# Patient Record
Sex: FEMALE | Race: WHITE | NOT HISPANIC OR LATINO | Employment: FULL TIME | URBAN - METROPOLITAN AREA
[De-identification: names, ages, dates, MRNs, and addresses within clinical notes are randomized per-mention and may not be internally consistent; named-entity substitution may affect disease eponyms.]

---

## 2017-11-11 ENCOUNTER — HOSPITAL ENCOUNTER (INPATIENT)
Facility: MEDICAL CENTER | Age: 47
LOS: 4 days | DRG: 392 | End: 2017-11-15
Attending: EMERGENCY MEDICINE | Admitting: INTERNAL MEDICINE
Payer: COMMERCIAL

## 2017-11-11 ENCOUNTER — RESOLUTE PROFESSIONAL BILLING HOSPITAL PROF FEE (OUTPATIENT)
Dept: HOSPITALIST | Facility: MEDICAL CENTER | Age: 47
End: 2017-11-11
Payer: COMMERCIAL

## 2017-11-11 PROBLEM — E86.0 DEHYDRATION: Status: ACTIVE | Noted: 2017-11-11

## 2017-11-11 PROBLEM — G89.29 CHRONIC PAIN: Status: ACTIVE | Noted: 2017-11-11

## 2017-11-11 PROBLEM — A08.4 VIRAL GASTROENTERITIS: Status: ACTIVE | Noted: 2017-11-11

## 2017-11-11 PROBLEM — E83.42 HYPOMAGNESEMIA: Status: ACTIVE | Noted: 2017-11-11

## 2017-11-11 PROBLEM — E87.6 HYPOKALEMIA: Status: ACTIVE | Noted: 2017-11-11

## 2017-11-11 PROBLEM — Z72.0 TOBACCO ABUSE: Status: ACTIVE | Noted: 2017-11-11

## 2017-11-11 LAB
ALBUMIN SERPL BCP-MCNC: 4.6 G/DL (ref 3.2–4.9)
ALBUMIN/GLOB SERPL: 1 G/DL
ALP SERPL-CCNC: 99 U/L (ref 30–99)
ALT SERPL-CCNC: 20 U/L (ref 2–50)
ANION GAP SERPL CALC-SCNC: 9 MMOL/L (ref 0–11.9)
AST SERPL-CCNC: 26 U/L (ref 12–45)
BASOPHILS # BLD AUTO: 0.5 % (ref 0–1.8)
BASOPHILS # BLD: 0.03 K/UL (ref 0–0.12)
BILIRUB SERPL-MCNC: 0.9 MG/DL (ref 0.1–1.5)
BUN SERPL-MCNC: 21 MG/DL (ref 8–22)
CALCIUM SERPL-MCNC: 9.4 MG/DL (ref 8.4–10.2)
CHLORIDE SERPL-SCNC: 105 MMOL/L (ref 96–112)
CO2 SERPL-SCNC: 17 MMOL/L (ref 20–33)
CREAT SERPL-MCNC: 1.29 MG/DL (ref 0.5–1.4)
EOSINOPHIL # BLD AUTO: 0.15 K/UL (ref 0–0.51)
EOSINOPHIL NFR BLD: 2.3 % (ref 0–6.9)
ERYTHROCYTE [DISTWIDTH] IN BLOOD BY AUTOMATED COUNT: 45 FL (ref 35.9–50)
GFR SERPL CREATININE-BSD FRML MDRD: 44 ML/MIN/1.73 M 2
GLOBULIN SER CALC-MCNC: 4.8 G/DL (ref 1.9–3.5)
GLUCOSE SERPL-MCNC: 121 MG/DL (ref 65–99)
HCT VFR BLD AUTO: 50.9 % (ref 37–47)
HGB BLD-MCNC: 17.8 G/DL (ref 12–16)
IMM GRANULOCYTES # BLD AUTO: 0.01 K/UL (ref 0–0.11)
IMM GRANULOCYTES NFR BLD AUTO: 0.2 % (ref 0–0.9)
LIPASE SERPL-CCNC: 22 U/L (ref 7–58)
LYMPHOCYTES # BLD AUTO: 1.44 K/UL (ref 1–4.8)
LYMPHOCYTES NFR BLD: 22.1 % (ref 22–41)
MAGNESIUM SERPL-MCNC: 1.5 MG/DL (ref 1.5–2.5)
MCH RBC QN AUTO: 32 PG (ref 27–33)
MCHC RBC AUTO-ENTMCNC: 35 G/DL (ref 33.6–35)
MCV RBC AUTO: 91.5 FL (ref 81.4–97.8)
MONOCYTES # BLD AUTO: 0.59 K/UL (ref 0–0.85)
MONOCYTES NFR BLD AUTO: 9 % (ref 0–13.4)
NEUTROPHILS # BLD AUTO: 4.3 K/UL (ref 2–7.15)
NEUTROPHILS NFR BLD: 65.9 % (ref 44–72)
NRBC # BLD AUTO: 0 K/UL
NRBC BLD AUTO-RTO: 0 /100 WBC
PLATELET # BLD AUTO: 125 K/UL (ref 164–446)
PMV BLD AUTO: 10.1 FL (ref 9–12.9)
POTASSIUM SERPL-SCNC: 2.4 MMOL/L (ref 3.6–5.5)
PROT SERPL-MCNC: 9.4 G/DL (ref 6–8.2)
RBC # BLD AUTO: 5.56 M/UL (ref 4.2–5.4)
SODIUM SERPL-SCNC: 131 MMOL/L (ref 135–145)
WBC # BLD AUTO: 6.5 K/UL (ref 4.8–10.8)

## 2017-11-11 PROCEDURE — 80053 COMPREHEN METABOLIC PANEL: CPT

## 2017-11-11 PROCEDURE — A9270 NON-COVERED ITEM OR SERVICE: HCPCS

## 2017-11-11 PROCEDURE — 700105 HCHG RX REV CODE 258: Performed by: INTERNAL MEDICINE

## 2017-11-11 PROCEDURE — 99285 EMERGENCY DEPT VISIT HI MDM: CPT

## 2017-11-11 PROCEDURE — 99223 1ST HOSP IP/OBS HIGH 75: CPT | Mod: 25 | Performed by: INTERNAL MEDICINE

## 2017-11-11 PROCEDURE — 770020 HCHG ROOM/CARE - TELE (206)

## 2017-11-11 PROCEDURE — 700102 HCHG RX REV CODE 250 W/ 637 OVERRIDE(OP)

## 2017-11-11 PROCEDURE — 700111 HCHG RX REV CODE 636 W/ 250 OVERRIDE (IP): Performed by: EMERGENCY MEDICINE

## 2017-11-11 PROCEDURE — 83690 ASSAY OF LIPASE: CPT

## 2017-11-11 PROCEDURE — A9270 NON-COVERED ITEM OR SERVICE: HCPCS | Performed by: INTERNAL MEDICINE

## 2017-11-11 PROCEDURE — 99407 BEHAV CHNG SMOKING > 10 MIN: CPT | Performed by: INTERNAL MEDICINE

## 2017-11-11 PROCEDURE — 700111 HCHG RX REV CODE 636 W/ 250 OVERRIDE (IP): Performed by: INTERNAL MEDICINE

## 2017-11-11 PROCEDURE — 36415 COLL VENOUS BLD VENIPUNCTURE: CPT

## 2017-11-11 PROCEDURE — 96375 TX/PRO/DX INJ NEW DRUG ADDON: CPT

## 2017-11-11 PROCEDURE — 96361 HYDRATE IV INFUSION ADD-ON: CPT

## 2017-11-11 PROCEDURE — 85025 COMPLETE CBC W/AUTO DIFF WBC: CPT

## 2017-11-11 PROCEDURE — 93005 ELECTROCARDIOGRAM TRACING: CPT | Performed by: EMERGENCY MEDICINE

## 2017-11-11 PROCEDURE — 96365 THER/PROPH/DIAG IV INF INIT: CPT

## 2017-11-11 PROCEDURE — 700105 HCHG RX REV CODE 258: Performed by: EMERGENCY MEDICINE

## 2017-11-11 PROCEDURE — 83735 ASSAY OF MAGNESIUM: CPT

## 2017-11-11 PROCEDURE — 700102 HCHG RX REV CODE 250 W/ 637 OVERRIDE(OP): Performed by: INTERNAL MEDICINE

## 2017-11-11 RX ORDER — ACETAMINOPHEN 325 MG/1
650 TABLET ORAL EVERY 6 HOURS PRN
Status: DISCONTINUED | OUTPATIENT
Start: 2017-11-11 | End: 2017-11-15 | Stop reason: HOSPADM

## 2017-11-11 RX ORDER — CYCLOBENZAPRINE HCL 10 MG
10 TABLET ORAL
COMMUNITY
End: 2020-09-01

## 2017-11-11 RX ORDER — ALBUTEROL SULFATE 90 UG/1
2 AEROSOL, METERED RESPIRATORY (INHALATION) EVERY 6 HOURS PRN
Status: DISCONTINUED | OUTPATIENT
Start: 2017-11-11 | End: 2017-11-15 | Stop reason: HOSPADM

## 2017-11-11 RX ORDER — MAGNESIUM SULFATE HEPTAHYDRATE 40 MG/ML
2 INJECTION, SOLUTION INTRAVENOUS ONCE
Status: COMPLETED | OUTPATIENT
Start: 2017-11-11 | End: 2017-11-11

## 2017-11-11 RX ORDER — CYCLOBENZAPRINE HCL 10 MG
10 TABLET ORAL
Status: DISCONTINUED | OUTPATIENT
Start: 2017-11-11 | End: 2017-11-12

## 2017-11-11 RX ORDER — SODIUM CHLORIDE 9 MG/ML
INJECTION, SOLUTION INTRAVENOUS CONTINUOUS
Status: DISCONTINUED | OUTPATIENT
Start: 2017-11-11 | End: 2017-11-12

## 2017-11-11 RX ORDER — PROMETHAZINE HYDROCHLORIDE 25 MG/1
12.5-25 TABLET ORAL EVERY 4 HOURS PRN
Status: DISCONTINUED | OUTPATIENT
Start: 2017-11-11 | End: 2017-11-15 | Stop reason: HOSPADM

## 2017-11-11 RX ORDER — POTASSIUM CHLORIDE 7.45 MG/ML
10 INJECTION INTRAVENOUS ONCE
Status: COMPLETED | OUTPATIENT
Start: 2017-11-11 | End: 2017-11-11

## 2017-11-11 RX ORDER — PROMETHAZINE HYDROCHLORIDE 25 MG/1
12.5-25 SUPPOSITORY RECTAL EVERY 4 HOURS PRN
Status: DISCONTINUED | OUTPATIENT
Start: 2017-11-11 | End: 2017-11-15 | Stop reason: HOSPADM

## 2017-11-11 RX ORDER — FAMOTIDINE 40 MG/1
40 TABLET, FILM COATED ORAL
COMMUNITY
End: 2020-09-01

## 2017-11-11 RX ORDER — OMEPRAZOLE 20 MG/1
40 CAPSULE, DELAYED RELEASE ORAL DAILY
Status: DISCONTINUED | OUTPATIENT
Start: 2017-11-11 | End: 2017-11-12

## 2017-11-11 RX ORDER — OMEPRAZOLE 40 MG/1
40 CAPSULE, DELAYED RELEASE ORAL EVERY MORNING
COMMUNITY
End: 2020-09-01

## 2017-11-11 RX ORDER — DIPHENHYDRAMINE HCL 25 MG
50 TABLET ORAL 2 TIMES DAILY PRN
COMMUNITY
End: 2022-09-23

## 2017-11-11 RX ORDER — NICOTINE 21 MG/24HR
14 PATCH, TRANSDERMAL 24 HOURS TRANSDERMAL
Status: DISCONTINUED | OUTPATIENT
Start: 2017-11-11 | End: 2017-11-15 | Stop reason: HOSPADM

## 2017-11-11 RX ORDER — ONDANSETRON 4 MG/1
4 TABLET, ORALLY DISINTEGRATING ORAL EVERY 8 HOURS PRN
COMMUNITY
End: 2020-09-01

## 2017-11-11 RX ORDER — FENTANYL 25 UG/1
1 PATCH TRANSDERMAL
COMMUNITY
End: 2020-09-01

## 2017-11-11 RX ORDER — SODIUM CHLORIDE 9 MG/ML
1000 INJECTION, SOLUTION INTRAVENOUS ONCE
Status: COMPLETED | OUTPATIENT
Start: 2017-11-11 | End: 2017-11-11

## 2017-11-11 RX ORDER — ONDANSETRON 2 MG/ML
4 INJECTION INTRAMUSCULAR; INTRAVENOUS ONCE
Status: COMPLETED | OUTPATIENT
Start: 2017-11-11 | End: 2017-11-11

## 2017-11-11 RX ORDER — FENTANYL 25 UG/1
1 PATCH TRANSDERMAL
Status: DISCONTINUED | OUTPATIENT
Start: 2017-11-11 | End: 2017-11-15 | Stop reason: HOSPADM

## 2017-11-11 RX ORDER — POTASSIUM CHLORIDE 20 MEQ/1
TABLET, EXTENDED RELEASE ORAL
Status: COMPLETED
Start: 2017-11-11 | End: 2017-11-11

## 2017-11-11 RX ORDER — TOPIRAMATE 25 MG/1
50 TABLET ORAL 2 TIMES DAILY
Status: DISCONTINUED | OUTPATIENT
Start: 2017-11-11 | End: 2017-11-15 | Stop reason: HOSPADM

## 2017-11-11 RX ORDER — TOPIRAMATE 50 MG/1
50 TABLET, FILM COATED ORAL 2 TIMES DAILY
COMMUNITY
End: 2020-09-01

## 2017-11-11 RX ORDER — POTASSIUM CHLORIDE 7.45 MG/ML
10 INJECTION INTRAVENOUS
Status: COMPLETED | OUTPATIENT
Start: 2017-11-11 | End: 2017-11-11

## 2017-11-11 RX ORDER — ONDANSETRON 2 MG/ML
4 INJECTION INTRAMUSCULAR; INTRAVENOUS EVERY 4 HOURS PRN
Status: DISCONTINUED | OUTPATIENT
Start: 2017-11-11 | End: 2017-11-15 | Stop reason: HOSPADM

## 2017-11-11 RX ORDER — FAMOTIDINE 20 MG/1
40 TABLET, FILM COATED ORAL
Status: DISCONTINUED | OUTPATIENT
Start: 2017-11-11 | End: 2017-11-15 | Stop reason: HOSPADM

## 2017-11-11 RX ORDER — ONDANSETRON 4 MG/1
4 TABLET, ORALLY DISINTEGRATING ORAL EVERY 4 HOURS PRN
Status: DISCONTINUED | OUTPATIENT
Start: 2017-11-11 | End: 2017-11-15 | Stop reason: HOSPADM

## 2017-11-11 RX ORDER — ALBUTEROL SULFATE 90 UG/1
2 AEROSOL, METERED RESPIRATORY (INHALATION) EVERY 6 HOURS PRN
COMMUNITY
End: 2020-09-01

## 2017-11-11 RX ORDER — POTASSIUM CHLORIDE 20 MEQ/1
40 TABLET, EXTENDED RELEASE ORAL ONCE
Status: COMPLETED | OUTPATIENT
Start: 2017-11-11 | End: 2017-11-11

## 2017-11-11 RX ORDER — OXYCODONE HYDROCHLORIDE 10 MG/1
10 TABLET ORAL EVERY 6 HOURS PRN
Status: DISCONTINUED | OUTPATIENT
Start: 2017-11-11 | End: 2017-11-15 | Stop reason: HOSPADM

## 2017-11-11 RX ORDER — OXYCODONE HYDROCHLORIDE 10 MG/1
10 TABLET ORAL EVERY 6 HOURS PRN
COMMUNITY
End: 2020-09-01

## 2017-11-11 RX ADMIN — POTASSIUM CHLORIDE 40 MEQ: 1500 TABLET, EXTENDED RELEASE ORAL at 15:27

## 2017-11-11 RX ADMIN — OMEPRAZOLE 40 MG: 20 CAPSULE, DELAYED RELEASE ORAL at 17:11

## 2017-11-11 RX ADMIN — FAMOTIDINE 40 MG: 20 TABLET ORAL at 20:09

## 2017-11-11 RX ADMIN — POTASSIUM CHLORIDE 40 MEQ: 20 TABLET, EXTENDED RELEASE ORAL at 15:27

## 2017-11-11 RX ADMIN — POTASSIUM CHLORIDE 10 MEQ: 10 INJECTION, SOLUTION INTRAVENOUS at 15:50

## 2017-11-11 RX ADMIN — OXYCODONE HYDROCHLORIDE 10 MG: 10 TABLET ORAL at 17:11

## 2017-11-11 RX ADMIN — NICOTINE 14 MG: 14 PATCH, EXTENDED RELEASE TRANSDERMAL at 15:49

## 2017-11-11 RX ADMIN — SODIUM CHLORIDE 1000 ML: 900 INJECTION, SOLUTION INTRAVENOUS at 12:30

## 2017-11-11 RX ADMIN — SODIUM CHLORIDE: 9 INJECTION, SOLUTION INTRAVENOUS at 15:49

## 2017-11-11 RX ADMIN — MAGNESIUM SULFATE IN WATER 2 G: 40 INJECTION, SOLUTION INTRAVENOUS at 19:44

## 2017-11-11 RX ADMIN — FENTANYL 1 PATCH: 25 PATCH TRANSDERMAL at 17:11

## 2017-11-11 RX ADMIN — POTASSIUM CHLORIDE 10 MEQ: 10 INJECTION, SOLUTION INTRAVENOUS at 13:28

## 2017-11-11 RX ADMIN — POTASSIUM CHLORIDE 10 MEQ: 10 INJECTION, SOLUTION INTRAVENOUS at 19:44

## 2017-11-11 RX ADMIN — ONDANSETRON 4 MG: 2 INJECTION INTRAMUSCULAR; INTRAVENOUS at 12:30

## 2017-11-11 RX ADMIN — POTASSIUM CHLORIDE 10 MEQ: 10 INJECTION, SOLUTION INTRAVENOUS at 17:00

## 2017-11-11 RX ADMIN — TOPIRAMATE 50 MG: 25 TABLET, FILM COATED ORAL at 17:10

## 2017-11-11 RX ADMIN — CYCLOBENZAPRINE HYDROCHLORIDE 10 MG: 10 TABLET, FILM COATED ORAL at 20:09

## 2017-11-11 ASSESSMENT — PATIENT HEALTH QUESTIONNAIRE - PHQ9
1. LITTLE INTEREST OR PLEASURE IN DOING THINGS: NOT AT ALL
SUM OF ALL RESPONSES TO PHQ9 QUESTIONS 1 AND 2: 0
2. FEELING DOWN, DEPRESSED, IRRITABLE, OR HOPELESS: NOT AT ALL
SUM OF ALL RESPONSES TO PHQ QUESTIONS 1-9: 0

## 2017-11-11 ASSESSMENT — ENCOUNTER SYMPTOMS
CHILLS: 1
HEADACHES: 0
SORE THROAT: 0
COUGH: 0
ABDOMINAL PAIN: 1
DIARRHEA: 1
SHORTNESS OF BREATH: 0
NAUSEA: 1
VOMITING: 1
FEVER: 0
FLANK PAIN: 0

## 2017-11-11 ASSESSMENT — LIFESTYLE VARIABLES
ALCOHOL_USE: NO
EVER_SMOKED: YES
EVER_SMOKED: YES

## 2017-11-11 ASSESSMENT — PAIN SCALES - GENERAL
PAINLEVEL_OUTOF10: 0
PAINLEVEL_OUTOF10: 8
PAINLEVEL_OUTOF10: 6
PAINLEVEL_OUTOF10: 0

## 2017-11-11 NOTE — H&P
Hospital Medicine History and Physical    Date of Service  11/11/2017    Chief Complaint  Chief Complaint   Patient presents with   • Nausea/Vomiting/Diarrhea       History of Presenting Illness  47 y.o. femaleWith a past medical history of lupus, fibromyalgia who presented 11/11/2017 with nausea, vomiting and diarrhea for the past 4 days. Patient reports profuse watery diarrhea with no blood in it. She reports she has diarrhea 1-2 times every hour for the past 3 days. She also reports nausea and vomiting has been unable to keep much food or fluids down. She notes some generalized abdominal cramping. She also reports generalized weakness, malaise and fatigue. She denies any fever, chest pain, shortness of breath, cough or headache. In the ER she was tachycardic on admission and found to have a sodium of 131, potassium of 2.4 and mag of 1.5. She is a BUN of 21 and creatinine 1.29 with a estimated GFR of 44. She'll be admitted to the observation unit with IV replacement of her electrolytes and IV fluid hydration.      Primary Care Physician  Cipriano Gomez M.D.    Consultants  None    Code Status  Full Code    Review of Systems  Review of Systems   Constitutional: Positive for chills and malaise/fatigue. Negative for fever.   HENT: Negative for sore throat.    Respiratory: Negative for cough and shortness of breath.    Cardiovascular: Negative for chest pain.   Gastrointestinal: Positive for abdominal pain, diarrhea, nausea and vomiting.   Genitourinary: Negative for dysuria, flank pain and hematuria.   Neurological: Negative for headaches.   All other systems reviewed and are negative.       Past Medical History  Past Medical History:   Diagnosis Date   • Heart burn    • Indigestion    • Liver disease     Auto immune hepatitis   • Lupus    • Other specified disorder of intestines    • Other specified symptom associated with female genital organs     tubiligation       Surgical History  Past Surgical History:    Procedure Laterality Date   • HYSTEROSCOPY WITH VIDEO OPERATIVE  2013    Performed by Robin Frederick M.D. at SURGERY [facility]   • LAPAROSCOPY  2012    Performed by Hayes Lugo M.D. at SURGERY [facility]   • GASTROSCOPY  2012    Performed by Hayes Lugo M.D. at SURGERY [facility]   • BOWEL RESECTION LAPAROSCOPIC  2012    Performed by HAYES LUGO at SURGERY [facility]   • LYSIS ADHESIONS GENERAL  2012    Performed by HAYES LUGO at SURGERY [facility]   • OTHER ABDOMINAL SURGERY      Kristi En Y gastric bypass   • OTHER ORTHOPEDIC SURGERY      Left ankle surgery   • OTHER ORTHOPEDIC SURGERY      Left knee surgery   • GYN SURGERY      tubal ligation   • GYN SURGERY         • PB REMOVE TONSILS/ADENOIDS,<11 Y/O     • PRIMARY C SECTION             Medications  No current facility-administered medications on file prior to encounter.      No current outpatient prescriptions on file prior to encounter.       Family History  History reviewed. No pertinent family history.     Social History  Social History   Substance Use Topics   • Smoking status: Current Every Day Smoker     Packs/day: 0.50     Years: 30.00     Types: Cigarettes   • Smokeless tobacco: Never Used   • Alcohol use No       Allergies  Allergies   Allergen Reactions   • Asa [Aspirin] Unspecified     GI upset, bleeding.   • Nsaids Nausea     GI upset, bleeding.   • Tape Unspecified     Blisters all over, Paper tape is ok        Physical Exam  Laboratory   Hemodynamics  Temp (24hrs), Av.6 °C (97.8 °F), Min:36.6 °C (97.8 °F), Max:36.6 °C (97.8 °F)   Temperature: 36.6 °C (97.8 °F)  Pulse  Av.5  Min: 79  Max: 118    Blood Pressure: 128/93, NIBP: (!) 96/65      Respiratory      Respiration: 20, Pulse Oximetry: 98 %             Physical Exam   Constitutional: She is oriented to person, place, and time. She appears well-developed and well-nourished. No distress.    HENT:   Head: Normocephalic and atraumatic.   Oral mucous members are dry   Eyes: Conjunctivae are normal. Pupils are equal, round, and reactive to light.   Neck: Neck supple.   Cardiovascular: Regular rhythm and normal heart sounds.    Tachycardic   Pulmonary/Chest: Effort normal and breath sounds normal. No respiratory distress. She has no wheezes. She has no rales.   Abdominal: Soft. Bowel sounds are normal. She exhibits no distension. There is no tenderness. There is no rebound.   Musculoskeletal: Normal range of motion. She exhibits no edema or tenderness.   Neurological: She is alert and oriented to person, place, and time. No cranial nerve deficit. Coordination normal.   Strength and sensation intact bilaterally   Skin: Skin is dry.   Psychiatric: She has a normal mood and affect. Her behavior is normal.   Nursing note and vitals reviewed.      Recent Labs      11/11/17   1216   WBC  6.5   RBC  5.56*   HEMOGLOBIN  17.8*   HEMATOCRIT  50.9*   MCV  91.5   MCH  32.0   MCHC  35.0   RDW  45.0   PLATELETCT  125*   MPV  10.1     Recent Labs      11/11/17   1216   SODIUM  131*   POTASSIUM  2.4*   CHLORIDE  105   CO2  17*   GLUCOSE  121*   BUN  21   CREATININE  1.29   CALCIUM  9.4     Recent Labs      11/11/17   1216   ALTSGPT  20   ASTSGOT  26   ALKPHOSPHAT  99   TBILIRUBIN  0.9   LIPASE  22   GLUCOSE  121*                 Lab Results   Component Value Date    TROPONINI <0.01 07/01/2015     Urinalysis:    Lab Results  Component Value Date/Time   SPECGRAVITY 1.014 08/03/2016 0520   GLUCOSEUR Negative 08/03/2016 0520   KETONES Negative 08/03/2016 0520   NITRITE Negative 08/03/2016 0520   WBCURINE 20-50 (A) 08/03/2016 0520   RBCURINE 2-5 (A) 08/03/2016 0520   BACTERIA Few (A) 08/03/2016 0520   EPITHELCELL Few 08/03/2016 0520        Imaging  No orders to display        Assessment/Plan     I anticipate this patient is appropriate for observation status at this time.    Hypokalemia- (present on admission)   Assessment &  Plan    Profound hypokalemia with K of 2.4  Will replace with 4 K riders Q hour and 40 meq of po Kdur once  Replace magnesium  Follow BMP  Telemetry monitoring          Chronic pain   Assessment & Plan    stable  Continue home regimen of fentanyl and oxycodone        Tobacco abuse- (present on admission)   Assessment & Plan    Tobacco cessation education provided for more than 10 minutes, discussed options of nicotine patch, medical treatment with wellbutrin and chantix. Discussed the benefits of quitting smoking. Nicotine replacement protocol will be provided to the patient.          Viral gastroenteritis- (present on admission)   Assessment & Plan    Continue supportive care and IVF hydration  Zofran for N/V  Tylenol for fever or pain        Hypomagnesemia- (present on admission)   Assessment & Plan    Borderline low at 1.5  Replace with MgSO4 2 g IV once        Dehydration- (present on admission)   Assessment & Plan    IV fluid hydration with NS  Follow BMP  Zofran for N/V            VTE prophylaxis: SCD.

## 2017-11-11 NOTE — ED NOTES
Iv placed , labs drawn and taken to lab. Pt medicated as directed by SHEELA oneal, poc update given to pt. Further orders and dispo pending at this time. No further questions from pt at this time

## 2017-11-11 NOTE — ED NOTES
Assumed patient care. Pt assesement done.  Plan of care reviewed with patient. K-rider infusing with NS dilution. Monitor- SR. Pt resting comfortably. Waiting for admitting orders.

## 2017-11-11 NOTE — ED PROVIDER NOTES
ED Provider Note    CHIEF COMPLAINT  Chief Complaint   Patient presents with   • Nausea/Vomiting/Diarrhea       HPI  Elvia Roberto is a 47 y.o. female who presents for evaluation of nausea vomiting and nonbloody diarrhea. Patient reports symptoms for 3-4 days. She denies any antibiotic use medications foreign travel or drinking untreated water. She has no extensive abdominal surgical history including cholecystectomy bowel resection gastric bypass with Dr. Regalado. She specifically denies blood in her stool or vomit. Pain is crampy, diffuse nonradiating nonlocalizing. No flank pain or hematuria. She reports feeling unable to keep medicines or liquids down    REVIEW OF SYSTEMS  See HPI for further details. No high fevers chills night as well as weight loss numbness tingling or weakness All other systems are negative.     PAST MEDICAL HISTORY  Past Medical History:   Diagnosis Date   • Heart burn    • Indigestion    • Liver disease     Auto immune hepatitis   • Lupus    • Other specified disorder of intestines    • Other specified symptom associated with female genital organs     tubiligation       FAMILY HISTORY  No history of bleeding disorder    SOCIAL HISTORY  Social History     Social History   • Marital status: Single     Spouse name: N/A   • Number of children: N/A   • Years of education: N/A     Social History Main Topics   • Smoking status: Current Every Day Smoker     Packs/day: 0.50     Years: 30.00     Types: Cigarettes   • Smokeless tobacco: Never Used   • Alcohol use No   • Drug use: No   • Sexual activity: Not on file     Other Topics Concern   • Not on file     Social History Narrative   • No narrative on file   + tobacco  No ETOH    SURGICAL HISTORY  Past Surgical History:   Procedure Laterality Date   • HYSTEROSCOPY WITH VIDEO OPERATIVE  8/20/2013    Performed by Robin Frederick M.D. at SURGERY [facility]   • LAPAROSCOPY  12/16/2012    Performed by Hayes Regalado M.D. at SURGERY [facility]  ORS   • GASTROSCOPY  2012    Performed by Hayes Lugo M.D. at SURGERY [facility] ORS   • BOWEL RESECTION LAPAROSCOPIC  2012    Performed by HAYES LUGO at SURGERY [facility] ORS   • LYSIS ADHESIONS GENERAL  2012    Performed by HAYES LUGO at SURGERY [facility] ORS   • OTHER ABDOMINAL SURGERY      Kristi En Y gastric bypass   • OTHER ORTHOPEDIC SURGERY      Left ankle surgery   • OTHER ORTHOPEDIC SURGERY      Left knee surgery   • GYN SURGERY      tubal ligation   • GYN SURGERY         • PB REMOVE TONSILS/ADENOIDS,<13 Y/O     • PRIMARY C SECTION             CURRENT MEDICATIONS    Current Facility-Administered Medications:   •  NS infusion 1,000 mL, 1,000 mL, Intravenous, Once, Yusef Herrera M.D.  •  ondansetron (ZOFRAN) syringe/vial injection 4 mg, 4 mg, Intravenous, Once, Yusef Herrera M.D.    Current Outpatient Prescriptions:   •  cyclobenzaprine (FLEXERIL) 10 MG Tab, Take 10 mg by mouth 2 Times a Day. Indications: Muscle Spasm, Disp: , Rfl:   •  fentanyl (DURAGESIC) 25 MCG/HR PATCH 72 HR, Apply 1 Patch to skin as directed every 72 hours., Disp: , Rfl:   •  diphenhydrAMINE (BENADRYL) 25 MG Tab, Take 50 mg by mouth every 6 hours as needed for Itching. Indications: Hayfever, Disp: , Rfl:   •  B Complex Vitamins (B COMPLEX PO), Take 1 Tab by mouth every day., Disp: , Rfl:   •  therapeutic multivitamin-minerals (THERAGRAN-M) Tab, Take 1 Tab by mouth every day., Disp: , Rfl:   •  oxyCODONE CR (OXYCONTIN) 10 MG Tablet Extended Release 12 hour Abuse-Deterrent, Take 10 mg by mouth every 6 hours. Indications: Chronic Pain, Disp: , Rfl:   •  omeprazole (PRILOSEC) 40 MG delayed-release capsule, Take 40 mg by mouth every day., Disp: , Rfl:   •  famotidine (PEPCID) 40 MG Tab, Take 40 mg by mouth every bedtime., Disp: , Rfl:   •  ondansetron (ZOFRAN ODT) 4 MG TBDP, Take 4 mg by mouth every 8 hours as needed for Nausea/Vomiting., Disp: , Rfl:   •  vitamin B comp+C  "(ALLBEE WITH C) TABS, Take 1 Tab by mouth every day., Disp: , Rfl:   •  hydroxychloroquine (PLAQUENIL) 200 MG TABS, Take 400 mg by mouth every day., Disp: , Rfl:   •  calcium carbonate (OS-ZACK 500) 1250 MG TABS, Take 1 Tab by mouth every day., Disp: , Rfl:       ALLERGIES  Allergies   Allergen Reactions   • Asa [Aspirin] Unspecified     GI upset, bleeding.   • Nsaids Nausea     GI upset, bleeding.   • Tape Unspecified     Blisters all over, Paper tape is ok       PHYSICAL EXAM  VITAL SIGNS: /93   Pulse (!) 118   Temp 36.6 °C (97.8 °F)   Resp 20   Ht 1.575 m (5' 2\") Comment: Stated  Wt 64 kg (141 lb 1.5 oz)   LMP 05/04/2013   SpO2 94%   BMI 25.81 kg/m²       Constitutional: Well developed, Well nourished, No acute distress, Non-toxic appearance.   HENT: Normocephalic, Atraumatic, Bilateral external ears normal,Dry mucous membranes, No oral exudates, Nose normal.   Eyes: PERRLA, EOMI, Conjunctiva normal, No discharge.   Neck: Normal range of motion, No tenderness, Supple, No stridor.   Cardiovascular: Tachycardic, Normal rhythm, No murmurs, No rubs, No gallops.   Thorax & Lungs: Normal breath sounds, No respiratory distress, No wheezing, No chest tenderness.   Abdomen: Bowel sounds normal, Soft, No tenderness, No masses, No pulsatile masses.   Skin: Warm, Dry, No erythema, No rash.   Extremities: Intact distal pulses, No edema, No tenderness, No cyanosis, No clubbing.   Musculoskeletal: Good range of motion in all major joints. No tenderness to palpation or major deformities noted.   Neurologic: Alert & oriented x 3, Normal motor function, Normal sensory function, No focal deficits noted.   Psychiatric: Affect normal, Judgment normal, Mood normal.       RADIOLOGY/PROCEDURES  ***    COURSE & MEDICAL DECISION MAKING  Pertinent Labs & Imaging studies reviewed. (See chart for details)  ***    FINAL IMPRESSION  1. ***  2.   3.         Electronically signed by: Yusef Herrera, 11/11/2017 12:23 PM    "

## 2017-11-11 NOTE — ED PROVIDER NOTES
ED Provider Note    CHIEF COMPLAINT  Chief Complaint   Patient presents with   • Nausea/Vomiting/Diarrhea       HPI  Elvia Roberto is a 47 y.o. female who presents for evaluation of nausea vomiting and nonbloody diarrhea. The patient reports symptoms for 3-4 days. She denies any antibiotic use, foreign travel, drinking untreated water. She has an extensive abdominal surgical history including cholecystectomy, bowel resection and gastric bypass with Dr. Regalado. She has been unable to keep anything down. Pain is described as crampy diffuse nonradiating nonlocalizing no flank pain or hematuria. She has not been able to keep any of her liquids down    REVIEW OF SYSTEMS  See HPI for further details. No high fevers chills night sweats as weight loss numbness tingling or weakness All other systems are negative.     PAST MEDICAL HISTORY  Past Medical History:   Diagnosis Date   • Heart burn    • Indigestion    • Liver disease     Auto immune hepatitis   • Lupus    • Other specified disorder of intestines    • Other specified symptom associated with female genital organs     tubiligation     Family history  No history of bleeding disorder    SOCIAL HISTORY  Social History     Social History   • Marital status: Single     Spouse name: N/A   • Number of children: N/A   • Years of education: N/A     Social History Main Topics   • Smoking status: Current Every Day Smoker     Packs/day: 0.50     Years: 30.00     Types: Cigarettes   • Smokeless tobacco: Never Used   • Alcohol use No   • Drug use: No   • Sexual activity: Not on file     Other Topics Concern   • Not on file     Social History Narrative   • No narrative on file   No IV drugs    SURGICAL HISTORY  Past Surgical History:   Procedure Laterality Date   • HYSTEROSCOPY WITH VIDEO OPERATIVE  8/20/2013    Performed by Robin Frederick M.D. at SURGERY [facility] ORS   • LAPAROSCOPY  12/16/2012    Performed by Hayes Regalado M.D. at SURGERY [facility] ORS   • GASTROSCOPY   2012    Performed by Hayes Lugo M.D. at SURGERY [facility] ORS   • BOWEL RESECTION LAPAROSCOPIC  2012    Performed by HAYES LUGO at SURGERY [facility] ORS   • LYSIS ADHESIONS GENERAL  2012    Performed by HAYES LUGO at SURGERY [facility] ORS   • OTHER ABDOMINAL SURGERY      Kristi En Y gastric bypass   • OTHER ORTHOPEDIC SURGERY      Left ankle surgery   • OTHER ORTHOPEDIC SURGERY      Left knee surgery   • GYN SURGERY      tubal ligation   • GYN SURGERY         • PB REMOVE TONSILS/ADENOIDS,<13 Y/O     • PRIMARY C SECTION             CURRENT MEDICATIONS    Current Facility-Administered Medications:   •  potassium chloride in water (KCL) ivpb 10 mEq, 10 mEq, Intravenous, Once, Yusef Herrera M.D., Last Rate: 100 mL/hr at 17 1328, 10 mEq at 17 1328    Current Outpatient Prescriptions:   •  cyclobenzaprine (FLEXERIL) 10 MG Tab, Take 10 mg by mouth 2 Times a Day. Indications: Muscle Spasm, Disp: , Rfl:   •  fentanyl (DURAGESIC) 25 MCG/HR PATCH 72 HR, Apply 1 Patch to skin as directed every 72 hours., Disp: , Rfl:   •  diphenhydrAMINE (BENADRYL) 25 MG Tab, Take 50 mg by mouth every 6 hours as needed for Itching. Indications: Hayfever, Disp: , Rfl:   •  B Complex Vitamins (B COMPLEX PO), Take 1 Tab by mouth every day., Disp: , Rfl:   •  therapeutic multivitamin-minerals (THERAGRAN-M) Tab, Take 1 Tab by mouth every day., Disp: , Rfl:   •  oxyCODONE CR (OXYCONTIN) 10 MG Tablet Extended Release 12 hour Abuse-Deterrent, Take 10 mg by mouth every 6 hours. Indications: Chronic Pain, Disp: , Rfl:   •  omeprazole (PRILOSEC) 40 MG delayed-release capsule, Take 40 mg by mouth every day., Disp: , Rfl:   •  famotidine (PEPCID) 40 MG Tab, Take 40 mg by mouth every bedtime., Disp: , Rfl:   •  ondansetron (ZOFRAN ODT) 4 MG TBDP, Take 4 mg by mouth every 8 hours as needed for Nausea/Vomiting., Disp: , Rfl:   •  vitamin B comp+C (ALLBEE WITH C) TABS, Take 1 Tab by  "mouth every day., Disp: , Rfl:   •  hydroxychloroquine (PLAQUENIL) 200 MG TABS, Take 400 mg by mouth every day., Disp: , Rfl:   •  calcium carbonate (OS-ZACK 500) 1250 MG TABS, Take 1 Tab by mouth every day., Disp: , Rfl:       ALLERGIES  Allergies   Allergen Reactions   • Asa [Aspirin] Unspecified     GI upset, bleeding.   • Nsaids Nausea     GI upset, bleeding.   • Tape Unspecified     Blisters all over, Paper tape is ok       PHYSICAL EXAM  VITAL SIGNS: /93   Pulse (!) 118   Temp 36.6 °C (97.8 °F)   Resp 20   Ht 1.575 m (5' 2\") Comment: Stated  Wt 64 kg (141 lb 1.5 oz)   LMP 05/04/2013   SpO2 94%   BMI 25.81 kg/m²       Constitutional: Well developed, Well nourished, No acute distress, Non-toxic appearance.   HENT: Normocephalic, Atraumatic, Bilateral external ears normal, dry mucous membranes, No oral exudates, Nose normal.   Eyes: PERRLA, EOMI, Conjunctiva normal, No discharge.   Neck: Normal range of motion, No tenderness, Supple, No stridor.   Lymphatic: No lymphadenopathy noted.   Cardiovascular: Tachycardic, Normal rhythm, No murmurs, No rubs, No gallops.   Thorax & Lungs: Normal breath sounds, No respiratory distress, No wheezing, No chest tenderness.   Abdomen: Bowel sounds normal, Soft, No tenderness, No masses, No pulsatile masses.   Skin: Warm, Dry, No erythema, No rash.   Back: No tenderness, No CVA tenderness.   Extremities: Intact distal pulses, No edema, No tenderness, No cyanosis, No clubbing.   Musculoskeletal: Good range of motion in all major joints. No tenderness to palpation or major deformities noted.   Neurologic: Alert & oriented x 3, Normal motor function, Normal sensory function, No focal deficits noted.   Psychiatric: Anxious     Results for orders placed or performed during the hospital encounter of 11/11/17   CBC WITH DIFFERENTIAL   Result Value Ref Range    WBC 6.5 4.8 - 10.8 K/uL    RBC 5.56 (H) 4.20 - 5.40 M/uL    Hemoglobin 17.8 (H) 12.0 - 16.0 g/dL    Hematocrit 50.9 " (H) 37.0 - 47.0 %    MCV 91.5 81.4 - 97.8 fL    MCH 32.0 27.0 - 33.0 pg    MCHC 35.0 33.6 - 35.0 g/dL    RDW 45.0 35.9 - 50.0 fL    Platelet Count 125 (L) 164 - 446 K/uL    MPV 10.1 9.0 - 12.9 fL    Neutrophils-Polys 65.90 44.00 - 72.00 %    Lymphocytes 22.10 22.00 - 41.00 %    Monocytes 9.00 0.00 - 13.40 %    Eosinophils 2.30 0.00 - 6.90 %    Basophils 0.50 0.00 - 1.80 %    Immature Granulocytes 0.20 0.00 - 0.90 %    Nucleated RBC 0.00 /100 WBC    Neutrophils (Absolute) 4.30 2.00 - 7.15 K/uL    Lymphs (Absolute) 1.44 1.00 - 4.80 K/uL    Monos (Absolute) 0.59 0.00 - 0.85 K/uL    Eos (Absolute) 0.15 0.00 - 0.51 K/uL    Baso (Absolute) 0.03 0.00 - 0.12 K/uL    Immature Granulocytes (abs) 0.01 0.00 - 0.11 K/uL    NRBC (Absolute) 0.00 K/uL   COMP METABOLIC PANEL   Result Value Ref Range    Sodium 131 (L) 135 - 145 mmol/L    Potassium 2.4 (LL) 3.6 - 5.5 mmol/L    Chloride 105 96 - 112 mmol/L    Co2 17 (L) 20 - 33 mmol/L    Anion Gap 9.0 0.0 - 11.9    Glucose 121 (H) 65 - 99 mg/dL    Bun 21 8 - 22 mg/dL    Creatinine 1.29 0.50 - 1.40 mg/dL    Calcium 9.4 8.4 - 10.2 mg/dL    AST(SGOT) 26 12 - 45 U/L    ALT(SGPT) 20 2 - 50 U/L    Alkaline Phosphatase 99 30 - 99 U/L    Total Bilirubin 0.9 0.1 - 1.5 mg/dL    Albumin 4.6 3.2 - 4.9 g/dL    Total Protein 9.4 (H) 6.0 - 8.2 g/dL    Globulin 4.8 (H) 1.9 - 3.5 g/dL    A-G Ratio 1.0 g/dL   LIPASE   Result Value Ref Range    Lipase 22 7 - 58 U/L   ESTIMATED GFR   Result Value Ref Range    GFR If  53 (A) >60 mL/min/1.73 m 2    GFR If Non  44 (A) >60 mL/min/1.73 m 2   MAGNESIUM   Result Value Ref Range    Magnesium 1.5 1.5 - 2.5 mg/dL        COURSE & MEDICAL DECISION MAKING  Pertinent Labs & Imaging studies reviewed. (See chart for details)  An IV was established. The patient was given IV fluids and antiemetics. Here she did not have any evidence of peritonitis I did not feel that CT scanning is indicated. She does have profound hypokalemia with a  potassium of 2.4. Magnesium is on the low end at 1.5. Patient was tachycardic and dehydrated but no evidence of acute renal insufficiency or failure although her BUN and creatinine are elevated from baseline. She was given IV potassium and will be admitted to the hospitalist service for electrolyte repletion and IV fluids. I suspect she primarily has viral gastroenteritis    FINAL IMPRESSION  1. Nausea vomiting and diarrhea  2. Dehydration  3. Hypokalemia    Admission      Electronically signed by: Yusef Herrera, 11/11/2017 1:29 PM

## 2017-11-11 NOTE — ASSESSMENT & PLAN NOTE
Tobacco cessation education provided for more than 10 minutes, discussed options of nicotine patch, medical treatment with wellbutrin and chantix. Discussed the benefits of quitting smoking. Nicotine replacement protocol will be provided to the patient.

## 2017-11-11 NOTE — ED NOTES
Med rec complete per patient and Trly Uniq sadia  Allergies reviewed  No ORAL antibiotics in last 30 days

## 2017-11-11 NOTE — ED NOTES
tech from Lab called with critical result of K 2.4 at 1251. Critical lab result read back to tech.   Dr Giron.  notified of critical lab result at 1251.  Critical lab result read back by Dr. giron.

## 2017-11-12 LAB
ANION GAP SERPL CALC-SCNC: 7 MMOL/L (ref 0–11.9)
BASOPHILS # BLD AUTO: 0.5 % (ref 0–1.8)
BASOPHILS # BLD: 0.03 K/UL (ref 0–0.12)
BUN SERPL-MCNC: 13 MG/DL (ref 8–22)
C DIFF DNA SPEC QL NAA+PROBE: NEGATIVE
C DIFF TOX GENS STL QL NAA+PROBE: NEGATIVE
CALCIUM SERPL-MCNC: 8.4 MG/DL (ref 8.4–10.2)
CHLORIDE SERPL-SCNC: 115 MMOL/L (ref 96–112)
CO2 SERPL-SCNC: 14 MMOL/L (ref 20–33)
CREAT SERPL-MCNC: 0.95 MG/DL (ref 0.5–1.4)
EOSINOPHIL # BLD AUTO: 0.19 K/UL (ref 0–0.51)
EOSINOPHIL NFR BLD: 3.3 % (ref 0–6.9)
ERYTHROCYTE [DISTWIDTH] IN BLOOD BY AUTOMATED COUNT: 45.2 FL (ref 35.9–50)
GFR SERPL CREATININE-BSD FRML MDRD: >60 ML/MIN/1.73 M 2
GLUCOSE SERPL-MCNC: 102 MG/DL (ref 65–99)
HCT VFR BLD AUTO: 45.8 % (ref 37–47)
HGB BLD-MCNC: 15.4 G/DL (ref 12–16)
IMM GRANULOCYTES # BLD AUTO: 0.01 K/UL (ref 0–0.11)
IMM GRANULOCYTES NFR BLD AUTO: 0.2 % (ref 0–0.9)
LYMPHOCYTES # BLD AUTO: 1.39 K/UL (ref 1–4.8)
LYMPHOCYTES NFR BLD: 23.9 % (ref 22–41)
MAGNESIUM SERPL-MCNC: 1.9 MG/DL (ref 1.5–2.5)
MCH RBC QN AUTO: 31.2 PG (ref 27–33)
MCHC RBC AUTO-ENTMCNC: 33.6 G/DL (ref 33.6–35)
MCV RBC AUTO: 92.9 FL (ref 81.4–97.8)
MONOCYTES # BLD AUTO: 0.6 K/UL (ref 0–0.85)
MONOCYTES NFR BLD AUTO: 10.3 % (ref 0–13.4)
NEUTROPHILS # BLD AUTO: 3.59 K/UL (ref 2–7.15)
NEUTROPHILS NFR BLD: 61.8 % (ref 44–72)
NRBC # BLD AUTO: 0 K/UL
NRBC BLD AUTO-RTO: 0 /100 WBC
PLATELET # BLD AUTO: 90 K/UL (ref 164–446)
PMV BLD AUTO: 10.6 FL (ref 9–12.9)
POTASSIUM SERPL-SCNC: 2.4 MMOL/L (ref 3.6–5.5)
POTASSIUM SERPL-SCNC: 2.6 MMOL/L (ref 3.6–5.5)
RBC # BLD AUTO: 4.93 M/UL (ref 4.2–5.4)
SODIUM SERPL-SCNC: 136 MMOL/L (ref 135–145)
TSH SERPL DL<=0.005 MIU/L-ACNC: 0.61 UIU/ML (ref 0.35–5.5)
WBC # BLD AUTO: 5.8 K/UL (ref 4.8–10.8)

## 2017-11-12 PROCEDURE — 700101 HCHG RX REV CODE 250: Performed by: HOSPITALIST

## 2017-11-12 PROCEDURE — 700105 HCHG RX REV CODE 258: Performed by: INTERNAL MEDICINE

## 2017-11-12 PROCEDURE — 700111 HCHG RX REV CODE 636 W/ 250 OVERRIDE (IP): Performed by: INTERNAL MEDICINE

## 2017-11-12 PROCEDURE — 700102 HCHG RX REV CODE 250 W/ 637 OVERRIDE(OP)

## 2017-11-12 PROCEDURE — 700102 HCHG RX REV CODE 250 W/ 637 OVERRIDE(OP): Performed by: INTERNAL MEDICINE

## 2017-11-12 PROCEDURE — A9270 NON-COVERED ITEM OR SERVICE: HCPCS | Performed by: FAMILY MEDICINE

## 2017-11-12 PROCEDURE — 770020 HCHG ROOM/CARE - TELE (206)

## 2017-11-12 PROCEDURE — A9270 NON-COVERED ITEM OR SERVICE: HCPCS | Performed by: HOSPITALIST

## 2017-11-12 PROCEDURE — 83735 ASSAY OF MAGNESIUM: CPT

## 2017-11-12 PROCEDURE — 700111 HCHG RX REV CODE 636 W/ 250 OVERRIDE (IP): Performed by: FAMILY MEDICINE

## 2017-11-12 PROCEDURE — 700102 HCHG RX REV CODE 250 W/ 637 OVERRIDE(OP): Performed by: FAMILY MEDICINE

## 2017-11-12 PROCEDURE — A9270 NON-COVERED ITEM OR SERVICE: HCPCS

## 2017-11-12 PROCEDURE — 85025 COMPLETE CBC W/AUTO DIFF WBC: CPT

## 2017-11-12 PROCEDURE — 80048 BASIC METABOLIC PNL TOTAL CA: CPT

## 2017-11-12 PROCEDURE — A9270 NON-COVERED ITEM OR SERVICE: HCPCS | Performed by: INTERNAL MEDICINE

## 2017-11-12 PROCEDURE — 84443 ASSAY THYROID STIM HORMONE: CPT

## 2017-11-12 PROCEDURE — 700111 HCHG RX REV CODE 636 W/ 250 OVERRIDE (IP): Performed by: HOSPITALIST

## 2017-11-12 PROCEDURE — 99232 SBSQ HOSP IP/OBS MODERATE 35: CPT | Performed by: HOSPITALIST

## 2017-11-12 PROCEDURE — 700102 HCHG RX REV CODE 250 W/ 637 OVERRIDE(OP): Performed by: HOSPITALIST

## 2017-11-12 PROCEDURE — 84132 ASSAY OF SERUM POTASSIUM: CPT

## 2017-11-12 PROCEDURE — 87493 C DIFF AMPLIFIED PROBE: CPT

## 2017-11-12 RX ORDER — SODIUM CHLORIDE AND POTASSIUM CHLORIDE 150; 900 MG/100ML; MG/100ML
INJECTION, SOLUTION INTRAVENOUS CONTINUOUS
Status: DISCONTINUED | OUTPATIENT
Start: 2017-11-12 | End: 2017-11-15 | Stop reason: HOSPADM

## 2017-11-12 RX ORDER — POTASSIUM CHLORIDE 7.45 MG/ML
10 INJECTION INTRAVENOUS
Status: COMPLETED | OUTPATIENT
Start: 2017-11-12 | End: 2017-11-12

## 2017-11-12 RX ORDER — POTASSIUM CHLORIDE 20 MEQ/1
40 TABLET, EXTENDED RELEASE ORAL ONCE
Status: COMPLETED | OUTPATIENT
Start: 2017-11-12 | End: 2017-11-12

## 2017-11-12 RX ORDER — TOPIRAMATE 25 MG/1
TABLET ORAL
Status: COMPLETED
Start: 2017-11-12 | End: 2017-11-12

## 2017-11-12 RX ADMIN — OXYCODONE HYDROCHLORIDE 10 MG: 10 TABLET ORAL at 04:24

## 2017-11-12 RX ADMIN — POTASSIUM CHLORIDE 40 MEQ: 1500 TABLET, EXTENDED RELEASE ORAL at 05:56

## 2017-11-12 RX ADMIN — TOPIRAMATE 50 MG: 25 TABLET, FILM COATED ORAL at 20:20

## 2017-11-12 RX ADMIN — TOPIRAMATE 50 MG: 25 TABLET, FILM COATED ORAL at 04:24

## 2017-11-12 RX ADMIN — POTASSIUM CHLORIDE 10 MEQ: 10 INJECTION, SOLUTION INTRAVENOUS at 05:55

## 2017-11-12 RX ADMIN — SODIUM CHLORIDE AND POTASSIUM CHLORIDE: 9; 1.49 INJECTION, SOLUTION INTRAVENOUS at 14:30

## 2017-11-12 RX ADMIN — ONDANSETRON 4 MG: 2 INJECTION INTRAMUSCULAR; INTRAVENOUS at 04:19

## 2017-11-12 RX ADMIN — OMEPRAZOLE 40 MG: 20 CAPSULE, DELAYED RELEASE ORAL at 08:24

## 2017-11-12 RX ADMIN — ONDANSETRON 4 MG: 2 INJECTION INTRAMUSCULAR; INTRAVENOUS at 19:20

## 2017-11-12 RX ADMIN — POTASSIUM CHLORIDE 10 MEQ: 10 INJECTION, SOLUTION INTRAVENOUS at 16:00

## 2017-11-12 RX ADMIN — LOPERAMIDE HYDROCHLORIDE 2 MG: 1 SOLUTION ORAL at 17:22

## 2017-11-12 RX ADMIN — SODIUM CHLORIDE AND POTASSIUM CHLORIDE: 9; 1.49 INJECTION, SOLUTION INTRAVENOUS at 22:04

## 2017-11-12 RX ADMIN — POTASSIUM CHLORIDE 10 MEQ: 10 INJECTION, SOLUTION INTRAVENOUS at 14:30

## 2017-11-12 RX ADMIN — FAMOTIDINE 40 MG: 20 TABLET ORAL at 20:19

## 2017-11-12 RX ADMIN — POTASSIUM CHLORIDE 10 MEQ: 10 INJECTION, SOLUTION INTRAVENOUS at 15:00

## 2017-11-12 RX ADMIN — ONDANSETRON 4 MG: 2 INJECTION INTRAMUSCULAR; INTRAVENOUS at 08:24

## 2017-11-12 RX ADMIN — SODIUM CHLORIDE: 9 INJECTION, SOLUTION INTRAVENOUS at 00:03

## 2017-11-12 RX ADMIN — SODIUM CHLORIDE: 9 INJECTION, SOLUTION INTRAVENOUS at 06:01

## 2017-11-12 RX ADMIN — POTASSIUM CHLORIDE 10 MEQ: 10 INJECTION, SOLUTION INTRAVENOUS at 07:00

## 2017-11-12 RX ADMIN — NICOTINE 14 MG: 14 PATCH, EXTENDED RELEASE TRANSDERMAL at 05:55

## 2017-11-12 RX ADMIN — OXYCODONE HYDROCHLORIDE 10 MG: 10 TABLET ORAL at 15:23

## 2017-11-12 ASSESSMENT — ENCOUNTER SYMPTOMS
STRIDOR: 0
SPEECH CHANGE: 0
FEVER: 0
ABDOMINAL PAIN: 0
NERVOUS/ANXIOUS: 0
BLOOD IN STOOL: 0
EYE DISCHARGE: 0
HEARTBURN: 0
HEADACHES: 0
VOMITING: 0
COUGH: 0
NAUSEA: 0
DIARRHEA: 1
PALPITATIONS: 0
DIZZINESS: 0
SENSORY CHANGE: 0
CHILLS: 0
BACK PAIN: 0
DEPRESSION: 0
SHORTNESS OF BREATH: 0

## 2017-11-12 ASSESSMENT — PATIENT HEALTH QUESTIONNAIRE - PHQ9
SUM OF ALL RESPONSES TO PHQ9 QUESTIONS 1 AND 2: 0
1. LITTLE INTEREST OR PLEASURE IN DOING THINGS: NOT AT ALL
2. FEELING DOWN, DEPRESSED, IRRITABLE, OR HOPELESS: NOT AT ALL
SUM OF ALL RESPONSES TO PHQ QUESTIONS 1-9: 0

## 2017-11-12 ASSESSMENT — PAIN SCALES - GENERAL
PAINLEVEL_OUTOF10: 0
PAINLEVEL_OUTOF10: 7
PAINLEVEL_OUTOF10: 4
PAINLEVEL_OUTOF10: 7
PAINLEVEL_OUTOF10: 0

## 2017-11-12 NOTE — CARE PLAN
Problem: Safety  Goal: Will remain free from injury  Outcome: PROGRESSING AS EXPECTED  Self, up ad brooke with steady gait. CLIP. Pt calls for assist appropriately. Hourly rounding. Personal belongings within reach. Non-skid socks. Bed locked & in low position.          Problem: Knowledge Deficit  Goal: Knowledge of disease process/condition, treatment plan, diagnostic tests, and medications will improve  Outcome: PROGRESSING AS EXPECTED  POC discussed w/ pt. Understands disease process and treatment plan. Educated on new meds & procedures/tests. Repleting Mg and K+, will recheck labs in AM. Questions answered.

## 2017-11-12 NOTE — CARE PLAN
Problem: Safety  Goal: Will remain free from injury  Outcome: PROGRESSING AS EXPECTED  Pt calls for assistance. Up self. CLIP, belongings in reach. Pt verbalizes understanding of safety.    Problem: Bowel/Gastric:  Goal: Normal bowel function is maintained or improved  Outcome: PROGRESSING SLOWER THAN EXPECTED  Pt having diarrhea frequently. IVF infusing and electrolytes replaced. Pt medicated per MAR for nausea/vomiting. Stool sample sent to lab. Pt verbalizes understanding of GI POC.

## 2017-11-12 NOTE — PROGRESS NOTES
"1858 -- Assumed care of patient. Bedside report from ANAT Miller. POC discussed w/ pt. Lines patent. CLIP. Fall precautions in place.    2011 -- AOx4, up ad [place]. Afebrile. C/o mild 6/10 low back pain, no intervention needed at this time per pt. Assessment per doc flowsheet. PIV intact & patent. IVF, K-rider, and IV Mg infusing. Due NOC meds given (see MAR). States understanding of POC. No additional concerns at this time. CLIP. Personal belongings within reach. Non-skid socks. Bed locked & in low position.     2318 -- No status change. Pt resting comfortably. No signs of distress. Will monitor.    0201 -- No changes. Monitoring.    0432 -- Medicated for nausea and back pain (see MAR). C/o she's been up to bathroom multiple times and that \"if this continues I don't think I should go home.\" Per pt, she still having loose BMs.    0546 -- Chelsea from Lab called with critical result of potassium of 2.4 at 0509. Critical lab result read back to Chelsea. Dr. York notified of critical lab result at 0525.  Critical lab result read back by Dr. York. Telephone orders received for IV KCL 10mEq x2 doses and PO Kdur 40 mEq x1. RBV. Also notified MD of pt's diarrhea, order to collect stool sample to r/o Cdiff.    0602 -- Due AM meds given (see MAR). 1of2 K-rider infusing. Stool sample collected and sent to lab.    0701 -- Bedside report given to ANAT Ojeda. POC discussed w/ pt. Lines patent. Fall precautions in place.       Tele strip at 1917 shows sinus rhythm w/ HR of 82.     Measurements: 0.16 / 0.08 / 0.38    Tele Shift Summary:    Rhythm : SR  Rate : 70-89    Ectopy : Per CCT [place], pt had rare PVCs.     Telemetry monitoring strips placed in pt chart.      "

## 2017-11-12 NOTE — PROGRESS NOTES
Assessment completed. Due meds given. 2nd K rider infusing. Pt states no pain at this time. Some nausea, medicated per MAR. 2 loose/watery BM's this AM since 0700 per pt. Pt upself to bathroom, steady gait, no difficulties. Pt sitting up in bed, eating breakfast. No additional needs, will monitor.

## 2017-11-12 NOTE — PROGRESS NOTES
Transfer from [facility], report from [place].  TOF, Isolation for R/O Cdiff, 0/10 c/o pain.  Call light within reach and hourly rounding maintained.

## 2017-11-12 NOTE — DISCHARGE PLANNING
Care Transition Team Assessment    SW intern met with patient at bedside. Patient plans to discharge home with spouse when medically able. Patient given AD packet per their request. Patient has no questions or concerns at this time.     Information Source  Orientation : Oriented x 4  Information Given By: Patient  Informant's Name: Elvia   Who is responsible for making decisions for patient? : Patient    Readmission Evaluation  Is this a readmission?: No    Elopement Risk  Legal Hold: No  Ambulatory or Self Mobile in Wheelchair: Yes  Disoriented: No  Psychiatric Symptoms: None  History of Wandering: No  Elopement this Admit: No  Vocalizing Wanting to Leave: No  Displays Behaviors, Body Language Wanting to Leave: No-Not at Risk for Elopement  Elopement Risk: Not at Risk for Elopement    Interdisciplinary Discharge Planning  Does Admitting Nurse Feel This Could be a Complex Discharge?: No  Primary Care Physician: Dr. Gomez  Lives with - Patient's Self Care Capacity: Spouse  Patient or legal guardian wants to designate a caregiver (see row info): No  Support Systems: Family Member(s)  Housing / Facility: [address]  Do You Take your Prescribed Medications Regularly: Yes  Able to Return to Previous ADL's: Yes  Mobility Issues: No  Prior Services: None  Patient Expects to be Discharged to:: home  Assistance Needed: No  Durable Medical Equipment: Not Applicable    Discharge Preparedness  What is your plan after discharge?: Home with help  What are your discharge supports?: Spouse  Prior Functional Level: Independent with Activities of Daily Living  Difficulity with ADLs: None  Difficulity with IADLs: None    Functional Assesment  Prior Functional Level: Independent with Activities of Daily Living    Finances  Financial Barriers to Discharge: No  Prescription Coverage: Yes    Vision / Hearing Impairment  Vision Impairment : No  Hearing Impairment : No         Advance Directive  Advance Directive?: None  Advance Directive  offered?: AD Booklet given    Domestic Abuse  Have you ever been the victim of abuse or violence?: No  Physical Abuse or Sexual Abuse: No  Verbal Abuse or Emotional Abuse: No  Possible Abuse Reported to:: Not Applicable    Psychological Assessment  History of Substance Abuse: None  History of Psychiatric Problems: No  Non-compliant with Treatment: No  Newly Diagnosed Illness: No    Discharge Risks or Barriers  Discharge risks or barriers?: No    Anticipated Discharge Information  Anticipated discharge disposition: Home  Discharge Address: [address] [place] [address] [address]  Discharge Contact Phone Number: [phone]    This note has been reviewed by Bettye JARAMILLO

## 2017-11-12 NOTE — PROGRESS NOTES
Bedside report received from Charla COPPOLA. Assumed care. POC discussed. Pt resting comfortably in bed. Safety precautions in place.

## 2017-11-13 PROBLEM — A08.4 VIRAL GASTROENTERITIS: Status: RESOLVED | Noted: 2017-11-11 | Resolved: 2017-11-13

## 2017-11-13 PROBLEM — R19.7 DIARRHEA: Status: ACTIVE | Noted: 2017-11-13

## 2017-11-13 LAB
ANION GAP SERPL CALC-SCNC: 5 MMOL/L (ref 0–11.9)
BUN SERPL-MCNC: 9 MG/DL (ref 8–22)
CALCIUM SERPL-MCNC: 8.1 MG/DL (ref 8.4–10.2)
CHLORIDE SERPL-SCNC: 118 MMOL/L (ref 96–112)
CO2 SERPL-SCNC: 14 MMOL/L (ref 20–33)
CREAT SERPL-MCNC: 0.68 MG/DL (ref 0.5–1.4)
GFR SERPL CREATININE-BSD FRML MDRD: >60 ML/MIN/1.73 M 2
GLUCOSE SERPL-MCNC: 109 MG/DL (ref 65–99)
MAGNESIUM SERPL-MCNC: 1.5 MG/DL (ref 1.5–2.5)
POTASSIUM SERPL-SCNC: 2.8 MMOL/L (ref 3.6–5.5)
SODIUM SERPL-SCNC: 137 MMOL/L (ref 135–145)

## 2017-11-13 PROCEDURE — A9270 NON-COVERED ITEM OR SERVICE: HCPCS | Performed by: FAMILY MEDICINE

## 2017-11-13 PROCEDURE — A9270 NON-COVERED ITEM OR SERVICE: HCPCS | Performed by: INTERNAL MEDICINE

## 2017-11-13 PROCEDURE — A9270 NON-COVERED ITEM OR SERVICE: HCPCS | Performed by: HOSPITALIST

## 2017-11-13 PROCEDURE — 700102 HCHG RX REV CODE 250 W/ 637 OVERRIDE(OP): Performed by: FAMILY MEDICINE

## 2017-11-13 PROCEDURE — 3E0234Z INTRODUCTION OF SERUM, TOXOID AND VACCINE INTO MUSCLE, PERCUTANEOUS APPROACH: ICD-10-PCS | Performed by: INTERNAL MEDICINE

## 2017-11-13 PROCEDURE — 90471 IMMUNIZATION ADMIN: CPT

## 2017-11-13 PROCEDURE — 700102 HCHG RX REV CODE 250 W/ 637 OVERRIDE(OP): Performed by: INTERNAL MEDICINE

## 2017-11-13 PROCEDURE — 83735 ASSAY OF MAGNESIUM: CPT

## 2017-11-13 PROCEDURE — 80048 BASIC METABOLIC PNL TOTAL CA: CPT

## 2017-11-13 PROCEDURE — 90686 IIV4 VACC NO PRSV 0.5 ML IM: CPT | Performed by: INTERNAL MEDICINE

## 2017-11-13 PROCEDURE — 700102 HCHG RX REV CODE 250 W/ 637 OVERRIDE(OP): Performed by: HOSPITALIST

## 2017-11-13 PROCEDURE — 99232 SBSQ HOSP IP/OBS MODERATE 35: CPT | Performed by: HOSPITALIST

## 2017-11-13 PROCEDURE — 700111 HCHG RX REV CODE 636 W/ 250 OVERRIDE (IP): Performed by: HOSPITALIST

## 2017-11-13 PROCEDURE — 770020 HCHG ROOM/CARE - TELE (206)

## 2017-11-13 PROCEDURE — 700101 HCHG RX REV CODE 250: Performed by: HOSPITALIST

## 2017-11-13 PROCEDURE — 700111 HCHG RX REV CODE 636 W/ 250 OVERRIDE (IP): Performed by: INTERNAL MEDICINE

## 2017-11-13 RX ORDER — POTASSIUM CHLORIDE 7.45 MG/ML
10 INJECTION INTRAVENOUS
Status: COMPLETED | OUTPATIENT
Start: 2017-11-13 | End: 2017-11-13

## 2017-11-13 RX ORDER — POTASSIUM CHLORIDE 20 MEQ/1
40 TABLET, EXTENDED RELEASE ORAL ONCE
Status: COMPLETED | OUTPATIENT
Start: 2017-11-13 | End: 2017-11-13

## 2017-11-13 RX ORDER — MAGNESIUM SULFATE HEPTAHYDRATE 40 MG/ML
4 INJECTION, SOLUTION INTRAVENOUS ONCE
Status: COMPLETED | OUTPATIENT
Start: 2017-11-13 | End: 2017-11-13

## 2017-11-13 RX ADMIN — OXYCODONE HYDROCHLORIDE 10 MG: 10 TABLET ORAL at 06:13

## 2017-11-13 RX ADMIN — MAGNESIUM SULFATE IN WATER 4 G: 40 INJECTION, SOLUTION INTRAVENOUS at 18:33

## 2017-11-13 RX ADMIN — POTASSIUM CHLORIDE 10 MEQ: 10 INJECTION, SOLUTION INTRAVENOUS at 13:28

## 2017-11-13 RX ADMIN — INFLUENZA A VIRUS A/MICHIGAN/45/2015 X-275 (H1N1) ANTIGEN (FORMALDEHYDE INACTIVATED), INFLUENZA A VIRUS A/HONG KONG/4801/2014 X-263B (H3N2) ANTIGEN (FORMALDEHYDE INACTIVATED), INFLUENZA B VIRUS B/PHUKET/3073/2013 ANTIGEN (FORMALDEHYDE INACTIVATED), AND INFLUENZA B VIRUS B/BRISBANE/60/2008 ANTIGEN (FORMALDEHYDE INACTIVATED) 0.5 ML: 15; 15; 15; 15 INJECTION, SUSPENSION INTRAMUSCULAR at 06:07

## 2017-11-13 RX ADMIN — OXYCODONE HYDROCHLORIDE 10 MG: 10 TABLET ORAL at 13:28

## 2017-11-13 RX ADMIN — POTASSIUM CHLORIDE 10 MEQ: 10 INJECTION, SOLUTION INTRAVENOUS at 11:47

## 2017-11-13 RX ADMIN — POTASSIUM CHLORIDE 10 MEQ: 10 INJECTION, SOLUTION INTRAVENOUS at 08:17

## 2017-11-13 RX ADMIN — OXYCODONE HYDROCHLORIDE 10 MG: 10 TABLET ORAL at 20:12

## 2017-11-13 RX ADMIN — POTASSIUM CHLORIDE 10 MEQ: 10 INJECTION, SOLUTION INTRAVENOUS at 09:56

## 2017-11-13 RX ADMIN — POTASSIUM CHLORIDE 40 MEQ: 1500 TABLET, EXTENDED RELEASE ORAL at 06:58

## 2017-11-13 RX ADMIN — LOPERAMIDE HYDROCHLORIDE 2 MG: 1 SOLUTION ORAL at 19:13

## 2017-11-13 RX ADMIN — LOPERAMIDE HYDROCHLORIDE 2 MG: 1 SOLUTION ORAL at 06:13

## 2017-11-13 RX ADMIN — TOPIRAMATE 50 MG: 25 TABLET, FILM COATED ORAL at 18:32

## 2017-11-13 RX ADMIN — TOPIRAMATE 50 MG: 25 TABLET, FILM COATED ORAL at 06:05

## 2017-11-13 RX ADMIN — LOPERAMIDE HYDROCHLORIDE 2 MG: 1 SOLUTION ORAL at 12:33

## 2017-11-13 RX ADMIN — SODIUM CHLORIDE AND POTASSIUM CHLORIDE: 9; 1.49 INJECTION, SOLUTION INTRAVENOUS at 16:54

## 2017-11-13 RX ADMIN — NICOTINE 14 MG: 14 PATCH, EXTENDED RELEASE TRANSDERMAL at 06:06

## 2017-11-13 RX ADMIN — FAMOTIDINE 40 MG: 20 TABLET ORAL at 20:12

## 2017-11-13 RX ADMIN — ONDANSETRON 4 MG: 2 INJECTION INTRAMUSCULAR; INTRAVENOUS at 08:17

## 2017-11-13 RX ADMIN — SODIUM CHLORIDE AND POTASSIUM CHLORIDE: 9; 1.49 INJECTION, SOLUTION INTRAVENOUS at 06:10

## 2017-11-13 ASSESSMENT — PAIN SCALES - GENERAL
PAINLEVEL_OUTOF10: 6
PAINLEVEL_OUTOF10: 0
PAINLEVEL_OUTOF10: 5

## 2017-11-13 ASSESSMENT — ENCOUNTER SYMPTOMS
HEADACHES: 0
BLOOD IN STOOL: 0
NAUSEA: 0
EYE DISCHARGE: 0
SHORTNESS OF BREATH: 0
DIARRHEA: 1
CHILLS: 0
PALPITATIONS: 0
NERVOUS/ANXIOUS: 0
BACK PAIN: 0
DEPRESSION: 0
SPEECH CHANGE: 0
ABDOMINAL PAIN: 0
VOMITING: 0
FEVER: 0
DIZZINESS: 0

## 2017-11-13 NOTE — PROGRESS NOTES
[facility] Hospitalist Progress Note    Date of Service: 2017    Chief Complaint  47 y.o. female admitted 2017 with severe hypokalemia due to diarrhea for past 5 days prior to admit.    Interval Problem Update  Still with frequent diarrhea.  Will recheck Cdiff.  Imodium prn.  Diet discussed with patient. Replacing electrolytes.  No palpitations, muscle cramping nor vomiting.      Consultants/Specialty  none    Disposition  home        Review of Systems   Constitutional: Negative for chills and fever.   Eyes: Negative for discharge.   Respiratory: Negative for shortness of breath.    Cardiovascular: Negative for chest pain and palpitations.   Gastrointestinal: Positive for diarrhea. Negative for abdominal pain, blood in stool, melena, nausea and vomiting.   Genitourinary: Negative for dysuria and hematuria.   Musculoskeletal: Negative for back pain and joint pain.   Neurological: Negative for dizziness, speech change and headaches.   Psychiatric/Behavioral: Negative for depression. The patient is not nervous/anxious.       Physical Exam  Laboratory/Imaging   Hemodynamics  Temp (24hrs), Av.8 °C (98.2 °F), Min:36.3 °C (97.4 °F), Max:37 °C (98.6 °F)   Temperature: 36.9 °C (98.4 °F)  Pulse  Av.1  Min: 70  Max: 118    Blood Pressure: 110/69      Respiratory      Respiration: 18, Pulse Oximetry: 98 %     Work Of Breathing / Effort: Mild  RUL Breath Sounds: Diminished, RML Breath Sounds: Diminished, RLL Breath Sounds: Diminished, ROXANNA Breath Sounds: Diminished, LLL Breath Sounds: Diminished    Fluids    Intake/Output Summary (Last 24 hours) at 17 2100  Last data filed at 17 0600   Gross per 24 hour   Intake              800 ml   Output                0 ml   Net              800 ml       Nutrition  Orders Placed This Encounter   Procedures   • DIET ORDER     Standing Status:   Standing     Number of Occurrences:   1     Order Specific Question:   Diet:     Answer:   Diabetic [3]     Comments:    HIGH PROTEIN      Physical Exam   Constitutional: She appears well-developed and well-nourished. No distress.   HENT:   Head: Normocephalic and atraumatic.   Nose: Nose normal.   Mouth/Throat: Oropharynx is clear and moist.   Eyes: Conjunctivae and EOM are normal. Right eye exhibits no discharge. Left eye exhibits no discharge. No scleral icterus.   Neck: Normal range of motion. No tracheal deviation present.   Cardiovascular: Normal rate, regular rhythm, normal heart sounds and intact distal pulses.    No murmur heard.  Pulses:       Radial pulses are 2+ on the right side, and 2+ on the left side.   Pulmonary/Chest: Effort normal and breath sounds normal. No respiratory distress. She has no wheezes.   Abdominal: Soft. Bowel sounds are normal. She exhibits no distension. There is no tenderness.   Musculoskeletal: She exhibits no edema.   Neurological: She is alert. No cranial nerve deficit.   Skin: Skin is warm. She is not diaphoretic.   Psychiatric: She has a normal mood and affect. Her behavior is normal. Thought content normal.   Vitals reviewed.      Recent Labs      11/11/17   1216  11/12/17   0421   WBC  6.5  5.8   RBC  5.56*  4.93   HEMOGLOBIN  17.8*  15.4   HEMATOCRIT  50.9*  45.8   MCV  91.5  92.9   MCH  32.0  31.2   MCHC  35.0  33.6   RDW  45.0  45.2   PLATELETCT  125*  90*   MPV  10.1  10.6     Recent Labs      11/11/17   1216  11/12/17   0421  11/12/17   1500  11/13/17   0431   SODIUM  131*  136   --   137   POTASSIUM  2.4*  2.4*  2.6*  2.8*   CHLORIDE  105  115*   --   118*   CO2  17*  14*   --   14*   GLUCOSE  121*  102*   --   109*   BUN  21  13   --   9   CREATININE  1.29  0.95   --   0.68   CALCIUM  9.4  8.4   --   8.1*                      Assessment/Plan     Hypokalemia- (present on admission)   Assessment & Plan    Tele monitoring  repleting Mg and KCl (oral and IV)  Monitor labs  Due to diarrhea        Diarrhea   Assessment & Plan    Send for culture, wbc, o+p, [place] virus  Cdiff negative  IV  fluids        Chronic pain   Assessment & Plan    stable  Continue home regimen of fentanyl and oxycodone        Tobacco abuse- (present on admission)   Assessment & Plan    Tobacco cessation education provided for more than 10 minutes, discussed options of nicotine patch, medical treatment with wellbutrin and chantix. Discussed the benefits of quitting smoking. Nicotine replacement protocol will be provided to the patient.        Hypomagnesemia- (present on admission)   Assessment & Plan    low at 1.5  Replace with MgSO4   Am labs        Dehydration- (present on admission)   Assessment & Plan    Monitor vitals and daily labs  Control diarrhea.  IV fluids  Zofran for N/V            Reviewed items::  Medications reviewed, Labs reviewed and Radiology images reviewed  Hong catheter::  No Hong

## 2017-11-13 NOTE — PROGRESS NOTES
[facility] Hospitalist Progress Note    Date of Service: 2017    Chief Complaint  47 y.o. female admitted 2017 with severe hypokalemia due to diarrhea for past 5 days prior to admit.    Interval Problem Update  Awake and alert.  Ongoing diarrhea.  No Cdiff.  Imodium okay for symptomatic diarrhea.  Await further labs stool studies    Consultants/Specialty  none    Disposition  home        Review of Systems   Constitutional: Negative for chills and fever.   Eyes: Negative for discharge.   Respiratory: Negative for cough, shortness of breath and stridor.    Cardiovascular: Negative for chest pain, palpitations and leg swelling.   Gastrointestinal: Positive for diarrhea. Negative for abdominal pain, blood in stool, heartburn, melena, nausea and vomiting.   Genitourinary: Negative for dysuria and hematuria.   Musculoskeletal: Negative for back pain and joint pain.   Skin: Negative for rash.   Neurological: Negative for dizziness, sensory change, speech change and headaches.   Psychiatric/Behavioral: Negative for depression. The patient is not nervous/anxious.       Physical Exam  Laboratory/Imaging   Hemodynamics  Temp (24hrs), Av.8 °C (98.2 °F), Min:36.4 °C (97.6 °F), Max:36.9 °C (98.4 °F)   Temperature: 36.9 °C (98.4 °F)  Pulse  Av.4  Min: 70  Max: 118    Blood Pressure: 107/66, NIBP: 104/63      Respiratory      Respiration: 18, Pulse Oximetry: 98 %        RUL Breath Sounds: Clear, RML Breath Sounds: Clear, RLL Breath Sounds: Clear;Diminished, ROXANNA Breath Sounds: Clear, LLL Breath Sounds: Diminished;Clear    Fluids    Intake/Output Summary (Last 24 hours) at 17 2136  Last data filed at 17 1430   Gross per 24 hour   Intake             4065 ml   Output                0 ml   Net             4065 ml       Nutrition  Orders Placed This Encounter   Procedures   • DIET ORDER     Standing Status:   Standing     Number of Occurrences:   1     Order Specific Question:   Diet:     Answer:   Diabetic  [3]     Comments:   HIGH PROTEIN      Physical Exam   Constitutional: She appears well-developed and well-nourished. No distress.   HENT:   Head: Normocephalic and atraumatic.   Nose: Nose normal.   Mouth/Throat: Oropharynx is clear and moist.   Eyes: Conjunctivae and EOM are normal. Right eye exhibits no discharge. Left eye exhibits no discharge. No scleral icterus.   Neck: Normal range of motion. No tracheal deviation present.   Cardiovascular: Normal rate, regular rhythm, normal heart sounds and intact distal pulses.    No murmur heard.  Pulmonary/Chest: Effort normal and breath sounds normal. No respiratory distress. She has no wheezes.   Abdominal: Soft. Bowel sounds are normal. She exhibits no distension. There is no tenderness.   Musculoskeletal: She exhibits no edema.   Neurological: She is alert. No cranial nerve deficit.   Skin: Skin is warm. She is not diaphoretic.   Psychiatric: She has a normal mood and affect. Her behavior is normal. Thought content normal.   Vitals reviewed.      Recent Labs      11/11/17   1216  11/12/17   0421   WBC  6.5  5.8   RBC  5.56*  4.93   HEMOGLOBIN  17.8*  15.4   HEMATOCRIT  50.9*  45.8   MCV  91.5  92.9   MCH  32.0  31.2   MCHC  35.0  33.6   RDW  45.0  45.2   PLATELETCT  125*  90*   MPV  10.1  10.6     Recent Labs      11/11/17   1216  11/12/17   0421  11/12/17   1500   SODIUM  131*  136   --    POTASSIUM  2.4*  2.4*  2.6*   CHLORIDE  105  115*   --    CO2  17*  14*   --    GLUCOSE  121*  102*   --    BUN  21  13   --    CREATININE  1.29  0.95   --    CALCIUM  9.4  8.4   --                       Assessment/Plan     Hypokalemia- (present on admission)   Assessment & Plan    Tele monitoring  repleting Mg and KCl (oral and IV)  Monitor labs  Due to diarrhea          Chronic pain   Assessment & Plan    stable  Continue home regimen of fentanyl and oxycodone        Tobacco abuse- (present on admission)   Assessment & Plan    Tobacco cessation education provided for more than 10  minutes, discussed options of nicotine patch, medical treatment with wellbutrin and chantix. Discussed the benefits of quitting smoking. Nicotine replacement protocol will be provided to the patient.        Viral gastroenteritis- (present on admission)   Assessment & Plan    Continue supportive care and IVF hydration  Zofran for N/V  Tylenol for fever or pain        Hypomagnesemia- (present on admission)   Assessment & Plan    low at 1.5  Replace with MgSO4   Am labs        Dehydration- (present on admission)   Assessment & Plan    Monitor vitals and daily labs  Control diarrhea.  IV fluids  Zofran for N/V            Reviewed items::  Medications reviewed, Labs reviewed and Radiology images reviewed  Hong catheter::  No Hong

## 2017-11-13 NOTE — PROGRESS NOTES
Tele strip at 0711 shows SR w/ HR of 71  Measurements: .16/.10/.36    Tele Shift Summary:  Rhythm: SR  Rate: 70's  Ectopy: Per CCT [place], pt had rare PVC's.    Telemetry monitoring strips placed in pt chart.

## 2017-11-13 NOTE — PROGRESS NOTES
2230: Pt ambulated from ICU to 309-2. Pt has no c/o at this time. All questions and concerns addressed. All immediate needs met.   0330: Pt has been sleeping with the exception of vital sign checks. No acute changes in pt status.   0600: Pt medicated per MD order. All immediate needs met.   0625: Dr York updated on pt labs. Orders received.

## 2017-11-13 NOTE — PROGRESS NOTES
0815Report received, plan of care reviewed and discussed, assessment complete, oriented to room, bed alarm on, nonskid socks applied, advised to call for assistance.   1015 Discussed plan of care, encouraged and answered all questions, will continue to monitor.  1215 Up to bathroom, all needs met at this time.  1415 Complaints of diarrhea, medications administered per MAR.  1615 Up to shower.  1815 Resting, up in bed.  Report given to next shift.  Cardiac Summary.  Sinus rhythm with occasional PVCs (0.14/0.08/0.34)

## 2017-11-13 NOTE — PROGRESS NOTES
Tele summary  Rhythm: SR  Rate: 73-84  SC: 0.14  QRS: 0.08  QT: 0.36    Ectopy: Rare PVC    Telemetry strips placed in pt chart.

## 2017-11-13 NOTE — PROGRESS NOTES
Marielos from Lab called with critical result of 2.6 K at 1600. Critical lab result read back to Saroj.   Dr. Smith notified of critical lab result at 1602.  Critical lab result read back by Dr. Smith.

## 2017-11-14 LAB
ANION GAP SERPL CALC-SCNC: 4 MMOL/L (ref 0–11.9)
BUN SERPL-MCNC: <5 MG/DL (ref 8–22)
CALCIUM SERPL-MCNC: 7.6 MG/DL (ref 8.4–10.2)
CHLORIDE SERPL-SCNC: 119 MMOL/L (ref 96–112)
CO2 SERPL-SCNC: 16 MMOL/L (ref 20–33)
CREAT SERPL-MCNC: 0.75 MG/DL (ref 0.5–1.4)
ERYTHROCYTE [DISTWIDTH] IN BLOOD BY AUTOMATED COUNT: 45.8 FL (ref 35.9–50)
G LAMBLIA+C PARVUM AG STL QL RAPID: NORMAL
GFR SERPL CREATININE-BSD FRML MDRD: >60 ML/MIN/1.73 M 2
GLUCOSE SERPL-MCNC: 84 MG/DL (ref 65–99)
HCT VFR BLD AUTO: 37.4 % (ref 37–47)
HGB BLD-MCNC: 13 G/DL (ref 12–16)
MAGNESIUM SERPL-MCNC: 2 MG/DL (ref 1.5–2.5)
MCH RBC QN AUTO: 31.8 PG (ref 27–33)
MCHC RBC AUTO-ENTMCNC: 34.8 G/DL (ref 33.6–35)
MCV RBC AUTO: 91.4 FL (ref 81.4–97.8)
PLATELET # BLD AUTO: 78 K/UL (ref 164–446)
PMV BLD AUTO: 10.3 FL (ref 9–12.9)
POTASSIUM SERPL-SCNC: 3.2 MMOL/L (ref 3.6–5.5)
RBC # BLD AUTO: 4.09 M/UL (ref 4.2–5.4)
SIGNIFICANT IND 70042: NORMAL
SITE SITE: NORMAL
SODIUM SERPL-SCNC: 139 MMOL/L (ref 135–145)
SOURCE SOURCE: NORMAL
WBC # BLD AUTO: 4.2 K/UL (ref 4.8–10.8)
WBC STL QL MICRO: ABNORMAL

## 2017-11-14 PROCEDURE — 99406 BEHAV CHNG SMOKING 3-10 MIN: CPT

## 2017-11-14 PROCEDURE — 87328 CRYPTOSPORIDIUM AG IA: CPT

## 2017-11-14 PROCEDURE — 700101 HCHG RX REV CODE 250: Performed by: FAMILY MEDICINE

## 2017-11-14 PROCEDURE — 700102 HCHG RX REV CODE 250 W/ 637 OVERRIDE(OP): Performed by: HOSPITALIST

## 2017-11-14 PROCEDURE — 85027 COMPLETE CBC AUTOMATED: CPT

## 2017-11-14 PROCEDURE — 83735 ASSAY OF MAGNESIUM: CPT

## 2017-11-14 PROCEDURE — 700102 HCHG RX REV CODE 250 W/ 637 OVERRIDE(OP): Performed by: INTERNAL MEDICINE

## 2017-11-14 PROCEDURE — 99232 SBSQ HOSP IP/OBS MODERATE 35: CPT | Performed by: FAMILY MEDICINE

## 2017-11-14 PROCEDURE — 80048 BASIC METABOLIC PNL TOTAL CA: CPT

## 2017-11-14 PROCEDURE — 770020 HCHG ROOM/CARE - TELE (206)

## 2017-11-14 PROCEDURE — A9270 NON-COVERED ITEM OR SERVICE: HCPCS | Performed by: INTERNAL MEDICINE

## 2017-11-14 PROCEDURE — 700102 HCHG RX REV CODE 250 W/ 637 OVERRIDE(OP): Performed by: FAMILY MEDICINE

## 2017-11-14 PROCEDURE — 87046 STOOL CULTR AEROBIC BACT EA: CPT

## 2017-11-14 PROCEDURE — 87186 SC STD MICRODIL/AGAR DIL: CPT

## 2017-11-14 PROCEDURE — 89055 LEUKOCYTE ASSESSMENT FECAL: CPT

## 2017-11-14 PROCEDURE — 87329 GIARDIA AG IA: CPT

## 2017-11-14 PROCEDURE — A9270 NON-COVERED ITEM OR SERVICE: HCPCS | Performed by: FAMILY MEDICINE

## 2017-11-14 PROCEDURE — 87899 AGENT NOS ASSAY W/OPTIC: CPT

## 2017-11-14 PROCEDURE — 87045 FECES CULTURE AEROBIC BACT: CPT

## 2017-11-14 PROCEDURE — A9270 NON-COVERED ITEM OR SERVICE: HCPCS | Performed by: HOSPITALIST

## 2017-11-14 PROCEDURE — 87798 DETECT AGENT NOS DNA AMP: CPT

## 2017-11-14 PROCEDURE — 700111 HCHG RX REV CODE 636 W/ 250 OVERRIDE (IP): Performed by: INTERNAL MEDICINE

## 2017-11-14 RX ORDER — POTASSIUM CHLORIDE 20 MEQ/1
40 TABLET, EXTENDED RELEASE ORAL ONCE
Status: COMPLETED | OUTPATIENT
Start: 2017-11-14 | End: 2017-11-14

## 2017-11-14 RX ORDER — OMEPRAZOLE 20 MG/1
40 CAPSULE, DELAYED RELEASE ORAL EVERY MORNING
Status: DISCONTINUED | OUTPATIENT
Start: 2017-11-14 | End: 2017-11-15 | Stop reason: HOSPADM

## 2017-11-14 RX ORDER — CHOLESTYRAMINE LIGHT 4 G/5.7G
1 POWDER, FOR SUSPENSION ORAL 2 TIMES DAILY PRN
Status: DISCONTINUED | OUTPATIENT
Start: 2017-11-14 | End: 2017-11-15 | Stop reason: HOSPADM

## 2017-11-14 RX ADMIN — TOPIRAMATE 50 MG: 25 TABLET, FILM COATED ORAL at 17:34

## 2017-11-14 RX ADMIN — ONDANSETRON 4 MG: 2 INJECTION INTRAMUSCULAR; INTRAVENOUS at 10:10

## 2017-11-14 RX ADMIN — OXYCODONE HYDROCHLORIDE 10 MG: 10 TABLET ORAL at 07:58

## 2017-11-14 RX ADMIN — SODIUM CHLORIDE AND POTASSIUM CHLORIDE: 9; 1.49 INJECTION, SOLUTION INTRAVENOUS at 21:18

## 2017-11-14 RX ADMIN — ONDANSETRON 4 MG: 2 INJECTION INTRAMUSCULAR; INTRAVENOUS at 20:16

## 2017-11-14 RX ADMIN — NICOTINE 14 MG: 14 PATCH, EXTENDED RELEASE TRANSDERMAL at 05:09

## 2017-11-14 RX ADMIN — CHOLESTYRAMINE 4 G: 4 POWDER, FOR SUSPENSION ORAL at 15:15

## 2017-11-14 RX ADMIN — FAMOTIDINE 40 MG: 20 TABLET ORAL at 20:22

## 2017-11-14 RX ADMIN — SODIUM CHLORIDE AND POTASSIUM CHLORIDE: 9; 1.49 INJECTION, SOLUTION INTRAVENOUS at 12:53

## 2017-11-14 RX ADMIN — LOPERAMIDE HYDROCHLORIDE 2 MG: 1 SOLUTION ORAL at 00:56

## 2017-11-14 RX ADMIN — OMEPRAZOLE 40 MG: 20 CAPSULE, DELAYED RELEASE ORAL at 10:53

## 2017-11-14 RX ADMIN — OXYCODONE HYDROCHLORIDE 10 MG: 10 TABLET ORAL at 15:10

## 2017-11-14 RX ADMIN — POTASSIUM CHLORIDE 40 MEQ: 1500 TABLET, EXTENDED RELEASE ORAL at 10:05

## 2017-11-14 RX ADMIN — TOPIRAMATE 50 MG: 25 TABLET, FILM COATED ORAL at 05:09

## 2017-11-14 RX ADMIN — FENTANYL 1 PATCH: 25 PATCH TRANSDERMAL at 17:33

## 2017-11-14 ASSESSMENT — PAIN SCALES - GENERAL
PAINLEVEL_OUTOF10: 7
PAINLEVEL_OUTOF10: 0
PAINLEVEL_OUTOF10: 3
PAINLEVEL_OUTOF10: 8

## 2017-11-14 ASSESSMENT — ENCOUNTER SYMPTOMS
MYALGIAS: 0
BLOOD IN STOOL: 0
COUGH: 0
TINGLING: 0
VOMITING: 0
BLURRED VISION: 0
DEPRESSION: 0
HEADACHES: 0
DOUBLE VISION: 0
HEMOPTYSIS: 0
HEARTBURN: 0
DIARRHEA: 1
NECK PAIN: 0
DIZZINESS: 0
NAUSEA: 1
FEVER: 0
CHILLS: 0
ORTHOPNEA: 0

## 2017-11-14 NOTE — PROGRESS NOTES
Assessment completed--see doc flowsheet. A&Ox4. Medicated for 8/10 back pain--see MAR. Denies abdominal tenderness upon palpation. Denies N/V. Stool sample collected. POC discussed, verbalized understanding. Call light and personal possessions within reach, bed in low position, encouraged to call for assistance.

## 2017-11-14 NOTE — PROGRESS NOTES
Report received. Care assumed. Resting in bed. Alert and oriented. Respirations even and unlabored. Medicated by day RN for loose stool. No other c/o. IV Mag infusing without issue. Call light in reach.

## 2017-11-14 NOTE — RESPIRATORY CARE
COPD EDUCATION by COPD CLINICAL EDUCATOR  11/14/2017 at 9:31 AM by Sheeba Nuno     Patient interviewed by COPD education team. Patient refused COPD program at this time and smoking cessation.

## 2017-11-14 NOTE — PROGRESS NOTES
Resting with eyes closed. Respirations even and unlabored. No c/o. Repositions self. IV infusing without issue. Call light in reach.     Tele Report:   Sinus Rhythm  HR 70-90  Rare PVC  0.14/ 0.08/ 0.34

## 2017-11-14 NOTE — PROGRESS NOTES
Resting on and off. Respirations even and unlabored. States still having loose watery stool, medicated per MAR. IV infusing without issue. Call light in reach.

## 2017-11-14 NOTE — ASSESSMENT & PLAN NOTE
Stool studies showed positive WBCs. C. diff was negative. The rest of the studies are pending. Patient is still having loose green stools. Will Follow.

## 2017-11-14 NOTE — PROGRESS NOTES
Due medications given without issue. Resting on and off. Respirations even and unlabored. States still having loose Bm. Denies pain at this time and refused pain medication. IV infusing without issue. Call light in reach.

## 2017-11-14 NOTE — PROGRESS NOTES
Due medications given without issue. Up self to bathroom to void. IV infusing without issue. C/o chronic back pain, medicated per MAR. Call light in reach.

## 2017-11-14 NOTE — CARE PLAN
Problem: Infection  Goal: Will remain free from infection  Outcome: PROGRESSING AS EXPECTED  Standard precautions in place. Monitor for s/s of infection. Stool samples collected for diagnosis confirmation. Educated and encouraged re: proper hand hygiene.    Problem: Pain Management  Goal: Pain level will decrease to patient's comfort goal  Outcome: PROGRESSING AS EXPECTED  Pt encouraged to verbalize experiencing pain. 0-10 pain scale explained, verbalized understanding. Educated and encouraged re: non-pharm methods of pain relief. Administer prn pain meds as ordered.

## 2017-11-14 NOTE — CARE PLAN
Problem: Safety  Goal: Will remain free from falls  Outcome: PROGRESSING AS EXPECTED  Oriented to room and equipment. Call light instructions given, in reach at all times. Slipper socks in place. Floor dry and uncluttered. Bed locked and in lowest position.     Problem: Knowledge Deficit  Goal: Knowledge of the prescribed therapeutic regimen will improve  Outcome: PROGRESSING AS EXPECTED  Plan of care discussed with patient. Opportunity given for questions. States understanding.     Problem: Pain Management  Goal: Pain level will decrease to patient's comfort goal  Outcome: PROGRESSING AS EXPECTED  Uses 0-10 scale appropriately. Pharmacologic and nonpharmacologic interventions in place.

## 2017-11-14 NOTE — PROGRESS NOTES
[facility]. Assumed care. Pt resting in bed. Safety precautions in place.

## 2017-11-14 NOTE — DISCHARGE PLANNING
Medical Social Work    Referral: CELIA reviewed the chart this AM.      Intervention: Per flowsheet, pt lives with spouse and expects to d.c home.  No SS or DC needs at this time.      Plan: CELIA Ashley available to assist with any d.c planning.

## 2017-11-15 ENCOUNTER — PATIENT OUTREACH (OUTPATIENT)
Dept: HEALTH INFORMATION MANAGEMENT | Facility: OTHER | Age: 47
End: 2017-11-15

## 2017-11-15 VITALS
DIASTOLIC BLOOD PRESSURE: 65 MMHG | SYSTOLIC BLOOD PRESSURE: 106 MMHG | RESPIRATION RATE: 18 BRPM | OXYGEN SATURATION: 100 % | HEART RATE: 84 BPM | WEIGHT: 148.59 LBS | TEMPERATURE: 97.2 F | HEIGHT: 62 IN | BODY MASS INDEX: 27.34 KG/M2

## 2017-11-15 PROBLEM — R59.0 RETROPERITONEAL LYMPHADENOPATHY: Chronic | Status: ACTIVE | Noted: 2017-11-15

## 2017-11-15 PROBLEM — D69.6 THROMBOCYTOPENIA (HCC): Chronic | Status: ACTIVE | Noted: 2017-11-15

## 2017-11-15 LAB
ALBUMIN SERPL BCP-MCNC: 3.1 G/DL (ref 3.2–4.9)
ALBUMIN/GLOB SERPL: 1 G/DL
ALP SERPL-CCNC: 78 U/L (ref 30–99)
ALT SERPL-CCNC: 12 U/L (ref 2–50)
ANION GAP SERPL CALC-SCNC: 5 MMOL/L (ref 0–11.9)
AST SERPL-CCNC: 18 U/L (ref 12–45)
BILIRUB SERPL-MCNC: 0.7 MG/DL (ref 0.1–1.5)
BUN SERPL-MCNC: <5 MG/DL (ref 8–22)
CALCIUM SERPL-MCNC: 8 MG/DL (ref 8.4–10.2)
CHLORIDE SERPL-SCNC: 118 MMOL/L (ref 96–112)
CO2 SERPL-SCNC: 14 MMOL/L (ref 20–33)
CREAT SERPL-MCNC: 0.68 MG/DL (ref 0.5–1.4)
E COLI SXT1+2 STL IA: NORMAL
GFR SERPL CREATININE-BSD FRML MDRD: >60 ML/MIN/1.73 M 2
GLOBULIN SER CALC-MCNC: 3.2 G/DL (ref 1.9–3.5)
GLUCOSE SERPL-MCNC: 88 MG/DL (ref 65–99)
POTASSIUM SERPL-SCNC: 3.1 MMOL/L (ref 3.6–5.5)
PROT SERPL-MCNC: 6.3 G/DL (ref 6–8.2)
SIGNIFICANT IND 70042: NORMAL
SITE SITE: NORMAL
SODIUM SERPL-SCNC: 137 MMOL/L (ref 135–145)
SOURCE SOURCE: NORMAL

## 2017-11-15 PROCEDURE — 80053 COMPREHEN METABOLIC PANEL: CPT

## 2017-11-15 PROCEDURE — A9270 NON-COVERED ITEM OR SERVICE: HCPCS | Performed by: INTERNAL MEDICINE

## 2017-11-15 PROCEDURE — 700102 HCHG RX REV CODE 250 W/ 637 OVERRIDE(OP): Performed by: INTERNAL MEDICINE

## 2017-11-15 PROCEDURE — 99239 HOSP IP/OBS DSCHRG MGMT >30: CPT | Performed by: FAMILY MEDICINE

## 2017-11-15 PROCEDURE — A9270 NON-COVERED ITEM OR SERVICE: HCPCS | Performed by: FAMILY MEDICINE

## 2017-11-15 PROCEDURE — 700101 HCHG RX REV CODE 250: Performed by: FAMILY MEDICINE

## 2017-11-15 PROCEDURE — 700102 HCHG RX REV CODE 250 W/ 637 OVERRIDE(OP): Performed by: FAMILY MEDICINE

## 2017-11-15 RX ORDER — OMEPRAZOLE 20 MG/1
20 CAPSULE, DELAYED RELEASE ORAL ONCE
Status: COMPLETED | OUTPATIENT
Start: 2017-11-15 | End: 2017-11-15

## 2017-11-15 RX ORDER — POTASSIUM CHLORIDE 20 MEQ/1
20 TABLET, EXTENDED RELEASE ORAL DAILY
Qty: 5 TAB | Refills: 0 | Status: SHIPPED | OUTPATIENT
Start: 2017-11-15 | End: 2017-11-20

## 2017-11-15 RX ORDER — CHOLESTYRAMINE LIGHT 4 G/5.7G
4 POWDER, FOR SUSPENSION ORAL 2 TIMES DAILY PRN
Qty: 20 EACH | Refills: 0 | Status: SHIPPED | OUTPATIENT
Start: 2017-11-15 | End: 2017-11-25

## 2017-11-15 RX ORDER — POTASSIUM CHLORIDE 20 MEQ/1
40 TABLET, EXTENDED RELEASE ORAL DAILY
Status: DISCONTINUED | OUTPATIENT
Start: 2017-11-15 | End: 2017-11-15 | Stop reason: HOSPADM

## 2017-11-15 RX ORDER — ONDANSETRON 4 MG/1
4 TABLET, FILM COATED ORAL EVERY 4 HOURS PRN
Qty: 20 TAB | Refills: 0 | Status: SHIPPED | OUTPATIENT
Start: 2017-11-15 | End: 2017-11-22

## 2017-11-15 RX ADMIN — OMEPRAZOLE 20 MG: 20 CAPSULE, DELAYED RELEASE ORAL at 09:02

## 2017-11-15 RX ADMIN — SODIUM CHLORIDE AND POTASSIUM CHLORIDE: 9; 1.49 INJECTION, SOLUTION INTRAVENOUS at 08:43

## 2017-11-15 RX ADMIN — CHOLESTYRAMINE 4 G: 4 POWDER, FOR SUSPENSION ORAL at 06:38

## 2017-11-15 RX ADMIN — POTASSIUM CHLORIDE 40 MEQ: 1500 TABLET, EXTENDED RELEASE ORAL at 10:00

## 2017-11-15 RX ADMIN — OXYCODONE HYDROCHLORIDE 10 MG: 10 TABLET ORAL at 06:38

## 2017-11-15 RX ADMIN — TOPIRAMATE 50 MG: 25 TABLET, FILM COATED ORAL at 05:17

## 2017-11-15 RX ADMIN — NICOTINE 14 MG: 14 PATCH, EXTENDED RELEASE TRANSDERMAL at 05:18

## 2017-11-15 ASSESSMENT — PAIN SCALES - GENERAL
PAINLEVEL_OUTOF10: 8
PAINLEVEL_OUTOF10: 4

## 2017-11-15 NOTE — CARE PLAN
Problem: Safety  Goal: Will remain free from injury  Outcome: PROGRESSING AS EXPECTED  Safety Precaution in place. Non skid socks in use. Call light within reach. Reminded patient to call for assist. Hourly rounds in effect. Verbalized understanding.    Problem: Infection  Goal: Will remain free from infection  Outcome: PROGRESSING AS EXPECTED  Implement Standard Precaution. Hand washing every encounter & before & after patient care. Verbalized understanding.    Problem: Knowledge Deficit  Goal: Knowledge of disease process/condition, treatment plan, diagnostic tests, and medications will improve  Outcome: PROGRESSING AS EXPECTED  Discussed Plan of care. Discharge instructions. Verbalized understanding.    Problem: Pain Management  Goal: Pain level will decrease to patient's comfort goal    Intervention: Follow pain managment plan developed in collaboration with patient and Interdisciplinary Team  Outcome : Progressing as expected.                    Educated on pain scale. Encouraged to verbalize pain. Will medicate as per MAR.

## 2017-11-15 NOTE — CARE PLAN
Problem: Safety  Goal: Will remain free from falls  Outcome: PROGRESSING AS EXPECTED  Pt educated on call light system, all fall precautions in place    Problem: Knowledge Deficit  Goal: Knowledge of disease process/condition, treatment plan, diagnostic tests, and medications will improve  Outcome: PROGRESSING AS EXPECTED  POC discussed at the bedside with pt, all questions answered

## 2017-11-15 NOTE — PROGRESS NOTES
@ 0030 - new tegaderm dressing placed on pt IV site, IV fluids hung, no pt needs at this time    @ 0415 - pt sleeping comfortably in bed, no pt needs, will continue to monitor

## 2017-11-15 NOTE — PROGRESS NOTES
Fentanyl patch replaced to left chest as per pt request--see MAR. Pt resting quietly in bed after transfer to [facility] for special contact isolation.

## 2017-11-15 NOTE — PROGRESS NOTES
Report given to NOC nurse, Rhett COPPOLA. Pt remains calm and cooperative. All lines remain patent. Safety precautions remain in place. POC discussed with pt and RN at the bedside.

## 2017-11-15 NOTE — PROGRESS NOTES
0926 New orders received & acknowledged from Dr Valadez ([facility] po - see MAR)    1000 Patient's sitting up in bed, Dr Valadez visited.

## 2017-11-15 NOTE — DISCHARGE INSTRUCTIONS
Discharge Instructions    Discharged to home by car with relative. Discharged via wheelchair, hospital escort: Yes.  Special equipment needed: Not Applicable    Be sure to schedule a follow-up appointment with your primary care doctor or any specialists as instructed.     Discharge Plan:   Diet Plan: Discussed (as tolerated)  Activity Level: Discussed (as tolerated)  Smoking Cessation Offered: Patient Counseled  Confirmed Follow up Appointment: Patient to Call and Schedule Appointment  Confirmed Symptoms Management: Discussed  Influenza Vaccine Indication: Indicated: 9 to 64 years of age  Influenza Vaccine Given - only chart on this line when given: Influenza Vaccine Given (See MAR)    I understand that a diet low in cholesterol, fat, and sodium is recommended for good health. Unless I have been given specific instructions below for another diet, I accept this instruction as my diet prescription.   Other diet: as tolerated    Special Instructions: Follow-up with PCP in 2-3 days to follow-up on electrolyte levels. Scheduled already set (see appointments)    · Is patient discharged on Warfarin / Coumadin?   No     · Is patient Post Blood Transfusion?  No    Depression / Suicide Risk    As you are discharged from [facility], it is important to learn how to keep safe from harming yourself.    Recognize the warning signs:  · Abrupt changes in personality, positive or negative- including increase in energy   · Giving away possessions  · Change in eating patterns- significant weight changes-  positive or negative  · Change in sleeping patterns- unable to sleep or sleeping all the time   · Unwillingness or inability to communicate  · Depression  · Unusual sadness, discouragement and loneliness  · Talk of wanting to die  · Neglect of personal appearance   · Rebelliousness- reckless behavior  · Withdrawal from people/activities they love  · Confusion- inability to concentrate     If you or a loved one  observes any of these behaviors or has concerns about self-harm, here's what you can do:  · Talk about it- your feelings and reasons for harming yourself  · Remove any means that you might use to hurt yourself (examples: pills, rope, extension cords, firearm)  · Get professional help from the community (Mental Health, Substance Abuse, psychological counseling)  · Do not be alone:Call your Safe Contact- someone whom you trust who will be there for you.  · Call your local CRISIS HOTLINE 429-4923 or 662-971-2961  · Call your local Children's Mobile Crisis Response Team [place] (238) 118-2202 or www.Point  · Call the toll free National Suicide Prevention Hotlines   · National Suicide Prevention Lifeline 097-000-ZMDI (2270)  · [employer] Line Network 800-SUICIDE (699-4648)    Hypokalemia  Hypokalemia means a low potassium level in the blood. Symptoms may include muscle weakness and cramping, fatigue, abdominal pain, vomiting, constipation, or irregularities of the heartbeat. Sometimes hypokalemia is discovered by your caregiver if you are taking certain medicines for high blood pressure or kidney disease.   Potassium is an electrolyte that helps regulate the amount of fluid in the body. It also stimulates muscle contraction and maintains a stable acid-base balance. If potassium levels go too low or too high, your health may be in danger. You are at risk for developing shock, heart, and lung problems. Hypokalemia can occur if you have excessive diarrhea, vomiting, or sweating. Potassium can be lost through your kidneys in the urine. Certain common medicines can also cause potassium loss, especially water pills (diuretics). The same is possible with cortisone medications or certain types of antibiotics. Low potassium can be dangerous if you are taking certain heart medicines. In diabetes, your potassium may fall after you take insulin, especially if your diabetes had been out of control for a while. In  rare cases, potassium may be low because you are not getting enough in your diet.   In adults, a potassium level below 3.5 mEq/L is usually considered low.  Hypokalemia can be treated with potassium supplements taken by mouth and a diet that is high in potassium. Foods with high potassium content are:  · Peas, lentils, lima beans, nuts, and dried fruit.   · Whole grain and bran cereals and breads.   · Fresh fruit and vegetables. Examples include:   · Bananas.   · Cantaloupe.   · Grapefruit.   · Oranges.   · Tomatoes.   · Honeydew melons.   · Potatoes.   · Peaches.   · Orange and tomato juices.   · Meats.   See your caregiver as instructed for a follow-up blood test to be sure your potassium is back to normal.  SEEK MEDICAL CARE IF:   · You have nausea, vomiting, constipation, or abdominal pain.   · You have palpitations or irregular heartbeats, chest pain or shortness of breath.   · You have muscle cramps or weakness or fatigue.   · You have lethargy.   SEEK IMMEDIATE MEDICAL CARE IF:   · You have paralysis.   · You have confusion or other mental status changes.   Document Released: 12/18/2006 Document Revised: 03/11/2013 Document Reviewed: 04/13/2011  [employer]® Patient Information ©2013 [employer].Viral Gastroenteritis  Viral gastroenteritis is also called stomach flu. This illness is caused by a certain type of germ (virus). It can cause sudden watery poop (diarrhea) and throwing up (vomiting). This can cause you to lose body fluids (dehydration). This illness usually lasts for 3 to 8 days. It usually goes away on its own.  HOME CARE   · Drink enough fluids to keep your pee (urine) clear or pale yellow. Drink small amounts of fluids often.  · Ask your doctor how to replace body fluid losses (rehydration).  · Avoid:  ¨ Foods high in sugar.  ¨ Alcohol.  ¨ Bubbly (carbonated) drinks.  ¨ Tobacco.  ¨ Juice.  ¨ Caffeine drinks.  ¨ Very hot or cold fluids.  ¨ Fatty, greasy foods.  ¨ Eating too much at one  time.  ¨ Dairy products until 24 to 48 hours after your watery poop stops.  · You may eat foods with active cultures (probiotics). They can be found in some yogurts and supplements.  · Wash your hands well to avoid spreading the illness.  · Only take medicines as told by your doctor. Do not give aspirin to children. Do not take medicines for watery poop (antidiarrheals).  · Ask your doctor if you should keep taking your regular medicines.  · Keep all doctor visits as told.  GET HELP RIGHT AWAY IF:   · You cannot keep fluids down.  · You do not pee at least once every 6 to 8 hours.  · You are short of breath.  · You see blood in your poop or throw up. This may look like coffee grounds.  · You have belly (abdominal) pain that gets worse or is just in one small spot (localized).  · You keep throwing up or having watery poop.  · You have a fever.  · The patient is a child younger than 3 months, and he or she has a fever.  · The patient is a child older than 3 months, and he or she has a fever and problems that do not go away.  · The patient is a child older than 3 months, and he or she has a fever and problems that suddenly get worse.  · The patient is a baby, and he or she has no tears when crying.  MAKE SURE YOU:   · Understand these instructions.  · Will watch your condition.  · Will get help right away if you are not doing well or get worse.     This information is not intended to replace advice given to you by your health care provider. Make sure you discuss any questions you have with your health care provider.     Document Released: 06/05/2009 Document Revised: 03/11/2013 Document Reviewed: 10/03/2012  [employer] Interactive Patient Education ©2016 [employer]  Cholestyramine powder for oral suspension  What is this medicine?  CHOLESTYRAMINE (koe LESS tir a meen) is used to lower cholesterol in patients who are at risk of heart disease or stroke. This medicine is only for patients whose cholesterol level is not  controlled by diet.  This medicine may be used for other purposes; ask your health care provider or pharmacist if you have questions.  COMMON BRAND NAME(S): Locholest , Locholest Light, Prevalite , Questran Light, Questran  What should I tell my health care provider before I take this medicine?  They need to know if you have any of these conditions:  -blocked bile duct  -an unusual or allergic reaction to cholestyramine, other medicines, foods, dyes, or preservatives  -pregnant or trying to get pregnant  -breast-feeding  How should I use this medicine?  Do not take this medicine in the dry form. It must be mixed with a liquid before swallowing. Follow the directions on the prescription label. Place the powder in a glass or cup. Add between 2 and 6 ounces of fluid. This can be water, milk, pulpy fruit juice, fluid soup, or other liquid. Mix well and drink all of the liquid. Take your doses at regular intervals. Do not take your medicine more often than directed.  Talk to your pediatrician regarding the use of this medicine in children. Special care may be needed.  Overdosage: If you think you have taken too much of this medicine contact a poison control center or emergency room at once.  NOTE: This medicine is only for you. Do not share this medicine with others.  What if I miss a dose?  If you miss a dose, take it as soon as you can. If it is almost time for your next dose, take only that dose. Do not take double or extra doses.  What may interact with this medicine?  -diuretics  -female hormones, like estrogens or progestins and birth control pills  -heart medicines such as digoxin or digitoxin  -penicillin G  -phenobarbital  -phenylbutazone  -phytonadione  -propranolol  -tetracycline antibiotics  -thyroid hormones  -vitamin A  -vitamin D  -vitamin E  -warfarin  Take other drugs at least 1 hour before or 4 hours after this medicine, to avoid decreasing their absorption.  This list may not describe all possible  interactions. Give your health care provider a list of all the medicines, herbs, non-prescription drugs, or dietary supplements you use. Also tell them if you smoke, drink alcohol, or use illegal drugs. Some items may interact with your medicine.  What should I watch for while using this medicine?  Visit your doctor or health care professional for regular checks on your progress. Your blood fats and other tests will be measured from time to time.  This medicine is only part of a total cholesterol-lowering program. Your health care professional or dietician can suggest a low-cholesterol and low-fat diet that will reduce your risk of getting heart and blood vessel disease. Avoid alcohol and smoking, and keep a proper exercise schedule.  To reduce the chance of getting constipated, drink plenty of water and increase the amount of fiber in your diet. Ask your doctor or health care professional for advice if you are constipated.  What side effects may I notice from receiving this medicine?  Side effects that you should report to your doctor or health care professional as soon as possible:  -allergic reactions like skin rash, itching or hives, swelling of the face, lips, or tongue  -bloody or black, tarry stools  -severe stomach pain with nausea and vomiting  -unusual bleeding  Side effects that usually do not require medical attention (report to your doctor or health care professional if they continue or are bothersome):  -constipation or diarrhea  -dizziness  -headache  -heartburn, indigestion  -nausea, vomiting  -perianal irritation  This list may not describe all possible side effects. Call your doctor for medical advice about side effects. You may report side effects to FDA at 9-736-FDA-7309.  Where should I keep my medicine?  Keep out of the reach of children.  Store at room temperature between 15 and 30 degrees C (59 and 86 degrees F). Throw away any unused medicine after the expiration date.  NOTE: This sheet is a  summary. It may not cover all possible information. If you have questions about this medicine, talk to your doctor, pharmacist, or health care provider.  © 2014, Elsevier/Gold Standard. (3/24/2009 3:33:42 PM)  Ondansetron tablets  What is this medicine?  ONDANSETRON (on DAN se yuval) is used to treat nausea and vomiting caused by chemotherapy. It is also used to prevent or treat nausea and vomiting after surgery.  This medicine may be used for other purposes; ask your health care provider or pharmacist if you have questions.  COMMON BRAND NAME(S): Zofran  What should I tell my health care provider before I take this medicine?  They need to know if you have any of these conditions:  -heart disease  -history of irregular heartbeat  -liver disease  -low levels of magnesium or potassium in the blood  -an unusual or allergic reaction to ondansetron, granisetron, other medicines, foods, dyes, or preservatives  -pregnant or trying to get pregnant  -breast-feeding  How should I use this medicine?  Take this medicine by mouth with a glass of water. Follow the directions on your prescription label. Take your doses at regular intervals. Do not take your medicine more often than directed.  Talk to your pediatrician regarding the use of this medicine in children. Special care may be needed.  Overdosage: If you think you have taken too much of this medicine contact a poison control center or emergency room at once.  NOTE: This medicine is only for you. Do not share this medicine with others.  What if I miss a dose?  If you miss a dose, take it as soon as you can. If it is almost time for your next dose, take only that dose. Do not take double or extra doses.  What may interact with this medicine?  Do not take this medicine with any of the following medications:  -apomorphine  -cisapride  -dofetilide  -dronedarone  -pimozide  -thioridazine  -ziprasidone   This medicine may also interact with the following  medications:  -carbamazepine  -phenytoin  -rifampicin  -tramadol  -other medicines that prolong the QT interval (cause an abnormal heart rhythm)  This list may not describe all possible interactions. Give your health care provider a list of all the medicines, herbs, non-prescription drugs, or dietary supplements you use. Also tell them if you smoke, drink alcohol, or use illegal drugs. Some items may interact with your medicine.  What should I watch for while using this medicine?  Check with your doctor or health care professional right away if you have any sign of an allergic reaction.  What side effects may I notice from receiving this medicine?  Side effects that you should report to your doctor or health care professional as soon as possible:  -allergic reactions like skin rash, itching or hives, swelling of the face, lips or tongue  -breathing problems  -dizziness  -fast or irregular heartbeat  -feeling faint or lightheaded, falls  -fever and chills  -swelling of the hands or feet  -tightness in the chest  Side effects that usually do not require medical attention (report to your doctor or health care professional if they continue or are bothersome):  -constipation or diarrhea  -headache  This list may not describe all possible side effects. Call your doctor for medical advice about side effects. You may report side effects to FDA at 4-420-FDA-9619.  Where should I keep my medicine?  Keep out of the reach of children.  Store between 2 and 30 degrees C (36 and 86 degrees F). Throw away any unused medicine after the expiration date.  NOTE: This sheet is a summary. It may not cover all possible information. If you have questions about this medicine, talk to your doctor, pharmacist, or health care provider.  © 2014, Elsevier/Gold Standard. (12/19/2013 4:05:54 PM)

## 2017-11-15 NOTE — PROGRESS NOTES
Report given to Eli day-shift RN. Pt POC discussed, pt resting comfortably in bed, all safety precautions in place.

## 2017-11-15 NOTE — PROGRESS NOTES
Tele Summary:    Rhythm: SR  Rate: 65-78  KY: 0.16  QRS: 0.08  QT: 0.34    Ectopy: no ectopy    Tele strip placed in pt chart.

## 2017-11-15 NOTE — PROGRESS NOTES
Tele Summary    Rhythm : Sinus Rhythm  Ectopy : rare PVC   HR : 71  CO : 0.16  QRS : 0.08  QT : 0.38

## 2017-11-15 NOTE — PROGRESS NOTES
[facility]    Date of Service: 2017    Chief Complaint  47 y.o. female admitted 2017 with severe hypokalemia due to diarrhea for past 5 days prior to admit.    Interval Problem Update  Patient was seen and examined. Feels better this morning. Was nauseous but did not vomit. Still having loose green stools. No further watery diarrhea.    Consultants/Specialty  none    Disposition  home        Review of Systems   Constitutional: Negative for chills and fever.   HENT: Negative for hearing loss.    Eyes: Negative for blurred vision and double vision.   Respiratory: Negative for cough and hemoptysis.    Cardiovascular: Negative for chest pain and orthopnea.   Gastrointestinal: Positive for diarrhea and nausea. Negative for blood in stool, heartburn, melena and vomiting.   Genitourinary: Negative for dysuria and urgency.   Musculoskeletal: Negative for myalgias and neck pain.   Skin: Negative for itching and rash.   Neurological: Negative for dizziness, tingling and headaches.   Psychiatric/Behavioral: Negative for depression and suicidal ideas.      Physical Exam  Laboratory/Imaging   Hemodynamics  Temp (24hrs), Av.9 °C (98.5 °F), Min:36.6 °C (97.9 °F), Max:37.4 °C (99.4 °F)   Temperature: 36.6 °C (97.9 °F)  Pulse  Av.1  Min: 70  Max: 118    Blood Pressure: 102/64      Respiratory      Respiration: 18, Pulse Oximetry: 98 %     Work Of Breathing / Effort: Mild  RUL Breath Sounds: Clear, RML Breath Sounds: Diminished, RLL Breath Sounds: Diminished, ROXANNA Breath Sounds: Clear, LLL Breath Sounds: Diminished    Fluids    Intake/Output Summary (Last 24 hours) at 17 1634  Last data filed at 17 1200   Gross per 24 hour   Intake             2830 ml   Output                0 ml   Net             2830 ml       Nutrition  Orders Placed This Encounter   Procedures   • DIET ORDER     Standing Status:   Standing     Number of Occurrences:   1     Order Specific Question:   Diet:      Answer:   Diabetic [3]     Comments:   HIGH PROTEIN      Physical Exam   Constitutional: She is oriented to person, place, and time. She appears well-developed and well-nourished. No distress.   HENT:   Head: Normocephalic and atraumatic.   Nose: Nose normal.   Eyes: Conjunctivae and EOM are normal. No scleral icterus.   Neck: Normal range of motion. Neck supple. No thyromegaly present.   Cardiovascular: Normal rate, regular rhythm, normal heart sounds and intact distal pulses.    Pulses:       Radial pulses are 2+ on the right side, and 2+ on the left side.   Pulmonary/Chest: Effort normal and breath sounds normal. No respiratory distress.   Abdominal: Soft. Bowel sounds are normal. She exhibits no distension. There is no tenderness.   Musculoskeletal: Normal range of motion. She exhibits no edema.   Neurological: She is alert and oriented to person, place, and time. No cranial nerve deficit.   Skin: Skin is warm. No erythema.   Psychiatric: She has a normal mood and affect. Her behavior is normal.   Vitals reviewed.      Recent Labs      11/12/17   0421 11/14/17   0518   WBC  5.8  4.2*   RBC  4.93  4.09*   HEMOGLOBIN  15.4  13.0   HEMATOCRIT  45.8  37.4   MCV  92.9  91.4   MCH  31.2  31.8   MCHC  33.6  34.8   RDW  45.2  45.8   PLATELETCT  90*  78*   MPV  10.6  10.3     Recent Labs      11/12/17   0421  11/12/17   1500  11/13/17   0431  11/14/17   0518   SODIUM  136   --   137  139   POTASSIUM  2.4*  2.6*  2.8*  3.2*   CHLORIDE  115*   --   118*  119*   CO2  14*   --   14*  16*   GLUCOSE  102*   --   109*  84   BUN  13   --   9  <5*   CREATININE  0.95   --   0.68  0.75   CALCIUM  8.4   --   8.1*  7.6*                      Assessment/Plan     Hypokalemia- (present on admission)   Assessment & Plan    Replaced and corrected        Diarrhea   Assessment & Plan    Stool studies showed positive WBCs. C. diff was negative. The rest of the studies are pending. Patient is still having loose green stools. Will Follow.         Chronic pain   Assessment & Plan    stable  On home regimen of fentanyl and oxycodone        Tobacco abuse- (present on admission)   Assessment & Plan    Tobacco cessation education provided for more than 10 minutes, discussed options of nicotine patch, medical treatment with wellbutrin and chantix. Discussed the benefits of quitting smoking. Nicotine replacement protocol will be provided to the patient.        Hypomagnesemia- (present on admission)   Assessment & Plan    Placed and corrected        Dehydration- (present on admission)   Assessment & Plan    On IV fluids            Reviewed items::  Medications reviewed, Labs reviewed and Radiology images reviewed  Hong catheter::  No Hong  DVT prophylaxis - mechanical:  SCDs

## 2017-11-15 NOTE — PROGRESS NOTES
1330 Discharge instructions given to the patient. Verbalized understanding. Patient was discharged with patient's belongings, e prescriptions (Questran, Potassium & Zofran) pauly w/c accompanied by one female CNA & spouse via Private car.

## 2017-11-15 NOTE — PROGRESS NOTES
0656 Bedside report received from NOC RN (Rhett) at the beginning of the shift.  No distress noted.    0902 Patient's sitting up in bed, having Breakfast. IVF patent & infusing. Rates back pain 4/10, tolerable per patient (medicated by NOC RN (See MAR) on Fentanyl patch. Assessment completed. No distress noted. Plan of care reviewed with the patient. Verbalized understanding.

## 2017-11-15 NOTE — DISCHARGE SUMMARY
CHIEF COMPLAINT ON ADMISSION  Chief Complaint   Patient presents with   • Nausea/Vomiting/Diarrhea       CODE STATUS  Full Code    HPI & HOSPITAL COURSE  This is a 47 y.o. female here with Intractable nausea vomiting diarrhea. She was also found to be hypokalemic. Potassium was replaced during the hospital stay but continued to be low. Sepsis workup was negative. Stool studies were negative for any infectious etiology. Patient's symptoms improved over the course of hospital stay. She was able to tolerate by mouth intake with minimal nausea. Diarrhea and watery stools decreased over the hospital stay. Patient was ambulatory. Patient to be discharged home in stable condition. Patient to take oral potassium at home and follow up with PCP as soon as possible to check her electrolyte levels.    Therefore, she is discharged in good and stable condition with close outpatient follow-up.    SPECIFIC OUTPATIENT FOLLOW-UP  PCP ASAP    DISCHARGE PROBLEM LIST  Active Problems:    Hypokalemia POA: Yes    Dehydration POA: Yes    Hypomagnesemia POA: Yes    Tobacco abuse POA: Yes    Chronic pain POA: Unknown    Diarrhea POA: Unknown    Thrombocytopenia (CMS-HCC) (Chronic) POA: Yes    Retroperitoneal lymphadenopathy (Chronic) POA: No  Resolved Problems:    Viral gastroenteritis POA: Yes      FOLLOW UP  No future appointments.  No follow-up provider specified.    MEDICATIONS ON DISCHARGE   Elvia Roberto   Home Medication Instructions TASHA:[id number]    Printed on:11/15/17 1100   Medication Information                      albuterol 108 (90 Base) MCG/ACT Aero Soln inhalation aerosol  Inhale 2 Puffs by mouth every 6 hours as needed for Shortness of Breath.             cholestyramine (QUESTRAN,PREVALITE) 4 GM Pack  Take 1 Packet by mouth 2 times a day as needed (diarrhea) for up to 10 days.             cyclobenzaprine (FLEXERIL) 10 MG Tab  Take 10 mg by mouth every bedtime.             diphenhydrAMINE (BENADRYL) 25 MG Tab  Take 50 mg  by mouth 2 Times a Day. Itching and allergies             famotidine (PEPCID) 40 MG Tab  Take 40 mg by mouth every bedtime.             fentanyl (DURAGESIC) 25 MCG/HR PATCH 72 HR  Apply 1 Patch to skin as directed every 72 hours.             omeprazole (PRILOSEC) 40 MG delayed-release capsule  Take 40 mg by mouth every morning.             ondansetron (ZOFRAN ODT) 4 MG TABLET DISPERSIBLE  Take 4 mg by mouth every 8 hours as needed for Nausea.             ondansetron (ZOFRAN) 4 MG Tab tablet  Take 1 Tab by mouth every four hours as needed for Nausea/Vomiting for up to 7 days.             oxycodone immediate release (ROXICODONE) 10 MG immediate release tablet  Take 10 mg by mouth every 6 hours as needed for Moderate Pain.             potassium chloride SA (KDUR) 20 MEQ Tab CR  Take 1 Tab by mouth every day for 5 days.             topiramate (TOPAMAX) 50 MG tablet  Take 50 mg by mouth 2 times a day.                 DIET  Orders Placed This Encounter   Procedures   • DIET ORDER     Standing Status:   Standing     Number of Occurrences:   1     Order Specific Question:   Diet:     Answer:   Diabetic [3]     Comments:   HIGH PROTEIN        ACTIVITY  As tolerated.  Weight bearing as tolerated      CONSULTATIONS  None    PROCEDURES  None    LABORATORY  Lab Results   Component Value Date/Time    SODIUM 137 11/15/2017 08:00 AM    POTASSIUM 3.1 (L) 11/15/2017 08:00 AM    CHLORIDE 118 (H) 11/15/2017 08:00 AM    CO2 14 (L) 11/15/2017 08:00 AM    GLUCOSE 88 11/15/2017 08:00 AM    BUN <5 (L) 11/15/2017 08:00 AM    CREATININE 0.68 11/15/2017 08:00 AM        Lab Results   Component Value Date/Time    WBC 4.2 (L) 11/14/2017 05:18 AM    HEMOGLOBIN 13.0 11/14/2017 05:18 AM    HEMATOCRIT 37.4 11/14/2017 05:18 AM    PLATELETCT 78 (L) 11/14/2017 05:18 AM        Total time of the discharge process exceeds 38 minutes

## 2017-11-16 LAB — NORWALK VIRUS PCR NORWK1: NORMAL

## 2017-11-17 LAB
BACTERIA STL CULT: ABNORMAL
BACTERIA STL CULT: ABNORMAL
E COLI SXT1+2 STL IA: ABNORMAL
SIGNIFICANT IND 70042: ABNORMAL
SITE SITE: ABNORMAL
SOURCE SOURCE: ABNORMAL

## 2017-11-20 ENCOUNTER — HOSPITAL ENCOUNTER (OUTPATIENT)
Dept: LAB | Facility: MEDICAL CENTER | Age: 47
End: 2017-11-20
Attending: INTERNAL MEDICINE
Payer: COMMERCIAL

## 2017-11-20 LAB
ANION GAP SERPL CALC-SCNC: 7 MMOL/L (ref 0–11.9)
BUN SERPL-MCNC: 13 MG/DL (ref 8–22)
CALCIUM SERPL-MCNC: 9.1 MG/DL (ref 8.5–10.5)
CHLORIDE SERPL-SCNC: 106 MMOL/L (ref 96–112)
CO2 SERPL-SCNC: 24 MMOL/L (ref 20–33)
CREAT SERPL-MCNC: 0.73 MG/DL (ref 0.5–1.4)
GFR SERPL CREATININE-BSD FRML MDRD: >60 ML/MIN/1.73 M 2
GLUCOSE SERPL-MCNC: 89 MG/DL (ref 65–99)
POTASSIUM SERPL-SCNC: 4.3 MMOL/L (ref 3.6–5.5)
SODIUM SERPL-SCNC: 137 MMOL/L (ref 135–145)

## 2017-11-20 PROCEDURE — 80048 BASIC METABOLIC PNL TOTAL CA: CPT

## 2017-11-20 PROCEDURE — 36415 COLL VENOUS BLD VENIPUNCTURE: CPT

## 2018-02-06 LAB — EKG IMPRESSION: NORMAL

## 2018-10-16 ENCOUNTER — OFFICE VISIT (OUTPATIENT)
Dept: NEUROLOGY | Facility: MEDICAL CENTER | Age: 48
End: 2018-10-16
Payer: COMMERCIAL

## 2018-10-16 VITALS
OXYGEN SATURATION: 98 % | HEART RATE: 83 BPM | WEIGHT: 164 LBS | BODY MASS INDEX: 30.18 KG/M2 | SYSTOLIC BLOOD PRESSURE: 108 MMHG | TEMPERATURE: 98.8 F | DIASTOLIC BLOOD PRESSURE: 66 MMHG | HEIGHT: 62 IN

## 2018-10-16 PROCEDURE — 99204 OFFICE O/P NEW MOD 45 MIN: CPT | Performed by: PHYSICIAN ASSISTANT

## 2018-10-16 RX ORDER — TOPIRAMATE 150 MG/1
1 CAPSULE, EXTENDED RELEASE ORAL DAILY
Qty: 30 EACH | Refills: 11 | Status: SHIPPED | OUTPATIENT
Start: 2018-10-16 | End: 2020-09-01

## 2018-10-16 RX ORDER — LIDOCAINE 50 MG/G
OINTMENT TOPICAL PRN
COMMUNITY
End: 2020-09-01

## 2018-10-16 RX ORDER — RIZATRIPTAN BENZOATE 10 MG/1
10 TABLET, ORALLY DISINTEGRATING ORAL
Qty: 10 TAB | Refills: 11 | Status: SHIPPED | OUTPATIENT
Start: 2018-10-16 | End: 2020-09-01

## 2018-10-16 RX ORDER — DULOXETIN HYDROCHLORIDE 60 MG/1
60 CAPSULE, DELAYED RELEASE ORAL DAILY
COMMUNITY
End: 2022-07-10

## 2018-10-16 ASSESSMENT — PATIENT HEALTH QUESTIONNAIRE - PHQ9
SUM OF ALL RESPONSES TO PHQ QUESTIONS 1-9: 5
CLINICAL INTERPRETATION OF PHQ2 SCORE: 3
5. POOR APPETITE OR OVEREATING: 0 - NOT AT ALL

## 2018-10-16 NOTE — PATIENT INSTRUCTIONS
Acute/Rescue Medications: max use 9 days monthly - use calendar to track and bring to next visit    Triptan  - recommend maxalt melt at onset of migraine - if you do not like we will try sumatriptan nasal spray  Nausea medication - zofran melt for nausea with migraine or for severe neausea  Caffeine tablets potentiall add on to the maxalt if it isn't working after an hour    Daily Preventative Treatment:  Vitamins - handout provided  Daily medicine - TRY QUDEXY instead of topamax    Refer to dentist for TMJ evaluation    Dilated eye exam by next visit

## 2018-10-16 NOTE — PROGRESS NOTES
"Subjective:      Elvia Roberto is a 48 y.o. female who presents with Establish Care (Migraines)    Chief Complaint/Reason for referral:  headaches    History of Present Illness:   Describe your headaches to me: began in 20's.  Currently pounding, stabbing, light hurts, sound not so much but is an irritant.  Normally in back and above eyes.  Lasts more than 4 hours untreated.  Lasts days some time.    One medicine he was prescribing \"midrin\" they don't make anymore so her PCP sent her here.    When did headaches start or why do you think you started having headaches?  Nothing    Have your headaches started recently or changed recently? No, but can't get abortive anymore    Do you get an aura or any symptoms that typically begin PRIOR to headache onset?  none    Are you nauseated or sick to your stomach when you have a headache?  y  Does light bother you when you have a headache?   __y_______  Does sound or noise bother you when you have a headache?   __y_______    Neurologic symptoms with headaches (weakness, numbness, vertigo, speech changes, cognitive changes)  none    Do you have any neck or jaw pain with headaches?    No neck injuries  Been told she grinds teeth    Have you identified any triggers for your headaches (dehydration, poor sleep, low blood sugar, alcohol 35% (ciaran wine) chocolate 22%, cheese 9%, citrus fruit 11%)? denies    Do you feel restless like you want to pace around with your headaches or do you feel like lying down to make your headache feel better/less severe? Lie down    Do headaches start by coughing, sneezing, bending over, Valsalva maneuver, sexual activity? none    Do headaches start shortly after you lie down to go to bed or shortly after you get up from bed in the morning? none    Have you noticed a menstrual pattern to your headaches? No menses but has period    Have you ever kept a headache diary?    How many days do you keep ANY type of headache in any given month?  3 or fewer " days______   Between 3 and 6 days______   Between 6 and 10 days_____  Between 11 and 14 days____  15 or more headache/days per month__X___ everyday  Has severe headaches __15__ days per month    Family members with headaches:  Daughter has migraines    Co--morbid conditions:    Conditions that affect diagnosis and treatment:  Depression  Yes over the past year - situational due to death.  She feels it is well controlled with the cymbalta       Anxiety     Yes - covered also by cymbalta       Sleep disorders:   Yes but due to pain from lupus       Obesity  Yes BMI 30.  Last dilated eye exam 2 years ago    History of TBI Y age 9            Pregnancy/family planning   N/a    Fibromyalgia   Yes - treated by Dr. Woodard also for the lupus    Social History:  Do you drink any caffeine? Yes__X___ No____   How many days per week?  2 or fewer days______  3 or more days__X____  typically daily one large coffee    Do you drink alcohol?  None    Do you use recreational drugs including medicinal marijuana? none    Do you smoke cigarettes? 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 n/a    Who and where do you live? Jorge lives with  son and granddaughters    What is your exercise program: walk to school pick them up.    Have you had an MRI done? never    PMH reviewed - allergy to aspirin products, no kidney stones, + lupus, no MI, no stroke, no asthma    Medications and Allergies Reviewed     What are you taking right now for your headaches/#days per month of acute medication use:     15 days per month - tylenol caffeine OTC product  Caffeine  Never tried actual migraine abortive      Prior acute treatments:  Medication/dose/timing/route/worked or side-effects?    midrin used to work but cannot get anymore    What are you taking right now - daily - to prevent headaches?/dose    cymbalta for depression  topiramate 100 mg  "daily    Any prior prophylactic treatments:  Medications/dose/frequency/duration of treatment/worked or side effects?    Never tried a beta blocker for migraine but blood pressure is too low - she will not tolerate  Antidepressant - already taking so add on of TCA or venlafaxine contraindicated  Already taking topamax at therapeutic dose > 3 months                                                                                                             HPI    ROS       Objective:     /66 (BP Location: Right arm, Patient Position: Sitting, BP Cuff Size: Small adult)   Pulse 83   Temp 37.1 °C (98.8 °F) (Temporal)   Ht 1.575 m (5' 2\")   Wt 74.4 kg (164 lb)   LMP 05/04/2013   SpO2 98%   BMI 30.00 kg/m²      Physical Exam    Well developed, well nourished - vital signs reviewed  Alert and Oriented x 3, Affect Appropriate, Fund of knowledge within normal limits, Memory intact  Cranial Nerves: PERRL, EOMI without nystagmus or opthalmoplegia,  face symmetric in strength and sensation, no facial droop, tongue midline without atrophy or fasiculations, shoulder shrug is normal bilaterally, speech clear and fluent no aphasia  Motor:  Upper and lower extremities 5/5, equal bilaterally.  No drift.  Sensation: Light touch equal and intact in all extremities, No sensory deficits  Cerebellar:  Finger to nose intact.  No dysmetria.  No tremor.  Gait: normal gait without ataxia.  Negative Romberg  CV: no peripheral edema          Assessment/Plan:     Chronic migraine/migraine without aura:    Acute/Rescue Medications: max use 9 days monthly - use calendar to track and bring to next visit    Triptan  - recommend maxalt melt at onset of migraine - if you do not like we will try sumatriptan nasal spray  Nausea medication - zofran melt for nausea with migraine or for severe neausea  Caffeine tablets potentiall add on to the maxalt if it isn't working after an hour    Daily Preventative Treatment:  Vitamins - handout " provided  Daily medicine - TRY QUDEXY instead of topamax    Refer to dentist for TMJ evaluation    Dilated eye exam by next visit    Total time with this visit:   45  Minutes face-to-face with patient. More than 50% of this visit was spent educating patient on their illness and/or coordinating care, as detailed above

## 2018-11-14 ENCOUNTER — TELEPHONE (OUTPATIENT)
Dept: NEUROLOGY | Facility: MEDICAL CENTER | Age: 48
End: 2018-11-14

## 2018-11-14 NOTE — TELEPHONE ENCOUNTER
Called pt and no answer I LVM letting her know that I received PA request for Qudexy XR and if she still has the platinum pass that we gave her during the visit. I advised pt to call me back.

## 2018-11-19 NOTE — TELEPHONE ENCOUNTER
Called pt and she answered, she let me know that she used the platinum card and was able to get the medication.

## 2018-12-11 ENCOUNTER — TELEPHONE (OUTPATIENT)
Dept: NEUROLOGY | Facility: MEDICAL CENTER | Age: 48
End: 2018-12-11

## 2018-12-11 NOTE — TELEPHONE ENCOUNTER
Called pt and she answered, I let her know that pharmacy requested PA for Qudexy pt is aware of this and is frustrated with pharmacy because she had taken the card to them twice now and still haven't figured it out. Pt said she would go in the third time and I let her know that the only thing they need to do is call access pathways and their shouldn't be an issue since she has commercial insurance. I advised pt to call me back if there are issues.

## 2019-01-10 ENCOUNTER — APPOINTMENT (OUTPATIENT)
Dept: NEUROLOGY | Facility: MEDICAL CENTER | Age: 49
End: 2019-01-10
Payer: COMMERCIAL

## 2020-03-31 ENCOUNTER — OFFICE VISIT (OUTPATIENT)
Dept: URGENT CARE | Facility: CLINIC | Age: 50
End: 2020-03-31
Payer: MEDICAID

## 2020-03-31 VITALS
HEIGHT: 62 IN | SYSTOLIC BLOOD PRESSURE: 120 MMHG | WEIGHT: 152 LBS | OXYGEN SATURATION: 97 % | DIASTOLIC BLOOD PRESSURE: 76 MMHG | HEART RATE: 70 BPM | TEMPERATURE: 98.6 F | BODY MASS INDEX: 27.97 KG/M2

## 2020-03-31 DIAGNOSIS — J02.9 VIRAL PHARYNGITIS: ICD-10-CM

## 2020-03-31 LAB
INT CON NEG: NORMAL
INT CON POS: NORMAL
S PYO AG THROAT QL: NEGATIVE

## 2020-03-31 PROCEDURE — 87880 STREP A ASSAY W/OPTIC: CPT | Performed by: FAMILY MEDICINE

## 2020-03-31 PROCEDURE — 99214 OFFICE O/P EST MOD 30 MIN: CPT | Performed by: FAMILY MEDICINE

## 2020-03-31 RX ORDER — FLUTICASONE PROPIONATE 50 MCG
1 SPRAY, SUSPENSION (ML) NASAL 2 TIMES DAILY
Qty: 1 BOTTLE | Refills: 0 | Status: SHIPPED | OUTPATIENT
Start: 2020-03-31 | End: 2020-09-01

## 2020-03-31 NOTE — LETTER
March 31, 2020         Patient: Elvia Roberto   YOB: 1970   Date of Visit: 3/31/2020           To Whom it May Concern:    Elvia Roberto was seen in my clinic on 3/31/2020. She may return to work when symptoms have resolved..    If you have any questions or concerns, please don't hesitate to call.        Sincerely,           Arash Frazier M.D.  Electronically Signed

## 2020-04-01 NOTE — PROGRESS NOTES
"Subjective:     CC:  presents with Pharyngitis            Pharyngitis   This is a new problem. The current episode started in the past 5 days. The problem has been unchanged. There has been no fever. The pain is moderate. Associated symptoms include a hoarse voice, swollen glands . Pertinent negatives include no abdominal pain, congestion, coughing, diarrhea, headaches, shortness of breath or vomiting. no exposure to strep or mono.   has tried acetaminophen for the symptoms. The treatment provided mild relief.     Social History     Tobacco Use   • Smoking status: Current Every Day Smoker     Packs/day: 0.50     Years: 30.00     Pack years: 15.00     Types: Cigarettes   • Smokeless tobacco: Never Used   Substance Use Topics   • Alcohol use: No   • Drug use: No       Past Medical History:   Diagnosis Date   • Heart burn    • Indigestion    • Liver disease     Auto immune hepatitis   • Lupus (HCC)    • Other specified disorder of intestines    • Other specified symptom associated with female genital organs     tubiligation       Review of Systems   Constitutional: Positive for malaise/fatigue. Negative for fever and weight loss.   HENT: Positive for sore throat  Respiratory: Negative for cough, sputum production and shortness of breath.    Cardiovascular: Negative for chest pain.   Gastrointestinal: Negative for nausea, vomiting, abdominal pain and diarrhea.   Genitourinary: Negative.    Neurological: Negative for dizziness and headaches.   All other systems reviewed and are negative.         Objective:   /76   Pulse 70   Temp 37 °C (98.6 °F) (Temporal)   Ht 1.575 m (5' 2\")   Wt 68.9 kg (152 lb)   SpO2 97%         Physical Exam   Constitutional:   oriented to person, place, and time.  appears well-developed and well-nourished. No distress.   HENT:   Head: Normocephalic and atraumatic.   Right Ear: External ear normal.   Left Ear: External ear normal.   Nose: Mucosal edema present. Right sinus exhibits no " maxillary sinus tenderness and no frontal sinus tenderness. Left sinus exhibits no maxillary sinus tenderness and no frontal sinus tenderness.   Mouth/Throat: no posterior oropharyngeal exudate.   There is posterior oropharyngeal erythema present. No posterior oropharyngeal edema.   + clr PND noted  Tonsils absent .  Uvula midline   Eyes: Conjunctivae and EOM are normal. Pupils are equal, round, and reactive to light. Right eye exhibits no discharge. Left eye exhibits no discharge. No scleral icterus.   Neck: Normal range of motion. Neck supple. No JVD present. No tracheal deviation present. No thyromegaly present.   Cardiovascular: Normal rate, regular rhythm, normal heart sounds and intact distal pulses.  Exam reveals no friction rub.    No murmur heard.  Pulmonary/Chest: Effort normal and breath sounds normal. No respiratory distress.   no wheezes.   no rales.    Musculoskeletal:  exhibits no edema.   Lymphadenopathy:     Positive Cervical adenopathy.   Neurological:   alert and oriented to person, place, and time.   Skin: Skin is warm and dry. No erythema.   Psychiatric:   normal mood and affect.   Nursing note and vitals reviewed.             Assessment/Plan:     1. Viral pharyngitis  Rapid strep negative       - Maalox Plus - Diphenhydramine - Lidocaine (MBX Oral Solution); Take 5 mL by mouth every 6 hours as needed.  Dispense: 150 mL; Refill: 0  - fluticasone (FLONASE) 50 MCG/ACT nasal spray; Spray 1 Spray in nose 2 times a day.  Dispense: 1 Bottle; Refill: 0      Follow up in one week if no improvement, sooner if symptoms worsen.

## 2020-09-01 ENCOUNTER — HOSPITAL ENCOUNTER (EMERGENCY)
Facility: MEDICAL CENTER | Age: 50
End: 2020-09-01
Attending: EMERGENCY MEDICINE
Payer: MEDICAID

## 2020-09-01 VITALS
WEIGHT: 153 LBS | OXYGEN SATURATION: 95 % | DIASTOLIC BLOOD PRESSURE: 75 MMHG | HEIGHT: 62 IN | BODY MASS INDEX: 28.16 KG/M2 | SYSTOLIC BLOOD PRESSURE: 124 MMHG | RESPIRATION RATE: 18 BRPM | HEART RATE: 56 BPM | TEMPERATURE: 97.9 F

## 2020-09-01 DIAGNOSIS — G43.901 MIGRAINE WITH STATUS MIGRAINOSUS, NOT INTRACTABLE, UNSPECIFIED MIGRAINE TYPE: ICD-10-CM

## 2020-09-01 PROCEDURE — 99284 EMERGENCY DEPT VISIT MOD MDM: CPT

## 2020-09-01 PROCEDURE — 96375 TX/PRO/DX INJ NEW DRUG ADDON: CPT

## 2020-09-01 PROCEDURE — 96374 THER/PROPH/DIAG INJ IV PUSH: CPT

## 2020-09-01 PROCEDURE — 700111 HCHG RX REV CODE 636 W/ 250 OVERRIDE (IP): Performed by: EMERGENCY MEDICINE

## 2020-09-01 RX ORDER — PROCHLORPERAZINE EDISYLATE 5 MG/ML
10 INJECTION INTRAMUSCULAR; INTRAVENOUS ONCE
Status: COMPLETED | OUTPATIENT
Start: 2020-09-01 | End: 2020-09-01

## 2020-09-01 RX ORDER — KETOROLAC TROMETHAMINE 30 MG/ML
30 INJECTION, SOLUTION INTRAMUSCULAR; INTRAVENOUS ONCE
Status: COMPLETED | OUTPATIENT
Start: 2020-09-01 | End: 2020-09-01

## 2020-09-01 RX ORDER — PANTOPRAZOLE SODIUM 40 MG/1
40 TABLET, DELAYED RELEASE ORAL DAILY
Status: SHIPPED | COMMUNITY
End: 2021-02-05 | Stop reason: SDUPTHER

## 2020-09-01 RX ORDER — DIPHENHYDRAMINE HYDROCHLORIDE 50 MG/ML
25 INJECTION INTRAMUSCULAR; INTRAVENOUS ONCE
Status: COMPLETED | OUTPATIENT
Start: 2020-09-01 | End: 2020-09-01

## 2020-09-01 RX ADMIN — DIPHENHYDRAMINE HYDROCHLORIDE 25 MG: 50 INJECTION INTRAMUSCULAR; INTRAVENOUS at 12:40

## 2020-09-01 RX ADMIN — PROCHLORPERAZINE EDISYLATE 10 MG: 5 INJECTION INTRAMUSCULAR; INTRAVENOUS at 12:41

## 2020-09-01 RX ADMIN — KETOROLAC TROMETHAMINE 30 MG: 30 INJECTION, SOLUTION INTRAMUSCULAR at 12:40

## 2020-09-01 NOTE — DISCHARGE INSTRUCTIONS
Follow-up with your primary doctor for recheck when possible.  Return if worse or for any concerns

## 2020-09-01 NOTE — ED PROVIDER NOTES
ED Provider Note    CHIEF COMPLAINT  Chief Complaint   Patient presents with   • Headache     Onset 1 week. [place]. Hx of migrains.       HPI  Elvia Roberto is a 50 y.o. female who presents with complaint of headache.  Onset 1 week ago.  It is throbbing with associated photophobia and nausea.  She states it is similar to previous migraine headaches although lasting longer than usual.  She states it has been over a year since she last had [facility].  Patient states she did drive herself today.  There are no acute numbness or weakness to the extremities, no neck stiffness, no fever.  She denies associated trauma.  Patient states she feels tired.  No chest pain, no cough, she denies exposure to COVID-19.    REVIEW OF SYSTEMS  Neurologic: Prolonged headache, history of similar  Eyes: Photophobia  Ear nose throat: No ear pain  Gastrointestinal: Nausea, no vomiting  Musculoskeletal: No neck pain or stiffness           PAST MEDICAL HISTORY  Past Medical History:   Diagnosis Date   • Heart burn    • Indigestion    • Liver disease     Auto immune hepatitis   • Lupus (HCC)    • Other specified disorder of intestines    • Other specified symptom associated with female genital organs     tubiligation       FAMILY HISTORY  No family history on file.    SOCIAL HISTORY  Social History     Socioeconomic History   • Marital status: Single     Spouse name: Not on file   • Number of children: Not on file   • Years of education: Not on file   • Highest education level: Not on file   Occupational History   • Not on file   Social Needs   • Financial resource strain: Not on file   • Food insecurity     Worry: Not on file     Inability: Not on file   • Transportation needs     Medical: Not on file     Non-medical: Not on file   Tobacco Use   • Smoking status: Current Every Day Smoker     Packs/day: 0.50     Years: 30.00     Pack years: 15.00     Types: Cigarettes   • Smokeless tobacco: Never Used    Substance and Sexual Activity   • Alcohol use: No   • Drug use: No   • Sexual activity: Not on file   Lifestyle   • Physical activity     Days per week: Not on file     Minutes per session: Not on file   • Stress: Not on file   Relationships   • Social connections     Talks on phone: Not on file     Gets together: Not on file     Attends [employer] service: Not on file     Active member of club or organization: Not on file     Attends meetings of clubs or organizations: Not on file     Relationship status: Not on file   • Intimate partner violence     Fear of current or ex partner: Not on file     Emotionally abused: Not on file     Physically abused: Not on file     Forced sexual activity: Not on file   Other Topics Concern   • Not on file   Social History Narrative   • Not on file       SURGICAL HISTORY  Past Surgical History:   Procedure Laterality Date   • HYSTEROSCOPY WITH VIDEO OPERATIVE  2013    Performed by Robin Frederick M.D. at SURGERY [facility]   • LAPAROSCOPY  2012    Performed by Hayes Lugo M.D. at SURGERY [facility]   • GASTROSCOPY  2012    Performed by Hayes Lugo M.D. at SURGERY [facility]   • BOWEL RESECTION LAPAROSCOPIC  2012    Performed by HAYES LUGO at SURGERY [facility]   • LYSIS ADHESIONS GENERAL  2012    Performed by HAYES LUGO at SURGERY [facility]   • OTHER ABDOMINAL SURGERY      Kristi En Y gastric bypass   • OTHER ORTHOPEDIC SURGERY      Left ankle surgery   • OTHER ORTHOPEDIC SURGERY      Left knee surgery   • GYN SURGERY      tubal ligation   • GYN SURGERY         • PB REMOVE TONSILS/ADENOIDS,<13 Y/O     • PRIMARY C SECTION             CURRENT MEDICATIONS  No current facility-administered medications on file prior to encounter.      Current Outpatient Medications on File Prior to Encounter   Medication Sig Dispense Refill   • pantoprazole (PROTONIX) 40 MG Tablet Delayed Response Take 40  "mg by mouth every bedtime.     • DULoxetine (CYMBALTA) 60 MG Cap DR Particles delayed-release capsule Take 60 mg by mouth every bedtime.     • diphenhydrAMINE (BENADRYL) 25 MG Tab Take 50 mg by mouth every bedtime.         ALLERGIES  Allergies   Allergen Reactions   • Asa [Aspirin] Unspecified     GI upset, bleeding.   • Nsaids Nausea     GI upset, bleeding.   • Tape Unspecified     Blisters all over, Paper tape is ok       PHYSICAL EXAM  VITAL SIGNS: /78   Pulse 77   Temp 36.6 °C (97.9 °F) (Temporal)   Resp 18   Ht 1.575 m (5' 2\")   Wt 69.4 kg (153 lb)   LMP 05/04/2013   SpO2 99%   BMI 27.98 kg/m²   Constitutional:  No acute distress, Non-toxic appearance.   HENT: No facial trauma, no swelling, no epistaxis  Eyes: Pupils are 3 mm bilateral, EOMI, Conjunctiva normal, No discharge.  No nystagmus  Musculoskeletal: No cervical neck tenderness, neck is supple.   Lymphatic: No lymphadenopathy.   Cardiovascular: Normal heart rate, Normal rhythm  Pulmonary: Normal breath sounds, No respiratory distress, No wheezing  Skin: Warm, Dry, No erythema, No rash.    Neurologic: Sensation and strength normal.  Speech clear.  Patient is alert and cooperative  Psychiatric: Affect normal, Mood normal.         COURSE & MEDICAL DECISION MAKING  Pertinent Labs & Imaging studies reviewed. (See chart for details)  Differential diagnosis was migraine headache, tension headache, cluster headache.  I find no evidence of stroke on exam.  There was no history of trauma and no fever.  Patient received IV Toradol, IV Benadryl, and Compazine.  She was able to get some rest, stated she was feeling improved.  Patient is discharged in improved condition.  Given the multiple episodes of previous similar headache, no extensive work-up was ordered today.  She has normal vital signs, appears well.  She is agreeable to returning if worse.    FINAL IMPRESSION     1. Migraine with status migrainosus, not intractable, unspecified migraine type   "           Electronically signed by: Teodoro Laguerre M.D., 9/1/2020 11:49 AM

## 2020-09-14 ENCOUNTER — APPOINTMENT (OUTPATIENT)
Dept: RADIOLOGY | Facility: MEDICAL CENTER | Age: 50
End: 2020-09-14
Attending: EMERGENCY MEDICINE
Payer: MEDICAID

## 2020-09-14 ENCOUNTER — HOSPITAL ENCOUNTER (EMERGENCY)
Facility: MEDICAL CENTER | Age: 50
End: 2020-09-14
Attending: EMERGENCY MEDICINE
Payer: MEDICAID

## 2020-09-14 VITALS
OXYGEN SATURATION: 98 % | SYSTOLIC BLOOD PRESSURE: 124 MMHG | BODY MASS INDEX: 28.34 KG/M2 | HEIGHT: 62 IN | WEIGHT: 154 LBS | DIASTOLIC BLOOD PRESSURE: 77 MMHG | HEART RATE: 78 BPM | TEMPERATURE: 98.5 F | RESPIRATION RATE: 16 BRPM

## 2020-09-14 DIAGNOSIS — S61.211A LACERATION OF LEFT INDEX FINGER WITHOUT FOREIGN BODY WITHOUT DAMAGE TO NAIL, INITIAL ENCOUNTER: ICD-10-CM

## 2020-09-14 PROCEDURE — 700111 HCHG RX REV CODE 636 W/ 250 OVERRIDE (IP): Performed by: EMERGENCY MEDICINE

## 2020-09-14 PROCEDURE — 700101 HCHG RX REV CODE 250: Performed by: EMERGENCY MEDICINE

## 2020-09-14 PROCEDURE — 99283 EMERGENCY DEPT VISIT LOW MDM: CPT

## 2020-09-14 PROCEDURE — 73140 X-RAY EXAM OF FINGER(S): CPT | Mod: LT

## 2020-09-14 PROCEDURE — 90715 TDAP VACCINE 7 YRS/> IM: CPT | Performed by: EMERGENCY MEDICINE

## 2020-09-14 PROCEDURE — 64450 NJX AA&/STRD OTHER PN/BRANCH: CPT

## 2020-09-14 PROCEDURE — 90471 IMMUNIZATION ADMIN: CPT

## 2020-09-14 RX ORDER — AMOXICILLIN 500 MG/1
500 CAPSULE ORAL 3 TIMES DAILY
Status: SHIPPED | COMMUNITY
Start: 2020-09-11 | End: 2021-01-21

## 2020-09-14 RX ORDER — SENNOSIDES 8.6 MG
1300 CAPSULE ORAL EVERY 8 HOURS PRN
Status: SHIPPED | COMMUNITY
End: 2021-01-30

## 2020-09-14 RX ORDER — LIDOCAINE HYDROCHLORIDE 10 MG/ML
5 INJECTION, SOLUTION INFILTRATION; PERINEURAL ONCE
Status: COMPLETED | OUTPATIENT
Start: 2020-09-14 | End: 2020-09-14

## 2020-09-14 RX ADMIN — LIDOCAINE HYDROCHLORIDE 5 ML: 10 INJECTION, SOLUTION INFILTRATION; PERINEURAL at 12:15

## 2020-09-14 RX ADMIN — CLOSTRIDIUM TETANI TOXOID ANTIGEN (FORMALDEHYDE INACTIVATED), CORYNEBACTERIUM DIPHTHERIAE TOXOID ANTIGEN (FORMALDEHYDE INACTIVATED), BORDETELLA PERTUSSIS TOXOID ANTIGEN (GLUTARALDEHYDE INACTIVATED), BORDETELLA PERTUSSIS FILAMENTOUS HEMAGGLUTININ ANTIGEN (FORMALDEHYDE INACTIVATED), BORDETELLA PERTUSSIS PERTACTIN ANTIGEN, AND BORDETELLA PERTUSSIS FIMBRIAE 2/3 ANTIGEN 0.5 ML: 5; 2; 2.5; 5; 3; 5 INJECTION, SUSPENSION INTRAMUSCULAR at 11:13

## 2020-09-14 NOTE — ED NOTES
Reviewed discharge instructions w/ pt, verbalized understanding to information provided including follow up care, return precautions and wound care, denied questions/concerns.  Pt ambulated from ED.

## 2020-09-14 NOTE — ED NOTES
Med Rec complete per Pt at bedside  Allergies Reviewed    Pt started a 10 day course of AMOXIL on 9/11

## 2020-09-14 NOTE — DISCHARGE INSTRUCTIONS
You soap and water 2-3 times a day to keep the area clean.  Otherwise keep it covered with a Band-Aid until it heals.

## 2020-09-14 NOTE — ED PROVIDER NOTES
ED Provider Note    ED Provider    Means of arrival: Private vehicle  History obtained from: Patient  History limited by: None    CHIEF COMPLAINT  Chief Complaint   Patient presents with   • Finger Injury     Left Second Digit, by screwdriver       HPI  Elvia Roberto is a 50 y.o. female who presents with complaints of laceration to left index finger.  She was at home doing a job had a flat screw bit on a electric drill that punctured the distal left index finger.  She has no numbness or tingling.  She does complain of pain.    REVIEW OF SYSTEMS  See HPI for further details.     PAST MEDICAL HISTORY   has a past medical history of Heart burn, Indigestion, Liver disease, Lupus (HCC), Other specified disorder of intestines, and Other specified symptom associated with female genital organs.    SOCIAL HISTORY  Social History     Tobacco Use   • Smoking status: Current Every Day Smoker     Packs/day: 0.50     Years: 30.00     Pack years: 15.00     Types: Cigarettes   • Smokeless tobacco: Never Used   Substance and Sexual Activity   • Alcohol use: No   • Drug use: No   • Sexual activity: Not on file       SURGICAL HISTORY   has a past surgical history that includes bowel resection laparoscopic (4/4/2012); lysis adhesions general (4/4/2012); gyn surgery (1993); gyn surgery (1993); other orthopedic surgery (2004); other orthopedic surgery (2000); other abdominal surgery (2010); laparoscopy (12/16/2012); gastroscopy (12/16/2012); primary c section; remove tonsils/adenoids,<11 y/o; and hysteroscopy with video operative (8/20/2013).    CURRENT MEDICATIONS  Home Medications     Reviewed by Maria G Navas (Pharmacy Tech) on 09/14/20 at 1109  Med List Status: Complete   Medication Last Dose Status   acetaminophen (ACETAMINOPHEN 8 HOUR) 650 MG CR tablet 9/13/2020 Active   amoxicillin (AMOXIL) 500 MG Cap 9/13/2020 Active   diphenhydrAMINE (BENADRYL) 25 MG Tab 9/13/2020 Active   DULoxetine (CYMBALTA) 60 MG Cap DR Particles  "delayed-release capsule 9/13/2020 Active   pantoprazole (PROTONIX) 40 MG Tablet Delayed Response 9/13/2020 Active                ALLERGIES  Allergies   Allergen Reactions   • Asa [Aspirin] Unspecified     GI upset, bleeding.   • Nsaids Nausea     GI upset, bleeding.   • Tape Unspecified     Blisters all over, Paper tape is ok       PHYSICAL EXAM  VITAL SIGNS: /77   Pulse 78   Temp 36.9 °C (98.5 °F) (Temporal)   Resp 16   Ht 1.575 m (5' 2\")   Wt 69.9 kg (154 lb)   LMP 05/04/2013   SpO2 98%   BMI 28.17 kg/m²   Constitutional: Alert in no apparent distress.  HENT: Normocephalic, Atraumatic, Mucous membranes are moist  Eyes:  Conjunctiva normal, non-icteric.   Heart: no peripheral cyanosis  Lungs: respirations are even and unlabored  Skin: Warm, Dry,normal color  Neurologic: Alert, Grossly non-focal.   Psychiatric: Affect normal, Judgment normal, Mood normal, Appears appropriate and not intoxicated.   Left index finger has a laceration on the radial side, subcutaneous wound edges are well approximated.  Distal neurovascular is normal.  Range of motion of the fingers normal  MEDICAL DECISION MAKING  This is a 50 y.o. female who presents with laceration of the finger.  X-rays again be done to evaluate for fracture.  Tetanus will be updated    DIAGNOSTIC STUDIES / PROCEDURES    LABS      RADIOLOGY  DX-FINGER(S) 2+ LEFT   Final Result      No radiographic evidence of acute traumatic injury      Mild second DIP osteoarthritis      X-rays were visualized by me    COURSE  Pertinent Labs & Imaging studies reviewed. (See chart for details)    12:13 PM - Patient seen and examined at bedside. Discussed plan of care,     Digital block  Left index finger  3 cc lidocaine 1% without epinephrine  Good anesthesia was obtained      Wound edges are well approximated without intervention.  This is a dirty wound from a dirty drill bit, risk of infection is high.  As the wound edges are well approximated I do not believe " sutures are necessary.  The wound was anesthetized using a digital block with good results and the wound will be cleaned and dressed.              FINAL IMPRESSION  1. Laceration of left index finger without foreign body without damage to nail, initial encounter        The note accurately reflects work and decisions made by me.  Dave Pollack D.O.  9/14/2020  12:18 PM

## 2020-09-14 NOTE — ED TRIAGE NOTES
"Chief Complaint   Patient presents with   • Finger Injury     Left Second Digit, by screwdriver     /77   Pulse 78   Temp 36.9 °C (98.5 °F) (Temporal)   Resp 16   Ht 1.575 m (5' 2\")   Wt 69.9 kg (154 lb)   LMP 05/04/2013   SpO2 98%   BMI 28.17 kg/m²     Covid Screen Negative  "

## 2020-12-23 ENCOUNTER — HOSPITAL ENCOUNTER (EMERGENCY)
Facility: MEDICAL CENTER | Age: 50
End: 2020-12-23
Attending: EMERGENCY MEDICINE
Payer: COMMERCIAL

## 2020-12-23 VITALS
BODY MASS INDEX: 27.38 KG/M2 | HEIGHT: 62 IN | TEMPERATURE: 97.7 F | HEART RATE: 70 BPM | DIASTOLIC BLOOD PRESSURE: 72 MMHG | OXYGEN SATURATION: 99 % | WEIGHT: 148.81 LBS | SYSTOLIC BLOOD PRESSURE: 110 MMHG | RESPIRATION RATE: 17 BRPM

## 2020-12-23 DIAGNOSIS — R52 BODY ACHES: ICD-10-CM

## 2020-12-23 DIAGNOSIS — M32.9 LUPUS (HCC): ICD-10-CM

## 2020-12-23 LAB
COVID ORDER STATUS COVID19: NORMAL
SARS-COV-2 RNA RESP QL NAA+PROBE: NOTDETECTED
SPECIMEN SOURCE: NORMAL

## 2020-12-23 PROCEDURE — U0003 INFECTIOUS AGENT DETECTION BY NUCLEIC ACID (DNA OR RNA); SEVERE ACUTE RESPIRATORY SYNDROME CORONAVIRUS 2 (SARS-COV-2) (CORONAVIRUS DISEASE [COVID-19]), AMPLIFIED PROBE TECHNIQUE, MAKING USE OF HIGH THROUGHPUT TECHNOLOGIES AS DESCRIBED BY CMS-2020-01-R: HCPCS

## 2020-12-23 PROCEDURE — C9803 HOPD COVID-19 SPEC COLLECT: HCPCS | Performed by: EMERGENCY MEDICINE

## 2020-12-23 PROCEDURE — 99283 EMERGENCY DEPT VISIT LOW MDM: CPT

## 2020-12-23 RX ORDER — PREDNISONE 10 MG/1
TABLET ORAL
Qty: 42 TAB | Refills: 0 | Status: SHIPPED | OUTPATIENT
Start: 2020-12-23 | End: 2021-03-30

## 2020-12-23 RX ORDER — TRAMADOL HYDROCHLORIDE 50 MG/1
50 TABLET ORAL EVERY 6 HOURS PRN
Qty: 15 TAB | Refills: 0 | Status: SHIPPED | OUTPATIENT
Start: 2020-12-23 | End: 2020-12-28

## 2020-12-23 ASSESSMENT — PAIN SCALES - WONG BAKER: WONGBAKER_NUMERICALRESPONSE: HURTS JUST A LITTLE BIT

## 2020-12-23 ASSESSMENT — LIFESTYLE VARIABLES: DO YOU DRINK ALCOHOL: NO

## 2020-12-23 NOTE — ED TRIAGE NOTES
"Chief Complaint   Patient presents with   • Generalized Body Aches     \" lupus flare\"      pt states she is \" in a lupus flare\". Not manageable with tylenol at home.   "

## 2020-12-23 NOTE — ED PROVIDER NOTES
"ED Provider Note    CHIEF COMPLAINT   Chief Complaint   Patient presents with   • Generalized Body Aches     \" lupus flare\"       HPI   Elvia Roberto is a 50 y.o. female who presents to the emergency department with a chief complaint of body aches.  The patient has a history of lupus.  She is not currently taking any lupus medications.  She comes in with diffuse body aches and joint aches.  She says that she feels like she is having a lupus flare.  She had similar symptoms multiple times in the past and this is been related to lupus.  She not had any fevers.  No COVID-19 exposures.  No vomiting.  No chest pain.  No abdominal pain.  No headaches.    REVIEW OF SYSTEMS   See HPI for further details. All other systems are negative.     PAST MEDICAL HISTORY   Past Medical History:   Diagnosis Date   • Heart burn    • Indigestion    • Liver disease     Auto immune hepatitis   • Lupus (HCC)    • Other specified disorder of intestines    • Other specified symptom associated with female genital organs     tubiligation       FAMILY HISTORY  History reviewed. No pertinent family history.    SOCIAL HISTORY  Social History     Socioeconomic History   • Marital status: Single     Spouse name: Not on file   • Number of children: Not on file   • Years of education: Not on file   • Highest education level: Not on file   Occupational History   • Not on file   Social Needs   • Financial resource strain: Not on file   • Food insecurity     Worry: Not on file     Inability: Not on file   • Transportation needs     Medical: Not on file     Non-medical: Not on file   Tobacco Use   • Smoking status: Current Every Day Smoker     Packs/day: 0.50     Years: 30.00     Pack years: 15.00     Types: Cigarettes   • Smokeless tobacco: Never Used   Substance and Sexual Activity   • Alcohol use: No   • Drug use: No   • Sexual activity: Not on file   Lifestyle   • Physical activity     Days per week: Not on file     Minutes per session: Not on " "file   • Stress: Not on file   Relationships   • Social connections     Talks on phone: Not on file     Gets together: Not on file     Attends Protestant service: Not on file     Active member of club or organization: Not on file     Attends meetings of clubs or organizations: Not on file     Relationship status: Not on file   • Intimate partner violence     Fear of current or ex partner: Not on file     Emotionally abused: Not on file     Physically abused: Not on file     Forced sexual activity: Not on file   Other Topics Concern   • Not on file   Social History Narrative   • Not on file       SURGICAL HISTORY  Past Surgical History:   Procedure Laterality Date   • HYSTEROSCOPY WITH VIDEO OPERATIVE  2013    Performed by Robin Frederick M.D. at SURGERY [facility]   • LAPAROSCOPY  2012    Performed by Hayes Lugo M.D. at SURGERY [facility] ORS   • GASTROSCOPY  2012    Performed by Hayes Lugo M.D. at SURGERY [facility] ORS   • BOWEL RESECTION LAPAROSCOPIC  2012    Performed by HAYES LUGO at SURGERY [facility] ORS   • LYSIS ADHESIONS GENERAL  2012    Performed by HAYES LUGO at SURGERY [facility] ORS   • OTHER ABDOMINAL SURGERY      Kristi En Y gastric bypass   • OTHER ORTHOPEDIC SURGERY      Left ankle surgery   • OTHER ORTHOPEDIC SURGERY      Left knee surgery   • GYN SURGERY      tubal ligation   • GYN SURGERY         • PB REMOVE TONSILS/ADENOIDS,<13 Y/O     • PRIMARY C SECTION             CURRENT MEDICATIONS   Home Medications    **Home medications have not yet been reviewed for this encounter**         ALLERGIES   Allergies   Allergen Reactions   • Asa [Aspirin] Unspecified     GI upset, bleeding.   • Nsaids Nausea     GI upset, bleeding.   • Tape Unspecified     Blisters all over, Paper tape is ok       PHYSICAL EXAM  VITAL SIGNS: /69   Pulse 75   Temp 36.5 °C (97.7 °F) (Temporal)   Resp 16   Ht 1.575 m (5' 2\")   Wt 67.5 " kg (148 lb 13 oz)   LMP 05/04/2013   SpO2 99%   BMI 27.22 kg/m²   Constitutional: Well developed, Well nourished, No acute distress, Non-toxic appearance.   Cardiovascular: Normal heart rate, Normal rhythm, No murmurs, No rubs, No gallops.   Thorax & Lungs: Normal breath sounds, No respiratory distress, No wheezing, No chest tenderness.   Abdomen: Bowel sounds normal, Soft, No tenderness, No masses, No pulsatile masses.   Skin: Warm, Dry, No erythema, xeroderma noted, excoriations over the forearms noted.  Extremities: Intact distal pulses, No cyanosis, No clubbing.   Musculoskeletal: Mild diffuse tenderness but no specific point tenderness.  No joint effusions.  No significant joint erythema.  Neurologic: Alert & oriented x 3, Normal motor function, Normal sensory function, No focal deficits noted.     RADIOLOGY/PROCEDURES      COURSE & MEDICAL DECISION MAKING  Pertinent Labs & Imaging studies reviewed. (See chart for details)    Patient presents today with body aches.  She says that she feels like she is having a lupus flare.  Previously she has been treated well with tramadol and prednisone for similar symptoms.  I do not feel that this is inappropriate.  I will give the patient a prescription for these medications.  We will also screen her for COVID-19 given the current pandemic.  Discharged home in stable condition.    I reviewed prescription monitoring program for patient's narcotic use before prescribing a scheduled drug.The patient will not drink alcohol nor drive with prescribed medications. The patient will return for new or worsening symptoms and is stable at the time of discharge.    The patient is referred to a primary physician for blood pressure management, diabetic screening, and for all other preventative health concerns.    DISPOSITION:  Patient will be discharged home in stable condition.    FOLLOW UP:  Cipriano Gomez M.D.  [address]  [facility]  [address]  [phone]    Schedule an appointment as soon as possible for a visit       [facility], Emergency Dept  [address]  [place] [id number] [phone]    If symptoms worsen      OUTPATIENT MEDICATIONS:  New Prescriptions    PREDNISONE (DELTASONE) 10 MG TAB    Day 1 take 6 tablets  Day 2 take 6 tablets  Day 3 take 5 tablets  Day 4 take 5 tablets  Day 5 take 4 tablets  Day 6 take 4 tablets  Day 7 take 3 tablets  Day 8 take 3 tablets  Day 9 take 2 tablets  Day 10 take 2 tablets  Day 11 take 1 tablet  Day 12 take 1 tablet    TRAMADOL (ULTRAM) 50 MG TAB    Take 1 Tab by mouth every 6 hours as needed (pain) for up to 5 days.         FINAL IMPRESSION  1. Body aches    2. Lupus (HCC)            Electronically signed by: Rio Deshpande M.D., 12/23/2020 8:49 AM

## 2021-01-21 ENCOUNTER — APPOINTMENT (OUTPATIENT)
Dept: RADIOLOGY | Facility: MEDICAL CENTER | Age: 51
End: 2021-01-21
Attending: EMERGENCY MEDICINE
Payer: MEDICAID

## 2021-01-21 ENCOUNTER — HOSPITAL ENCOUNTER (EMERGENCY)
Facility: MEDICAL CENTER | Age: 51
End: 2021-01-21
Attending: EMERGENCY MEDICINE
Payer: MEDICAID

## 2021-01-21 VITALS
OXYGEN SATURATION: 96 % | RESPIRATION RATE: 16 BRPM | WEIGHT: 149.69 LBS | HEART RATE: 62 BPM | DIASTOLIC BLOOD PRESSURE: 55 MMHG | TEMPERATURE: 97.9 F | SYSTOLIC BLOOD PRESSURE: 100 MMHG | BODY MASS INDEX: 27.55 KG/M2 | HEIGHT: 62 IN

## 2021-01-21 DIAGNOSIS — D61.818 PANCYTOPENIA (HCC): ICD-10-CM

## 2021-01-21 DIAGNOSIS — K75.4 AUTOIMMUNE HEPATITIS (HCC): ICD-10-CM

## 2021-01-21 LAB
ALBUMIN SERPL BCP-MCNC: 3.5 G/DL (ref 3.2–4.9)
ALBUMIN/GLOB SERPL: 0.8 G/DL
ALP SERPL-CCNC: 153 U/L (ref 30–99)
ALT SERPL-CCNC: 32 U/L (ref 2–50)
ANION GAP SERPL CALC-SCNC: 8 MMOL/L (ref 7–16)
AST SERPL-CCNC: 49 U/L (ref 12–45)
BASOPHILS # BLD AUTO: 0.9 % (ref 0–1.8)
BASOPHILS # BLD: 0.03 K/UL (ref 0–0.12)
BILIRUB SERPL-MCNC: 0.3 MG/DL (ref 0.1–1.5)
BUN SERPL-MCNC: 12 MG/DL (ref 8–22)
CALCIUM SERPL-MCNC: 8.3 MG/DL (ref 8.4–10.2)
CHLORIDE SERPL-SCNC: 107 MMOL/L (ref 96–112)
CO2 SERPL-SCNC: 23 MMOL/L (ref 20–33)
CREAT SERPL-MCNC: 0.6 MG/DL (ref 0.5–1.4)
EOSINOPHIL # BLD AUTO: 0.06 K/UL (ref 0–0.51)
EOSINOPHIL NFR BLD: 1.9 % (ref 0–6.9)
ERYTHROCYTE [DISTWIDTH] IN BLOOD BY AUTOMATED COUNT: 52.3 FL (ref 35.9–50)
GLOBULIN SER CALC-MCNC: 4.5 G/DL (ref 1.9–3.5)
GLUCOSE SERPL-MCNC: 85 MG/DL (ref 65–99)
HCT VFR BLD AUTO: 33 % (ref 37–47)
HGB BLD-MCNC: 9.9 G/DL (ref 12–16)
IMM GRANULOCYTES # BLD AUTO: 0.01 K/UL (ref 0–0.11)
IMM GRANULOCYTES NFR BLD AUTO: 0.3 % (ref 0–0.9)
INR PPP: 1.2 (ref 0.87–1.13)
LIPASE SERPL-CCNC: 41 U/L (ref 7–58)
LYMPHOCYTES # BLD AUTO: 0.87 K/UL (ref 1–4.8)
LYMPHOCYTES NFR BLD: 27.5 % (ref 22–41)
MCH RBC QN AUTO: 23.4 PG (ref 27–33)
MCHC RBC AUTO-ENTMCNC: 30 G/DL (ref 33.6–35)
MCV RBC AUTO: 78 FL (ref 81.4–97.8)
MONOCYTES # BLD AUTO: 0.33 K/UL (ref 0–0.85)
MONOCYTES NFR BLD AUTO: 10.4 % (ref 0–13.4)
NEUTROPHILS # BLD AUTO: 1.86 K/UL (ref 2–7.15)
NEUTROPHILS NFR BLD: 59 % (ref 44–72)
NRBC # BLD AUTO: 0 K/UL
NRBC BLD-RTO: 0 /100 WBC
PLATELET # BLD AUTO: 104 K/UL (ref 164–446)
PMV BLD AUTO: 10.1 FL (ref 9–12.9)
POTASSIUM SERPL-SCNC: 3.9 MMOL/L (ref 3.6–5.5)
PROT SERPL-MCNC: 8 G/DL (ref 6–8.2)
PROTHROMBIN TIME: 14.9 SEC (ref 12–14.6)
RBC # BLD AUTO: 4.23 M/UL (ref 4.2–5.4)
SODIUM SERPL-SCNC: 138 MMOL/L (ref 135–145)
WBC # BLD AUTO: 3.2 K/UL (ref 4.8–10.8)

## 2021-01-21 PROCEDURE — 83690 ASSAY OF LIPASE: CPT

## 2021-01-21 PROCEDURE — 85610 PROTHROMBIN TIME: CPT

## 2021-01-21 PROCEDURE — 99284 EMERGENCY DEPT VISIT MOD MDM: CPT

## 2021-01-21 PROCEDURE — 76705 ECHO EXAM OF ABDOMEN: CPT

## 2021-01-21 PROCEDURE — 700111 HCHG RX REV CODE 636 W/ 250 OVERRIDE (IP): Performed by: EMERGENCY MEDICINE

## 2021-01-21 PROCEDURE — 36415 COLL VENOUS BLD VENIPUNCTURE: CPT

## 2021-01-21 PROCEDURE — 96374 THER/PROPH/DIAG INJ IV PUSH: CPT

## 2021-01-21 PROCEDURE — 80053 COMPREHEN METABOLIC PANEL: CPT

## 2021-01-21 PROCEDURE — 85025 COMPLETE CBC W/AUTO DIFF WBC: CPT

## 2021-01-21 RX ADMIN — FENTANYL CITRATE 50 MCG: 50 INJECTION, SOLUTION INTRAMUSCULAR; INTRAVENOUS at 08:56

## 2021-01-21 NOTE — ED NOTES
Pharmacy Medication Reconciliation      Medication reconciliation updated and complete per pt at bedside  Allergies have been verified and updated   No oral ABX within the last 14 days  Patient home pharmacy:[facility]

## 2021-01-21 NOTE — ED TRIAGE NOTES
Pt amb to triage c/o ruq pain since this am; pt states hx autoimmune hepatits r/t hx lupus. Pt states ruq pain recurrent r/t hx lupus

## 2021-01-21 NOTE — ED NOTES
Discharge instructions provided. PIV removed.  Pt verbalized the understanding of discharge instructions to follow up with PCP, GI and to return to ER if condition worsens.  Pt ambulated out of ER without difficulty.

## 2021-01-21 NOTE — ED PROVIDER NOTES
ED Provider Note    CHIEF COMPLAINT  Chief Complaint   Patient presents with   • RUQ Pain       HPI  Elvia Roberto is a 50 y.o. female who presents to the emergency department with a history of lupus and autoimmune hepatitis.  She states she has not maintained follow-up with her gastroenterologist and currently has no one that is following her liver disease.  She last had a lupus flareup flare in the emergency department and was told to follow-up with her primary care and placed on a steroid taper.  The patient comes in stating it 4:00 this morning she started having pain in the right side that feels like her liver becoming inflamed again.  She has not noticed any vomiting she has had no jaundice she tried to go to work but it was too painful and came here for evaluation.    REVIEW OF SYSTEMS  Positive for abdominal pain, Negative for jaundice fever chills headache rash shortness of breath and all other systems are negative  PAST MEDICAL HISTORY   has a past medical history of Heart burn, Indigestion, Liver disease, Lupus (HCC), Other specified disorder of intestines, and Other specified symptom associated with female genital organs.    SOCIAL HISTORY  Social History     Tobacco Use   • Smoking status: Current Every Day Smoker     Packs/day: 0.50     Years: 30.00     Pack years: 15.00     Types: Cigarettes   • Smokeless tobacco: Never Used   Substance and Sexual Activity   • Alcohol use: No   • Drug use: No   • Sexual activity: Not on file       SURGICAL HISTORY   has a past surgical history that includes bowel resection laparoscopic (4/4/2012); lysis adhesions general (4/4/2012); gyn surgery (1993); gyn surgery (1993); other orthopedic surgery (2004); other orthopedic surgery (2000); other abdominal surgery (2010); laparoscopy (12/16/2012); gastroscopy (12/16/2012); primary c section; remove tonsils/adenoids,<11 y/o; and hysteroscopy with video operative (8/20/2013).    CURRENT MEDICATIONS  Reviewed.  See  "Encounter Summary.  Include   Home Medications    Medication Sig Taking? Last Dose Authorizing Provider   predniSONE (DELTASONE) 10 MG Tab Day 1 take 6 tablets  Day 2 take 6 tablets  Day 3 take 5 tablets  Day 4 take 5 tablets  Day 5 take 4 tablets  Day 6 take 4 tablets  Day 7 take 3 tablets  Day 8 take 3 tablets  Day 9 take 2 tablets  Day 10 take 2 tablets  Day 11 take 1 tablet  Day 12 take 1 tablet   Rio Deshpande M.D.   amoxicillin (AMOXIL) 500 MG Cap Take 500 mg by mouth 3 times a day. Pt started a 10 day course of AMOXIL on 9/11   Physician Outpatient   acetaminophen (ACETAMINOPHEN 8 HOUR) 650 MG CR tablet Take 1,300 mg by mouth every 8 hours as needed (PAIN , HEADACHE).   Physician Outpatient   pantoprazole (PROTONIX) 40 MG Tablet Delayed Response Take 40 mg by mouth every bedtime.   Physician Outpatient   DULoxetine (CYMBALTA) 60 MG Cap DR Particles delayed-release capsule Take 60 mg by mouth every bedtime.   Physician Outpatient   diphenhydrAMINE (BENADRYL) 25 MG Tab Take 50 mg by mouth every bedtime.   Physician Outpatient         ALLERGIES  Allergies   Allergen Reactions   • Asa [Aspirin] Unspecified     GI upset, bleeding.   • Nsaids Nausea     GI upset, bleeding.   • Tape Unspecified     Blisters all over, Paper tape is ok       PHYSICAL EXAM  VITAL SIGNS: /69   Pulse 71   Temp 36.6 °C (97.9 °F) (Temporal)   Resp 18   Ht 1.575 m (5' 2\")   Wt 67.9 kg (149 lb 11.1 oz)   LMP 05/04/2013   SpO2 99%   BMI 27.38 kg/m²   Constitutional: Appears comfortable, Alert in no apparent distress.  HENT: Normocephalic, Bilateral external ears normal. Nose normal.   Eyes: Pupils are equal and reactive. Conjunctiva normal, non-icteric.   Thorax & Lungs: Easy unlabored respirations  Abdomen:  No gross signs of peritonitis, no pain with movement   Skin: Visualized skin is  Dry, No erythema, No rash.,  Right upper quadrant tenderness to palpation extremities:   No edema, No asymmetry  Neurologic: Alert, " Grossly non-focal.   Psychiatric: Affect and Mood normal      COURSE & MEDICAL DECISION MAKING  Nursing notes and vital signs were reviewed. (See chart for details)    The patient presents to the Emergency Department with right upper quadrant pain, history of autoimmune hepatitis, not currently compliant with care.  In the emergency department patient will be evaluated with a CBC CMP lipase and an ultrasound of the liver.  She will be given a low-dose of fentanyl for pain relief prior to her imaging to help facilitate the ultrasound.  Clinically she does not appear to have ascites or jaundice, she is afebrile nontoxic and no signs of sepsis    Patient in the emergency department the following results:  US-RUQ   Final Result      1.  Suspicious for cirrhosis and portal hypertension      2.  Prior cholecystectomy. No biliary dilation.        Results for orders placed or performed during the hospital encounter of 01/21/21   CBC WITH DIFFERENTIAL   Result Value Ref Range    WBC 3.2 (L) 4.8 - 10.8 K/uL    RBC 4.23 4.20 - 5.40 M/uL    Hemoglobin 9.9 (L) 12.0 - 16.0 g/dL    Hematocrit 33.0 (L) 37.0 - 47.0 %    MCV 78.0 (L) 81.4 - 97.8 fL    MCH 23.4 (L) 27.0 - 33.0 pg    MCHC 30.0 (L) 33.6 - 35.0 g/dL    RDW 52.3 (H) 35.9 - 50.0 fL    Platelet Count 104 (L) 164 - 446 K/uL    MPV 10.1 9.0 - 12.9 fL    Neutrophils-Polys 59.00 44.00 - 72.00 %    Lymphocytes 27.50 22.00 - 41.00 %    Monocytes 10.40 0.00 - 13.40 %    Eosinophils 1.90 0.00 - 6.90 %    Basophils 0.90 0.00 - 1.80 %    Immature Granulocytes 0.30 0.00 - 0.90 %    Nucleated RBC 0.00 /100 WBC    Neutrophils (Absolute) 1.86 (L) 2.00 - 7.15 K/uL    Lymphs (Absolute) 0.87 (L) 1.00 - 4.80 K/uL    Monos (Absolute) 0.33 0.00 - 0.85 K/uL    Eos (Absolute) 0.06 0.00 - 0.51 K/uL    Baso (Absolute) 0.03 0.00 - 0.12 K/uL    Immature Granulocytes (abs) 0.01 0.00 - 0.11 K/uL    NRBC (Absolute) 0.00 K/uL   Comp Metabolic Panel   Result Value Ref Range    Sodium 138 135 - 145 mmol/L     Potassium 3.9 3.6 - 5.5 mmol/L    Chloride 107 96 - 112 mmol/L    Co2 23 20 - 33 mmol/L    Anion Gap 8.0 7.0 - 16.0    Glucose 85 65 - 99 mg/dL    Bun 12 8 - 22 mg/dL    Creatinine 0.60 0.50 - 1.40 mg/dL    Calcium 8.3 (L) 8.4 - 10.2 mg/dL    AST(SGOT) 49 (H) 12 - 45 U/L    ALT(SGPT) 32 2 - 50 U/L    Alkaline Phosphatase 153 (H) 30 - 99 U/L    Total Bilirubin 0.3 0.1 - 1.5 mg/dL    Albumin 3.5 3.2 - 4.9 g/dL    Total Protein 8.0 6.0 - 8.2 g/dL    Globulin 4.5 (H) 1.9 - 3.5 g/dL    A-G Ratio 0.8 g/dL   LIPASE   Result Value Ref Range    Lipase 41 7 - 58 U/L   ESTIMATED GFR   Result Value Ref Range    GFR If African American >60 >60 mL/min/1.73 m 2    GFR If Non African American >60 >60 mL/min/1.73 m 2   PT/INR   Result Value Ref Range    PT 14.9 (H) 12.0 - 14.6 sec    INR 1.20 (H) 0.87 - 1.13     t I spoke with Dr. Oro from [facility] regarding this patient's management.  He did not feel any further work-up was necessary and that she would need to be seen in their liver clinic.  He has taken her name and information we will schedule her as he needs to have her undergo fibrosis analysis as an outpatient and management of her autoimmune hepatitis.  He did feel that her LFTs were reassuring that she did not have active disease.  We discussed the findings on ultrasound of portal hypertension and possible cirrhosis that he will follow-up with.  Additionally the patient was noted to be pancytopenic.  He thought this could be due to her lupus so I have made copies of her blood work to see if the patient is having a lupus flare.  The patient will need ongoing management and I have stressed to her the importance of not failing outpatient management.  She states that she sees her regular doctor routinely.    At this time the patient has no acute florid liver failure or signs of active bleeding or other hemodynamic instability to suggest need for inpatient management.  I have set her up with gastroenterology and she  knows to follow-up with her primary care for her pancytopenia and lupus.  If there is any new or worsening symptoms she may return to the ER at any time  Discharge Medications:    FINAL IMPRESSION  1.  Right upper quadrant pain  2.  Suspected autoimmune hepatitis without evidence of an acute flare  3.  Pancytopenia             Electronically signed by: Jyoti Mckay M.D., 1/21/2021 8:46 AM

## 2021-01-23 ENCOUNTER — HOSPITAL ENCOUNTER (EMERGENCY)
Facility: MEDICAL CENTER | Age: 51
End: 2021-01-23
Attending: EMERGENCY MEDICINE
Payer: COMMERCIAL

## 2021-01-23 ENCOUNTER — APPOINTMENT (OUTPATIENT)
Dept: RADIOLOGY | Facility: MEDICAL CENTER | Age: 51
End: 2021-01-23
Attending: EMERGENCY MEDICINE
Payer: COMMERCIAL

## 2021-01-23 VITALS
HEART RATE: 60 BPM | HEIGHT: 62 IN | RESPIRATION RATE: 16 BRPM | SYSTOLIC BLOOD PRESSURE: 107 MMHG | OXYGEN SATURATION: 95 % | TEMPERATURE: 97.2 F | DIASTOLIC BLOOD PRESSURE: 60 MMHG | WEIGHT: 147.71 LBS | BODY MASS INDEX: 27.18 KG/M2

## 2021-01-23 DIAGNOSIS — R10.11 RIGHT UPPER QUADRANT ABDOMINAL PAIN: ICD-10-CM

## 2021-01-23 LAB
ALBUMIN SERPL BCP-MCNC: 3.5 G/DL (ref 3.2–4.9)
ALBUMIN/GLOB SERPL: 0.8 G/DL
ALP SERPL-CCNC: 152 U/L (ref 30–99)
ALT SERPL-CCNC: 29 U/L (ref 2–50)
ANION GAP SERPL CALC-SCNC: 8 MMOL/L (ref 7–16)
APTT PPP: 33.5 SEC (ref 24.7–36)
AST SERPL-CCNC: 47 U/L (ref 12–45)
BASOPHILS # BLD AUTO: 0.6 % (ref 0–1.8)
BASOPHILS # BLD: 0.02 K/UL (ref 0–0.12)
BILIRUB SERPL-MCNC: 0.3 MG/DL (ref 0.1–1.5)
BUN SERPL-MCNC: 11 MG/DL (ref 8–22)
CALCIUM SERPL-MCNC: 8.8 MG/DL (ref 8.4–10.2)
CHLORIDE SERPL-SCNC: 105 MMOL/L (ref 96–112)
CO2 SERPL-SCNC: 24 MMOL/L (ref 20–33)
CREAT SERPL-MCNC: 0.62 MG/DL (ref 0.5–1.4)
EOSINOPHIL # BLD AUTO: 0.09 K/UL (ref 0–0.51)
EOSINOPHIL NFR BLD: 2.8 % (ref 0–6.9)
ERYTHROCYTE [DISTWIDTH] IN BLOOD BY AUTOMATED COUNT: 50.4 FL (ref 35.9–50)
GLOBULIN SER CALC-MCNC: 4.5 G/DL (ref 1.9–3.5)
GLUCOSE SERPL-MCNC: 74 MG/DL (ref 65–99)
HCT VFR BLD AUTO: 32.4 % (ref 37–47)
HGB BLD-MCNC: 9.9 G/DL (ref 12–16)
IMM GRANULOCYTES # BLD AUTO: 0.01 K/UL (ref 0–0.11)
IMM GRANULOCYTES NFR BLD AUTO: 0.3 % (ref 0–0.9)
INR PPP: 1.19 (ref 0.87–1.13)
LIPASE SERPL-CCNC: 49 U/L (ref 7–58)
LYMPHOCYTES # BLD AUTO: 1.24 K/UL (ref 1–4.8)
LYMPHOCYTES NFR BLD: 38.8 % (ref 22–41)
MCH RBC QN AUTO: 23 PG (ref 27–33)
MCHC RBC AUTO-ENTMCNC: 30.6 G/DL (ref 33.6–35)
MCV RBC AUTO: 75.3 FL (ref 81.4–97.8)
MONOCYTES # BLD AUTO: 0.43 K/UL (ref 0–0.85)
MONOCYTES NFR BLD AUTO: 13.4 % (ref 0–13.4)
NEUTROPHILS # BLD AUTO: 1.41 K/UL (ref 2–7.15)
NEUTROPHILS NFR BLD: 44.1 % (ref 44–72)
NRBC # BLD AUTO: 0 K/UL
NRBC BLD-RTO: 0 /100 WBC
PLATELET # BLD AUTO: 123 K/UL (ref 164–446)
PMV BLD AUTO: 10.2 FL (ref 9–12.9)
POTASSIUM SERPL-SCNC: 4.4 MMOL/L (ref 3.6–5.5)
PROT SERPL-MCNC: 8 G/DL (ref 6–8.2)
PROTHROMBIN TIME: 14.8 SEC (ref 12–14.6)
RBC # BLD AUTO: 4.3 M/UL (ref 4.2–5.4)
SODIUM SERPL-SCNC: 137 MMOL/L (ref 135–145)
WBC # BLD AUTO: 3.2 K/UL (ref 4.8–10.8)

## 2021-01-23 PROCEDURE — 83690 ASSAY OF LIPASE: CPT

## 2021-01-23 PROCEDURE — 96374 THER/PROPH/DIAG INJ IV PUSH: CPT | Mod: XU

## 2021-01-23 PROCEDURE — 700105 HCHG RX REV CODE 258: Performed by: EMERGENCY MEDICINE

## 2021-01-23 PROCEDURE — 700117 HCHG RX CONTRAST REV CODE 255: Performed by: EMERGENCY MEDICINE

## 2021-01-23 PROCEDURE — 85025 COMPLETE CBC W/AUTO DIFF WBC: CPT

## 2021-01-23 PROCEDURE — 96375 TX/PRO/DX INJ NEW DRUG ADDON: CPT

## 2021-01-23 PROCEDURE — 74177 CT ABD & PELVIS W/CONTRAST: CPT

## 2021-01-23 PROCEDURE — 99285 EMERGENCY DEPT VISIT HI MDM: CPT

## 2021-01-23 PROCEDURE — 85730 THROMBOPLASTIN TIME PARTIAL: CPT

## 2021-01-23 PROCEDURE — 700111 HCHG RX REV CODE 636 W/ 250 OVERRIDE (IP): Performed by: EMERGENCY MEDICINE

## 2021-01-23 PROCEDURE — 85610 PROTHROMBIN TIME: CPT

## 2021-01-23 PROCEDURE — 80053 COMPREHEN METABOLIC PANEL: CPT

## 2021-01-23 RX ORDER — HYDROCODONE BITARTRATE AND ACETAMINOPHEN 5; 325 MG/1; MG/1
1 TABLET ORAL EVERY 4 HOURS PRN
Qty: 12 TAB | Refills: 0 | Status: SHIPPED | OUTPATIENT
Start: 2021-01-23 | End: 2021-01-26

## 2021-01-23 RX ORDER — ONDANSETRON 4 MG/1
4 TABLET, ORALLY DISINTEGRATING ORAL EVERY 8 HOURS PRN
Qty: 10 TAB | Refills: 0 | Status: SHIPPED | OUTPATIENT
Start: 2021-01-23 | End: 2021-03-30

## 2021-01-23 RX ORDER — SODIUM CHLORIDE 9 MG/ML
1000 INJECTION, SOLUTION INTRAVENOUS ONCE
Status: COMPLETED | OUTPATIENT
Start: 2021-01-23 | End: 2021-01-23

## 2021-01-23 RX ORDER — ONDANSETRON 2 MG/ML
4 INJECTION INTRAMUSCULAR; INTRAVENOUS ONCE
Status: COMPLETED | OUTPATIENT
Start: 2021-01-23 | End: 2021-01-23

## 2021-01-23 RX ORDER — HYDROMORPHONE HYDROCHLORIDE 1 MG/ML
0.5 INJECTION, SOLUTION INTRAMUSCULAR; INTRAVENOUS; SUBCUTANEOUS ONCE
Status: COMPLETED | OUTPATIENT
Start: 2021-01-23 | End: 2021-01-23

## 2021-01-23 RX ADMIN — IOHEXOL 100 ML: 350 INJECTION, SOLUTION INTRAVENOUS at 13:49

## 2021-01-23 RX ADMIN — ONDANSETRON 4 MG: 2 INJECTION INTRAMUSCULAR; INTRAVENOUS at 14:18

## 2021-01-23 RX ADMIN — SODIUM CHLORIDE 1000 ML: 9 INJECTION, SOLUTION INTRAVENOUS at 14:18

## 2021-01-23 RX ADMIN — HYDROMORPHONE HYDROCHLORIDE 0.5 MG: 1 INJECTION, SOLUTION INTRAMUSCULAR; INTRAVENOUS; SUBCUTANEOUS at 14:18

## 2021-01-23 ASSESSMENT — FIBROSIS 4 INDEX: FIB4 SCORE: 4.25

## 2021-01-23 NOTE — ED TRIAGE NOTES
"This is a 50 y.o. female who was seen in our department 2 days ago.  Her medical history history includes lupus and autoimmune hepatitis.  She has not continued her visits with her gastroenterologist and currently has no specialty that is following her liver disease.   She present today, complaining of recurrent abdominal pain.  Chief Complaint   Patient presents with   • Abdominal Pain   /75   Pulse 77   Temp 36.2 °C (97.2 °F) (Temporal)   Resp 18   Ht 1.575 m (5' 2\")   Wt 67 kg (147 lb 11.3 oz)   LMP 05/04/2013   SpO2 99%   BMI 27.02 kg/m²         "

## 2021-01-23 NOTE — ED PROVIDER NOTES
CHIEF COMPLAINT  Chief Complaint   Patient presents with   • Abdominal Pain       HPI  Elvia Roberto is a 50 y.o. female who presents for the second time in 48 hours with abdominal pain to the right upper abdomen. The patient has a history of lupus and autoimmune hepatitis. She has had pain similar to this in the past but this episode started three days ago. She had an evaluation in the ER two days ago and was found to have only mild elevation of her transaminases. Ultrasound of the right upper quadrant demonstrated cirrhosis and evidence of portal hypertension but otherwise was unremarkable. The patient is had no jaundice. She does not have an endocrinologist and is only followed by her primary doctor. She was given follow-up with Dr. Linares from [facility] but did not follow up yet.    REVIEW OF SYSTEMS  All other systems are negative.     PAST MEDICAL HISTORY  Past Medical History:   Diagnosis Date   • Heart burn    • Indigestion    • Liver disease     Auto immune hepatitis   • Lupus (HCC)    • Other specified disorder of intestines    • Other specified symptom associated with female genital organs     tubiligation       FAMILY HISTORY  History reviewed. No pertinent family history.    SOCIAL HISTORY  Social History     Socioeconomic History   • Marital status: Single     Spouse name: Not on file   • Number of children: Not on file   • Years of education: Not on file   • Highest education level: Not on file   Occupational History   • Not on file   Social Needs   • Financial resource strain: Not on file   • Food insecurity     Worry: Not on file     Inability: Not on file   • Transportation needs     Medical: Not on file     Non-medical: Not on file   Tobacco Use   • Smoking status: Current Every Day Smoker     Packs/day: 0.50     Years: 30.00     Pack years: 15.00     Types: Cigarettes   • Smokeless tobacco: Never Used   Substance and Sexual Activity   • Alcohol use: No   • Drug use: No   • Sexual activity: Not on  file   Lifestyle   • Physical activity     Days per week: Not on file     Minutes per session: Not on file   • Stress: Not on file   Relationships   • Social connections     Talks on phone: Not on file     Gets together: Not on file     Attends Worship service: Not on file     Active member of club or organization: Not on file     Attends meetings of clubs or organizations: Not on file     Relationship status: Not on file   • Intimate partner violence     Fear of current or ex partner: Not on file     Emotionally abused: Not on file     Physically abused: Not on file     Forced sexual activity: Not on file   Other Topics Concern   • Not on file   Social History Narrative   • Not on file       SURGICAL HISTORY  Past Surgical History:   Procedure Laterality Date   • HYSTEROSCOPY WITH VIDEO OPERATIVE  2013    Performed by Robin Frederick M.D. at SURGERY [facility]   • LAPAROSCOPY  2012    Performed by Hayes Lguo M.D. at SURGERY [facility]   • GASTROSCOPY  2012    Performed by Hayes Lugo M.D. at SURGERY [facility]   • BOWEL RESECTION LAPAROSCOPIC  2012    Performed by HAYES LUGO at SURGERY [facility]   • LYSIS ADHESIONS GENERAL  2012    Performed by HAYES LUGO at SURGERY [facility]   • OTHER ABDOMINAL SURGERY      Kristi En Y gastric bypass   • OTHER ORTHOPEDIC SURGERY      Left ankle surgery   • OTHER ORTHOPEDIC SURGERY      Left knee surgery   • GYN SURGERY      tubal ligation   • GYN SURGERY         • PB REMOVE TONSILS/ADENOIDS,<13 Y/O     • PRIMARY C SECTION             CURRENT MEDICATIONS  Home Medications    **Home medications have not yet been reviewed for this encounter**         ALLERGIES  Allergies   Allergen Reactions   • Asa [Aspirin] Unspecified     GI upset, bleeding, Gastric Bypass     • Nsaids Nausea     GI upset, bleeding, Gastric Bypass     • Tape Unspecified     Blisters all over, Paper tape is ok  "      PHYSICAL EXAM  VITAL SIGNS: /75   Pulse 77   Temp 36.2 °C (97.2 °F) (Temporal)   Resp 18   Ht 1.575 m (5' 2\")   Wt 67 kg (147 lb 11.3 oz)   LMP 05/04/2013   SpO2 99%   BMI 27.02 kg/m²      Constitutional: Well developed, Well nourished, No acute distress, Non-toxic appearance.   HENT: Normocephalic, Atraumatic, mucous membranes moist, no erythema, exudates, swelling, or masses, nares patent   Eyes: nonicteric  Neck: Supple, no meningismus  Lymphatic: No lymphadenopathy noted.   Cardiovascular: Regular rate and rhythm, no gallops rubs or murmurs  Lungs: Clear bilaterally   Abdomen: soft throughout, there is mild tenderness in the right upper quadrant without rebound or guarding  Skin: Warm, Dry, no rash  Back: No tenderness, No CVA tenderness.   Genitalia: Deferred  Rectal: Deferred  Extremities: No edema  Neurologic: Alert, appropriate, follows commands, moving all extremities, normal speech   Psychiatric: Affect normal    RADIOLOGY/PROCEDURES  CT-ABDOMEN-PELVIS WITH   Final Result      1.  No evidence of bowel obstruction or focal inflammatory change.      2.  Appearance of the liver consistent with hepatocellular dysfunction. There is also splenomegaly with dilatation of the portal vein, small gastroesophageal varices and recanalization of the umbilical vein consistent with portal hypertension.      3.  Prior cholecystectomy.      4.  Prior gastric bypass procedure.      5.  Surgical change involving the lumbar spine.        Results for orders placed or performed during the hospital encounter of 01/23/21   CBC WITH DIFFERENTIAL   Result Value Ref Range    WBC 3.2 (L) 4.8 - 10.8 K/uL    RBC 4.30 4.20 - 5.40 M/uL    Hemoglobin 9.9 (L) 12.0 - 16.0 g/dL    Hematocrit 32.4 (L) 37.0 - 47.0 %    MCV 75.3 (L) 81.4 - 97.8 fL    MCH 23.0 (L) 27.0 - 33.0 pg    MCHC 30.6 (L) 33.6 - 35.0 g/dL    RDW 50.4 (H) 35.9 - 50.0 fL    Platelet Count 123 (L) 164 - 446 K/uL    MPV 10.2 9.0 - 12.9 fL    " Neutrophils-Polys 44.10 44.00 - 72.00 %    Lymphocytes 38.80 22.00 - 41.00 %    Monocytes 13.40 0.00 - 13.40 %    Eosinophils 2.80 0.00 - 6.90 %    Basophils 0.60 0.00 - 1.80 %    Immature Granulocytes 0.30 0.00 - 0.90 %    Nucleated RBC 0.00 /100 WBC    Neutrophils (Absolute) 1.41 (L) 2.00 - 7.15 K/uL    Lymphs (Absolute) 1.24 1.00 - 4.80 K/uL    Monos (Absolute) 0.43 0.00 - 0.85 K/uL    Eos (Absolute) 0.09 0.00 - 0.51 K/uL    Baso (Absolute) 0.02 0.00 - 0.12 K/uL    Immature Granulocytes (abs) 0.01 0.00 - 0.11 K/uL    NRBC (Absolute) 0.00 K/uL   COMP METABOLIC PANEL   Result Value Ref Range    Sodium 137 135 - 145 mmol/L    Potassium 4.4 3.6 - 5.5 mmol/L    Chloride 105 96 - 112 mmol/L    Co2 24 20 - 33 mmol/L    Anion Gap 8.0 7.0 - 16.0    Glucose 74 65 - 99 mg/dL    Bun 11 8 - 22 mg/dL    Creatinine 0.62 0.50 - 1.40 mg/dL    Calcium 8.8 8.4 - 10.2 mg/dL    AST(SGOT) 47 (H) 12 - 45 U/L    ALT(SGPT) 29 2 - 50 U/L    Alkaline Phosphatase 152 (H) 30 - 99 U/L    Total Bilirubin 0.3 0.1 - 1.5 mg/dL    Albumin 3.5 3.2 - 4.9 g/dL    Total Protein 8.0 6.0 - 8.2 g/dL    Globulin 4.5 (H) 1.9 - 3.5 g/dL    A-G Ratio 0.8 g/dL   LIPASE   Result Value Ref Range    Lipase 49 7 - 58 U/L   PROTHROMBIN TIME (INR)   Result Value Ref Range    PT 14.8 (H) 12.0 - 14.6 sec    INR 1.19 (H) 0.87 - 1.13   APTT   Result Value Ref Range    APTT 33.5 24.7 - 36.0 sec   ESTIMATED GFR   Result Value Ref Range    GFR If African American >60 >60 mL/min/1.73 m 2    GFR If Non African American >60 >60 mL/min/1.73 m 2         COURSE & MEDICAL DECISION MAKING  Pertinent Labs & Imaging studies reviewed. (See chart for details)  this is a 50-year-old female with liver cirrhosis and a history of autoimmune hepatitis. She presents with right upper quadrant abdominal pain. She was recently here and had a workup to include ultrasound of the right upper abdomen which demonstrated evidence of cirrhosis and portal hypertension. The patient is had a prior  cholecystectomy. Follow-up CT imaging today demonstrates no evidence of other pathology to include appendicitis, diverticulitis, colitis, portal vein thrombosis. Patient has mild elevation of her transaminases. Case discussed with gastroenterology-digestive health. The patient will be referred for further management but at this time we will not initiate steroids.    FINAL IMPRESSION  1. RUQ abdominal pain  2.   3.         Electronically signed by: Arnaud George M.D., 1/23/2021 1:10 PM

## 2021-01-23 NOTE — DISCHARGE INSTRUCTIONS
Please follow up with gastroenterology. Return for Sandy concerning symptoms in the interim to include increased pain persistent vomiting or other concerns.

## 2021-01-30 ENCOUNTER — HOSPITAL ENCOUNTER (EMERGENCY)
Facility: MEDICAL CENTER | Age: 51
End: 2021-01-30
Attending: EMERGENCY MEDICINE
Payer: COMMERCIAL

## 2021-01-30 VITALS
OXYGEN SATURATION: 94 % | HEART RATE: 66 BPM | RESPIRATION RATE: 16 BRPM | HEIGHT: 62 IN | SYSTOLIC BLOOD PRESSURE: 119 MMHG | BODY MASS INDEX: 27.18 KG/M2 | TEMPERATURE: 98 F | WEIGHT: 147.71 LBS | DIASTOLIC BLOOD PRESSURE: 67 MMHG

## 2021-01-30 DIAGNOSIS — R11.2 NAUSEA AND VOMITING, INTRACTABILITY OF VOMITING NOT SPECIFIED, UNSPECIFIED VOMITING TYPE: ICD-10-CM

## 2021-01-30 DIAGNOSIS — M32.9 LUPUS (HCC): ICD-10-CM

## 2021-01-30 DIAGNOSIS — R10.13 EPIGASTRIC PAIN: ICD-10-CM

## 2021-01-30 LAB
ALBUMIN SERPL BCP-MCNC: 3.4 G/DL (ref 3.2–4.9)
ALBUMIN/GLOB SERPL: 0.8 G/DL
ALP SERPL-CCNC: 156 U/L (ref 30–99)
ALT SERPL-CCNC: 37 U/L (ref 2–50)
ANION GAP SERPL CALC-SCNC: 10 MMOL/L (ref 7–16)
APPEARANCE UR: CLEAR
AST SERPL-CCNC: 59 U/L (ref 12–45)
BASOPHILS # BLD AUTO: 0.6 % (ref 0–1.8)
BASOPHILS # BLD: 0.02 K/UL (ref 0–0.12)
BILIRUB SERPL-MCNC: 0.3 MG/DL (ref 0.1–1.5)
BILIRUB UR QL STRIP.AUTO: NEGATIVE
BUN SERPL-MCNC: 12 MG/DL (ref 8–22)
CALCIUM SERPL-MCNC: 8.7 MG/DL (ref 8.4–10.2)
CHLORIDE SERPL-SCNC: 104 MMOL/L (ref 96–112)
CO2 SERPL-SCNC: 24 MMOL/L (ref 20–33)
COLOR UR: YELLOW
COMMENT 1642: NORMAL
CREAT SERPL-MCNC: 0.72 MG/DL (ref 0.5–1.4)
EOSINOPHIL # BLD AUTO: 0.08 K/UL (ref 0–0.51)
EOSINOPHIL NFR BLD: 2.6 % (ref 0–6.9)
ERYTHROCYTE [DISTWIDTH] IN BLOOD BY AUTOMATED COUNT: 51.2 FL (ref 35.9–50)
GLOBULIN SER CALC-MCNC: 4.5 G/DL (ref 1.9–3.5)
GLUCOSE SERPL-MCNC: 89 MG/DL (ref 65–99)
GLUCOSE UR STRIP.AUTO-MCNC: NEGATIVE MG/DL
HCT VFR BLD AUTO: 33.8 % (ref 37–47)
HGB BLD-MCNC: 10 G/DL (ref 12–16)
HYPOCHROMIA BLD QL SMEAR: ABNORMAL
IMM GRANULOCYTES # BLD AUTO: 0.01 K/UL (ref 0–0.11)
IMM GRANULOCYTES NFR BLD AUTO: 0.3 % (ref 0–0.9)
INR PPP: 1.25 (ref 0.87–1.13)
KETONES UR STRIP.AUTO-MCNC: NEGATIVE MG/DL
LEUKOCYTE ESTERASE UR QL STRIP.AUTO: NEGATIVE
LG PLATELETS BLD QL SMEAR: NORMAL
LIPASE SERPL-CCNC: 35 U/L (ref 7–58)
LYMPHOCYTES # BLD AUTO: 1.13 K/UL (ref 1–4.8)
LYMPHOCYTES NFR BLD: 36.2 % (ref 22–41)
MCH RBC QN AUTO: 22.3 PG (ref 27–33)
MCHC RBC AUTO-ENTMCNC: 29.6 G/DL (ref 33.6–35)
MCV RBC AUTO: 75.4 FL (ref 81.4–97.8)
MICRO URNS: NORMAL
MONOCYTES # BLD AUTO: 0.38 K/UL (ref 0–0.85)
MONOCYTES NFR BLD AUTO: 12.2 % (ref 0–13.4)
NEUTROPHILS # BLD AUTO: 1.5 K/UL (ref 2–7.15)
NEUTROPHILS NFR BLD: 48.1 % (ref 44–72)
NITRITE UR QL STRIP.AUTO: NEGATIVE
NRBC # BLD AUTO: 0 K/UL
NRBC BLD-RTO: 0 /100 WBC
PH UR STRIP.AUTO: 5.5 [PH] (ref 5–8)
PLATELET # BLD AUTO: 101 K/UL (ref 164–446)
PLATELET BLD QL SMEAR: NORMAL
PMV BLD AUTO: 10.1 FL (ref 9–12.9)
POLYCHROMASIA BLD QL SMEAR: NORMAL
POTASSIUM SERPL-SCNC: 4.2 MMOL/L (ref 3.6–5.5)
PROT SERPL-MCNC: 7.9 G/DL (ref 6–8.2)
PROT UR QL STRIP: NEGATIVE MG/DL
PROTHROMBIN TIME: 15.4 SEC (ref 12–14.6)
RBC # BLD AUTO: 4.48 M/UL (ref 4.2–5.4)
RBC BLD AUTO: PRESENT
RBC UR QL AUTO: NEGATIVE
SODIUM SERPL-SCNC: 138 MMOL/L (ref 135–145)
SP GR UR STRIP.AUTO: 1.02
TARGETS BLD QL SMEAR: NORMAL
WBC # BLD AUTO: 3.1 K/UL (ref 4.8–10.8)

## 2021-01-30 PROCEDURE — 700111 HCHG RX REV CODE 636 W/ 250 OVERRIDE (IP): Performed by: EMERGENCY MEDICINE

## 2021-01-30 PROCEDURE — 80053 COMPREHEN METABOLIC PANEL: CPT

## 2021-01-30 PROCEDURE — 96375 TX/PRO/DX INJ NEW DRUG ADDON: CPT

## 2021-01-30 PROCEDURE — 96374 THER/PROPH/DIAG INJ IV PUSH: CPT

## 2021-01-30 PROCEDURE — 85610 PROTHROMBIN TIME: CPT

## 2021-01-30 PROCEDURE — 700105 HCHG RX REV CODE 258: Performed by: EMERGENCY MEDICINE

## 2021-01-30 PROCEDURE — 99284 EMERGENCY DEPT VISIT MOD MDM: CPT

## 2021-01-30 PROCEDURE — 81003 URINALYSIS AUTO W/O SCOPE: CPT

## 2021-01-30 PROCEDURE — 83690 ASSAY OF LIPASE: CPT

## 2021-01-30 PROCEDURE — 85025 COMPLETE CBC W/AUTO DIFF WBC: CPT

## 2021-01-30 PROCEDURE — 96376 TX/PRO/DX INJ SAME DRUG ADON: CPT

## 2021-01-30 RX ORDER — MORPHINE SULFATE 4 MG/ML
4 INJECTION, SOLUTION INTRAMUSCULAR; INTRAVENOUS ONCE
Status: COMPLETED | OUTPATIENT
Start: 2021-01-30 | End: 2021-01-30

## 2021-01-30 RX ORDER — ONDANSETRON 2 MG/ML
4 INJECTION INTRAMUSCULAR; INTRAVENOUS ONCE
Status: COMPLETED | OUTPATIENT
Start: 2021-01-30 | End: 2021-01-30

## 2021-01-30 RX ORDER — SODIUM CHLORIDE 9 MG/ML
1000 INJECTION, SOLUTION INTRAVENOUS ONCE
Status: COMPLETED | OUTPATIENT
Start: 2021-01-30 | End: 2021-01-30

## 2021-01-30 RX ORDER — ONDANSETRON 4 MG/1
4 TABLET, ORALLY DISINTEGRATING ORAL EVERY 6 HOURS PRN
Qty: 20 TAB | Refills: 0 | Status: SHIPPED | OUTPATIENT
Start: 2021-01-30 | End: 2022-07-10

## 2021-01-30 RX ORDER — OXYCODONE HYDROCHLORIDE 5 MG/1
5 TABLET ORAL EVERY 6 HOURS PRN
Qty: 12 TAB | Refills: 0 | Status: SHIPPED | OUTPATIENT
Start: 2021-01-30 | End: 2021-02-03

## 2021-01-30 RX ADMIN — ONDANSETRON 4 MG: 2 INJECTION INTRAMUSCULAR; INTRAVENOUS at 13:25

## 2021-01-30 RX ADMIN — MORPHINE SULFATE 4 MG: 4 INJECTION INTRAVENOUS at 11:08

## 2021-01-30 RX ADMIN — MORPHINE SULFATE 4 MG: 4 INJECTION INTRAVENOUS at 13:27

## 2021-01-30 RX ADMIN — ONDANSETRON 4 MG: 2 INJECTION INTRAMUSCULAR; INTRAVENOUS at 11:08

## 2021-01-30 RX ADMIN — SODIUM CHLORIDE 1000 ML: 9 INJECTION, SOLUTION INTRAVENOUS at 11:08

## 2021-01-30 ASSESSMENT — FIBROSIS 4 INDEX: FIB4 SCORE: 3.62

## 2021-01-30 NOTE — ED TRIAGE NOTES
"Chief Complaint   Patient presents with   • Vomiting     vomiting for 2 days.  The patient has a history of lupus and autoimmune hepatitis. Multiple ER visits for this symptoms.      /81   Pulse 62   Temp 36.3 °C (97.3 °F) (Temporal)   Resp 16   Ht 1.575 m (5' 2\")   Wt 67 kg (147 lb 11.3 oz)   LMP 05/04/2013   SpO2 99%   BMI 27.02 kg/m²     "

## 2021-01-30 NOTE — ED PROVIDER NOTES
ED Provider Note    ER PROVIDER NOTE        CHIEF COMPLAINT  Chief Complaint   Patient presents with   • Vomiting     vomiting for 2 days.  The patient has a history of lupus and autoimmune hepatitis. Multiple ER visits for this symptoms.        HPI  Elvia Roberto is a 50 y.o. female who presents to the emergency department complaining of nausea and vomiting.  Patient reports that over the last 2 days she has had continuous vomiting and difficulty holding anything down.  She reports no blood in her emesis.  Is not having any diarrhea or constipation.  She also reports some upper abdominal pain, she states she has had some right-sided upper abdominal pain for few weeks, has been seen few times for this in the emergency department.  Pain seems to be a little more central now both crampy and sharp and worse with vomiting.  There is no radiation and no other alleviating or aggravating factors.  She reports no chest pain or shortness of breath.  No fevers chills cough or congestion.    History of lupus and autoimmune hepatitis, has appointment scheduled with digestive health although has not seen them yet    REVIEW OF SYSTEMS  Pertinent positives include vomiting. Pertinent negatives include no fever or chills. See HPI for details. All other systems reviewed and are negative.    PAST MEDICAL HISTORY   has a past medical history of Heart burn, Indigestion, Liver disease, Lupus (HCC), Other specified disorder of intestines, and Other specified symptom associated with female genital organs.    SURGICAL HISTORY   has a past surgical history that includes bowel resection laparoscopic (4/4/2012); lysis adhesions general (4/4/2012); gyn surgery (1993); gyn surgery (1993); other orthopedic surgery (2004); other orthopedic surgery (2000); other abdominal surgery (2010); laparoscopy (12/16/2012); gastroscopy (12/16/2012); primary c section; remove tonsils/adenoids,<13 y/o; and hysteroscopy with video operative  (8/20/2013).    FAMILY HISTORY  History reviewed. No pertinent family history.    SOCIAL HISTORY  Social History     Socioeconomic History   • Marital status: Single     Spouse name: Not on file   • Number of children: Not on file   • Years of education: Not on file   • Highest education level: Not on file   Occupational History   • Not on file   Social Needs   • Financial resource strain: Not on file   • Food insecurity     Worry: Not on file     Inability: Not on file   • Transportation needs     Medical: Not on file     Non-medical: Not on file   Tobacco Use   • Smoking status: Current Every Day Smoker     Packs/day: 0.50     Years: 30.00     Pack years: 15.00     Types: Cigarettes   • Smokeless tobacco: Never Used   Substance and Sexual Activity   • Alcohol use: No   • Drug use: No   • Sexual activity: Not on file   Lifestyle   • Physical activity     Days per week: Not on file     Minutes per session: Not on file   • Stress: Not on file   Relationships   • Social connections     Talks on phone: Not on file     Gets together: Not on file     Attends Yarsani service: Not on file     Active member of club or organization: Not on file     Attends meetings of clubs or organizations: Not on file     Relationship status: Not on file   • Intimate partner violence     Fear of current or ex partner: Not on file     Emotionally abused: Not on file     Physically abused: Not on file     Forced sexual activity: Not on file   Other Topics Concern   • Not on file   Social History Narrative   • Not on file      Social History     Substance and Sexual Activity   Drug Use No       CURRENT MEDICATIONS  Home Medications     Reviewed by Maria G Starks (Pharmacy Tech) on 01/30/21 at 1128  Med List Status: Complete   Medication Last Dose Status   diphenhydrAMINE (BENADRYL) 25 MG Tab FEW DAYS AGO Active   DULoxetine (CYMBALTA) 60 MG Cap DR Particles delayed-release capsule 1/30/2021 Active   ondansetron (ZOFRAN ODT) 4 MG TABLET  "DISPERSIBLE FEW DAYS AGO Active   pantoprazole (PROTONIX) 40 MG Tablet Delayed Response 1/30/2021 Active   predniSONE (DELTASONE) 10 MG Tab NOT TAKING Active                ALLERGIES  Allergies   Allergen Reactions   • Asa [Aspirin] Unspecified     GI upset, bleeding, Gastric Bypass     • Nsaids Nausea     GI upset, bleeding, Gastric Bypass     • Tape Unspecified     Blisters all over, Paper tape is ok       PHYSICAL EXAM  VITAL SIGNS: /63   Pulse 63   Temp 36.3 °C (97.3 °F) (Temporal)   Resp 16   Ht 1.575 m (5' 2\")   Wt 67 kg (147 lb 11.3 oz)   LMP 05/04/2013   SpO2 94%   BMI 27.02 kg/m²   Pulse ox interpretation: I interpret this pulse ox as normal.    Constitutional: Alert in no apparent distress.  HENT: No signs of trauma, Bilateral external ears normal, Nose normal.   Eyes: Pupils are equal and reactive, Conjunctiva normal, Non-icteric.   Neck: Normal range of motion, No tenderness, Supple, No stridor.   Lymphatic: No lymphadenopathy noted.   Cardiovascular: Regular rate and rhythm, no murmurs.   Thorax & Lungs: Normal breath sounds, No respiratory distress, No wheezing, No chest tenderness.   Abdomen: Bowel sounds normal, Soft, mild right upper quadrant/epigastric tenderness, No masses, No pulsatile masses. No peritoneal signs.  Skin: Warm, Dry, No erythema, No rash.   Back: No bony tenderness, No CVA tenderness.   Extremities: Intact distal pulses, No edema, No tenderness, No cyanosis, Negative Ritchie's sign.  Musculoskeletal: Good range of motion in all major joints. No tenderness to palpation or major deformities noted.   Neurologic: Alert , Normal motor function, Normal sensory function, No focal deficits noted.   Psychiatric: Affect normal, Judgment normal, Mood normal.     DIAGNOSTIC STUDIES / PROCEDURES    LABS  Labs Reviewed   CBC WITH DIFFERENTIAL - Abnormal; Notable for the following components:       Result Value    WBC 3.1 (*)     Hemoglobin 10.0 (*)     Hematocrit 33.8 (*)     MCV " 75.4 (*)     MCH 22.3 (*)     MCHC 29.6 (*)     RDW 51.2 (*)     Platelet Count 101 (*)     Neutrophils (Absolute) 1.50 (*)     Hypochromia 2+ (*)     All other components within normal limits    Narrative:     Indicate which anticoagulants the patient is on:->UNKNOWN   COMP METABOLIC PANEL - Abnormal; Notable for the following components:    AST(SGOT) 59 (*)     Alkaline Phosphatase 156 (*)     Globulin 4.5 (*)     All other components within normal limits    Narrative:     Indicate which anticoagulants the patient is on:->UNKNOWN   PROTHROMBIN TIME - Abnormal; Notable for the following components:    PT 15.4 (*)     INR 1.25 (*)     All other components within normal limits    Narrative:     Indicate which anticoagulants the patient is on:->UNKNOWN   LIPASE    Narrative:     Indicate which anticoagulants the patient is on:->UNKNOWN   URINALYSIS   ESTIMATED GFR    Narrative:     Indicate which anticoagulants the patient is on:->UNKNOWN   PLATELET ESTIMATE    Narrative:     Indicate which anticoagulants the patient is on:->UNKNOWN   MORPHOLOGY    Narrative:     Indicate which anticoagulants the patient is on:->UNKNOWN   DIFFERENTIAL COMMENT    Narrative:     Indicate which anticoagulants the patient is on:->UNKNOWN       All labs reviewed by me.    RADIOLOGY  No orders to display     The radiologist's interpretation of all radiological studies have been reviewed and images independently viewed by me.    COURSE & MEDICAL DECISION MAKING  Nursing notes, VS, PMSFHx reviewed in chart.    10:42 AM Patient seen and examined at bedside. Patient will be treated with morphine, Zofran, IV fluid. Ordered for CBC, CMP, lipase, INR, UA to evaluate her symptoms.     In review of patient records she was seen here on January 23 had CT scan that showed hepatocellular dysfunction and portal hypertension, the 21st was seen as well ultrasound with portal hypertension and cirrhosis    Patient is reevaluated, her pain is improved, still  some nausea, will order additional medications, oral challenge    2:46 PM    Patient is tolerated oral challenge well    This patient was cared for during the COVID-19 pandemic.  History and physical exam may be limited/truncated by the inherent challenges of PPE and the need to decrease staff exposure to novel coronavirus.  Some aspects of disease management may be different to protect staff and help slow the spread of disease. I verified that, if possible, the patient was wearing a mask and I was wearing appropriate PPE every time I encountered the patient.     Current [facility] COVID19 protocol followed      HYDRATION: Based on the patient's presentation of Acute Vomiting the patient was given IV fluids. IV Hydration was used because oral hydration was not as rapid as required. Upon recheck following hydration, the patient was Improved.    Decision Making:  This is a 50 y.o. female present with abdominal pain nausea and vomiting.  Here the patient's symptoms have been controlled and she is tolerating orals well the time of discharge with appropriate vital signs.  She is given a prescription for Zofran, oxycodone, and will follow up through her primary care, she additionally has follow-up scheduled with .  She does have a slight pancytopenia that is stable from her baseline.  No significant metabolic or electrolyte derangement.  Does not appear to have any decompensated liver function with her labs today.  She has had recent imaging including CT and ultrasound without acute pathology I do not see an indication for repeat imaging today.  I have discussed strict return precautions with the patient which he understands well    I reviewed prescription monitoring program for patient's narcotic use before prescribing a scheduled drug.The patient will not drink alcohol nor drive with prescribed medications. The patient will return for new or worsening symptoms and is stable at the time of discharge.    The  patient is referred to a primary physician for blood pressure management, diabetic screening, and for all other preventative health concerns.    In prescribing controlled substances to this patient, I certify that I have obtained and reviewed the medical history of Elvia Roberto. I have also made a good aranza effort to obtain applicable records from other providers who have treated the patient and records did not demonstrate any increased risk of substance abuse that would prevent me from prescribing controlled substances.     I have conducted a physical exam and documented it. I have reviewed Ms. Roberto’s prescription history as maintained by the [place] Prescription Monitoring Program.     I have assessed the patient’s risk for abuse, dependency, and addiction using the validated Opioid Risk Tool available at https://www.mdcalc.com/mdfugp-clwq-lctu-ort-narcotic-abuse.     Given the above, I believe the benefits of controlled substance therapy outweigh the risks. The reasons for prescribing controlled substances include non-narcotic, oral analgesic alternatives have been inadequate for pain control. Accordingly, I have discussed the risk and benefits, treatment plan, and alternative therapies with the patient.         DISPOSITION:  Patient will be discharged home in stable condition.    FOLLOW UP:  Cipriano Gomez M.D.  [address]  [facility] [address]  [phone]    In 1 week      [facility]  [address] [place] [id number] [phone]          OUTPATIENT MEDICATIONS:  New Prescriptions    ONDANSETRON (ZOFRAN ODT) 4 MG TABLET DISPERSIBLE    Take 1 Tab by mouth every 6 hours as needed for Nausea.    OXYCODONE IMMEDIATE-RELEASE (ROXICODONE) 5 MG TAB    Take 1 Tab by mouth every 6 hours as needed for Severe Pain for up to 4 days.         FINAL IMPRESSION  1. Nausea and vomiting, intractability of vomiting not specified, unspecified vomiting type    2. Epigastric  pain    3. Lupus (HCC)          The note accurately reflects work and decisions made by me.  Pérez Garg M.D.  1/30/2021  2:49 PM

## 2021-01-30 NOTE — Clinical Note
Elvia Pardeep was seen and treated in our emergency department on 1/30/2021.  She may return to work on 02/04/2021.       If you have any questions or concerns, please don't hesitate to call.      Pérez Garg M.D.

## 2021-01-30 NOTE — ED NOTES
Discharge instructions given and discussed. RX for oxycodone given and pt educated. Pt educated to come back to ER for new or worsening symptoms and follow up with PCP  And GI as instructed. Pt verbalized understanding. VSS. Pt  Discharged in stable condition.

## 2021-02-04 ENCOUNTER — HOSPITAL ENCOUNTER (EMERGENCY)
Facility: MEDICAL CENTER | Age: 51
End: 2021-02-05
Attending: EMERGENCY MEDICINE | Admitting: EMERGENCY MEDICINE
Payer: COMMERCIAL

## 2021-02-04 DIAGNOSIS — R10.11 RUQ ABDOMINAL PAIN: ICD-10-CM

## 2021-02-04 DIAGNOSIS — R11.2 NON-INTRACTABLE VOMITING WITH NAUSEA, UNSPECIFIED VOMITING TYPE: ICD-10-CM

## 2021-02-04 DIAGNOSIS — R10.13 EPIGASTRIC PAIN: ICD-10-CM

## 2021-02-04 LAB
ALBUMIN SERPL BCP-MCNC: 3.6 G/DL (ref 3.2–4.9)
ALBUMIN/GLOB SERPL: 0.7 G/DL
ALP SERPL-CCNC: 208 U/L (ref 30–99)
ALT SERPL-CCNC: 54 U/L (ref 2–50)
AMPHET UR QL SCN: NEGATIVE
ANION GAP SERPL CALC-SCNC: 10 MMOL/L (ref 7–16)
APPEARANCE UR: CLEAR
AST SERPL-CCNC: 82 U/L (ref 12–45)
BARBITURATES UR QL SCN: NEGATIVE
BASOPHILS # BLD AUTO: 1 % (ref 0–1.8)
BASOPHILS # BLD: 0.04 K/UL (ref 0–0.12)
BENZODIAZ UR QL SCN: NEGATIVE
BILIRUB SERPL-MCNC: 0.3 MG/DL (ref 0.1–1.5)
BILIRUB UR QL STRIP.AUTO: NEGATIVE
BUN SERPL-MCNC: 13 MG/DL (ref 8–22)
BZE UR QL SCN: NEGATIVE
CALCIUM SERPL-MCNC: 8.5 MG/DL (ref 8.4–10.2)
CANNABINOIDS UR QL SCN: NEGATIVE
CHLORIDE SERPL-SCNC: 104 MMOL/L (ref 96–112)
CO2 SERPL-SCNC: 24 MMOL/L (ref 20–33)
COLOR UR: YELLOW
CREAT SERPL-MCNC: 0.75 MG/DL (ref 0.5–1.4)
EOSINOPHIL # BLD AUTO: 0.1 K/UL (ref 0–0.51)
EOSINOPHIL NFR BLD: 2.4 % (ref 0–6.9)
ERYTHROCYTE [DISTWIDTH] IN BLOOD BY AUTOMATED COUNT: 51.8 FL (ref 35.9–50)
ETHANOL BLD-MCNC: <10.1 MG/DL (ref 0–10)
GLOBULIN SER CALC-MCNC: 5 G/DL (ref 1.9–3.5)
GLUCOSE SERPL-MCNC: 76 MG/DL (ref 65–99)
GLUCOSE UR STRIP.AUTO-MCNC: NEGATIVE MG/DL
HCT VFR BLD AUTO: 35 % (ref 37–47)
HGB BLD-MCNC: 10.5 G/DL (ref 12–16)
IMM GRANULOCYTES # BLD AUTO: 0.01 K/UL (ref 0–0.11)
IMM GRANULOCYTES NFR BLD AUTO: 0.2 % (ref 0–0.9)
KETONES UR STRIP.AUTO-MCNC: NEGATIVE MG/DL
LEUKOCYTE ESTERASE UR QL STRIP.AUTO: NEGATIVE
LIPASE SERPL-CCNC: 43 U/L (ref 7–58)
LYMPHOCYTES # BLD AUTO: 1.51 K/UL (ref 1–4.8)
LYMPHOCYTES NFR BLD: 36.3 % (ref 22–41)
MCH RBC QN AUTO: 22.9 PG (ref 27–33)
MCHC RBC AUTO-ENTMCNC: 30 G/DL (ref 33.6–35)
MCV RBC AUTO: 76.3 FL (ref 81.4–97.8)
METHADONE UR QL SCN: NEGATIVE
MICRO URNS: NORMAL
MONOCYTES # BLD AUTO: 0.53 K/UL (ref 0–0.85)
MONOCYTES NFR BLD AUTO: 12.7 % (ref 0–13.4)
NEUTROPHILS # BLD AUTO: 1.97 K/UL (ref 2–7.15)
NEUTROPHILS NFR BLD: 47.4 % (ref 44–72)
NITRITE UR QL STRIP.AUTO: NEGATIVE
NRBC # BLD AUTO: 0 K/UL
NRBC BLD-RTO: 0 /100 WBC
OPIATES UR QL SCN: NEGATIVE
OXYCODONE UR QL SCN: POSITIVE
PCP UR QL SCN: NEGATIVE
PH UR STRIP.AUTO: 6 [PH] (ref 5–8)
PLATELET # BLD AUTO: 126 K/UL (ref 164–446)
PMV BLD AUTO: 10.3 FL (ref 9–12.9)
POTASSIUM SERPL-SCNC: 3.4 MMOL/L (ref 3.6–5.5)
PROPOXYPH UR QL SCN: NEGATIVE
PROT SERPL-MCNC: 8.6 G/DL (ref 6–8.2)
PROT UR QL STRIP: NEGATIVE MG/DL
RBC # BLD AUTO: 4.59 M/UL (ref 4.2–5.4)
RBC UR QL AUTO: NEGATIVE
SODIUM SERPL-SCNC: 138 MMOL/L (ref 135–145)
SP GR UR STRIP.AUTO: 1.02
WBC # BLD AUTO: 4.2 K/UL (ref 4.8–10.8)

## 2021-02-04 PROCEDURE — 700102 HCHG RX REV CODE 250 W/ 637 OVERRIDE(OP): Performed by: EMERGENCY MEDICINE

## 2021-02-04 PROCEDURE — 82077 ASSAY SPEC XCP UR&BREATH IA: CPT

## 2021-02-04 PROCEDURE — 80053 COMPREHEN METABOLIC PANEL: CPT

## 2021-02-04 PROCEDURE — 94760 N-INVAS EAR/PLS OXIMETRY 1: CPT

## 2021-02-04 PROCEDURE — 700111 HCHG RX REV CODE 636 W/ 250 OVERRIDE (IP): Performed by: EMERGENCY MEDICINE

## 2021-02-04 PROCEDURE — A9270 NON-COVERED ITEM OR SERVICE: HCPCS | Performed by: EMERGENCY MEDICINE

## 2021-02-04 PROCEDURE — 99285 EMERGENCY DEPT VISIT HI MDM: CPT

## 2021-02-04 PROCEDURE — 80307 DRUG TEST PRSMV CHEM ANLYZR: CPT

## 2021-02-04 PROCEDURE — 83690 ASSAY OF LIPASE: CPT

## 2021-02-04 PROCEDURE — 96375 TX/PRO/DX INJ NEW DRUG ADDON: CPT

## 2021-02-04 PROCEDURE — 85025 COMPLETE CBC W/AUTO DIFF WBC: CPT

## 2021-02-04 PROCEDURE — 81003 URINALYSIS AUTO W/O SCOPE: CPT | Mod: XU

## 2021-02-04 PROCEDURE — 700105 HCHG RX REV CODE 258: Performed by: EMERGENCY MEDICINE

## 2021-02-04 PROCEDURE — 96374 THER/PROPH/DIAG INJ IV PUSH: CPT

## 2021-02-04 RX ORDER — METOCLOPRAMIDE HYDROCHLORIDE 5 MG/ML
10 INJECTION INTRAMUSCULAR; INTRAVENOUS ONCE
Status: COMPLETED | OUTPATIENT
Start: 2021-02-04 | End: 2021-02-04

## 2021-02-04 RX ORDER — SODIUM CHLORIDE 9 MG/ML
1000 INJECTION, SOLUTION INTRAVENOUS ONCE
Status: COMPLETED | OUTPATIENT
Start: 2021-02-04 | End: 2021-02-05

## 2021-02-04 RX ORDER — MORPHINE SULFATE 4 MG/ML
4 INJECTION, SOLUTION INTRAMUSCULAR; INTRAVENOUS ONCE
Status: COMPLETED | OUTPATIENT
Start: 2021-02-04 | End: 2021-02-04

## 2021-02-04 RX ORDER — ONDANSETRON 2 MG/ML
4 INJECTION INTRAMUSCULAR; INTRAVENOUS ONCE
Status: COMPLETED | OUTPATIENT
Start: 2021-02-04 | End: 2021-02-04

## 2021-02-04 RX ADMIN — ONDANSETRON 4 MG: 2 INJECTION INTRAMUSCULAR; INTRAVENOUS at 21:35

## 2021-02-04 RX ADMIN — METOCLOPRAMIDE 10 MG: 5 INJECTION, SOLUTION INTRAMUSCULAR; INTRAVENOUS at 21:35

## 2021-02-04 RX ADMIN — LIDOCAINE HYDROCHLORIDE 30 ML: 20 SOLUTION OROPHARYNGEAL at 21:34

## 2021-02-04 RX ADMIN — MORPHINE SULFATE 4 MG: 4 INJECTION INTRAVENOUS at 21:34

## 2021-02-04 RX ADMIN — SODIUM CHLORIDE 1000 ML: 9 INJECTION, SOLUTION INTRAVENOUS at 23:01

## 2021-02-04 ASSESSMENT — FIBROSIS 4 INDEX: FIB4 SCORE: 4.9

## 2021-02-05 VITALS
DIASTOLIC BLOOD PRESSURE: 61 MMHG | SYSTOLIC BLOOD PRESSURE: 101 MMHG | OXYGEN SATURATION: 95 % | HEIGHT: 62 IN | WEIGHT: 145.28 LBS | HEART RATE: 64 BPM | TEMPERATURE: 98.4 F | RESPIRATION RATE: 16 BRPM | BODY MASS INDEX: 26.74 KG/M2

## 2021-02-05 RX ORDER — PANTOPRAZOLE SODIUM 40 MG/1
40 TABLET, DELAYED RELEASE ORAL DAILY
Qty: 30 TAB | Refills: 0 | Status: SHIPPED | OUTPATIENT
Start: 2021-02-05 | End: 2022-07-10

## 2021-02-05 RX ORDER — ONDANSETRON 4 MG/1
4 TABLET, ORALLY DISINTEGRATING ORAL EVERY 8 HOURS PRN
Qty: 10 TAB | Refills: 0 | Status: SHIPPED | OUTPATIENT
Start: 2021-02-05 | End: 2021-03-30

## 2021-02-05 RX ORDER — SUCRALFATE ORAL 1 G/10ML
1 SUSPENSION ORAL 4 TIMES DAILY
Qty: 414 ML | Refills: 0 | Status: SHIPPED | OUTPATIENT
Start: 2021-02-05 | End: 2021-03-30

## 2021-02-05 RX ORDER — METOCLOPRAMIDE 10 MG/1
10 TABLET ORAL 4 TIMES DAILY
Qty: 40 TAB | Refills: 0 | Status: SHIPPED | OUTPATIENT
Start: 2021-02-05 | End: 2021-02-15

## 2021-02-05 NOTE — DISCHARGE INSTRUCTIONS
Return the emergency department if you have increasing pain, fever, vomiting blood, blood in stool or jaundice.  You need to follow-up with a GI doctor.

## 2021-02-05 NOTE — ED PROVIDER NOTES
CHIEF COMPLAINT  Chief Complaint   Patient presents with   • N/V   • Abdominal Pain     right/ epigastric area        HPI  Elvia Roberto is a 50 y.o. female who presents epigastric and right upper quadrant abdominal pain, nausea, vomiting.  Patient states is been going on for over 2 weeks now with persistent vomiting several times a day.  She denies any blood in her vomit.  She denies fever, chills, cough,, change in sense of smell, change in sense of taste, known COVID-19 exposure.  She is try to get an appointment with GI but states that they cannot see her for at least 2 months.  She states that the pain in the epigastric region plus with vomiting is unbearable therefore she came to the emergency department.    REVIEW OF SYSTEMS  Pertinent positives include nausea, vomiting, epigastric pain, right upper quadrant pain. Pertinent negatives include as above. All other systems negative.    PAST MEDICAL HISTORY   has a past medical history of Heart burn, Indigestion, Liver disease, Lupus (HCC), Other specified disorder of intestines, and Other specified symptom associated with female genital organs.    SOCIAL HISTORY  Social History     Tobacco Use   • Smoking status: Current Every Day Smoker     Packs/day: 0.50     Years: 30.00     Pack years: 15.00     Types: Cigarettes   • Smokeless tobacco: Never Used   Substance and Sexual Activity   • Alcohol use: No   • Drug use: No   • Sexual activity: Not on file       SURGICAL HISTORY   has a past surgical history that includes bowel resection laparoscopic (4/4/2012); lysis adhesions general (4/4/2012); gyn surgery (1993); gyn surgery (1993); other orthopedic surgery (2004); other orthopedic surgery (2000); other abdominal surgery (2010); laparoscopy (12/16/2012); gastroscopy (12/16/2012); primary c section; remove tonsils/adenoids,<11 y/o; and hysteroscopy with video operative (8/20/2013).    CURRENT MEDICATIONS  Home Medications    **Home medications have not yet been  "reviewed for this encounter**         ALLERGIES  Allergies   Allergen Reactions   • Asa [Aspirin] Unspecified     GI upset, bleeding, Gastric Bypass     • Nsaids Nausea     GI upset, bleeding, Gastric Bypass     • Tape Unspecified     Blisters all over, Paper tape is ok       PHYSICAL EXAM  VITAL SIGNS: /61   Pulse 64   Temp 36.9 °C (98.4 °F) (Temporal)   Resp 16   Ht 1.575 m (5' 2\")   Wt 65.9 kg (145 lb 4.5 oz)   LMP 05/04/2013   SpO2 95%   BMI 26.57 kg/m²   Constitutional: Well developed, Well nourished, moderate distress.   HENT: Normocephalic, Atraumatic, mask in place.   Eyes: Conjunctiva normal, No discharge.  No scleral icterus  Cardiovascular: Normal heart rate, Normal rhythm, No murmurs, equal pulses.   Pulmonary: Normal breath sounds, No respiratory distress, No wheezing, No rales, No rhonchi.  Abdomen:Soft, tenderness greatest in the epigastric and right upper quadrant, negative Andrews sign no masses, no rebound, no guarding.   Back: No CVA tenderness.   Musculoskeletal: No major deformities noted, No tenderness.   Skin: Warm, Dry, No erythema, No rash.   Neurologic: Alert & oriented x 3, Normal motor function,  No focal deficits noted.   Psychiatric: Affect normal, Judgment normal, Mood normal.         RADIOLOGY/PROCEDURES  No orders to display       Laboratory tests  Results for orders placed or performed during the hospital encounter of 02/04/21   CBC WITH DIFFERENTIAL   Result Value Ref Range    WBC 4.2 (L) 4.8 - 10.8 K/uL    RBC 4.59 4.20 - 5.40 M/uL    Hemoglobin 10.5 (L) 12.0 - 16.0 g/dL    Hematocrit 35.0 (L) 37.0 - 47.0 %    MCV 76.3 (L) 81.4 - 97.8 fL    MCH 22.9 (L) 27.0 - 33.0 pg    MCHC 30.0 (L) 33.6 - 35.0 g/dL    RDW 51.8 (H) 35.9 - 50.0 fL    Platelet Count 126 (L) 164 - 446 K/uL    MPV 10.3 9.0 - 12.9 fL    Neutrophils-Polys 47.40 44.00 - 72.00 %    Lymphocytes 36.30 22.00 - 41.00 %    Monocytes 12.70 0.00 - 13.40 %    Eosinophils 2.40 0.00 - 6.90 %    Basophils 1.00 0.00 - " 1.80 %    Immature Granulocytes 0.20 0.00 - 0.90 %    Nucleated RBC 0.00 /100 WBC    Neutrophils (Absolute) 1.97 (L) 2.00 - 7.15 K/uL    Lymphs (Absolute) 1.51 1.00 - 4.80 K/uL    Monos (Absolute) 0.53 0.00 - 0.85 K/uL    Eos (Absolute) 0.10 0.00 - 0.51 K/uL    Baso (Absolute) 0.04 0.00 - 0.12 K/uL    Immature Granulocytes (abs) 0.01 0.00 - 0.11 K/uL    NRBC (Absolute) 0.00 K/uL   COMP METABOLIC PANEL   Result Value Ref Range    Sodium 138 135 - 145 mmol/L    Potassium 3.4 (L) 3.6 - 5.5 mmol/L    Chloride 104 96 - 112 mmol/L    Co2 24 20 - 33 mmol/L    Anion Gap 10.0 7.0 - 16.0    Glucose 76 65 - 99 mg/dL    Bun 13 8 - 22 mg/dL    Creatinine 0.75 0.50 - 1.40 mg/dL    Calcium 8.5 8.4 - 10.2 mg/dL    AST(SGOT) 82 (H) 12 - 45 U/L    ALT(SGPT) 54 (H) 2 - 50 U/L    Alkaline Phosphatase 208 (H) 30 - 99 U/L    Total Bilirubin 0.3 0.1 - 1.5 mg/dL    Albumin 3.6 3.2 - 4.9 g/dL    Total Protein 8.6 (H) 6.0 - 8.2 g/dL    Globulin 5.0 (H) 1.9 - 3.5 g/dL    A-G Ratio 0.7 g/dL   LIPASE   Result Value Ref Range    Lipase 43 7 - 58 U/L   URINALYSIS    Specimen: Urine   Result Value Ref Range    Color Yellow     Character Clear     Specific Gravity 1.025 <1.035    Ph 6.0 5.0 - 8.0    Glucose Negative Negative mg/dL    Ketones Negative Negative mg/dL    Protein Negative Negative mg/dL    Bilirubin Negative Negative    Nitrite Negative Negative    Leukocyte Esterase Negative Negative    Occult Blood Negative Negative    Micro Urine Req see below    ESTIMATED GFR   Result Value Ref Range    GFR If African American >60 >60 mL/min/1.73 m 2    GFR If Non African American >60 >60 mL/min/1.73 m 2   URINE DRUG SCREEN   Result Value Ref Range    Amphetamines Urine Negative Negative    Barbiturates Negative Negative    Benzodiazepines Negative Negative    Cocaine Metabolite Negative Negative    Methadone Negative Negative    Opiates Negative Negative    Oxycodone Positive (A) Negative    Phencyclidine -Pcp Negative Negative    Propoxyphene  Negative Negative    Cannabinoid Metab Negative Negative   ETHYL ALCOHOL (BLOOD)   Result Value Ref Range    Diagnostic Alcohol <10.1 0.0 - 10.0 mg/dL         Medications given in the ER  Medications   morphine (pf) 4 mg/mL injection 4 mg (4 mg Intravenous Given 2/4/21 2134)   ondansetron (ZOFRAN) syringe/vial injection 4 mg (4 mg Intravenous Given 2/4/21 2135)   metoclopramide (REGLAN) injection 10 mg (10 mg Intravenous Given 2/4/21 2135)   hyoscyamine-maalox plus-lidocaine viscous (GI COCKTAIL) oral susp 30 mL (30 mL Oral Given 2/4/21 2134)   NS infusion 1,000 mL (0 mL Intravenous Stopped 2/5/21 0007)       COURSE & MEDICAL DECISION MAKING  Pertinent Labs & Imaging studies reviewed. (See chart for details)  [place] includes gastritis, ulcer, choledocholithiasis, dehydration, electrolyte abnormality, COVID-19  HYDRATION: Based on the patient's presentation of Acute Vomiting the patient was given IV fluids. IV Hydration was used because oral hydration was not as rapid as required. Upon recheck following hydration, the patient was Patient felt better after IV fluids..    I verified that the patient was wearing a mask and I was wearing appropriate PPE every time I entered the room. The patient's mask was on the patient at all times during my encounter except for a brief view of the oropharynx.    At this point time patient's pain is improved with GI cocktail as well as antinausea medication and morphine.  Discussed case with Dr. [facility].  He recommended an alcohol and urine drug screen be obtained.  He feels my services are negative    Medical decision making: Patient presents with epigastric/right upper quadrant abdominal pain at this point time I think abdominal pain may be secondary to gastritis or possible ulcer.  Patient is started on Carafate to add to her antacid medication.  As well as prescription for Zofran and Reglan.  We will add Carafate to the patient's GI regimen exam concerned about  gastritis or gastric ulcer causing the pain.  Patient's electrolytes are otherwise unremarkable I do not think she needs any electrolyte replacement.    I reviewed prescription monitoring program for patient's narcotic use before prescribing a scheduled drug.The patient will not drink alcohol nor drive with prescribed medications. The patient will return for new or worsening symptoms and is stable at the time of discharge.    The patient is referred to a primary physician for blood pressure management, diabetic screening, and for all other preventative health concerns.        DISPOSITION:  Patient will be discharged home in stable condition.    FOLLOW UP:  Cipriano Gomez M.D.  [address]  [address]  [facility] [address]  [phone]    Schedule an appointment as soon as possible for a visit in 2 days      Talha Funez M.D.  [address]  [address]  [facility] [address]  [phone]    Schedule an appointment as soon as possible for a visit         OUTPATIENT MEDICATIONS:  Discharge Medication List as of 2/5/2021 12:08 AM      START taking these medications    Details   sucralfate (CARAFATE) 1 GM/10ML Suspension Take 10 mL by mouth 4 times a day., Disp-414 mL, R-0, Normal      !! ondansetron (ZOFRAN ODT) 4 MG TABLET DISPERSIBLE Take 1 Tab by mouth every 8 hours as needed., Disp-10 Tab, R-0, Print Rx Paper      metoclopramide (REGLAN) 10 MG Tab Take 1 Tab by mouth 4 times a day for 10 days., Disp-40 Tab, R-0, Normal       !! - Potential duplicate medications found. Please discuss with provider.          FINAL IMPRESSION  1. Epigastric pain    2. RUQ abdominal pain    3. Non-intractable vomiting with nausea, unspecified vomiting type               Electronically signed by: aDmien Crain M.D., 2/5/2021 1:54 AM

## 2021-02-05 NOTE — ED TRIAGE NOTES
"Chief Complaint   Patient presents with   • N/V   • Abdominal Pain     right/ epigastric area      /80   Pulse 89   Temp 35.9 °C (96.7 °F) (Temporal)   Resp 16   Ht 1.575 m (5' 2\")   Wt 65.9 kg (145 lb 4.5 oz)   LMP 05/04/2013   SpO2 98%   BMI 26.57 kg/m²     "

## 2021-02-14 ENCOUNTER — APPOINTMENT (OUTPATIENT)
Dept: RADIOLOGY | Facility: MEDICAL CENTER | Age: 51
End: 2021-02-14
Attending: EMERGENCY MEDICINE
Payer: MEDICAID

## 2021-02-14 ENCOUNTER — HOSPITAL ENCOUNTER (EMERGENCY)
Facility: MEDICAL CENTER | Age: 51
End: 2021-02-14
Attending: EMERGENCY MEDICINE
Payer: MEDICAID

## 2021-02-14 VITALS
HEIGHT: 62 IN | BODY MASS INDEX: 27.18 KG/M2 | TEMPERATURE: 98.7 F | SYSTOLIC BLOOD PRESSURE: 100 MMHG | OXYGEN SATURATION: 96 % | DIASTOLIC BLOOD PRESSURE: 64 MMHG | HEART RATE: 81 BPM | WEIGHT: 147.71 LBS | RESPIRATION RATE: 17 BRPM

## 2021-02-14 DIAGNOSIS — R11.0 NAUSEA: ICD-10-CM

## 2021-02-14 DIAGNOSIS — M79.10 MYALGIA: ICD-10-CM

## 2021-02-14 LAB
ALBUMIN SERPL BCP-MCNC: 3.7 G/DL (ref 3.2–4.9)
ALBUMIN/GLOB SERPL: 0.7 G/DL
ALP SERPL-CCNC: 226 U/L (ref 30–99)
ALT SERPL-CCNC: 53 U/L (ref 2–50)
AMMONIA PLAS-SCNC: <10 UMOL/L (ref 11–45)
ANION GAP SERPL CALC-SCNC: 9 MMOL/L (ref 7–16)
APPEARANCE UR: CLEAR
APTT PPP: 33.9 SEC (ref 24.7–36)
AST SERPL-CCNC: 82 U/L (ref 12–45)
BASOPHILS # BLD AUTO: 0.9 % (ref 0–1.8)
BASOPHILS # BLD: 0.03 K/UL (ref 0–0.12)
BILIRUB SERPL-MCNC: 0.4 MG/DL (ref 0.1–1.5)
BILIRUB UR QL STRIP.AUTO: NEGATIVE
BUN SERPL-MCNC: 13 MG/DL (ref 8–22)
CALCIUM SERPL-MCNC: 8.6 MG/DL (ref 8.4–10.2)
CHLORIDE SERPL-SCNC: 102 MMOL/L (ref 96–112)
CO2 SERPL-SCNC: 25 MMOL/L (ref 20–33)
COLOR UR: YELLOW
CREAT SERPL-MCNC: 0.65 MG/DL (ref 0.5–1.4)
CRP SERPL HS-MCNC: 0.1 MG/DL (ref 0–0.75)
EKG IMPRESSION: NORMAL
EOSINOPHIL # BLD AUTO: 0.08 K/UL (ref 0–0.51)
EOSINOPHIL NFR BLD: 2.4 % (ref 0–6.9)
ERYTHROCYTE [DISTWIDTH] IN BLOOD BY AUTOMATED COUNT: 51.8 FL (ref 35.9–50)
ERYTHROCYTE [SEDIMENTATION RATE] IN BLOOD BY WESTERGREN METHOD: 39 MM/HOUR (ref 0–30)
GLOBULIN SER CALC-MCNC: 5 G/DL (ref 1.9–3.5)
GLUCOSE SERPL-MCNC: 94 MG/DL (ref 65–99)
GLUCOSE UR STRIP.AUTO-MCNC: NEGATIVE MG/DL
HCT VFR BLD AUTO: 34.5 % (ref 37–47)
HGB BLD-MCNC: 10.4 G/DL (ref 12–16)
IMM GRANULOCYTES # BLD AUTO: 0.01 K/UL (ref 0–0.11)
IMM GRANULOCYTES NFR BLD AUTO: 0.3 % (ref 0–0.9)
INR PPP: 1.2 (ref 0.87–1.13)
KETONES UR STRIP.AUTO-MCNC: NEGATIVE MG/DL
LACTATE BLD-SCNC: 1 MMOL/L (ref 0.5–2)
LEUKOCYTE ESTERASE UR QL STRIP.AUTO: NEGATIVE
LIPASE SERPL-CCNC: 48 U/L (ref 7–58)
LYMPHOCYTES # BLD AUTO: 1.13 K/UL (ref 1–4.8)
LYMPHOCYTES NFR BLD: 33.6 % (ref 22–41)
MCH RBC QN AUTO: 22.4 PG (ref 27–33)
MCHC RBC AUTO-ENTMCNC: 30.1 G/DL (ref 33.6–35)
MCV RBC AUTO: 74.4 FL (ref 81.4–97.8)
MICRO URNS: NORMAL
MONOCYTES # BLD AUTO: 0.32 K/UL (ref 0–0.85)
MONOCYTES NFR BLD AUTO: 9.5 % (ref 0–13.4)
NEUTROPHILS # BLD AUTO: 1.79 K/UL (ref 2–7.15)
NEUTROPHILS NFR BLD: 53.3 % (ref 44–72)
NITRITE UR QL STRIP.AUTO: NEGATIVE
NRBC # BLD AUTO: 0 K/UL
NRBC BLD-RTO: 0 /100 WBC
PH UR STRIP.AUTO: 6 [PH] (ref 5–8)
PLATELET # BLD AUTO: 110 K/UL (ref 164–446)
PMV BLD AUTO: 9.7 FL (ref 9–12.9)
POTASSIUM SERPL-SCNC: 3.9 MMOL/L (ref 3.6–5.5)
PROT SERPL-MCNC: 8.7 G/DL (ref 6–8.2)
PROT UR QL STRIP: NEGATIVE MG/DL
PROTHROMBIN TIME: 14.9 SEC (ref 12–14.6)
RBC # BLD AUTO: 4.64 M/UL (ref 4.2–5.4)
RBC UR QL AUTO: NEGATIVE
SODIUM SERPL-SCNC: 136 MMOL/L (ref 135–145)
SP GR UR STRIP.AUTO: 1.02
TROPONIN T SERPL-MCNC: <6 NG/L (ref 6–19)
WBC # BLD AUTO: 3.4 K/UL (ref 4.8–10.8)

## 2021-02-14 PROCEDURE — 99284 EMERGENCY DEPT VISIT MOD MDM: CPT

## 2021-02-14 PROCEDURE — 96374 THER/PROPH/DIAG INJ IV PUSH: CPT

## 2021-02-14 PROCEDURE — 700111 HCHG RX REV CODE 636 W/ 250 OVERRIDE (IP): Performed by: EMERGENCY MEDICINE

## 2021-02-14 PROCEDURE — 81003 URINALYSIS AUTO W/O SCOPE: CPT

## 2021-02-14 PROCEDURE — 82140 ASSAY OF AMMONIA: CPT

## 2021-02-14 PROCEDURE — 83605 ASSAY OF LACTIC ACID: CPT

## 2021-02-14 PROCEDURE — 700105 HCHG RX REV CODE 258: Performed by: EMERGENCY MEDICINE

## 2021-02-14 PROCEDURE — 80053 COMPREHEN METABOLIC PANEL: CPT

## 2021-02-14 PROCEDURE — 85730 THROMBOPLASTIN TIME PARTIAL: CPT

## 2021-02-14 PROCEDURE — 93005 ELECTROCARDIOGRAM TRACING: CPT | Performed by: EMERGENCY MEDICINE

## 2021-02-14 PROCEDURE — 85610 PROTHROMBIN TIME: CPT

## 2021-02-14 PROCEDURE — 87040 BLOOD CULTURE FOR BACTERIA: CPT

## 2021-02-14 PROCEDURE — 36415 COLL VENOUS BLD VENIPUNCTURE: CPT

## 2021-02-14 PROCEDURE — 96375 TX/PRO/DX INJ NEW DRUG ADDON: CPT

## 2021-02-14 PROCEDURE — 84484 ASSAY OF TROPONIN QUANT: CPT

## 2021-02-14 PROCEDURE — 85025 COMPLETE CBC W/AUTO DIFF WBC: CPT

## 2021-02-14 PROCEDURE — 74022 RADEX COMPL AQT ABD SERIES: CPT

## 2021-02-14 PROCEDURE — 86140 C-REACTIVE PROTEIN: CPT

## 2021-02-14 PROCEDURE — 85652 RBC SED RATE AUTOMATED: CPT

## 2021-02-14 PROCEDURE — 83690 ASSAY OF LIPASE: CPT

## 2021-02-14 RX ORDER — ONDANSETRON 2 MG/ML
4 INJECTION INTRAMUSCULAR; INTRAVENOUS ONCE
Status: COMPLETED | OUTPATIENT
Start: 2021-02-14 | End: 2021-02-14

## 2021-02-14 RX ORDER — PREDNISONE 20 MG/1
40 TABLET ORAL DAILY
Qty: 6 TABLET | Refills: 0 | Status: SHIPPED | OUTPATIENT
Start: 2021-02-15 | End: 2021-02-18

## 2021-02-14 RX ORDER — SODIUM CHLORIDE 9 MG/ML
1000 INJECTION, SOLUTION INTRAVENOUS ONCE
Status: COMPLETED | OUTPATIENT
Start: 2021-02-14 | End: 2021-02-14

## 2021-02-14 RX ORDER — ONDANSETRON 4 MG/1
4 TABLET, ORALLY DISINTEGRATING ORAL EVERY 8 HOURS PRN
Qty: 6 TABLET | Refills: 0 | Status: SHIPPED | OUTPATIENT
Start: 2021-02-14 | End: 2021-02-16

## 2021-02-14 RX ADMIN — SODIUM CHLORIDE 1000 ML: 9 INJECTION, SOLUTION INTRAVENOUS at 17:11

## 2021-02-14 RX ADMIN — ONDANSETRON 4 MG: 2 INJECTION INTRAMUSCULAR; INTRAVENOUS at 17:11

## 2021-02-14 RX ADMIN — FAMOTIDINE 20 MG: 10 INJECTION INTRAVENOUS at 17:11

## 2021-02-14 RX ADMIN — PREDNISONE 60 MG: 10 TABLET ORAL at 18:02

## 2021-02-14 ASSESSMENT — FIBROSIS 4 INDEX: FIB4 SCORE: 4.52

## 2021-02-14 NOTE — ED TRIAGE NOTES
"Presents complaining of episodic N/V with generalized body aches recurring for \"a month or so.\"  Chief Complaint   Patient presents with   • N/V   • Body Aches   • Jaundice       /76   Pulse 66   Temp 37.2 °C (99 °F) (Temporal)   Resp 18   Ht 1.575 m (5' 2\")   Wt 67 kg (147 lb 11.3 oz)   LMP 05/04/2013   SpO2 99%   BMI 27.02 kg/m²      "

## 2021-02-14 NOTE — ED PROVIDER NOTES
"ED Provider Note  CHIEF COMPLAINT  Chief Complaint   Patient presents with   • N/V   • Body Aches   • Jaundice       HPI  Elvia Roberto is a 50 y.o. female who presents to the ER complaining of diffuse body pain, nausea, dry heaves, and upper abdominal pain which is been going on for the last week or so.  She says she is having a \"flareup\" of her lupus.  She is currently not on any medications for her lupus.  She said that since having all of her teeth removed in August, \"her body is falling apart.\"  She has had similar total body pain/diffuse body pain like this in the past.  She said her last episode was about a month ago.  She said that prednisone helped her quite a bit for her diffuse body pain.  She also has history of cirrhosis.  She has been seen here in emergency room and several times for the same symptoms and has been referred to gastroenterology.  She says because of her insurance they cannot see her for 2 months.  No diarrhea.  No urinary symptoms.  No chest pain, cough, or shortness of breath.  Patient reports that the total body pain and upper abdominal discomfort with associated nausea and dry heaves that she feels today is the same that she is felt in the past.  She has been taking her omeprazole and her fluoxetine as prescribed.  Otherwise she is on no other medications.  No fevers or chills.  She denies ever having drinking alcohol.  She says that her cirrhosis is secondary to autoimmune hepatitis.    REVIEW OF SYSTEMS  See HPI for further details.  Positive for diffuse body pain/total body pain, upper abdominal pain, nausea, dry heaves.  Negative for fever, chills, melena, diarrhea, chest pain, cough, shortness of breath.  All other systems are negative.    PAST MEDICAL HISTORY  Past Medical History:   Diagnosis Date   • Heart burn    • Indigestion    • Liver disease     Auto immune hepatitis   • Lupus (HCC)    • Other specified disorder of intestines    • Other specified symptom associated " with female genital organs     tubiligation       FAMILY HISTORY  History reviewed. No pertinent family history.    SOCIAL HISTORY  Social History     Socioeconomic History   • Marital status: Single     Spouse name: Not on file   • Number of children: Not on file   • Years of education: Not on file   • Highest education level: Not on file   Occupational History   • Not on file   Tobacco Use   • Smoking status: Current Every Day Smoker     Packs/day: 0.50     Years: 30.00     Pack years: 15.00     Types: Cigarettes   • Smokeless tobacco: Never Used   Substance and Sexual Activity   • Alcohol use: No   • Drug use: No   • Sexual activity: Not on file   Other Topics Concern   • Not on file   Social History Narrative   • Not on file     Social Determinants of Health     Financial Resource Strain:    • Difficulty of Paying Living Expenses:    Food Insecurity:    • Worried About Running Out of Food in the Last Year:    • Ran Out of Food in the Last Year:    Transportation Needs:    • Lack of Transportation (Medical):    • Lack of Transportation (Non-Medical):    Physical Activity:    • Days of Exercise per Week:    • Minutes of Exercise per Session:    Stress:    • Feeling of Stress :    Social Connections:    • Frequency of Communication with Friends and Family:    • Frequency of Social Gatherings with Friends and Family:    • Attends Yarsanism Services:    • Active Member of Clubs or Organizations:    • Attends Club or Organization Meetings:    • Marital Status:    Intimate Partner Violence:    • Fear of Current or Ex-Partner:    • Emotionally Abused:    • Physically Abused:    • Sexually Abused:        SURGICAL HISTORY  Past Surgical History:   Procedure Laterality Date   • HYSTEROSCOPY WITH VIDEO OPERATIVE  8/20/2013    Performed by Robin Frederick M.D. at SURGERY [facility]   • LAPAROSCOPY  12/16/2012    Performed by Hayes Regalado M.D. at SURGERY [facility] ORS   • GASTROSCOPY  12/16/2012    Performed by Hayes  "IAN Lugo M.D. at SURGERY [facility] ORS   • BOWEL RESECTION LAPAROSCOPIC  2012    Performed by TOYIN LUGO at SURGERY [facility] ORS   • LYSIS ADHESIONS GENERAL  2012    Performed by TOYIN LUGO at SURGERY [facility] ORS   • OTHER ABDOMINAL SURGERY      Kristi En Y gastric bypass   • OTHER ORTHOPEDIC SURGERY      Left ankle surgery   • OTHER ORTHOPEDIC SURGERY      Left knee surgery   • GYN SURGERY      tubal ligation   • GYN SURGERY         • PB REMOVE TONSILS/ADENOIDS,<10 Y/O     • PRIMARY C SECTION             CURRENT MEDICATIONS  Home Medications    **Home medications have not yet been reviewed for this encounter**         ALLERGIES  Allergies   Allergen Reactions   • Asa [Aspirin] Unspecified     GI upset, bleeding, Gastric Bypass     • Nsaids Nausea     GI upset, bleeding, Gastric Bypass     • Tape Unspecified     Blisters all over, Paper tape is ok       PHYSICAL EXAM  VITAL SIGNS: /76   Pulse 66   Temp 37.2 °C (99 °F) (Temporal)   Resp 18   Ht 1.575 m (5' 2\")   Wt 67 kg (147 lb 11.3 oz)   LMP 2013   SpO2 99%   BMI 27.02 kg/m²      Constitutional: Well developed, well nourished; No acute distress; Non-toxic appearance.   HENT: Normocephalic, atraumatic; Bilateral external ears normal; oropharyngeal examination deferred due to COVID-19 outbreak and lack of oral pharyngeal complaints.  Eyes: PERRL, EOMI, Conjunctiva normal. No discharge.   Neck:  Supple, nontender midline; No stridor; No nuchal rigidity.   Lymphatic: No cervical lymphadenopathy noted.   Cardiovascular: Regular rate and rhythm without murmurs, rubs, or gallop.   Thorax & Lungs: No respiratory distress, breath sounds clear to auscultation bilaterally without wheezing, rales or rhonchi. Nontender chest wall. No crepitus or subcutaneous air  Abdomen: Soft, mild inconsistent tenderness in the right upper quadrant., bowel sounds normal. No obvious masses; No pulsatile masses; no " rebound, guarding, or peritoneal signs.   Skin: Good color; warm and dry without rash or petechia.  Back: Nontender, No CVA tenderness.    Extremities: Distal radial, dorsalis pedis, posterior tibial pulses are equal bilaterally; No edema; Nontender calves or saphenous, No cyanosis, No clubbing.   Musculoskeletal: Good range of motion in all major joints. No tenderness to palpation or major deformities noted.   Neurologic: Alert & oriented x 4, clear speech    EKG  Please see my EKG interpretation below    RADIOLOGY/PROCEDURES  DX-ABDOMEN COMPLETE WITH AP OR PA CXR   Final Result         1. Moderate amount of stool throughout the colon.          COURSE & MEDICAL DECISION MAKING  Pertinent Labs & Imaging studies reviewed. (See chart for details)  Results for orders placed or performed during the hospital encounter of 02/14/21   CBC WITH DIFFERENTIAL   Result Value Ref Range    WBC 3.4 (L) 4.8 - 10.8 K/uL    RBC 4.64 4.20 - 5.40 M/uL    Hemoglobin 10.4 (L) 12.0 - 16.0 g/dL    Hematocrit 34.5 (L) 37.0 - 47.0 %    MCV 74.4 (L) 81.4 - 97.8 fL    MCH 22.4 (L) 27.0 - 33.0 pg    MCHC 30.1 (L) 33.6 - 35.0 g/dL    RDW 51.8 (H) 35.9 - 50.0 fL    Platelet Count 110 (L) 164 - 446 K/uL    MPV 9.7 9.0 - 12.9 fL    Neutrophils-Polys 53.30 44.00 - 72.00 %    Lymphocytes 33.60 22.00 - 41.00 %    Monocytes 9.50 0.00 - 13.40 %    Eosinophils 2.40 0.00 - 6.90 %    Basophils 0.90 0.00 - 1.80 %    Immature Granulocytes 0.30 0.00 - 0.90 %    Nucleated RBC 0.00 /100 WBC    Neutrophils (Absolute) 1.79 (L) 2.00 - 7.15 K/uL    Lymphs (Absolute) 1.13 1.00 - 4.80 K/uL    Monos (Absolute) 0.32 0.00 - 0.85 K/uL    Eos (Absolute) 0.08 0.00 - 0.51 K/uL    Baso (Absolute) 0.03 0.00 - 0.12 K/uL    Immature Granulocytes (abs) 0.01 0.00 - 0.11 K/uL    NRBC (Absolute) 0.00 K/uL   COMP METABOLIC PANEL   Result Value Ref Range    Sodium 136 135 - 145 mmol/L    Potassium 3.9 3.6 - 5.5 mmol/L    Chloride 102 96 - 112 mmol/L    Co2 25 20 - 33 mmol/L    Anion  Gap 9.0 7.0 - 16.0    Glucose 94 65 - 99 mg/dL    Bun 13 8 - 22 mg/dL    Creatinine 0.65 0.50 - 1.40 mg/dL    Calcium 8.6 8.4 - 10.2 mg/dL    AST(SGOT) 82 (H) 12 - 45 U/L    ALT(SGPT) 53 (H) 2 - 50 U/L    Alkaline Phosphatase 226 (H) 30 - 99 U/L    Total Bilirubin 0.4 0.1 - 1.5 mg/dL    Albumin 3.7 3.2 - 4.9 g/dL    Total Protein 8.7 (H) 6.0 - 8.2 g/dL    Globulin 5.0 (H) 1.9 - 3.5 g/dL    A-G Ratio 0.7 g/dL   LIPASE   Result Value Ref Range    Lipase 48 7 - 58 U/L   URINALYSIS (UA)    Specimen: Blood   Result Value Ref Range    Color Yellow     Character Clear     Specific Gravity 1.020 <1.035    Ph 6.0 5.0 - 8.0    Glucose Negative Negative mg/dL    Ketones Negative Negative mg/dL    Protein Negative Negative mg/dL    Bilirubin Negative Negative    Nitrite Negative Negative    Leukocyte Esterase Negative Negative    Occult Blood Negative Negative    Micro Urine Req see below    AMMONIA   Result Value Ref Range    Ammonia <10 (L) 11 - 45 umol/L   PROTHROMBIN TIME (INR)   Result Value Ref Range    PT 14.9 (H) 12.0 - 14.6 sec    INR 1.20 (H) 0.87 - 1.13   APTT   Result Value Ref Range    APTT 33.9 24.7 - 36.0 sec   CRP QUANTITIVE (NON-CARDIAC)   Result Value Ref Range    Stat C-Reactive Protein 0.10 0.00 - 0.75 mg/dL   Sed Rate   Result Value Ref Range    Sed Rate Westergren 39 (H) 0 - 30 mm/hour   LACTIC ACID   Result Value Ref Range    Lactic Acid 1.0 0.5 - 2.0 mmol/L   ESTIMATED GFR   Result Value Ref Range    GFR If African American >60 >60 mL/min/1.73 m 2    GFR If Non African American >60 >60 mL/min/1.73 m 2   TROPONIN   Result Value Ref Range    Troponin T <6 6 - 19 ng/L   EKG (NOW)   Result Value Ref Range    Report       [facility]    Test Date:  2021  Pt Name:    JA CAMPOS                Department: E[facility]  MRN:        [id number]                      Room:       [facility]  Gender:     Female                       Technician: PERLA  :        [id number]         "           Requested By:BELEN MORA  Order #:    [id number]                    Reading MD: Belen Mora    Measurements  Intervals                                Axis  Rate:       61                           P:          -16  AL:         140                          QRS:        29  QRSD:       87                           T:          28  QT:         395  QTc:        398    Interpretive Statements  Sinus rhythm rate of 61  Normal axis  Normal intervals  No ST elevation or depression  Compared to ECG 11/11/2017 13:01:05  No significant changes  Electronically Signed On 2- 19:42:18 PST by Belen Mora         Patient presents to the ER complaining of diffuse body pain, right upper quadrant discomfort, nausea and dry heaves.  The patient says that the diffuse body pain is something she is experienced before.  It occurs when she is having a \"lupus flare.\"  She also has autoimmune hepatitis and has right upper quadrant abdominal discomfort.  She was told she needed to follow-up with gastroenterology, but they \"went to her appointment for couple months because she has Medicaid.\"  Patient has had nausea and dry heaves.  She says that she is had several of these episodes of total body pain with worsening right upper quadrant pain, nausea and dry heaves since August of last year when she got all of her teeth removed.  She says that since getting all of her teeth pulled, her body just has not been the same.  She is not being treated for her lupus.  She is waiting to see GI for her autoimmune hepatitis.  She has been in the ER here 6 times since Christmas of 2020 (in the last 2 months) for exactly the same symptoms.  Patient has a slight elevation in her LFTs.  Bilirubin is normal.  She is not jaundiced.  She has some mild tenderness in the right upper quadrant.  She had a right upper quadrant ultrasound and a CT scan of her abdomen done within the last 3 weeks.  Right upper quadrant ultrasound revealed cirrhosis and portal " hypertension.  Her gallbladder is already been removed.  No biliary dilatation.  CT scan of the abdomen was done which was negative other than liver appearing consistent with hepatocellular dysfunction as well as portal hypertension.  Patient has not had fevers or chills.  She was tender in the right upper quadrant in the area of the liver.  The pain she is been experiencing today is the same pain she is had on and off for many months.  Is the same pain for which she is supposed to go see gastroenterology.  At this time there is no evidence of ascites.  No evidence of decompensated liver disease today.  The patient is well-appearing.  She has been comfortably resting on the gurney the entire stay.  With respect to her total body pain, she cannot take NSAIDs or Tylenol.  She says prednisone seems to help her total body discomfort which she attributes to her lupus flare.  Her CRP is negative.  Her sed rate is minimally elevated.  Her white count is normal.  Lactic acid level is normal.  She is not septic or toxic.  I will send her home with some prednisone as it seemed to help her total body pain in the past.  Is possible she may be having some sort of lupus flare.  I told her I would not give her any more narcotic pain medicine as [facility] revealed that she has received multiple narcotic prescriptions from multiple providers over the last year.  I told her its important that she speak with her primary care physician about pain management.  If she needs some pain medicine she should be getting it from 1 physician and not from multiple ER physicians.  At this time I do not think the patient needs any additional imaging studies.  X-ray revealed some constipation but otherwise no obstruction or perforation.  Since the pain is the same pain she has had on and off for months and is the same pain she has been here in the ER with 6 times in the last 2 months alone, I do not think that repeating a CT scan and ultrasounds  going to be helpful, especially since all of her labs are stable and unchanged from previous.  At this time patient will be discharged home.  Of strongly encouraged [facility] to [facility] to see if they might be able to expedite her appointment.  She has been referred to Dr. Childress in the past.  Patient has been given strict return precautions and discharge instructions and understands treatment plan and follow-up.      I verified that the patient was wearing a mask and I was wearing appropriate PPE every time I entered the room. The patient's mask was on the patient at all times during my encounter     FINAL IMPRESSION   1. Nausea     2. Myalgia     .     This dictation has been created using voice recognition software. The accuracy of the dictation is limited by the abilities of the software. I expect there may be some errors of grammar and possibly content. I made every attempt to manually correct the errors within my dictation. However, errors related to voice recognition software may still exist and should be interpreted within the appropriate context.    Electronically signed by: Crystal Mora M.D., 2/14/2021 3:52 PM

## 2021-02-15 NOTE — ED NOTES
Patient given discharge instructions and printed prescriptions. Patient has no further questions at this time and encouraged to follow up with PCP in 2 days.

## 2021-02-15 NOTE — DISCHARGE INSTRUCTIONS
Follow-up with your primary care physician on Tuesday.  Please discuss a pain management regimen with your primary care physician.    Also follow-up with gastroenterology and rheumatology soon as possible.  Your primary care physician should be able to refer you to rheumatology and/or start a rheumatologic work-up that might guide future medication and treatment management.    Return to the ER for any worsening abdominal pain, worsening total body pain, changing abdominal pain, changing total body pain, fevers over 100.4, shaking chills, shortness of breath, worsening nausea/vomiting, diarrhea, blood in stool, black tarry stool, rash, joint pain or swelling, or for any concerns.

## 2021-02-15 NOTE — ED NOTES
ERP at bedside. Pt agrees with plan of care discussed by ERP. AIDET acknowledged with patient. Mary in low position. Pt is in a miguel bed.  Will continue to monitor.

## 2021-02-19 LAB
BACTERIA BLD CULT: NORMAL
BACTERIA BLD CULT: NORMAL
SIGNIFICANT IND 70042: NORMAL
SIGNIFICANT IND 70042: NORMAL
SITE SITE: NORMAL
SITE SITE: NORMAL
SOURCE SOURCE: NORMAL
SOURCE SOURCE: NORMAL

## 2021-03-16 ENCOUNTER — APPOINTMENT (OUTPATIENT)
Dept: RADIOLOGY | Facility: IMAGING CENTER | Age: 51
End: 2021-03-16
Attending: FAMILY MEDICINE
Payer: COMMERCIAL

## 2021-03-16 ENCOUNTER — OFFICE VISIT (OUTPATIENT)
Dept: URGENT CARE | Facility: CLINIC | Age: 51
End: 2021-03-16
Payer: COMMERCIAL

## 2021-03-16 VITALS
RESPIRATION RATE: 16 BRPM | TEMPERATURE: 98.6 F | HEART RATE: 80 BPM | BODY MASS INDEX: 27.05 KG/M2 | HEIGHT: 62 IN | SYSTOLIC BLOOD PRESSURE: 100 MMHG | WEIGHT: 147 LBS | DIASTOLIC BLOOD PRESSURE: 60 MMHG | OXYGEN SATURATION: 98 %

## 2021-03-16 DIAGNOSIS — S70.02XA CONTUSION OF LEFT HIP, INITIAL ENCOUNTER: ICD-10-CM

## 2021-03-16 DIAGNOSIS — S16.1XXA STRAIN OF NECK MUSCLE, INITIAL ENCOUNTER: ICD-10-CM

## 2021-03-16 DIAGNOSIS — S30.0XXA LUMBAR CONTUSION, INITIAL ENCOUNTER: ICD-10-CM

## 2021-03-16 LAB — GLUCOSE BLD-MCNC: 96 MG/DL (ref 70–100)

## 2021-03-16 PROCEDURE — 82962 GLUCOSE BLOOD TEST: CPT | Performed by: FAMILY MEDICINE

## 2021-03-16 PROCEDURE — 73501 X-RAY EXAM HIP UNI 1 VIEW: CPT | Mod: TC,LT | Performed by: FAMILY MEDICINE

## 2021-03-16 PROCEDURE — 72100 X-RAY EXAM L-S SPINE 2/3 VWS: CPT | Mod: TC | Performed by: FAMILY MEDICINE

## 2021-03-16 PROCEDURE — 72040 X-RAY EXAM NECK SPINE 2-3 VW: CPT | Mod: TC | Performed by: FAMILY MEDICINE

## 2021-03-16 PROCEDURE — 99215 OFFICE O/P EST HI 40 MIN: CPT | Performed by: FAMILY MEDICINE

## 2021-03-16 RX ORDER — CYCLOBENZAPRINE HCL 5 MG
5 TABLET ORAL 3 TIMES DAILY PRN
Qty: 20 TABLET | Refills: 0 | Status: SHIPPED | OUTPATIENT
Start: 2021-03-16 | End: 2021-03-30

## 2021-03-16 ASSESSMENT — FIBROSIS 4 INDEX: FIB4 SCORE: 5.22

## 2021-03-16 NOTE — LETTER
March 16, 2021         Patient: Elvia Roberto   YOB: 1970   Date of Visit: 3/16/2021           To Whom it May Concern:    Elvia Roberto was seen in my clinic on 3/16/2021. She may return to work on 3/20.    If you have any questions or concerns, please don't hesitate to call.        Sincerely,           Arash Frazier M.D.  Electronically Signed

## 2021-03-16 NOTE — PROGRESS NOTES
Subjective:      Chief Complaint   Patient presents with   • Dizziness     Walked to living room, became dizzy and fell.  Her neck and all the way to buttocks aches, headache             Fall  The accident occurred 3   hours ago.  States that she got up quickly from seated position an became dizzy and fell backward.  She did not hit her head or have LOC.   The fall occurred while walking. Pt fell from a height of 3 to 5 ft. Pt landed on hard floor, onto left buttock/left hip area.    There was no blood loss.  Now c/o dull soreness over neck, left lumbar and left hip area, worse with wtbearing.        Pertinent negatives include no fever, hematuria, loss of consciousness, tingling or visual change. Pt has tried nothing for the symptoms.         Social History     Tobacco Use   • Smoking status: Current Every Day Smoker     Packs/day: 0.50     Years: 30.00     Pack years: 15.00     Types: Cigarettes   • Smokeless tobacco: Never Used   Substance Use Topics   • Alcohol use: No   • Drug use: No         Past Medical History:   Diagnosis Date   • Heart burn    • Indigestion    • Liver disease     Auto immune hepatitis   • Lupus (HCC)    • Other specified disorder of intestines    • Other specified symptom associated with female genital organs     tubiligation         Current Outpatient Medications on File Prior to Visit   Medication Sig Dispense Refill   • pantoprazole (PROTONIX) 40 MG Tablet Delayed Response Take 1 Tab by mouth every day. 30 Tab 0   • ondansetron (ZOFRAN ODT) 4 MG TABLET DISPERSIBLE Take 1 Tab by mouth every 6 hours as needed for Nausea. 20 Tab 0   • DULoxetine (CYMBALTA) 60 MG Cap DR Particles delayed-release capsule Take 60 mg by mouth every day.     • diphenhydrAMINE (BENADRYL) 25 MG Tab Take 50 mg by mouth 2 times a day as needed.     • sucralfate (CARAFATE) 1 GM/10ML Suspension Take 10 mL by mouth 4 times a day. (Patient not taking: Reported on 3/16/2021) 414 mL 0   • ondansetron (ZOFRAN ODT) 4 MG  "TABLET DISPERSIBLE Take 1 Tab by mouth every 8 hours as needed. (Patient not taking: Reported on 3/16/2021) 10 Tab 0   • ondansetron (ZOFRAN ODT) 4 MG TABLET DISPERSIBLE Take 1 Tab by mouth every 8 hours as needed for Nausea. (Patient not taking: Reported on 3/16/2021) 10 Tab 0   • predniSONE (DELTASONE) 10 MG Tab Day 1 take 6 tablets  Day 2 take 6 tablets  Day 3 take 5 tablets  Day 4 take 5 tablets  Day 5 take 4 tablets  Day 6 take 4 tablets  Day 7 take 3 tablets  Day 8 take 3 tablets  Day 9 take 2 tablets  Day 10 take 2 tablets  Day 11 take 1 tablet  Day 12 take 1 tablet (Patient not taking: Reported on 1/21/2021) 42 Tab 0     No current facility-administered medications on file prior to visit.              Review of Systems   Constitutional: Negative for fever.   Genitourinary: Negative for hematuria.   Neurological: Negative for tingling and loss of consciousness.          Objective:     /60 (BP Location: Left arm, Patient Position: Sitting, BP Cuff Size: Adult)   Pulse 80   Temp 37 °C (98.6 °F) (Temporal)   Resp 16   Ht 1.575 m (5' 2\")   Wt 66.7 kg (147 lb)   SpO2 98%     Physical Exam   Constitutional: She is oriented to person, place, and time. Pt appears well-developed and well-nourished. No distress.   HENT:   Head: Normocephalic and atraumatic.   Eyes: Conjunctivae are normal.   Cardiovascular: Normal rate, regular rhythm and normal heart sounds.    Pulmonary/Chest: Effort normal and breath sounds normal. No respiratory distress. Pt has no wheezes.   Musculoskeletal:   Cervical spine:   Full AROM.   There is TTP over base of c-spine with some bilat muscular spasm noted.   Lumbar spine:  Previous surgical scar noted.    + bilat TTP.     Left hip:   Full AROM.   + TTP over greater trochanter.   Neurological: pt is alert and oriented to person, place, and time. No cranial nerve deficit.   Skin: Skin is warm. Pt is not diaphoretic. No erythema.   Nursing note and vitals reviewed.         POCT " glucose - 96  Reading Physician Reading Date Result Priority   Mendoza Slater M.D.  [phone] 3/16/2021 [facility]      Narrative & Impression        3/16/2021 1:05 PM     HISTORY/REASON FOR EXAM:  Pelvic/Hip Pain Following Trauma.        TECHNIQUE/EXAM DESCRIPTION AND NUMBER OF VIEWS:  3 views of the LEFT hip.     COMPARISON: 1/21/2016     FINDINGS:  Femoral heads are seated within the acetabula. There is mild bilateral joint space narrowing. There is acetabular spurring, right greater than left. There is subchondral cystic change on the right. There is no diastases of the symphysis pubis. SI joints   are symmetric. There are postsurgical changes in the lower lumbar spine. There is a moderate amount of colonic stool. There are surgical sutures in the left lower quadrant.     IMPRESSION:     No fracture or dislocation is seen.     Degenerative changes, right greater than left.     Moderate amount of colonic stool.           Details    Reading Physician Reading Date Result Priority   Mendoza Slater M.D.  [phone] 3/16/2021 Urgent Care      Narrative & Impression        3/16/2021 1:05 PM     HISTORY/REASON FOR EXAM:  Pain Following Trauma        TECHNIQUE/ EXAM DESCRIPTION AND NUMBER OF VIEWS:  3 views of the lumbar spine.     COMPARISON: None.     FINDINGS:  Posterior fusion hardware is seen at L5/S1. No hardware fracture is identified. No compression fracture is seen. There is intervertebral disc space narrowing and spurring at T11/T12. There are degenerative changes of the facet joints. There is a moderate   amount of colonic stool.     Surgical sutures in the left abdomen are noted. SI joints are symmetric.     IMPRESSION:     Postsurgical changes spanning L5/S1.     Facet arthropathy.     Degenerative changes in the visualized lower thoracic spine.     Moderate amount of colonic stool     Details    Reading Physician Reading Date Result Priority   Angy Cortez M.D.  [phone] 3/16/2021  Urgent [facility]      Narrative & Impression        3/16/2021 1:05 PM     HISTORY/REASON FOR EXAM:  Neck pain after fall earlier today.        TECHNIQUE/EXAM DESCRIPTION AND NUMBER OF VIEWS:  Cervical spine series, 3 views.     COMPARISON:  None.        FINDINGS:  The alignment of the cervical spine is within normal limits.     The vertebral body heights are maintained.     The intervertebral disk spaces are maintained.     The C1-2 level is normal. Lateral masses are symmetric.     Visualized lung apices are clear. There is no focal prevertebral soft tissue swelling.     IMPRESSION:     1.  Unremarkable cervical spine series.          Assessment/Plan:         1. Lumbar contusion, initial encounter  X-rays were personally reviewed by myself.   There is no fracture or sx of hardware failure,  just arthritic changes as noted above.      - diclofenac sodium 1 % Gel; Apply 4 g topically every 8 hours as needed.  Dispense: 100 g; Refill: 0  - cyclobenzaprine (FLEXERIL) 5 mg tablet; Take 1 tablet by mouth 3 times a day as needed.  Dispense: 20 tablet; Refill: 0    2. Strain of neck muscle, initial encounter  X-rays were personally reviewed by myself.   There is no fracture, just arthritic changes as noted above.      - diclofenac sodium 1 % Gel; Apply 4 g topically every 8 hours as needed.  Dispense: 100 g; Refill: 0  - cyclobenzaprine (FLEXERIL) 5 mg tablet; Take 1 tablet by mouth 3 times a day as needed.  Dispense: 20 tablet; Refill: 0      3. Contusion of left hip, initial encounter  X-rays were personally reviewed by myself.   There is no fracture, just arthritic changes as noted above.        - diclofenac sodium 1 % Gel; Apply 4 g topically every 8 hours as needed.  Dispense: 100 g; Refill: 0           A total of 40 minutes were spent with the patient during this encounter taking history, physical, placing orders, chart review and imaging interpretation.

## 2021-03-30 ENCOUNTER — OFFICE VISIT (OUTPATIENT)
Dept: URGENT CARE | Facility: CLINIC | Age: 51
End: 2021-03-30
Payer: COMMERCIAL

## 2021-03-30 VITALS
HEIGHT: 62 IN | HEART RATE: 74 BPM | TEMPERATURE: 99.3 F | RESPIRATION RATE: 14 BRPM | SYSTOLIC BLOOD PRESSURE: 110 MMHG | WEIGHT: 159.4 LBS | BODY MASS INDEX: 29.33 KG/M2 | OXYGEN SATURATION: 98 % | DIASTOLIC BLOOD PRESSURE: 80 MMHG

## 2021-03-30 DIAGNOSIS — H92.02 LEFT EAR PAIN: ICD-10-CM

## 2021-03-30 DIAGNOSIS — J01.00 ACUTE NON-RECURRENT MAXILLARY SINUSITIS: ICD-10-CM

## 2021-03-30 PROCEDURE — 99214 OFFICE O/P EST MOD 30 MIN: CPT | Performed by: NURSE PRACTITIONER

## 2021-03-30 RX ORDER — URSODIOL 300 MG/1
300 CAPSULE ORAL 3 TIMES DAILY
COMMUNITY
End: 2022-07-10

## 2021-03-30 RX ORDER — AMOXICILLIN AND CLAVULANATE POTASSIUM 875; 125 MG/1; MG/1
1 TABLET, FILM COATED ORAL 2 TIMES DAILY
Qty: 14 TABLET | Refills: 0 | Status: SHIPPED | OUTPATIENT
Start: 2021-03-30 | End: 2021-04-06

## 2021-03-30 ASSESSMENT — ENCOUNTER SYMPTOMS
SORE THROAT: 1
EYE PAIN: 0
DIAPHORESIS: 0
SINUS PAIN: 1
NECK PAIN: 0
NAUSEA: 0
SINUS PRESSURE: 1
SHORTNESS OF BREATH: 0
HOARSE VOICE: 0
FEVER: 0
DIZZINESS: 0
HEADACHES: 1
CHILLS: 0
MYALGIAS: 0
VOMITING: 0
COUGH: 0

## 2021-03-30 ASSESSMENT — FIBROSIS 4 INDEX: FIB4 SCORE: 5.22

## 2021-03-30 NOTE — LETTER
March 30, 2021         Patient: Elvia Roberto   YOB: 1970   Date of Visit: 3/30/2021           To Whom it May Concern:    Elvia Roberto was seen in my clinic on 3/30/2021. She may return to work on 4/1/2021.    If you have any questions or concerns, please don't hesitate to call.        Sincerely,           SHADIA Butler.  Electronically Signed

## 2021-03-31 NOTE — PROGRESS NOTES
Subjective:   Elvia Roberto is a 50 y.o. female who presents for Recurrent Sinusitis (sinus infection x10 days. )      Sinus Problem  This is a new problem. Episode onset: 10 days. The problem has been gradually worsening since onset. There has been no fever. Her pain is at a severity of 8/10. The pain is moderate. Associated symptoms include congestion, ear pain, headaches, sinus pressure and a sore throat. Pertinent negatives include no chills, coughing, diaphoresis, hoarse voice, neck pain, shortness of breath or sneezing. Past treatments include acetaminophen and saline sprays. The treatment provided no relief.       Review of Systems   Constitutional: Negative for chills, diaphoresis and fever.   HENT: Positive for congestion, ear pain, sinus pressure, sinus pain and sore throat. Negative for hoarse voice and sneezing.    Eyes: Negative for pain.   Respiratory: Negative for cough and shortness of breath.    Cardiovascular: Negative for chest pain.   Gastrointestinal: Negative for nausea and vomiting.   Genitourinary: Negative for hematuria.   Musculoskeletal: Negative for myalgias and neck pain.   Skin: Negative for rash.   Neurological: Positive for headaches. Negative for dizziness.       Medications:    • amoxicillin-clavulanate Tabs  • diphenhydrAMINE Tabs  • DULoxetine Cpep  • ondansetron Tbdp  • pantoprazole Tbec  • URSODIOL PO    Allergies: Asa [aspirin], Nsaids, and Tape    Problem List: Elvia Roberto has Abdominal pain, other specified site; Epigastric abdominal pain; Dehydration; Hypokalemia; Hypomagnesemia; Tobacco abuse; Chronic pain; Diarrhea; Thrombocytopenia (HCC); Retroperitoneal lymphadenopathy; and Chronic migraine on their problem list.    Surgical History:  Past Surgical History:   Procedure Laterality Date   • HYSTEROSCOPY WITH VIDEO OPERATIVE  8/20/2013    Performed by Robin Frederick M.D. at [facility]   • LAPAROSCOPY  12/16/2012    Performed by Hayes Regalado M.D. at  "[facility]   • GASTROSCOPY  2012    Performed by Hayes Lugo M.D. at SUR[facility]ERY [facility]   • BOWEL RESECTION LAPAROSCOPIC  2012    Performed by HAYES LUGO at SURGERY [facility]   • LYSIS ADHESIONS GENERAL  2012    Performed by HAYES LUGO at SURGERY [facility]   • OTHER ABDOMINAL SURGERY      Kristi En Y gastric bypass   • OTHER ORTHOPEDIC SURGERY      Left ankle surgery   • OTHER ORTHOPEDIC SURGERY      Left knee surgery   • GYN SURGERY      tubal ligation   • GYN SURGERY         • PB REMOVE TONSILS/ADENOIDS,<11 Y/O     • PRIMARY C SECTION             Past Social Hx: Elvia Roberto  reports that she has been smoking cigarettes. She has a 15.00 pack-year smoking history. She has never used smokeless tobacco. She reports that she does not drink alcohol and does not use drugs.     Past Family Hx:  Elvia Roberto family history is not on file.     Problem list, medications, and allergies reviewed by myself today in Epic.     Objective:     /80 (BP Location: Left arm, Patient Position: Sitting, BP Cuff Size: Adult)   Pulse 74   Temp 37.4 °C (99.3 °F) (Temporal)   Resp 14   Ht 1.575 m (5' 2\")   Wt 72.3 kg (159 lb 6.4 oz)   LMP 2013   SpO2 98%   BMI 29.15 kg/m²     Physical Exam  Vitals and nursing note reviewed.   Constitutional:       General: She is not in acute distress.     Appearance: She is well-developed.   HENT:      Head: Normocephalic and atraumatic.      Right Ear: External ear normal.      Left Ear: External ear normal.      Nose:      Right Turbinates: Swollen.      Left Turbinates: Swollen. Not pale.      Right Sinus: Maxillary sinus tenderness present.      Left Sinus: Maxillary sinus tenderness present.      Mouth/Throat:      Mouth: Mucous membranes are moist.      Pharynx: Oropharynx is clear.   Eyes:      Conjunctiva/sclera: Conjunctivae normal.   Cardiovascular:      Rate and Rhythm: Normal " rate.   Pulmonary:      Effort: Pulmonary effort is normal. No respiratory distress.      Breath sounds: Normal breath sounds.   Abdominal:      General: There is no distension.   Musculoskeletal:         General: Normal range of motion.   Skin:     General: Skin is warm and dry.   Neurological:      General: No focal deficit present.      Mental Status: She is alert and oriented to person, place, and time. Mental status is at baseline.      Gait: Gait (gait at baseline) normal.   Psychiatric:         Judgment: Judgment normal.         Assessment/Plan:     Diagnosis and associated orders:     1. Acute non-recurrent maxillary sinusitis  amoxicillin-clavulanate (AUGMENTIN) 875-125 MG Tab   2. Left ear pain          Comments/MDM:     • Patient is a 50-year-old female present with the stated above, patient having ongoing symptoms x10 days unrelieved with over-the-counter medication, patient is tender on exam, no facial swelling noted, patient without fever she will be started on Augmentin twice daily x7 days.  Encouraged to continue with oral decongestant, saline rinses, Tylenol as needed for pain.  Patient having close contact anybody positive COVID-19.  Declined Covid testing.  • Differential diagnosis, natural history, supportive care, and indications for immediate follow-up discussed.              Please note that this dictation was created using voice recognition software. I have made a reasonable attempt to correct obvious errors, but I expect that there are errors of grammar and possibly content that I did not discover before finalizing the note.    This note was electronically signed by Toney LEE.

## 2021-04-15 ENCOUNTER — APPOINTMENT (OUTPATIENT)
Dept: RADIOLOGY | Facility: MEDICAL CENTER | Age: 51
End: 2021-04-15
Attending: EMERGENCY MEDICINE
Payer: COMMERCIAL

## 2021-04-15 ENCOUNTER — HOSPITAL ENCOUNTER (OUTPATIENT)
Facility: MEDICAL CENTER | Age: 51
End: 2021-04-16
Attending: EMERGENCY MEDICINE | Admitting: INTERNAL MEDICINE
Payer: COMMERCIAL

## 2021-04-15 DIAGNOSIS — R55 SYNCOPE, UNSPECIFIED SYNCOPE TYPE: ICD-10-CM

## 2021-04-15 PROBLEM — F32.A DEPRESSION: Status: ACTIVE | Noted: 2021-04-15

## 2021-04-15 PROBLEM — D61.818 PANCYTOPENIA (HCC): Status: ACTIVE | Noted: 2021-04-15

## 2021-04-15 PROBLEM — M32.9 LUPUS (SYSTEMIC LUPUS ERYTHEMATOSUS) (HCC): Status: ACTIVE | Noted: 2021-04-15

## 2021-04-15 PROBLEM — K74.69 OTHER CIRRHOSIS OF LIVER (HCC): Status: ACTIVE | Noted: 2021-04-15

## 2021-04-15 LAB
ALBUMIN SERPL BCP-MCNC: 3.4 G/DL (ref 3.2–4.9)
ALBUMIN/GLOB SERPL: 0.8 G/DL
ALP SERPL-CCNC: 121 U/L (ref 30–99)
ALT SERPL-CCNC: 19 U/L (ref 2–50)
ANION GAP SERPL CALC-SCNC: 4 MMOL/L (ref 7–16)
ANISOCYTOSIS BLD QL SMEAR: ABNORMAL
APPEARANCE UR: CLEAR
AST SERPL-CCNC: 36 U/L (ref 12–45)
BASOPHILS # BLD AUTO: 0.8 % (ref 0–1.8)
BASOPHILS # BLD: 0.02 K/UL (ref 0–0.12)
BILIRUB SERPL-MCNC: 0.2 MG/DL (ref 0.1–1.5)
BILIRUB UR QL STRIP.AUTO: NEGATIVE
BUN SERPL-MCNC: 9 MG/DL (ref 8–22)
CALCIUM SERPL-MCNC: 8.4 MG/DL (ref 8.5–10.5)
CHLORIDE SERPL-SCNC: 107 MMOL/L (ref 96–112)
CO2 SERPL-SCNC: 25 MMOL/L (ref 20–33)
COLOR UR: YELLOW
COMMENT 1642: NORMAL
CREAT SERPL-MCNC: 0.63 MG/DL (ref 0.5–1.4)
EKG IMPRESSION: NORMAL
EKG IMPRESSION: NORMAL
EOSINOPHIL # BLD AUTO: 0.09 K/UL (ref 0–0.51)
EOSINOPHIL NFR BLD: 3.4 % (ref 0–6.9)
ERYTHROCYTE [DISTWIDTH] IN BLOOD BY AUTOMATED COUNT: 52.6 FL (ref 35.9–50)
GLOBULIN SER CALC-MCNC: 4.2 G/DL (ref 1.9–3.5)
GLUCOSE SERPL-MCNC: 94 MG/DL (ref 65–99)
GLUCOSE UR STRIP.AUTO-MCNC: NEGATIVE MG/DL
HCT VFR BLD AUTO: 31.7 % (ref 37–47)
HGB BLD-MCNC: 9.4 G/DL (ref 12–16)
HYPOCHROMIA BLD QL SMEAR: ABNORMAL
IMM GRANULOCYTES # BLD AUTO: 0.01 K/UL (ref 0–0.11)
IMM GRANULOCYTES NFR BLD AUTO: 0.4 % (ref 0–0.9)
KETONES UR STRIP.AUTO-MCNC: NEGATIVE MG/DL
LEUKOCYTE ESTERASE UR QL STRIP.AUTO: NEGATIVE
LYMPHOCYTES # BLD AUTO: 0.97 K/UL (ref 1–4.8)
LYMPHOCYTES NFR BLD: 36.5 % (ref 22–41)
MCH RBC QN AUTO: 21.9 PG (ref 27–33)
MCHC RBC AUTO-ENTMCNC: 29.7 G/DL (ref 33.6–35)
MCV RBC AUTO: 73.7 FL (ref 81.4–97.8)
MICRO URNS: NORMAL
MICROCYTES BLD QL SMEAR: ABNORMAL
MONOCYTES # BLD AUTO: 0.4 K/UL (ref 0–0.85)
MONOCYTES NFR BLD AUTO: 15 % (ref 0–13.4)
MORPHOLOGY BLD-IMP: NORMAL
NEUTROPHILS # BLD AUTO: 1.17 K/UL (ref 2–7.15)
NEUTROPHILS NFR BLD: 43.9 % (ref 44–72)
NITRITE UR QL STRIP.AUTO: NEGATIVE
NRBC # BLD AUTO: 0 K/UL
NRBC BLD-RTO: 0 /100 WBC
PH UR STRIP.AUTO: 7 [PH] (ref 5–8)
PLATELET # BLD AUTO: 86 K/UL (ref 164–446)
PLATELET BLD QL SMEAR: NORMAL
PMV BLD AUTO: 9.9 FL (ref 9–12.9)
POIKILOCYTOSIS BLD QL SMEAR: NORMAL
POTASSIUM SERPL-SCNC: 4 MMOL/L (ref 3.6–5.5)
PROT SERPL-MCNC: 7.6 G/DL (ref 6–8.2)
PROT UR QL STRIP: NEGATIVE MG/DL
RBC # BLD AUTO: 4.3 M/UL (ref 4.2–5.4)
RBC BLD AUTO: PRESENT
RBC UR QL AUTO: NEGATIVE
SARS-COV-2 RNA RESP QL NAA+PROBE: NOTDETECTED
SODIUM SERPL-SCNC: 136 MMOL/L (ref 135–145)
SP GR UR STRIP.AUTO: 1.01
SPECIMEN SOURCE: NORMAL
TARGETS BLD QL SMEAR: NORMAL
TROPONIN T SERPL-MCNC: <6 NG/L (ref 6–19)
UROBILINOGEN UR STRIP.AUTO-MCNC: 0.2 MG/DL
WBC # BLD AUTO: 2.7 K/UL (ref 4.8–10.8)

## 2021-04-15 PROCEDURE — 700105 HCHG RX REV CODE 258: Performed by: INTERNAL MEDICINE

## 2021-04-15 PROCEDURE — 99219 PR INITIAL OBSERVATION CARE,LEVL II: CPT | Performed by: INTERNAL MEDICINE

## 2021-04-15 PROCEDURE — 85025 COMPLETE CBC W/AUTO DIFF WBC: CPT

## 2021-04-15 PROCEDURE — G0378 HOSPITAL OBSERVATION PER HR: HCPCS

## 2021-04-15 PROCEDURE — 71045 X-RAY EXAM CHEST 1 VIEW: CPT

## 2021-04-15 PROCEDURE — 70450 CT HEAD/BRAIN W/O DYE: CPT

## 2021-04-15 PROCEDURE — 81003 URINALYSIS AUTO W/O SCOPE: CPT

## 2021-04-15 PROCEDURE — U0005 INFEC AGEN DETEC AMPLI PROBE: HCPCS

## 2021-04-15 PROCEDURE — A9270 NON-COVERED ITEM OR SERVICE: HCPCS | Performed by: INTERNAL MEDICINE

## 2021-04-15 PROCEDURE — U0003 INFECTIOUS AGENT DETECTION BY NUCLEIC ACID (DNA OR RNA); SEVERE ACUTE RESPIRATORY SYNDROME CORONAVIRUS 2 (SARS-COV-2) (CORONAVIRUS DISEASE [COVID-19]), AMPLIFIED PROBE TECHNIQUE, MAKING USE OF HIGH THROUGHPUT TECHNOLOGIES AS DESCRIBED BY CMS-2020-01-R: HCPCS

## 2021-04-15 PROCEDURE — 93005 ELECTROCARDIOGRAM TRACING: CPT | Performed by: EMERGENCY MEDICINE

## 2021-04-15 PROCEDURE — 84484 ASSAY OF TROPONIN QUANT: CPT

## 2021-04-15 PROCEDURE — 93005 ELECTROCARDIOGRAM TRACING: CPT

## 2021-04-15 PROCEDURE — 700102 HCHG RX REV CODE 250 W/ 637 OVERRIDE(OP): Performed by: INTERNAL MEDICINE

## 2021-04-15 PROCEDURE — 36415 COLL VENOUS BLD VENIPUNCTURE: CPT

## 2021-04-15 PROCEDURE — 99285 EMERGENCY DEPT VISIT HI MDM: CPT

## 2021-04-15 PROCEDURE — 80053 COMPREHEN METABOLIC PANEL: CPT

## 2021-04-15 RX ORDER — URSODIOL 300 MG/1
300 CAPSULE ORAL 3 TIMES DAILY
Status: DISCONTINUED | OUTPATIENT
Start: 2021-04-15 | End: 2021-04-16 | Stop reason: HOSPADM

## 2021-04-15 RX ORDER — BISACODYL 10 MG
10 SUPPOSITORY, RECTAL RECTAL
Status: DISCONTINUED | OUTPATIENT
Start: 2021-04-15 | End: 2021-04-16 | Stop reason: HOSPADM

## 2021-04-15 RX ORDER — DULOXETIN HYDROCHLORIDE 60 MG/1
60 CAPSULE, DELAYED RELEASE ORAL EVERY MORNING
Status: DISCONTINUED | OUTPATIENT
Start: 2021-04-16 | End: 2021-04-16 | Stop reason: HOSPADM

## 2021-04-15 RX ORDER — ACETAMINOPHEN 325 MG/1
650 TABLET ORAL EVERY 6 HOURS PRN
Status: DISCONTINUED | OUTPATIENT
Start: 2021-04-15 | End: 2021-04-16 | Stop reason: HOSPADM

## 2021-04-15 RX ORDER — PROMETHAZINE HYDROCHLORIDE 25 MG/1
12.5-25 TABLET ORAL EVERY 4 HOURS PRN
Status: DISCONTINUED | OUTPATIENT
Start: 2021-04-15 | End: 2021-04-16 | Stop reason: HOSPADM

## 2021-04-15 RX ORDER — DIPHENHYDRAMINE HCL 25 MG
50 TABLET ORAL EVERY 8 HOURS PRN
Status: DISCONTINUED | OUTPATIENT
Start: 2021-04-15 | End: 2021-04-16 | Stop reason: HOSPADM

## 2021-04-15 RX ORDER — AMOXICILLIN AND CLAVULANATE POTASSIUM 875; 125 MG/1; MG/1
1 TABLET, FILM COATED ORAL 2 TIMES DAILY
COMMUNITY
End: 2021-04-15

## 2021-04-15 RX ORDER — PROMETHAZINE HYDROCHLORIDE 25 MG/1
12.5-25 SUPPOSITORY RECTAL EVERY 4 HOURS PRN
Status: DISCONTINUED | OUTPATIENT
Start: 2021-04-15 | End: 2021-04-16 | Stop reason: HOSPADM

## 2021-04-15 RX ORDER — AMOXICILLIN 250 MG
2 CAPSULE ORAL 2 TIMES DAILY PRN
Status: DISCONTINUED | OUTPATIENT
Start: 2021-04-15 | End: 2021-04-16 | Stop reason: HOSPADM

## 2021-04-15 RX ORDER — ACETAMINOPHEN 500 MG
1000 TABLET ORAL 2 TIMES DAILY PRN
COMMUNITY
End: 2022-09-23

## 2021-04-15 RX ORDER — NICOTINE 21 MG/24HR
14 PATCH, TRANSDERMAL 24 HOURS TRANSDERMAL
Status: DISCONTINUED | OUTPATIENT
Start: 2021-04-15 | End: 2021-04-16 | Stop reason: HOSPADM

## 2021-04-15 RX ORDER — OMEPRAZOLE 20 MG/1
20 CAPSULE, DELAYED RELEASE ORAL DAILY
Status: DISCONTINUED | OUTPATIENT
Start: 2021-04-16 | End: 2021-04-16 | Stop reason: HOSPADM

## 2021-04-15 RX ORDER — ONDANSETRON 2 MG/ML
4 INJECTION INTRAMUSCULAR; INTRAVENOUS EVERY 4 HOURS PRN
Status: DISCONTINUED | OUTPATIENT
Start: 2021-04-15 | End: 2021-04-16 | Stop reason: HOSPADM

## 2021-04-15 RX ORDER — OXYCODONE HYDROCHLORIDE 5 MG/1
5 TABLET ORAL EVERY 4 HOURS PRN
Status: DISCONTINUED | OUTPATIENT
Start: 2021-04-15 | End: 2021-04-16 | Stop reason: HOSPADM

## 2021-04-15 RX ORDER — POLYETHYLENE GLYCOL 3350 17 G/17G
1 POWDER, FOR SOLUTION ORAL
Status: DISCONTINUED | OUTPATIENT
Start: 2021-04-15 | End: 2021-04-16 | Stop reason: HOSPADM

## 2021-04-15 RX ORDER — OXYCODONE HYDROCHLORIDE 10 MG/1
10 TABLET ORAL EVERY 4 HOURS PRN
Status: DISCONTINUED | OUTPATIENT
Start: 2021-04-15 | End: 2021-04-16 | Stop reason: HOSPADM

## 2021-04-15 RX ORDER — ONDANSETRON 4 MG/1
4 TABLET, ORALLY DISINTEGRATING ORAL EVERY 4 HOURS PRN
Status: DISCONTINUED | OUTPATIENT
Start: 2021-04-15 | End: 2021-04-16 | Stop reason: HOSPADM

## 2021-04-15 RX ORDER — METOCLOPRAMIDE 10 MG/1
10 TABLET ORAL EVERY MORNING
COMMUNITY
End: 2021-11-15 | Stop reason: SDUPTHER

## 2021-04-15 RX ORDER — PROCHLORPERAZINE EDISYLATE 5 MG/ML
5-10 INJECTION INTRAMUSCULAR; INTRAVENOUS EVERY 4 HOURS PRN
Status: DISCONTINUED | OUTPATIENT
Start: 2021-04-15 | End: 2021-04-16 | Stop reason: HOSPADM

## 2021-04-15 RX ORDER — SODIUM CHLORIDE 9 MG/ML
INJECTION, SOLUTION INTRAVENOUS CONTINUOUS
Status: ACTIVE | OUTPATIENT
Start: 2021-04-15 | End: 2021-04-16

## 2021-04-15 RX ADMIN — NICOTINE 14 MG: 14 PATCH TRANSDERMAL at 13:55

## 2021-04-15 RX ADMIN — URSODIOL 300 MG: 300 CAPSULE ORAL at 17:17

## 2021-04-15 RX ADMIN — OXYCODONE HYDROCHLORIDE 5 MG: 5 TABLET ORAL at 16:16

## 2021-04-15 RX ADMIN — URSODIOL 300 MG: 300 CAPSULE ORAL at 13:55

## 2021-04-15 RX ADMIN — SODIUM CHLORIDE: 9 INJECTION, SOLUTION INTRAVENOUS at 13:56

## 2021-04-15 RX ADMIN — ACETAMINOPHEN 650 MG: 325 TABLET, FILM COATED ORAL at 14:24

## 2021-04-15 ASSESSMENT — COGNITIVE AND FUNCTIONAL STATUS - GENERAL
SUGGESTED CMS G CODE MODIFIER MOBILITY: CH
MOBILITY SCORE: 24
DAILY ACTIVITIY SCORE: 24
MOBILITY SCORE: 24
SUGGESTED CMS G CODE MODIFIER MOBILITY: CH
SUGGESTED CMS G CODE MODIFIER DAILY ACTIVITY: CH

## 2021-04-15 ASSESSMENT — ENCOUNTER SYMPTOMS
PALPITATIONS: 0
CHILLS: 0
DIARRHEA: 0
CONSTIPATION: 0
ABDOMINAL PAIN: 0
MYALGIAS: 0
VOMITING: 0
HEADACHES: 1
DIZZINESS: 1
BLURRED VISION: 0
SPEECH CHANGE: 0
DEPRESSION: 0
SHORTNESS OF BREATH: 0
WEAKNESS: 0
NAUSEA: 0
FEVER: 0

## 2021-04-15 ASSESSMENT — FIBROSIS 4 INDEX
FIB4 SCORE: 4.9
FIB4 SCORE: 5.22

## 2021-04-15 ASSESSMENT — PATIENT HEALTH QUESTIONNAIRE - PHQ9
5. POOR APPETITE OR OVEREATING: NOT AT ALL
SUM OF ALL RESPONSES TO PHQ9 QUESTIONS 1 AND 2: 2
SUM OF ALL RESPONSES TO PHQ QUESTIONS 1-9: 3
8. MOVING OR SPEAKING SO SLOWLY THAT OTHER PEOPLE COULD HAVE NOTICED. OR THE OPPOSITE, BEING SO FIGETY OR RESTLESS THAT YOU HAVE BEEN MOVING AROUND A LOT MORE THAN USUAL: NOT AT ALL
7. TROUBLE CONCENTRATING ON THINGS, SUCH AS READING THE NEWSPAPER OR WATCHING TELEVISION: NOT AT ALL
1. LITTLE INTEREST OR PLEASURE IN DOING THINGS: SEVERAL DAYS
3. TROUBLE FALLING OR STAYING ASLEEP OR SLEEPING TOO MUCH: NOT AT ALL
9. THOUGHTS THAT YOU WOULD BE BETTER OFF DEAD, OR OF HURTING YOURSELF: NOT AT ALL
2. FEELING DOWN, DEPRESSED, IRRITABLE, OR HOPELESS: SEVERAL DAYS
4. FEELING TIRED OR HAVING LITTLE ENERGY: SEVERAL DAYS
6. FEELING BAD ABOUT YOURSELF - OR THAT YOU ARE A FAILURE OR HAVE LET YOURSELF OR YOUR FAMILY DOWN: NOT AL ALL

## 2021-04-15 ASSESSMENT — LIFESTYLE VARIABLES
ALCOHOL_USE: NO
HOW MANY TIMES IN THE PAST YEAR HAVE YOU HAD 5 OR MORE DRINKS IN A DAY: 0
DOES PATIENT WANT TO STOP DRINKING: NO
HAVE PEOPLE ANNOYED YOU BY CRITICIZING YOUR DRINKING: NO
AVERAGE NUMBER OF DAYS PER WEEK YOU HAVE A DRINK CONTAINING ALCOHOL: 0
TOTAL SCORE: 0
TOTAL SCORE: 0
CONSUMPTION TOTAL: NEGATIVE
ON A TYPICAL DAY WHEN YOU DRINK ALCOHOL HOW MANY DRINKS DO YOU HAVE: 0
EVER HAD A DRINK FIRST THING IN THE MORNING TO STEADY YOUR NERVES TO GET RID OF A HANGOVER: NO
EVER FELT BAD OR GUILTY ABOUT YOUR DRINKING: NO
HAVE YOU EVER FELT YOU SHOULD CUT DOWN ON YOUR DRINKING: NO
TOTAL SCORE: 0

## 2021-04-15 ASSESSMENT — PAIN DESCRIPTION - PAIN TYPE
TYPE: ACUTE PAIN

## 2021-04-15 NOTE — ASSESSMENT & PLAN NOTE
Smokes 5-10 cigs per day, started as a teenager  Cessation counseling performed  Nicotine replacement ordered

## 2021-04-15 NOTE — ASSESSMENT & PLAN NOTE
Unclear etiology, possibly orthostatic versus vasovagal  Telemetry monitoring  Echocardiogram pending  Orthostatic vital signs pending  IV fluids  CT head negative

## 2021-04-15 NOTE — ED NOTES
Pt up to restroom @ this time. Steady and strong gait noted. Pt denies c/o dizziness or feeling faint @ this time. Will cont to monitor pt

## 2021-04-15 NOTE — H&P
"Hospital Medicine History & Physical Note    Date of Service  4/15/2021    Primary Care Physician  Cipriano Gomez M.D.    Consultants  N/A    Code Status  Full Code    Chief Complaint  Chief Complaint   Patient presents with   • Syncope     Pt stated she \"got dizzy and woke up on the floor\". +LOC, +head trauma, denies blood thinners. EKG completed in triage        History of Presenting Illness  50 y.o. female who presented 4/15/2021 with syncopal episode.  Patient reports this morning she woke up feeling normal however after she had gotten up and walked across the floor she states she felt lightheaded and passed out.  Patient denied any chest pain, shortness of breath, nausea or diaphoresis before or after passing out.  She reports this is never happened to her before.  Patient did report hitting her head with headache afterwards, denied incontinence or tongue biting or postictal state.  Patient denies any recent illnesses, fevers or chills.  Of note patient reports that she has had liver cirrhosis with her lupus and has previously been told she is anemic.  She had a colonoscopy done last week where 3 polyps were removed, no signs of bleeding.    In the ER CT head showed no acute findings.  Laboratory work-up significant for WBC 2.7, hemoglobin 9.4, platelets 86, troponin negative, and UA negative.    Review of Systems  Review of Systems   Constitutional: Negative for chills, fever and malaise/fatigue.   HENT: Negative for congestion.    Eyes: Negative for blurred vision.   Respiratory: Negative for shortness of breath.    Cardiovascular: Negative for chest pain and palpitations.   Gastrointestinal: Negative for abdominal pain, constipation, diarrhea, nausea and vomiting.   Genitourinary: Negative for dysuria.   Musculoskeletal: Negative for myalgias.   Skin: Negative for rash.   Neurological: Positive for dizziness and headaches. Negative for speech change and weakness.   Psychiatric/Behavioral: Negative for " depression.   All other systems reviewed and are negative.      Past Medical History   has a past medical history of Heart burn, Indigestion, Liver disease, Lupus (HCC), Other specified disorder of intestines, and Other specified symptom associated with female genital organs.    Surgical History   has a past surgical history that includes bowel resection laparoscopic (4/4/2012); lysis adhesions general (4/4/2012); gyn surgery (1993); gyn surgery (1993); other orthopedic surgery (2004); other orthopedic surgery (2000); other abdominal surgery (2010); laparoscopy (12/16/2012); gastroscopy (12/16/2012); primary c section; pr remove tonsils/adenoids,<13 y/o; and hysteroscopy with video operative (8/20/2013).     Family History  Aunt- lupus. Denies any significant disease in mother or father     Social History   reports that she has been smoking cigarettes. She has a 15.00 pack-year smoking history. She has never used smokeless tobacco. She reports that she does not drink alcohol and does not use drugs.    Allergies  Allergies   Allergen Reactions   • Asa [Aspirin] Unspecified     GI upset, bleeding, Gastric Bypass     • Nsaids Nausea     GI upset, bleeding, Gastric Bypass     • Tape Unspecified     Blisters all over, Paper tape is ok       Medications  Prior to Admission Medications   Prescriptions Last Dose Informant Patient Reported? Taking?   DULoxetine (CYMBALTA) 60 MG Cap DR Particles delayed-release capsule  Patient Yes No   Sig: Take 60 mg by mouth every day.   URSODIOL PO   Yes No   Sig: Take  by mouth 3 times a day.   diphenhydrAMINE (BENADRYL) 25 MG Tab  Patient Yes No   Sig: Take 50 mg by mouth 2 times a day as needed.   ondansetron (ZOFRAN ODT) 4 MG TABLET DISPERSIBLE   No No   Sig: Take 1 Tab by mouth every 6 hours as needed for Nausea.   pantoprazole (PROTONIX) 40 MG Tablet Delayed Response   No No   Sig: Take 1 Tab by mouth every day.      Facility-Administered Medications: None       Physical Exam  Temp:   [36.3 °C (97.4 °F)-36.5 °C (97.7 °F)] 36.5 °C (97.7 °F)  Pulse:  [61-98] 64  Resp:  [17-22] 20  BP: (109-122)/(58-79) 116/58  SpO2:  [94 %-98 %] 95 %    Physical Exam  Vitals and nursing note reviewed.   Constitutional:       General: She is not in acute distress.  HENT:      Head: Normocephalic and atraumatic.   Eyes:      Extraocular Movements: Extraocular movements intact.      Conjunctiva/sclera: Conjunctivae normal.   Cardiovascular:      Rate and Rhythm: Normal rate and regular rhythm.      Pulses: Normal pulses.      Heart sounds: Normal heart sounds.   Pulmonary:      Effort: Pulmonary effort is normal. No respiratory distress.      Breath sounds: Normal breath sounds.   Abdominal:      General: Abdomen is flat. There is no distension.      Palpations: Abdomen is soft.      Tenderness: There is no abdominal tenderness.   Musculoskeletal:         General: Normal range of motion.      Cervical back: Normal range of motion and neck supple.      Right lower leg: No edema.      Left lower leg: No edema.   Skin:     General: Skin is warm and dry.   Neurological:      General: No focal deficit present.      Mental Status: She is alert and oriented to person, place, and time.      Cranial Nerves: No cranial nerve deficit.      Sensory: No sensory deficit.      Motor: No weakness.   Psychiatric:         Mood and Affect: Mood normal.         Behavior: Behavior normal.         Laboratory:  Recent Labs     04/15/21  0732   WBC 2.7*   RBC 4.30   HEMOGLOBIN 9.4*   HEMATOCRIT 31.7*   MCV 73.7*   MCH 21.9*   MCHC 29.7*   RDW 52.6*   PLATELETCT 86*   MPV 9.9     Recent Labs     04/15/21  0732   SODIUM 136   POTASSIUM 4.0   CHLORIDE 107   CO2 25   GLUCOSE 94   BUN 9   CREATININE 0.63   CALCIUM 8.4*     Recent Labs     04/15/21  0732   ALTSGPT 19   ASTSGOT 36   ALKPHOSPHAT 121*   TBILIRUBIN 0.2   GLUCOSE 94         No results for input(s): NTPROBNP in the last 72 hours.      Recent Labs     04/15/21  0732   TROPONINT <6        Imaging:  CT-HEAD W/O   Final Result      No evidence of acute intracranial process.      DX-CHEST-PORTABLE (1 VIEW)   Final Result         1.  No acute cardiopulmonary disease.      EC-ECHOCARDIOGRAM COMPLETE W/ CONT    (Results Pending)         Assessment/Plan:  I anticipate this patient is appropriate for observation status at this time.    Syncope- (present on admission)  Assessment & Plan  Unclear etiology, possibly orthostatic versus vasovagal  Telemetry monitoring  Echocardiogram pending  Orthostatic vital signs pending  IV fluids  CT head negative    Pancytopenia (HCC)- (present on admission)  Assessment & Plan  Possibly 2/2 lupus/cirrhosis  Recent colonoscopy done by GI, 3 polyps removed  Continue to monitor CBC  Transfuse hemoglobin < 7 or platelets < 10    Other cirrhosis of liver (HCC)- (present on admission)  Assessment & Plan  Patient reports Hx of   Continue home ursodiol     Depression  Assessment & Plan  Continue Cymbalta    Lupus (systemic lupus erythematosus) (HCC)- (present on admission)  Assessment & Plan  Hx of   Patient reports liver (cirrhosis) and joint involvement  Takes Tylenol as needed pain    Tobacco abuse- (present on admission)  Assessment & Plan  Smokes 5-10 cigs per day, started as a teenager  Cessation counseling performed  Nicotine replacement ordered    DVT prophylaxis: SCDs

## 2021-04-15 NOTE — ED NOTES
Pt up to restroom @ this time. Gait steady and strong. Now c/o dizziness or feeling faint @ this time. Pt put back on monitor. Will cont to monitor pt

## 2021-04-15 NOTE — ASSESSMENT & PLAN NOTE
Continue Cymbalta   Increase fluid intake  Warm salt water gargles as needed for sore throat  You may alternate Tylenol and Motrin as needed for sore throat/fever  You are contagious until on the antibiotic x 24 hours  Get a new toothbrush and begin using in 24 hours  If symptoms do not start to improve in next 2-3 days, follow up with PCP  Keep appointment with PCP on 11/30/18      Pharyngitis  Pharyngitis is redness, pain, and swelling (inflammation) of the throat (pharynx). It is a very common cause of sore throat. Pharyngitis can be caused by a bacteria, but it is usually caused by a virus. Most cases of pharyngitis get better on their own without treatment.  What are the causes?  This condition may be caused by:  · Infection by viruses (viral). Viral pharyngitis spreads from person to person (is contagious) through coughing, sneezing, and sharing of personal items or utensils such as cups, forks, spoons, and toothbrushes.  · Infection by bacteria (bacterial). Bacterial pharyngitis may be spread by touching the nose or face after coming in contact with the bacteria, or through more intimate contact, such as kissing.  · Allergies. Allergies can cause buildup of mucus in the throat (post-nasal drip), leading to inflammation and irritation. Allergies can also cause blocked nasal passages, forcing breathing through the mouth, which dries and irritates the throat.    What increases the risk?  You are more likely to develop this condition if:  · You are 5-24 years old.  · You are exposed to crowded environments such as , school, or dormitory living.  · You live in a cold climate.  · You have a weakened disease-fighting (immune) system.    What are the signs or symptoms?  Symptoms of this condition vary by the cause (viral, bacterial, or allergies) and can include:  · Sore throat.  · Fatigue.  · Low-grade fever.  · Headache.  · Joint pain and muscle aches.  · Skin rashes.  · Swollen glands in the throat (lymph  nodes).  · Plaque-like film on the throat or tonsils. This is often a symptom of bacterial pharyngitis.  · Vomiting.  · Stuffy nose (nasal congestion).  · Cough.  · Red, itchy eyes (conjunctivitis).  · Loss of appetite.    How is this diagnosed?  This condition is often diagnosed based on your medical history and a physical exam. Your health care provider will ask you questions about your illness and your symptoms. A swab of your throat may be done to check for bacteria (rapid strep test). Other lab tests may also be done, depending on the suspected cause, but these are rare.  How is this treated?  This condition usually gets better in 3-4 days without medicine. Bacterial pharyngitis may be treated with antibiotic medicines.  Follow these instructions at home:  · Take over-the-counter and prescription medicines only as told by your health care provider.  ? If you were prescribed an antibiotic medicine, take it as told by your health care provider. Do not stop taking the antibiotic even if you start to feel better.  ? Do not give children aspirin because of the association with Reye syndrome.  · Drink enough water and fluids to keep your urine clear or pale yellow.  · Get a lot of rest.  · Gargle with a salt-water mixture 3-4 times a day or as needed. To make a salt-water mixture, completely dissolve ½-1 tsp of salt in 1 cup of warm water.  · If your health care provider approves, you may use throat lozenges or sprays to soothe your throat.  Contact a health care provider if:  · You have large, tender lumps in your neck.  · You have a rash.  · You cough up green, yellow-brown, or bloody spit.  Get help right away if:  · Your neck becomes stiff.  · You drool or are unable to swallow liquids.  · You cannot drink or take medicines without vomiting.  · You have severe pain that does not go away, even after you take medicine.  · You have trouble breathing, and it is not caused by a stuffy nose.  · You have new pain and  swelling in your joints such as the knees, ankles, wrists, or elbows.  Summary  · Pharyngitis is redness, pain, and swelling (inflammation) of the throat (pharynx).  · While pharyngitis can be caused by a bacteria, the most common causes are viral.  · Most cases of pharyngitis get better on their own without treatment.  · Bacterial pharyngitis is treated with antibiotic medicines.  This information is not intended to replace advice given to you by your health care provider. Make sure you discuss any questions you have with your health care provider.  Document Released: 12/18/2006 Document Revised: 01/23/2018 Document Reviewed: 01/23/2018  [employer] Interactive Patient Education © 2018 [employer]

## 2021-04-15 NOTE — ED NOTES
Med rec completed per Pt at bedside and Pt's pharmacy [facility] ([phone]) to verify strengths of some of Pt's medications as Pt was unsure.  Allergies reviewed with Pt.  Pt was on a 7 day course of Augmentin 875-125 mg 1 tablet twice per day dispensed 3/30/2021 which Pt reports that she FINISHED.       Medication Sig   • metoclopramide (REGLAN) 10 MG Tab Take 10 mg by mouth every morning. Indications: Nausea and Vomiting   • acetaminophen (TYLENOL) 500 MG Tab Take 1,000 mg by mouth 1 time a day as needed (Pain, Headache). 2 tablets = 1,000 mg.   • [DISCONTINUED] amoxicillin-clavulanate (AUGMENTIN) 875-125 MG Tab Take 1 tablet by mouth 2 times a day. 7 day course dispensed 3/30/2021. FINISHED.   • ursodiol (ACTIGALL) 300 MG Cap Take 300 mg by mouth 3 times a day.   • pantoprazole (PROTONIX) 40 MG Tablet Delayed Response Take 1 Tab by mouth every day. (Patient taking differently: Take 40 mg by mouth every morning.)   • ondansetron (ZOFRAN ODT) 4 MG TABLET DISPERSIBLE Take 1 Tab by mouth every 6 hours as needed for Nausea.   • DULoxetine (CYMBALTA) 60 MG Cap DR Particles delayed-release capsule Take 60 mg by mouth every morning.   • diphenhydrAMINE (BENADRYL) 25 MG Tab Take 50 mg by mouth 2 times a day as needed (Allergies). 2 tablets = 50 mg.

## 2021-04-15 NOTE — ED PROVIDER NOTES
"ED Provider Note    CHIEF COMPLAINT  Chief Complaint   Patient presents with   • Syncope     Pt stated she \"got dizzy and woke up on the floor\". +LOC, +head trauma, denies blood thinners. EKG completed in triage        HPI  Elvia Roberto is a 50 y.o. female who presents to the emergency department after passing out.  Patient states she woke up feeling usual state of health.  Had been up for a few minutes walking around suddenly felt lightheaded things closed in on her and she woke up on the floor.  No incontinence or tongue biting.  She did hit her head and have a headache after the event but no headache prior.  Headache is generalized.  Nonradiating constant.  No neck pain.  No focal weakness, numbness, or acute neurologic symptoms although reports she always feels weak from her lupus.  She did not have any chest pain, shortness of breath, palpitations nor does she have that now.  No abdominal pain, nausea vomiting or diarrhea.  No black or bloody stool.  No fevers or recent illness.  Denies dysuria hematuria frequency.    REVIEW OF SYSTEMS  As per HPI, otherwise a 10 point review of systems is negative    PAST MEDICAL HISTORY  Past Medical History:   Diagnosis Date   • Heart burn    • Indigestion    • Liver disease     Auto immune hepatitis   • Lupus (HCC)    • Other specified disorder of intestines    • Other specified symptom associated with female genital organs     tubiligation       SOCIAL HISTORY  Social History     Tobacco Use   • Smoking status: Current Every Day Smoker     Packs/day: 0.50     Years: 30.00     Pack years: 15.00     Types: Cigarettes   • Smokeless tobacco: Never Used   Substance Use Topics   • Alcohol use: No   • Drug use: No       SURGICAL HISTORY  Past Surgical History:   Procedure Laterality Date   • HYSTEROSCOPY WITH VIDEO OPERATIVE  8/20/2013    Performed by Robin Frederick M.D. at SURGERY [facility]   • LAPAROSCOPY  12/16/2012    Performed by Hayes Regalado M.D. at SURGERY " "[facility]   • GASTROSCOPY  2012    Performed by Hayes Lugo M.D. at SURGERY [facility]   • BOWEL RESECTION LAPAROSCOPIC  2012    Performed by HAYES LUGO at SURGERY [facility]   • LYSIS ADHESIONS GENERAL  2012    Performed by HAYES LUGO at SURGERY [facility]   • OTHER ABDOMINAL SURGERY      Kristi En Y gastric bypass   • OTHER ORTHOPEDIC SURGERY      Left ankle surgery   • OTHER ORTHOPEDIC SURGERY      Left knee surgery   • GYN SURGERY      tubal ligation   • GYN SURGERY         • PB REMOVE TONSILS/ADENOIDS,<13 Y/O     • PRIMARY C SECTION             CURRENT MEDICATIONS  Home Medications    **Home medications have not yet been reviewed for this encounter**         ALLERGIES  Allergies   Allergen Reactions   • Asa [Aspirin] Unspecified     GI upset, bleeding, Gastric Bypass     • Nsaids Nausea     GI upset, bleeding, Gastric Bypass     • Tape Unspecified     Blisters all over, Paper tape is ok       PHYSICAL EXAM  VITAL SIGNS: /79   Pulse 98   Temp 36.3 °C (97.4 °F) (Temporal)   Resp 17   Ht 1.575 m (5' 2\")   Wt 71.1 kg (156 lb 12 oz)   LMP 2013   SpO2 98%   BMI 28.67 kg/m²    Constitutional: Awake and alert  HENT: Normal inspection  Eyes: Normal inspection  Neck: Grossly normal range of motion.  Cardiovascular: Normal heart rate, Normal rhythm.  Symmetric peripheral pulses.   Thorax & Lungs: No respiratory distress, No wheezing, No rales, No rhonchi, No chest tenderness.   Abdomen: Bowel sounds normal, soft, non-distended, nontender, no mass  Skin: No obvious rash.  Back: No tenderness, No CVA tenderness.   Extremities: No clubbing, cyanosis, edema, no Homans or cords.  Neurologic: Grossly normal   Psychiatric: Normal for situation    RADIOLOGY/PROCEDURES  CT-HEAD W/O   Final Result      No evidence of acute intracranial process.      DX-CHEST-PORTABLE (1 VIEW)   Final Result         1.  No acute cardiopulmonary " disease.           Imaging is interpreted by radiologist    Labs:  Results for orders placed or performed during the hospital encounter of 04/15/21   CBC with Differential   Result Value Ref Range    WBC 2.7 (L) 4.8 - 10.8 K/uL    RBC 4.30 4.20 - 5.40 M/uL    Hemoglobin 9.4 (L) 12.0 - 16.0 g/dL    Hematocrit 31.7 (L) 37.0 - 47.0 %    MCV 73.7 (L) 81.4 - 97.8 fL    MCH 21.9 (L) 27.0 - 33.0 pg    MCHC 29.7 (L) 33.6 - 35.0 g/dL    RDW 52.6 (H) 35.9 - 50.0 fL    Platelet Count 86 (L) 164 - 446 K/uL    MPV 9.9 9.0 - 12.9 fL    Neutrophils-Polys 43.90 (L) 44.00 - 72.00 %    Lymphocytes 36.50 22.00 - 41.00 %    Monocytes 15.00 (H) 0.00 - 13.40 %    Eosinophils 3.40 0.00 - 6.90 %    Basophils 0.80 0.00 - 1.80 %    Immature Granulocytes 0.40 0.00 - 0.90 %    Nucleated RBC 0.00 /100 WBC    Neutrophils (Absolute) 1.17 (L) 2.00 - 7.15 K/uL    Lymphs (Absolute) 0.97 (L) 1.00 - 4.80 K/uL    Monos (Absolute) 0.40 0.00 - 0.85 K/uL    Eos (Absolute) 0.09 0.00 - 0.51 K/uL    Baso (Absolute) 0.02 0.00 - 0.12 K/uL    Immature Granulocytes (abs) 0.01 0.00 - 0.11 K/uL    NRBC (Absolute) 0.00 K/uL    Hypochromia 1+     Anisocytosis 1+     Microcytosis 1+    Complete Metabolic Panel (CMP)   Result Value Ref Range    Sodium 136 135 - 145 mmol/L    Potassium 4.0 3.6 - 5.5 mmol/L    Chloride 107 96 - 112 mmol/L    Co2 25 20 - 33 mmol/L    Anion Gap 4.0 (L) 7.0 - 16.0    Glucose 94 65 - 99 mg/dL    Bun 9 8 - 22 mg/dL    Creatinine 0.63 0.50 - 1.40 mg/dL    Calcium 8.4 (L) 8.5 - 10.5 mg/dL    AST(SGOT) 36 12 - 45 U/L    ALT(SGPT) 19 2 - 50 U/L    Alkaline Phosphatase 121 (H) 30 - 99 U/L    Total Bilirubin 0.2 0.1 - 1.5 mg/dL    Albumin 3.4 3.2 - 4.9 g/dL    Total Protein 7.6 6.0 - 8.2 g/dL    Globulin 4.2 (H) 1.9 - 3.5 g/dL    A-G Ratio 0.8 g/dL   Troponin   Result Value Ref Range    Troponin T <6 6 - 19 ng/L   URINALYSIS CULTURE, IF INDICATED    Specimen: Urine   Result Value Ref Range    Color Yellow     Character Clear     Specific Gravity  1.010 <1.035    Ph 7.0 5.0 - 8.0    Glucose Negative Negative mg/dL    Ketones Negative Negative mg/dL    Protein Negative Negative mg/dL    Bilirubin Negative Negative    Urobilinogen, Urine 0.2 Negative    Nitrite Negative Negative    Leukocyte Esterase Negative Negative    Occult Blood Negative Negative    Micro Urine Req see below    ESTIMATED GFR   Result Value Ref Range    GFR If African American >60 >60 mL/min/1.73 m 2    GFR If Non African American >60 >60 mL/min/1.73 m 2   PERIPHERAL SMEAR REVIEW   Result Value Ref Range    Peripheral Smear Review see below    PLATELET ESTIMATE   Result Value Ref Range    Plt Estimation Decreased    MORPHOLOGY   Result Value Ref Range    RBC Morphology Present     Poikilocytosis 1+     Target Cells 1+    DIFFERENTIAL COMMENT   Result Value Ref Range    Comments-Diff see below    EKG   Result Value Ref Range    Report       [facility]    Test Date:  2021-04-15  Pt Name:    JA CAMPOS                Department: ER  MRN:        [id number]                      Room:  Gender:     Female                       Technician: [id number]  :        [id number]                   Requested By:ER TRIAGE PROTOCOL  Order #:    [id number]                    Reading MD: FAVIO CORTES MD    Measurements  Intervals                                Axis  Rate:       69                           P:          18  ME:         163                          QRS:        35  QRSD:       81                           T:          30  QT:         372  QTc:        398    Interpretive Statements  Sinus rhythm  Compared to ECG 2021 19:13:19  ST (T wave) deviation no longer present  Electronically Signed On 4- 7:33:21 PDT by FAVIO CORTES MD           COURSE & MEDICAL DECISION MAKING  Patient presents with syncope.  Sudden unexplained.  No preceding symptoms.  EKG without preexcitation.  Work-up undertaken.    Patient has pancytopenia.  Previous history of  leukopenia and anemia.  Platelets are lower than previous.  Other data unremarkable.    Patient will be admitted for telemetry echocardiogram further exploration in the pancytopenia.  Consulted hospitalist for admission.    FINAL IMPRESSION  1.  Syncope  2.  Pancytopenia      This dictation was created using voice recognition software. The accuracy of the dictation is limited to the abilities of the software.  The nursing notes were reviewed and certain aspects of this information were incorporated into this note.      Electronically signed by: Nelson Goldman M.D., 4/15/2021 7:38 AM

## 2021-04-15 NOTE — CARE PLAN
Problem: Communication  Goal: The ability to communicate needs accurately and effectively will improve  Outcome: PROGRESSING AS EXPECTED     Problem: Safety  Goal: Will remain free from falls  Outcome: PROGRESSING AS EXPECTED  Intervention: Implement fall precautions  Flowsheets (Taken 4/15/2021 1417)  Environmental Precautions:   Treaded Slipper Socks on Patient   Personal Belongings, Wastebasket, Call Bell etc. in Easy Reach   Transferred to Stronger Side   Report Given to Other Health Care Providers Regarding Fall Risk   Bed in Low Position   Communication Sign for Patients & Families   Mobility Assessed & Appropriate Sign Placed     Problem: Pain Management  Goal: Pain level will decrease to patient's comfort goal  Outcome: PROGRESSING AS EXPECTED

## 2021-04-15 NOTE — ASSESSMENT & PLAN NOTE
Possibly 2/2 lupus/cirrhosis  Recent colonoscopy done by GI, 3 polyps removed  Continue to monitor CBC  Transfuse hemoglobin < 7 or platelets < 10

## 2021-04-15 NOTE — ED TRIAGE NOTES
"Chief Complaint   Patient presents with   • Syncope     Pt stated she \"got dizzy and woke up on the floor\". +LOC, +head trauma, denies blood thinners. EKG completed in triage        Pt amb to triage with steady gait for above complaint.   Pt is alert and oriented, speaking in full sentences, follows commands and responds appropriately to questions. Resp are even and unlabored. No obvious acute distress.    Pt placed in lobby. Pt educated on triage process. Pt encouraged to alert staff for any changes.      Vitals:    04/15/21 0651   BP: 122/79   Pulse: 98   Resp: 17   Temp: 36.3 °C (97.4 °F)   SpO2: 98%       "

## 2021-04-16 ENCOUNTER — APPOINTMENT (OUTPATIENT)
Dept: CARDIOLOGY | Facility: MEDICAL CENTER | Age: 51
End: 2021-04-16
Attending: INTERNAL MEDICINE
Payer: COMMERCIAL

## 2021-04-16 VITALS
DIASTOLIC BLOOD PRESSURE: 74 MMHG | OXYGEN SATURATION: 97 % | HEIGHT: 62 IN | SYSTOLIC BLOOD PRESSURE: 104 MMHG | RESPIRATION RATE: 16 BRPM | BODY MASS INDEX: 28.76 KG/M2 | HEART RATE: 69 BPM | TEMPERATURE: 99.3 F | WEIGHT: 156.31 LBS

## 2021-04-16 PROBLEM — R55 SYNCOPE: Status: RESOLVED | Noted: 2021-04-15 | Resolved: 2021-04-16

## 2021-04-16 LAB
ANION GAP SERPL CALC-SCNC: 5 MMOL/L (ref 7–16)
BUN SERPL-MCNC: 12 MG/DL (ref 8–22)
CALCIUM SERPL-MCNC: 8.3 MG/DL (ref 8.5–10.5)
CHLORIDE SERPL-SCNC: 108 MMOL/L (ref 96–112)
CO2 SERPL-SCNC: 24 MMOL/L (ref 20–33)
CREAT SERPL-MCNC: 0.63 MG/DL (ref 0.5–1.4)
ERYTHROCYTE [DISTWIDTH] IN BLOOD BY AUTOMATED COUNT: 51.7 FL (ref 35.9–50)
FERRITIN SERPL-MCNC: 7.6 NG/ML (ref 10–291)
GLUCOSE SERPL-MCNC: 84 MG/DL (ref 65–99)
HCT VFR BLD AUTO: 29 % (ref 37–47)
HGB BLD-MCNC: 8.6 G/DL (ref 12–16)
IRON SATN MFR SERPL: 7 % (ref 15–55)
IRON SERPL-MCNC: 30 UG/DL (ref 40–170)
LV EJECT FRACT  99904: 65
LV EJECT FRACT MOD 2C 99903: 61.32
LV EJECT FRACT MOD 4C 99902: 70.62
LV EJECT FRACT MOD BP 99901: 64.93
MCH RBC QN AUTO: 21.6 PG (ref 27–33)
MCHC RBC AUTO-ENTMCNC: 29.7 G/DL (ref 33.6–35)
MCV RBC AUTO: 72.9 FL (ref 81.4–97.8)
PLATELET # BLD AUTO: 87 K/UL (ref 164–446)
PMV BLD AUTO: 10.1 FL (ref 9–12.9)
POTASSIUM SERPL-SCNC: 4.2 MMOL/L (ref 3.6–5.5)
RBC # BLD AUTO: 3.98 M/UL (ref 4.2–5.4)
SODIUM SERPL-SCNC: 137 MMOL/L (ref 135–145)
TIBC SERPL-MCNC: 412 UG/DL (ref 250–450)
TSH SERPL DL<=0.005 MIU/L-ACNC: 0.69 UIU/ML (ref 0.38–5.33)
UIBC SERPL-MCNC: 382 UG/DL (ref 110–370)
WBC # BLD AUTO: 3.3 K/UL (ref 4.8–10.8)

## 2021-04-16 PROCEDURE — 84443 ASSAY THYROID STIM HORMONE: CPT

## 2021-04-16 PROCEDURE — 83540 ASSAY OF IRON: CPT

## 2021-04-16 PROCEDURE — G0378 HOSPITAL OBSERVATION PER HR: HCPCS

## 2021-04-16 PROCEDURE — A9270 NON-COVERED ITEM OR SERVICE: HCPCS | Performed by: INTERNAL MEDICINE

## 2021-04-16 PROCEDURE — 85027 COMPLETE CBC AUTOMATED: CPT

## 2021-04-16 PROCEDURE — 700105 HCHG RX REV CODE 258: Performed by: INTERNAL MEDICINE

## 2021-04-16 PROCEDURE — 93306 TTE W/DOPPLER COMPLETE: CPT

## 2021-04-16 PROCEDURE — 36415 COLL VENOUS BLD VENIPUNCTURE: CPT

## 2021-04-16 PROCEDURE — 700102 HCHG RX REV CODE 250 W/ 637 OVERRIDE(OP): Performed by: INTERNAL MEDICINE

## 2021-04-16 PROCEDURE — 80048 BASIC METABOLIC PNL TOTAL CA: CPT

## 2021-04-16 PROCEDURE — 82728 ASSAY OF FERRITIN: CPT

## 2021-04-16 PROCEDURE — 83550 IRON BINDING TEST: CPT

## 2021-04-16 PROCEDURE — 93306 TTE W/DOPPLER COMPLETE: CPT | Mod: 26 | Performed by: INTERNAL MEDICINE

## 2021-04-16 RX ORDER — FERROUS SULFATE 325(65) MG
325 TABLET ORAL DAILY
Qty: 90 TABLET | Refills: 1 | Status: SHIPPED | OUTPATIENT
Start: 2021-04-16 | End: 2022-07-10

## 2021-04-16 RX ADMIN — SODIUM CHLORIDE: 9 INJECTION, SOLUTION INTRAVENOUS at 08:22

## 2021-04-16 RX ADMIN — NICOTINE 14 MG: 14 PATCH TRANSDERMAL at 05:43

## 2021-04-16 RX ADMIN — OMEPRAZOLE 20 MG: 20 CAPSULE, DELAYED RELEASE ORAL at 05:41

## 2021-04-16 RX ADMIN — URSODIOL 300 MG: 300 CAPSULE ORAL at 12:04

## 2021-04-16 RX ADMIN — DULOXETINE HYDROCHLORIDE 60 MG: 60 CAPSULE, DELAYED RELEASE ORAL at 05:41

## 2021-04-16 RX ADMIN — URSODIOL 300 MG: 300 CAPSULE ORAL at 05:41

## 2021-04-16 NOTE — NON-PROVIDER
[facility] FAMILY MEDICINE PROGRESS NOTE     Attending: Dr. Albina Torres MD  Senior Resident: Dr. Sarah Hargrove MD(PGY-2)    PATIENT: Elvia Robetro; [id number]; 1970    Subjective: Elvia Roberto is a 50 y.o. M w/ PMH of cirrhosis and anemia secondary to SLE presenting to the ED this morning after a syncopal episode. She states that she woke up feeling fine but began to feel lightheaded as she started to walk and woke up on the floor. She is unsure if she hit her head but reports headache. She denies any prodromal symptoms or post-ictal state and state she has never passed out before. She underwent colonoscopy 3 weeks ago and had 3 polyps removed but shows no sign of bleeding.    Pt is comfortable and denies any current pain. Discussed plan to discharge later today if echo results and labs come back normal and she is agreeable to this plan.    OBJECTIVE:  Temp:  [36.5 °C (97.7 °F)-37.3 °C (99.1 °F)] 37.3 °C (99.1 °F)  Pulse:  [57-71] 68  Resp:  [16-20] 16  BP: (100-127)/(58-83) 118/72  SpO2:  [95 %-98 %] 98 %  No intake or output data in the 24 hours ending 04/16/21 1049    PE:  Physical Exam  Constitutional:       Appearance: Normal appearance. She is not ill-appearing.   HENT:      Head: Normocephalic and atraumatic.      Nose: Nose normal.      Mouth/Throat:      Mouth: Mucous membranes are moist.   Cardiovascular:      Rate and Rhythm: Normal rate and regular rhythm.      Pulses: Normal pulses.      Heart sounds: Normal heart sounds.   Pulmonary:      Effort: Pulmonary effort is normal.      Breath sounds: Normal breath sounds.   Abdominal:      General: Bowel sounds are normal.      Palpations: Abdomen is soft.   Skin:     General: Skin is warm and dry.   Neurological:      General: No focal deficit present.      Mental Status: She is alert and oriented to person, place, and time.   Psychiatric:         Mood and Affect: Mood normal.         Behavior: Behavior normal.       LABS:  Recent Labs     04/15/21  0732  04/16/21  0140   WBC 2.7* 3.3*   RBC 4.30 3.98*   HEMOGLOBIN 9.4* 8.6*   HEMATOCRIT 31.7* 29.0*   MCV 73.7* 72.9*   MCH 21.9* 21.6*   RDW 52.6* 51.7*   PLATELETCT 86* 87*   MPV 9.9 10.1   NEUTSPOLYS 43.90*  --    LYMPHOCYTES 36.50  --    MONOCYTES 15.00*  --    EOSINOPHILS 3.40  --    BASOPHILS 0.80  --    RBCMORPHOLO Present  --      Recent Labs     04/15/21  0732 04/16/21  0140   SODIUM 136 137   POTASSIUM 4.0 4.2   CHLORIDE 107 108   CO2 25 24   BUN 9 12   CREATININE 0.63 0.63   CALCIUM 8.4* 8.3*   ALBUMIN 3.4  --      Estimated GFR/CRCL = Estimated Creatinine Clearance: 97.4 mL/min (by C-G formula based on SCr of 0.63 mg/dL).  Recent Labs     04/15/21  0732 04/16/21  0140   GLUCOSE 94 84     Recent Labs     04/15/21  0732   ASTSGOT 36   ALTSGPT 19   TBILIRUBIN 0.2   ALKPHOSPHAT 121*   GLOBULIN 4.2*             No results for input(s): INR, APTT, FIBRINOGEN in the last 72 hours.    Invalid input(s): DIMER    MICROBIOLOGY:  No results found for: BLOODCULTU, BLDCULT, BCHOLD     IMAGING:   CT-HEAD W/O   Final Result      No evidence of acute intracranial process.      DX-CHEST-PORTABLE (1 VIEW)   Final Result         1.  No acute cardiopulmonary disease.      EC-ECHOCARDIOGRAM COMPLETE W/ CONT    (Results Pending)       MEDS:  Current Facility-Administered Medications   Medication Last Admin   • senna-docusate (PERICOLACE or SENOKOT S) 8.6-50 MG per tablet 2 tablet      And   • polyethylene glycol/lytes (MIRALAX) PACKET 1 Packet      And   • magnesium hydroxide (MILK OF MAGNESIA) suspension 30 mL      And   • bisacodyl (DULCOLAX) suppository 10 mg     • NS infusion New Bag at 04/16/21 0822   • acetaminophen (Tylenol) tablet 650 mg 650 mg at 04/15/21 1424   • ondansetron (ZOFRAN) syringe/vial injection 4 mg     • ondansetron (ZOFRAN ODT) dispertab 4 mg     • promethazine (PHENERGAN) tablet 12.5-25 mg     • promethazine (PHENERGAN) suppository 12.5-25 mg     • prochlorperazine (COMPAZINE) injection 5-10 mg     •  DULoxetine (CYMBALTA) capsule 60 mg 60 mg at 04/16/21 0541   • omeprazole (PRILOSEC) capsule 20 mg 20 mg at 04/16/21 0541   • ursodiol (ACTIGALL) capsule 300 mg 300 mg at 04/16/21 0541   • diphenhydrAMINE (BENADRYL) tablet/capsule 50 mg     • nicotine (NICODERM) 14 MG/24HR 14 mg 14 mg at 04/16/21 0543    And   • nicotine polacrilex (NICORETTE) 2 MG piece 2 mg     • oxyCODONE immediate-release (ROXICODONE) tablet 5 mg 5 mg at 04/15/21 1616    Or   • oxyCODONE immediate release (ROXICODONE) tablet 10 mg         PROBLEM LIST:  No problems updated.    ASSESSMENT/PLAN: Elvia Roberto is a 50 y.o. M w/ PMH of cirrhosis and anemia secondary to SLE presenting to the ED this morning after a syncopal episode.    # Syncope  Pt's sx likely secondary to severe anemia. She is in sinus rhythm w/ normal electrolyte levels, negative troponin, and normal EKG and head CT.  - Telemetry  - TSH wnl  - Pending echocardiogram results  - IV fluids    # Pancytopenia  Likely secondary to SLE. Pt underwent colonoscopy w/ polyp removal 3 weeks ago but has no signs of active bleeding which may have contributed to her anemia.  - F/u w/ rheumatology outpatient  - Transfuse if Hgb <7 or platelets <10    # Cirrhosis  Secondary to SLE  - F/u w/ GI outpatient  - Continue home ursodiol    # SLE  Pt has hx of methotrexate tx but is not currently taking anything for her lupus and has not seen a physician in town for this recently.  - F/u w/ rheumatology outpatient    #Diet / Fluids  - Regular diet    #Bowel Regimen  - Senna/docusate, polyethylene glycol, milk of magnesia, bisacodyl PRN     #DVT Prophylaxis  - N/A    #Disposition  Discharge

## 2021-04-16 NOTE — DISCHARGE INSTRUCTIONS
Discharge Instructions    Discharged to home by car with friend. Discharged via walking, hospital escort: Yes.  Special equipment needed: Not Applicable    Be sure to schedule a follow-up appointment with your primary care doctor or any specialists as instructed.     Discharge Plan:   Diet Plan: Discussed  Activity Level: Discussed  Confirmed Follow up Appointment: Patient to Call and Schedule Appointment  Confirmed Symptoms Management: Discussed  Medication Reconciliation Updated: Yes    I understand that a diet low in cholesterol, fat, and sodium is recommended for good health. Unless I have been given specific instructions below for another diet, I accept this instruction as my diet prescription.       Special Instructions: None    · Is patient discharged on Warfarin / Coumadin?   No     Depression / Suicide Risk    As you are discharged from this [facility] facility, it is important to learn how to keep safe from harming yourself.    Recognize the warning signs:  · Abrupt changes in personality, positive or negative- including increase in energy   · Giving away possessions  · Change in eating patterns- significant weight changes-  positive or negative  · Change in sleeping patterns- unable to sleep or sleeping all the time   · Unwillingness or inability to communicate  · Depression  · Unusual sadness, discouragement and loneliness  · Talk of wanting to die  · Neglect of personal appearance   · Rebelliousness- reckless behavior  · Withdrawal from people/activities they love  · Confusion- inability to concentrate     If you or a loved one observes any of these behaviors or has concerns about self-harm, here's what you can do:  · Talk about it- your feelings and reasons for harming yourself  · Remove any means that you might use to hurt yourself (examples: pills, rope, extension cords, firearm)  · Get professional help from the community (Mental Health, Substance Abuse, psychological counseling)  · Do not be  alone:Call your Safe Contact- someone whom you trust who will be there for you.  · Call your local CRISIS HOTLINE 266-7510 or 214-777-7867  · Call your local Children's Mobile Crisis Response Team [place] (725) 593-2083 or www.[employer]  · Call the toll free National Suicide Prevention Hotlines   · National Suicide Prevention Lifeline 699-086-ODYJ (1421)  · National Calm Line Network 800-SUICIDE (083-0867)

## 2021-04-16 NOTE — PROGRESS NOTES
Pt being discharged home. IV removed. Telemetry monitor discontinued. Pt belongings gathered. Changed into own clothing. Discharge instructions reviewed with patient. Signed copy kept with chart. Medications, prescriptions, follow up appointments reviewed. Patient verbalizes understanding. All questions answered. Patient walked to hospital entrance with staff, being picked up by  and transported home.

## 2021-04-16 NOTE — PROGRESS NOTES
2 RN Skin Check    2 RN skin check complete.   Skin intact and unremarkable at this time.  Devices in place: none  Skin assessed under devices: no.  Wound consult placed No.  The following interventions in place Pillows.

## 2021-04-16 NOTE — PROGRESS NOTES
Bedside report received from day shift RN. Communication board updated, call light within reach. POC discussed with pt; all questions answered at this time.

## 2021-04-16 NOTE — CARE PLAN
Problem: Safety  Goal: Will remain free from injury  Outcome: PROGRESSING AS EXPECTED     Problem: Pain Management  Goal: Pain level will decrease to patient's comfort goal  Outcome: PROGRESSING AS EXPECTED     Problem: Communication  Goal: The ability to communicate needs accurately and effectively will improve  Outcome: MET

## 2021-04-16 NOTE — PROGRESS NOTES
Assumed care of patient at bedside report from NOC RN. Updated on POC. Patient currently A & O x 4; up independently; without complaints of acute pain. Patient has no complaints this morning. Questions regarding plan of care answered. Call light within reach. Whiteboard updated. Fall precautions in place. Bed locked and in lowest position. All questions answered. No other needs indicated at this time.

## 2021-04-16 NOTE — DISCHARGE SUMMARY
[facility] MEDICINE ADULT DISCHARGE SUMMARY   PATIENT: Elvia Roberto; [id number]; 1970    Attending Physician: Albina Torres MD  Senior Resident: Sarah Hargrove MD  Reent Physician: Jose L Langston MD    Admit Date:  4/15/2021       Discharge Date:   4/16/2021    Primary Diagnosis:   Syncope- (present on admission)  Assessment & Plan  Unclear etiology, possibly orthostatic versus vasovagal and may be affected by her significant anemia  Telemetry monitoring has shown no significant ectopy  CT was head negative    Secondary Diagnoses:                Pancytopenia (HCC)- (present on admission)  Assessment & Plan  Possibly 2/2 lupus/cirrhosis and suppression of marrow by related autoimmune processes  Recent colonoscopy done by GI, 3 polyps removed - seems unlikely to have been complicated by ongoing bleeding. FOBT negative.  Transfusion was not indicated because Hgb remained >8, but iron supplement will be started due to low ferritin of 7  Establishing ongoing relationship with hematology and following up with appointments is very important-reiterated importance of consistent followup     Other cirrhosis of liver (HCC)- (present on admission)  Assessment & Plan  Patient reports Hx of chronic liver failure  Continue home ursodiol      Depression  Assessment & Plan  Continue Cymbalta, no significant change from baseline, no SI/HI     Lupus (systemic lupus erythematosus) (HCC)- (present on admission)  Assessment & Plan  Hx of longstanding Lupus  Patient reports liver (cirrhosis) and joint involvement related to it, there has been no previous report of pancytopenia  Takes Tylenol as needed pain  Re-establish relationship if possible with her previous oncologist Dr. Gong, or colleague, and follow up regularly     Tobacco abuse- (present on admission)  Assessment & Plan  Smokes 5-10 cigs per day, started as a teenager  Cessation counseling reiterated    HPI:     50 y.o. female with history of Lupus and Liver disease  admitted for syncope and found at admission to have pancytopenia.     Hospital Summary:       Observed overnight with telemetry showing sinus rhythms with occasional PVC's.      Consultants:      None    Procedures:        None    Imaging/ Testing:      IMAGING: My preliminary echocardiogram viewing during the procedure suggests no severe valvular dysfunction and a normal looking LV ejection fraction.  EC-ECHOCARDIOGRAM COMPLETE W/O CONT   Final Result - pending      CT-HEAD W/O   Final Result      No evidence of acute intracranial process.      DX-CHEST-PORTABLE (1 VIEW)   Final Result         1.  No acute cardiopulmonary disease.        LABS:  Recent Labs     04/15/21  0732 04/16/21  0140   WBC 2.7* 3.3*   RBC 4.30 3.98*   HEMOGLOBIN 9.4* 8.6*   HEMATOCRIT 31.7* 29.0*   MCV 73.7* 72.9*   MCH 21.9* 21.6*   RDW 52.6* 51.7*   PLATELETCT 86* 87*   MPV 9.9 10.1   NEUTSPOLYS 43.90*  --    LYMPHOCYTES 36.50  --    MONOCYTES 15.00*  --    EOSINOPHILS 3.40  --    BASOPHILS 0.80  --    RBCMORPHOLO Present  --      Recent Labs     04/15/21  0732 04/16/21  0140   SODIUM 136 137   POTASSIUM 4.0 4.2   CHLORIDE 107 108   CO2 25 24   BUN 9 12   CREATININE 0.63 0.63   CALCIUM 8.4* 8.3*   ALBUMIN 3.4  --      Estimated GFR/CRCL = Estimated Creatinine Clearance: 97.4 mL/min (by C-G formula based on SCr of 0.63 mg/dL).  Recent Labs     04/15/21  0732 04/16/21  0140   GLUCOSE 94 84     Recent Labs     04/15/21  0732   ASTSGOT 36   ALTSGPT 19   TBILIRUBIN 0.2   ALKPHOSPHAT 121*   GLOBULIN 4.2*       OBJECTIVE:  Temp:  [36.9 °C (98.4 °F)-37.4 °C (99.3 °F)] 37.4 °C (99.3 °F)  Pulse:  [57-71] 69  Resp:  [16] 16  BP: (100-127)/(64-83) 104/74  SpO2:  [95 %-98 %] 97 %  No intake or output data in the 24 hours ending 04/16/21 0754    PE:  Gen: Persistent global weakness/fatigue but cheerful/ineractive   HEENT: NCAT, EOMI, PERRL.  MMM without lesions  Pulm: CTABL, no respiratory distress   Cardio: NS1S2 NMRG   Abdom: NTND BS+, no to minimal  ascites or liver tenderness  MSK: Normal motion x 4 extremities, mild global weakness  Ext: Tr-1+ pedal/ankle edema, 2+ pulses   Skin: No lesions or rashes visible  Neuro: A&O x 4/4, nonfocal exam    Discharge Medications:         Elvia Roberto   Home Medication Instructions TASHA:[id number]    Printed on:04/16/21 0405   Medication Information      acetaminophen (TYLENOL) 500 MG Tab  Take 1,000 mg by mouth 1 time a day as needed (Pain, Headache). 2 tablets = 1,000 mg.     diphenhydrAMINE (BENADRYL) 25 MG Tab  Take 50 mg by mouth 2 times a day as needed (Allergies). 2 tablets = 50 mg.     DULoxetine (CYMBALTA) 60 MG Cap DR Particles delayed-release capsule  Take 60 mg by mouth every morning.     ferrous sulfate 325 (65 Fe) MG tablet  Take 1 tablet by mouth every day.     metoclopramide (REGLAN) 10 MG Tab  Take 10 mg by mouth every morning. Indications: Nausea and Vomiting     ondansetron (ZOFRAN ODT) 4 MG TABLET DISPERSIBLE  Take 1 Tab by mouth every 6 hours as needed for Nausea.     pantoprazole (PROTONIX) 40 MG Tablet Delayed Response  Take 1 Tab by mouth every day.     ursodiol (ACTIGALL) 300 MG Cap  Take 300 mg by mouth 3 times a day.         Disposition:   Home    Diet:   As tolerated    Activity:   Regular as tolerated    Instructions:      It is critical to follow up with hematology and rheumatology specialists because the ongoing autoimmune issues in liver, blood cells and joints is complex and needs ongoing care.  The patient was instructed to return to the ER in the event of worsening symptoms. I have counseled the patient on the importance of compliance and the patient has agreed to proceed with all medical recommendations and follow up plan indicated above.   The patient understands that all medications come with benefits and risks. Risks may include permanent injury or death and these risks can be minimized with close reassessment and monitoring.        Please CC: Cipriano Gomez M.D.    Pending Studies:         Echocardiogram final results       Jose L Langston MD  [facility]   [phone]

## 2021-04-27 ASSESSMENT — ENCOUNTER SYMPTOMS
COUGH: 0
PHOTOPHOBIA: 0
DIZZINESS: 0
DOUBLE VISION: 0
CONSTIPATION: 0
WEIGHT LOSS: 0
NERVOUS/ANXIOUS: 0
FEVER: 0
BRUISES/BLEEDS EASILY: 0
VOMITING: 0
WHEEZING: 0
DIAPHORESIS: 0
TINGLING: 0
BLURRED VISION: 0
DEPRESSION: 0
DIARRHEA: 0
HEADACHES: 0
INSOMNIA: 0
SENSORY CHANGE: 0
SPUTUM PRODUCTION: 0
CHILLS: 0
SHORTNESS OF BREATH: 0
MYALGIAS: 0
NAUSEA: 0

## 2021-04-27 NOTE — PROGRESS NOTES
Consult:  Hematology/Oncology      Referring Physician: Same as PCP  Primary Care:  Cipriano Gomez M.D.    Diagnosis: Pancytopenia    Chief Complaint: Recent syncope    History of Presenting Illness:  Elvia Roberto is a 50 y.o. female with past medical history significant for autoimmune hepatitis and lupus.  Several CBCs are available for review in the last 2months demonstrating a white count between 2.7 and 4.2 and hemoglobin between 8.6 and 10.5 and MCV below 80 and a platelet count between 86,000 126,000.  Two value of ferritin are available 5 years ago and 11 days ago which are respectively 4.1 and 7.6.  A basic metabolic panel obtained 11 days ago showed calcium of 8.3 and a creatinine 0.6  A C-reactive protein obtained 2 months ago was 0.1  And a CT scan of abdomen and pelvis performed on January 23, 2021 indicated that the appearance of the liver is consistent with hepatocellular dysfunction and there is presence of splenomegaly redilation of the portal vein and small gastroesophageal varices with recanalization of the umbilical vein consistent with portal hypertension.  The patient appears to be status post gastric bypass  Recent evaluation by GI and according to the patient revealed no esophageal varices  Interval History:  Patient is here for consultation.    Past Medical History:   Diagnosis Date   • Heart burn    • Indigestion    • Liver disease     Auto immune hepatitis   • Lupus (HCC)    • Other specified disorder of intestines    • Other specified symptom associated with female genital organs     tubiligation       Past Surgical History:   Procedure Laterality Date   • HYSTEROSCOPY WITH VIDEO OPERATIVE  8/20/2013    Performed by Robin Frederick M.D. at SURGERY [facility] ORS   • LAPAROSCOPY  12/16/2012    Performed by Hayes Regalado M.D. at SURGERY [facility]   • GASTROSCOPY  12/16/2012    Performed by Hayes Regalado M.D. at SURGERY [facility]   • BOWEL RESECTION LAPAROSCOPIC  4/4/2012     Performed by TOYIN LUGO at SURGERY [facility]   • LYSIS ADHESIONS GENERAL  2012    Performed by TOYIN LUGO at SURGERY [facility]   • OTHER ABDOMINAL SURGERY      Kristi En Y gastric bypass   • OTHER ORTHOPEDIC SURGERY      Left ankle surgery   • OTHER ORTHOPEDIC SURGERY      Left knee surgery   • GYN SURGERY      tubal ligation   • GYN SURGERY         • PB REMOVE TONSILS/ADENOIDS,<11 Y/O     • PRIMARY C SECTION             Social History     Socioeconomic History   • Marital status: Single     Spouse name: Not on file   • Number of children: Not on file   • Years of education: Not on file   • Highest education level: Not on file   Occupational History   • Not on file   Tobacco Use   • Smoking status: Current Every Day Smoker     Packs/day: 0.50     Years: 30.00     Pack years: 15.00     Types: Cigarettes   • Smokeless tobacco: Never Used   Substance and Sexual Activity   • Alcohol use: No   • Drug use: No   • Sexual activity: Not on file   Other Topics Concern   • Not on file   Social History Narrative   • Not on file     Social Determinants of Health     Financial Resource Strain:    • Difficulty of Paying Living Expenses:    Food Insecurity:    • Worried About Running Out of Food in the Last Year:    • Ran Out of Food in the Last Year:    Transportation Needs:    • Lack of Transportation (Medical):    • Lack of Transportation (Non-Medical):    Physical Activity:    • Days of Exercise per Week:    • Minutes of Exercise per Session:    Stress:    • Feeling of Stress :    Social Connections:    • Frequency of Communication with Friends and Family:    • Frequency of Social Gatherings with Friends and Family:    • Attends Synagogue Services:    • Active Member of Clubs or Organizations:    • Attends Club or Organization Meetings:    • Marital Status:    Intimate Partner Violence:    • Fear of Current or Ex-Partner:    • Emotionally Abused:    • Physically Abused:     • Sexually Abused:        No family history on file.    OB History   No obstetric history on file.       Allergies as of 04/28/2021 - Reviewed 04/28/2021   Allergen Reaction Noted   • Asa [aspirin] Unspecified 07/13/2012   • Nsaids Nausea 04/04/2012   • Tape Unspecified 12/14/2012         Current Outpatient Medications:   •  ferrous sulfate 325 (65 Fe) MG tablet, Take 1 tablet by mouth every day., Disp: 90 tablet, Rfl: 1  •  metoclopramide (REGLAN) 10 MG Tab, Take 10 mg by mouth every morning. Indications: Nausea and Vomiting, Disp: , Rfl:   •  acetaminophen (TYLENOL) 500 MG Tab, Take 1,000 mg by mouth 1 time a day as needed (Pain, Headache). 2 tablets = 1,000 mg., Disp: , Rfl:   •  ursodiol (ACTIGALL) 300 MG Cap, Take 300 mg by mouth 3 times a day., Disp: , Rfl:   •  pantoprazole (PROTONIX) 40 MG Tablet Delayed Response, Take 1 Tab by mouth every day. (Patient taking differently: Take 40 mg by mouth every morning.), Disp: 30 Tab, Rfl: 0  •  ondansetron (ZOFRAN ODT) 4 MG TABLET DISPERSIBLE, Take 1 Tab by mouth every 6 hours as needed for Nausea., Disp: 20 Tab, Rfl: 0  •  DULoxetine (CYMBALTA) 60 MG Cap DR Particles delayed-release capsule, Take 60 mg by mouth every morning., Disp: , Rfl:   •  diphenhydrAMINE (BENADRYL) 25 MG Tab, Take 50 mg by mouth 2 times a day as needed (Allergies). 2 tablets = 50 mg., Disp: , Rfl:     Review of Systems:  Review of Systems   Constitutional: Negative for chills, diaphoresis, fever, malaise/fatigue and weight loss.   HENT: Negative for nosebleeds and tinnitus.    Eyes: Negative for blurred vision, double vision and photophobia.   Respiratory: Negative for cough, sputum production, shortness of breath and wheezing.    Cardiovascular: Negative for chest pain and leg swelling.        Syncope   Gastrointestinal: Negative for constipation, diarrhea, nausea and vomiting.   Genitourinary: Negative for dysuria.   Musculoskeletal: Negative for joint pain and myalgias.   Skin: Negative for  "rash.   Neurological: Negative for dizziness, tingling, sensory change and headaches.   Endo/Heme/Allergies: Does not bruise/bleed easily.   Psychiatric/Behavioral: Negative for depression. The patient is not nervous/anxious and does not have insomnia.           Physical Exam:  Vitals:    04/28/21 1512   BP: 110/72   BP Location: Left arm   Patient Position: Sitting   BP Cuff Size: Adult   Pulse: 87   Resp: 16   Temp: 37.7 °C (99.9 °F)   TempSrc: Temporal   SpO2: 98%   Weight: 73.3 kg (161 lb 9.6 oz)   Height: 1.605 m (5' 3.19\")       Physical Exam  Vitals reviewed.   Constitutional:       General: She is not in acute distress.  HENT:      Head: Normocephalic.      Mouth/Throat:      Mouth: Mucous membranes are moist.   Eyes:      Extraocular Movements: Extraocular movements intact.      Pupils: Pupils are equal, round, and reactive to light.   Cardiovascular:      Rate and Rhythm: Normal rate and regular rhythm.      Pulses: Normal pulses.      Heart sounds: Normal heart sounds.   Pulmonary:      Effort: Pulmonary effort is normal.      Breath sounds: Normal breath sounds.   Abdominal:      General: Bowel sounds are normal. There is distension.      Palpations: Abdomen is soft.      Tenderness: There is no abdominal tenderness.   Musculoskeletal:         General: Normal range of motion.      Cervical back: Normal range of motion and neck supple.   Lymphadenopathy:      Head:      Right side of head: No submental, submandibular, tonsillar, preauricular, posterior auricular or occipital adenopathy.      Left side of head: No submental, submandibular, tonsillar, preauricular, posterior auricular or occipital adenopathy.      Cervical: No cervical adenopathy.      Right cervical: No superficial, deep or posterior cervical adenopathy.     Left cervical: No superficial, deep or posterior cervical adenopathy.      Upper Body:      Right upper body: No supraclavicular, axillary, pectoral or epitrochlear adenopathy.      " Left upper body: No supraclavicular, axillary, pectoral or epitrochlear adenopathy.      Lower Body: No right inguinal adenopathy. No left inguinal adenopathy.   Skin:     General: Skin is warm and dry.      Coloration: Skin is pale.   Neurological:      General: No focal deficit present.      Mental Status: She is alert and oriented to person, place, and time.   Psychiatric:         Mood and Affect: Mood normal.          Labs:  No visits with results within 1 Day(s) from this visit.   Latest known visit with results is:   Admission on 04/15/2021, Discharged on 2021   Component Date Value Ref Range Status   • Report 04/15/2021    Final                    Value:[facility]    Test Date:  2021-04-15  Pt Name:    JA CAMPOS                Department: ER  MRN:        [id number]                      Room:  Gender:     Female                       Technician: [id number]  :        1970                   Requested By:ER TRIAGE PROTOCOL  Order #:    [id number]                    Reading MD: FAVIO CORTES MD    Measurements  Intervals                                Axis  Rate:       69                           P:          18  IA:         163                          QRS:        35  QRSD:       81                           T:          30  QT:         372  QTc:        398    Interpretive Statements  Sinus rhythm  Compared to ECG 2021 19:13:19  ST (T wave) deviation no longer present  Electronically Signed On 4- 7:33:21 PDT by FAVIO CORTES MD     • WBC 04/15/2021 2.7* 4.8 - 10.8 K/uL Final   • RBC 04/15/2021 4.30  4.20 - 5.40 M/uL Final   • Hemoglobin 04/15/2021 9.4* 12.0 - 16.0 g/dL Final   • Hematocrit 04/15/2021 31.7* 37.0 - 47.0 % Final   • MCV 04/15/2021 73.7* 81.4 - 97.8 fL Final   • MCH 04/15/2021 21.9* 27.0 - 33.0 pg Final   • MCHC 04/15/2021 29.7* 33.6 - 35.0 g/dL Final   • RDW 04/15/2021 52.6* 35.9 - 50.0 fL Final   • Platelet Count 04/15/2021 86* 164 -  446 K/uL Final   • MPV 04/15/2021 9.9  9.0 - 12.9 fL Final   • Neutrophils-Polys 04/15/2021 43.90* 44.00 - 72.00 % Final   • Lymphocytes 04/15/2021 36.50  22.00 - 41.00 % Final   • Monocytes 04/15/2021 15.00* 0.00 - 13.40 % Final   • Eosinophils 04/15/2021 3.40  0.00 - 6.90 % Final   • Basophils 04/15/2021 0.80  0.00 - 1.80 % Final   • Immature Granulocytes 04/15/2021 0.40  0.00 - 0.90 % Final   • Nucleated RBC 04/15/2021 0.00  /100 WBC Final   • Neutrophils (Absolute) 04/15/2021 1.17* 2.00 - 7.15 K/uL Final    Includes immature neutrophils, if present.   • Lymphs (Absolute) 04/15/2021 0.97* 1.00 - 4.80 K/uL Final   • Monos (Absolute) 04/15/2021 0.40  0.00 - 0.85 K/uL Final   • Eos (Absolute) 04/15/2021 0.09  0.00 - 0.51 K/uL Final   • Baso (Absolute) 04/15/2021 0.02  0.00 - 0.12 K/uL Final   • Immature Granulocytes (abs) 04/15/2021 0.01  0.00 - 0.11 K/uL Final   • NRBC (Absolute) 04/15/2021 0.00  K/uL Final   • Hypochromia 04/15/2021 1+   Final   • Anisocytosis 04/15/2021 1+   Final   • Microcytosis 04/15/2021 1+   Final   • Sodium 04/15/2021 136  135 - 145 mmol/L Final   • Potassium 04/15/2021 4.0  3.6 - 5.5 mmol/L Final   • Chloride 04/15/2021 107  96 - 112 mmol/L Final   • Co2 04/15/2021 25  20 - 33 mmol/L Final   • Anion Gap 04/15/2021 4.0* 7.0 - 16.0 Final   • Glucose 04/15/2021 94  65 - 99 mg/dL Final   • Bun 04/15/2021 9  8 - 22 mg/dL Final   • Creatinine 04/15/2021 0.63  0.50 - 1.40 mg/dL Final   • Calcium 04/15/2021 8.4* 8.5 - 10.5 mg/dL Final   • AST(SGOT) 04/15/2021 36  12 - 45 U/L Final   • ALT(SGPT) 04/15/2021 19  2 - 50 U/L Final   • Alkaline Phosphatase 04/15/2021 121* 30 - 99 U/L Final   • Total Bilirubin 04/15/2021 0.2  0.1 - 1.5 mg/dL Final   • Albumin 04/15/2021 3.4  3.2 - 4.9 g/dL Final   • Total Protein 04/15/2021 7.6  6.0 - 8.2 g/dL Final   • Globulin 04/15/2021 4.2* 1.9 - 3.5 g/dL Final   • A-G Ratio 04/15/2021 0.8  g/dL Final   • Troponin T 04/15/2021 <6  6 - 19 ng/L Final    Comment: As of  2019 at 0900, the Troponin I test has been replaced with the  Ultra High Sensitivity (Gen 5) Troponin T test.  Please note new analyte,  methodology, and reference range.  The Ultra High Sensitivity Troponin T test has a reference range for  positive troponins that follows the recommendation of the American College  of Cardiology (ACC) committee in conjunction with the 99th percentile  reference population. Normal range: 6-19 ng/L     • Report 04/15/2021    Final                    Value:[facility]    Test Date:  2021-04-15  Pt Name:    JA CAMPOS                Department: ER  MRN:        [id number]                      Room:       [facility]  Gender:     Female                       Technician: YESSI  :        1970                   Requested By:ER TRIAGE PROTOCOL  Order #:    [id number]                    Reading MD: Paco Cole MD    Measurements  Intervals                                Axis  Rate:       59                           P:          14  MO:         148                          QRS:        15  QRSD:       80                           T:          12  QT:         408  QTc:        405    Interpretive Statements  SINUS BRADYCARDIA  Compared to ECG 04/15/2021 06:54:46  Sinus rhythm no longer present  Electronically Signed On 4- 18:40:28 PDT by Paco Cole MD     • Color 04/15/2021 Yellow   Final   • Character 04/15/2021 Clear   Final   • Specific Gravity 04/15/2021 1.010  <1.035 Final   • Ph 04/15/2021 7.0  5.0 - 8.0 Final   • Glucose 04/15/2021 Negative  Negative mg/dL Final   • Ketones 04/15/2021 Negative  Negative mg/dL Final   • Protein 04/15/2021 Negative  Negative mg/dL Final   • Bilirubin 04/15/2021 Negative  Negative Final   • Urobilinogen, Urine 04/15/2021 0.2  Negative Final   • Nitrite 04/15/2021 Negative  Negative Final   • Leukocyte Esterase 04/15/2021 Negative  Negative Final   • Occult Blood 04/15/2021 Negative  Negative Final   • Micro Urine Req 04/15/2021  "see below   Final    Comment: Microscopic examination not performed when specimen is clear  and chemically negative for protein, blood, leukocyte esterase  and nitrite.     • GFR If  04/15/2021 >60  >60 mL/min/1.73 m 2 Final   • GFR If Non  04/15/2021 >60  >60 mL/min/1.73 m 2 Final   • Peripheral Smear Review 04/15/2021 see below   Final    Comment: Due to instrument suspect flags, further review of peripheral smear is  indicated on this patient sample. This review may or may not result in  abnormal findings.     • Plt Estimation 04/15/2021 Decreased   Final   • RBC Morphology 04/15/2021 Present   Final   • Poikilocytosis 04/15/2021 1+   Final   • Target Cells 04/15/2021 1+   Final   • Comments-Diff 04/15/2021 see below   Final    Results have been verified by peripheral blood smear review.   • SARS-CoV-2 Source 04/15/2021 NP Swab   Final   • SARS-CoV-2 by PCR 04/15/2021 NotDetected   Final    Comment: PATIENTS: Important information regarding your results and instructions can  be found at [email or web address]   \"After your  Covid-19 Test\"  [facility] providers: PLEASE REFER TO DE-ESCALATION AND RETESTING PROTOCOL  on [email or web address]  **The [facility] COVID-19 SARS-CoV-2 RT-PCR test has been made available for  use under the Emergency Use Authorization (EUA) only.     • Sodium 04/16/2021 137  135 - 145 mmol/L Final   • Potassium 04/16/2021 4.2  3.6 - 5.5 mmol/L Final   • Chloride 04/16/2021 108  96 - 112 mmol/L Final   • Co2 04/16/2021 24  20 - 33 mmol/L Final   • Glucose 04/16/2021 84  65 - 99 mg/dL Final   • Bun 04/16/2021 12  8 - 22 mg/dL Final   • Creatinine 04/16/2021 0.63  0.50 - 1.40 mg/dL Final   • Calcium 04/16/2021 8.3* 8.5 - 10.5 mg/dL Final   • Anion Gap 04/16/2021 5.0* 7.0 - 16.0 Final   • WBC 04/16/2021 3.3* 4.8 - 10.8 K/uL Final   • RBC 04/16/2021 3.98* 4.20 - 5.40 M/uL Final   • Hemoglobin 04/16/2021 8.6* 12.0 - 16.0 g/dL Final   • Hematocrit " 04/16/2021 29.0* 37.0 - 47.0 % Final   • MCV 04/16/2021 72.9* 81.4 - 97.8 fL Final   • MCH 04/16/2021 21.6* 27.0 - 33.0 pg Final   • MCHC 04/16/2021 29.7* 33.6 - 35.0 g/dL Final   • RDW 04/16/2021 51.7* 35.9 - 50.0 fL Final   • Platelet Count 04/16/2021 87* 164 - 446 K/uL Final   • MPV 04/16/2021 10.1  9.0 - 12.9 fL Final   • GFR If  04/16/2021 >60  >60 mL/min/1.73 m 2 Final   • GFR If Non  04/16/2021 >60  >60 mL/min/1.73 m 2 Final   • TSH 04/16/2021 0.690  0.380 - 5.330 uIU/mL Final    Comment: Please note new reference ranges effective 12/14/2017 10:00 AM  Pregnant Females, 1st Trimester  0.050-3.700  Pregnant Females, 2nd Trimester  0.310-4.350  Pregnant Females, 3rd Trimester  0.410-5.180     • Iron 04/16/2021 30* 40 - 170 ug/dL Final   • Total Iron Binding 04/16/2021 412  250 - 450 ug/dL Final   • Unsat Iron Binding 04/16/2021 382* 110 - 370 ug/dL Final   • % Saturation 04/16/2021 7* 15 - 55 % Final   • Ferritin 04/16/2021 7.6* 10.0 - 291.0 ng/mL Final   • Eject.Frac. MOD BP 04/16/2021 64.93   Final   • Eject.Frac. MOD 4C 04/16/2021 70.62   Final   • Eject.Frac. MOD 2C 04/16/2021 61.32   Final   • Left Ventrical Ejection Fraction 04/16/2021 65   Final       Imaging:   CT-HEAD W/O    Result Date: 4/15/2021  4/15/2021 9:24 AM HISTORY/REASON FOR EXAM:  Syncope. Ground-level fall. TECHNIQUE/EXAM DESCRIPTION AND NUMBER OF VIEWS: CT of the head without contrast. Up to date radiation dose reduction adjustments have been utilized to meet ALARA standards for radiation dose reduction. COMPARISON:  2/22/2016 FINDINGS: There is no evidence of mass or mass effect. The ventricles and CSF spaces are normal. There is no periventricular chronic small vessel ischemic change present. There is no intracranial hemorrhage seen. The calvarium is intact. There is no scalp injury.     No evidence of acute intracranial process.    DX-CHEST-PORTABLE (1 VIEW)    Result Date: 4/15/2021    4/15/2021 7:26 AM  HISTORY/REASON FOR EXAM: Chest Pain TECHNIQUE/EXAM DESCRIPTION:  Single AP view of the chest. COMPARISON: 2015 FINDINGS: Overlying cardiac leads are present. The cardiac silhouette appears within normal limits. The mediastinal contour appears within normal limits.  The central pulmonary vasculature appears normal. The lungs appear well expanded bilaterally.  Bilateral lungs are clear. No significant pleural effusions are identified. The bony structures appear age-appropriate.     1.  No acute cardiopulmonary disease.    EC-ECHOCARDIOGRAM COMPLETE W/O CONT    Result Date: 2021  Transthoracic Echo Report Echocardiography Laboratory CONCLUSIONS No prior study is available for comparison. Left ventricular ejection fraction is visually estimated to be 65%. Normal regional wall motion. No significant valve abnormalities. JA CAMPOS Exam Date:         2021                    08:23 Exam Location:     Inpatient Priority:          Routine Ordering Physician:        TOM ABRAHAM Referring Physician: Sonographer:               Katie Harley RDCS Age:    50     Gender:    F MRN:    [id number] :    1970 BSA:    1.51   Ht (in):    52     Wt (lb):    156 Exam Type:     Complete Indications:     Syncope and collapse ICD Codes:       R55 CPT Codes:       27372 BP:   109    /   59     HR:   62 Technical Quality:       Fair MEASUREMENTS  (Male / Female) Normal Values 2D ECHO LV Diastolic Diameter PLAX        3.8 cm                4.2 - 5.9 / 3.9 - 5.3 cm LV Systolic Diameter PLAX         2.4 cm                2.1 - 4.0 cm IVS Diastolic Thickness           0.79 cm               LVPW Diastolic Thickness          0.93 cm               LVOT Diameter                     2 cm                  Estimated LV Ejection Fraction    65 %                  LV Ejection Fraction MOD BP       64.9 %                >= 55  % LV Ejection Fraction MOD 4C       70.6 %                LV Ejection Fraction MOD 2C       61.3  %                IVC Diameter                      1.6 cm                DOPPLER AV Peak Velocity                  1.2 m/s               AV Peak Gradient                  5.7 mmHg              AV Mean Gradient                  3.2 mmHg              LVOT Peak Velocity                1.2 m/s               AV Area Cont Eq vti               3.3 cm2               Mitral E Point Velocity           0.86 m/s              Mitral E to A Ratio               1.3                   MV Pressure Half Time             71.7 ms               MV Area PHT                       3.1 cm2               MV Deceleration Time              247 ms                RVOT Peak Velocity                0.68 m/s              * Indicates values subject to auto-interpretation LV EF:  65    % FINDINGS Left Ventricle Normal left ventricular size, wall thickness, and systolic function. Left ventricular ejection fraction is visually estimated to be 65%. Normal regional wall motion. Normal diastolic function. Right Ventricle The right ventricle was normal in size and function. Right Atrium The right atrium is normal in size.  Normal inferior vena cava size and inspiratory collapse. Left Atrium The left atrium is normal in size.  Left atrial volume index is 25 mL/sq m. Mitral Valve Structurally normal mitral valve without significant stenosis or regurgitation. Aortic Valve Structurally normal aortic valve without significant stenosis or regurgitation. Tricuspid Valve Structurally normal tricuspid valve without significant stenosis or regurgitation.  Unable to estimate pulmonary artery pressure due to an inadequate tricuspid regurgitant jet. Pulmonic Valve Structurally normal pulmonic valve without significant stenosis or regurgitation. Pericardium Normal pericardium without effusion. Aorta The aortic root is normal.  Ascending aorta diameter is 2.7 cm. Bernabe Seay MD (Electronically Signed) Final Date:     16 April 2021                 14:31          Assessment & Plan:  1. Pancytopenia (HCC)     2. Hepatic cirrhosis, unspecified hepatic cirrhosis type, unspecified whether ascites present (HCC)     3. Iron deficiency anemia, unspecified iron deficiency anemia type     4. Portal hypertension (HCC)         At this point will address the iron deficiency with infusional iron dextran   I went over side effect and adverse event risk-benefit    The patient should have a screening ultrasound of the liver for hepatocellular carcinoma every 6-month    The pancytopenia is most likely related to a combination of hypersplenism and autoimmune disorder    I feel that the yield of a bone marrow special biopsy will be relatively low at this point    I will follow up the patient with a CBC monthly after iron replacement and then consider bone marrow aspiration biopsy if there is enough suspicion of a primary bone marrow failure    Return in 3 months  she agreed and verbalized her agreement and understanding with the current plan. I answered all questions and concerns she has at this time and advised her to call at any time in the interim with questions or concerns in regards to her care.     Thank you for allowing me to participate in his care, I will continue to follow.

## 2021-04-28 ENCOUNTER — OFFICE VISIT (OUTPATIENT)
Dept: HEMATOLOGY ONCOLOGY | Facility: MEDICAL CENTER | Age: 51
End: 2021-04-28
Payer: COMMERCIAL

## 2021-04-28 VITALS
DIASTOLIC BLOOD PRESSURE: 72 MMHG | HEIGHT: 63 IN | SYSTOLIC BLOOD PRESSURE: 110 MMHG | BODY MASS INDEX: 28.63 KG/M2 | OXYGEN SATURATION: 98 % | TEMPERATURE: 99.9 F | HEART RATE: 87 BPM | RESPIRATION RATE: 16 BRPM | WEIGHT: 161.6 LBS

## 2021-04-28 DIAGNOSIS — K74.60 HEPATIC CIRRHOSIS, UNSPECIFIED HEPATIC CIRRHOSIS TYPE, UNSPECIFIED WHETHER ASCITES PRESENT (HCC): ICD-10-CM

## 2021-04-28 DIAGNOSIS — K76.6 PORTAL HYPERTENSION (HCC): ICD-10-CM

## 2021-04-28 DIAGNOSIS — D50.9 IRON DEFICIENCY ANEMIA, UNSPECIFIED IRON DEFICIENCY ANEMIA TYPE: ICD-10-CM

## 2021-04-28 DIAGNOSIS — D61.818 PANCYTOPENIA (HCC): ICD-10-CM

## 2021-04-28 PROCEDURE — 99204 OFFICE O/P NEW MOD 45 MIN: CPT | Performed by: INTERNAL MEDICINE

## 2021-04-28 ASSESSMENT — FIBROSIS 4 INDEX: FIB4 SCORE: 4.84

## 2021-04-28 ASSESSMENT — PAIN SCALES - GENERAL: PAINLEVEL: 5=MODERATE PAIN

## 2021-04-29 DIAGNOSIS — E61.1 IRON DEFICIENCY: ICD-10-CM

## 2021-04-29 RX ORDER — DIPHENHYDRAMINE HYDROCHLORIDE 50 MG/ML
50 INJECTION INTRAMUSCULAR; INTRAVENOUS PRN
Status: CANCELLED | OUTPATIENT
Start: 2021-04-30

## 2021-04-29 RX ORDER — METHYLPREDNISOLONE SODIUM SUCCINATE 125 MG/2ML
125 INJECTION, POWDER, LYOPHILIZED, FOR SOLUTION INTRAMUSCULAR; INTRAVENOUS PRN
Status: CANCELLED | OUTPATIENT
Start: 2021-04-30

## 2021-04-29 RX ORDER — EPINEPHRINE 1 MG/ML(1)
0.5 AMPUL (ML) INJECTION PRN
Status: CANCELLED | OUTPATIENT
Start: 2021-04-30

## 2021-04-29 RX ORDER — SODIUM CHLORIDE 9 MG/ML
INJECTION, SOLUTION INTRAVENOUS CONTINUOUS
Status: CANCELLED | OUTPATIENT
Start: 2021-04-30

## 2021-06-07 ENCOUNTER — HOSPITAL ENCOUNTER (EMERGENCY)
Facility: MEDICAL CENTER | Age: 51
End: 2021-06-07
Attending: EMERGENCY MEDICINE
Payer: COMMERCIAL

## 2021-06-07 VITALS
HEART RATE: 59 BPM | RESPIRATION RATE: 15 BRPM | OXYGEN SATURATION: 96 % | WEIGHT: 157.63 LBS | SYSTOLIC BLOOD PRESSURE: 107 MMHG | TEMPERATURE: 98.6 F | DIASTOLIC BLOOD PRESSURE: 59 MMHG | HEIGHT: 63 IN | BODY MASS INDEX: 27.93 KG/M2

## 2021-06-07 DIAGNOSIS — G43.809 OTHER MIGRAINE WITHOUT STATUS MIGRAINOSUS, NOT INTRACTABLE: ICD-10-CM

## 2021-06-07 DIAGNOSIS — M79.10 MYALGIA: ICD-10-CM

## 2021-06-07 LAB
ALBUMIN SERPL BCP-MCNC: 3.9 G/DL (ref 3.2–4.9)
ALBUMIN/GLOB SERPL: 0.8 G/DL
ALP SERPL-CCNC: 126 U/L (ref 30–99)
ALT SERPL-CCNC: 34 U/L (ref 2–50)
ANION GAP SERPL CALC-SCNC: 11 MMOL/L (ref 7–16)
ANISOCYTOSIS BLD QL SMEAR: ABNORMAL
APPEARANCE UR: CLEAR
AST SERPL-CCNC: 47 U/L (ref 12–45)
BASOPHILS # BLD AUTO: 0.6 % (ref 0–1.8)
BASOPHILS # BLD: 0.02 K/UL (ref 0–0.12)
BILIRUB SERPL-MCNC: 0.3 MG/DL (ref 0.1–1.5)
BILIRUB UR QL STRIP.AUTO: ABNORMAL
BUN SERPL-MCNC: 15 MG/DL (ref 8–22)
CALCIUM SERPL-MCNC: 9 MG/DL (ref 8.4–10.2)
CHLORIDE SERPL-SCNC: 102 MMOL/L (ref 96–112)
CK SERPL-CCNC: 48 U/L (ref 0–154)
CO2 SERPL-SCNC: 22 MMOL/L (ref 20–33)
COLOR UR: YELLOW
COMMENT 1642: NORMAL
CREAT SERPL-MCNC: 0.77 MG/DL (ref 0.5–1.4)
CRP SERPL HS-MCNC: <0.3 MG/DL (ref 0–0.75)
EOSINOPHIL # BLD AUTO: 0.09 K/UL (ref 0–0.51)
EOSINOPHIL NFR BLD: 2.8 % (ref 0–6.9)
GLOBULIN SER CALC-MCNC: 4.6 G/DL (ref 1.9–3.5)
GLUCOSE SERPL-MCNC: 64 MG/DL (ref 65–99)
GLUCOSE UR STRIP.AUTO-MCNC: NEGATIVE MG/DL
HCT VFR BLD AUTO: 43.4 % (ref 37–47)
HGB BLD-MCNC: 13.3 G/DL (ref 12–16)
HYPOCHROMIA BLD QL SMEAR: ABNORMAL
IMM GRANULOCYTES # BLD AUTO: 0.01 K/UL (ref 0–0.11)
IMM GRANULOCYTES NFR BLD AUTO: 0.3 % (ref 0–0.9)
KETONES UR STRIP.AUTO-MCNC: NEGATIVE MG/DL
LEUKOCYTE ESTERASE UR QL STRIP.AUTO: NEGATIVE
LYMPHOCYTES # BLD AUTO: 1.05 K/UL (ref 1–4.8)
LYMPHOCYTES NFR BLD: 32.3 % (ref 22–41)
MACROCYTES BLD QL SMEAR: ABNORMAL
MCH RBC QN AUTO: 26.3 PG (ref 27–33)
MCHC RBC AUTO-ENTMCNC: 30.6 G/DL (ref 33.6–35)
MCV RBC AUTO: 85.9 FL (ref 81.4–97.8)
MICRO URNS: ABNORMAL
MICROCYTES BLD QL SMEAR: ABNORMAL
MONOCYTES # BLD AUTO: 0.42 K/UL (ref 0–0.85)
MONOCYTES NFR BLD AUTO: 12.9 % (ref 0–13.4)
NEUTROPHILS # BLD AUTO: 1.66 K/UL (ref 2–7.15)
NEUTROPHILS NFR BLD: 51.1 % (ref 44–72)
NITRITE UR QL STRIP.AUTO: NEGATIVE
NRBC # BLD AUTO: 0 K/UL
NRBC BLD-RTO: 0 /100 WBC
PH UR STRIP.AUTO: 6 [PH] (ref 5–8)
PLATELET # BLD AUTO: 74 K/UL (ref 164–446)
PLATELET BLD QL SMEAR: NORMAL
PMV BLD AUTO: 8.8 FL (ref 9–12.9)
POTASSIUM SERPL-SCNC: 4 MMOL/L (ref 3.6–5.5)
PROT SERPL-MCNC: 8.5 G/DL (ref 6–8.2)
PROT UR QL STRIP: NEGATIVE MG/DL
RBC # BLD AUTO: 5.05 M/UL (ref 4.2–5.4)
RBC BLD AUTO: PRESENT
RBC UR QL AUTO: NEGATIVE
SARS-COV-2 RNA RESP QL NAA+PROBE: NOTDETECTED
SODIUM SERPL-SCNC: 135 MMOL/L (ref 135–145)
SP GR UR STRIP.AUTO: 1.02
SPECIMEN SOURCE: NORMAL
WBC # BLD AUTO: 3.3 K/UL (ref 4.8–10.8)

## 2021-06-07 PROCEDURE — 80053 COMPREHEN METABOLIC PANEL: CPT

## 2021-06-07 PROCEDURE — U0003 INFECTIOUS AGENT DETECTION BY NUCLEIC ACID (DNA OR RNA); SEVERE ACUTE RESPIRATORY SYNDROME CORONAVIRUS 2 (SARS-COV-2) (CORONAVIRUS DISEASE [COVID-19]), AMPLIFIED PROBE TECHNIQUE, MAKING USE OF HIGH THROUGHPUT TECHNOLOGIES AS DESCRIBED BY CMS-2020-01-R: HCPCS

## 2021-06-07 PROCEDURE — 81003 URINALYSIS AUTO W/O SCOPE: CPT

## 2021-06-07 PROCEDURE — 85025 COMPLETE CBC W/AUTO DIFF WBC: CPT

## 2021-06-07 PROCEDURE — U0005 INFEC AGEN DETEC AMPLI PROBE: HCPCS

## 2021-06-07 PROCEDURE — 96375 TX/PRO/DX INJ NEW DRUG ADDON: CPT

## 2021-06-07 PROCEDURE — 700111 HCHG RX REV CODE 636 W/ 250 OVERRIDE (IP): Performed by: EMERGENCY MEDICINE

## 2021-06-07 PROCEDURE — 82550 ASSAY OF CK (CPK): CPT

## 2021-06-07 PROCEDURE — 700105 HCHG RX REV CODE 258: Performed by: EMERGENCY MEDICINE

## 2021-06-07 PROCEDURE — 86140 C-REACTIVE PROTEIN: CPT

## 2021-06-07 PROCEDURE — 96374 THER/PROPH/DIAG INJ IV PUSH: CPT

## 2021-06-07 PROCEDURE — 99284 EMERGENCY DEPT VISIT MOD MDM: CPT

## 2021-06-07 PROCEDURE — 36415 COLL VENOUS BLD VENIPUNCTURE: CPT

## 2021-06-07 RX ORDER — KETOROLAC TROMETHAMINE 30 MG/ML
15 INJECTION, SOLUTION INTRAMUSCULAR; INTRAVENOUS ONCE
Status: COMPLETED | OUTPATIENT
Start: 2021-06-07 | End: 2021-06-07

## 2021-06-07 RX ORDER — SODIUM CHLORIDE 9 MG/ML
1000 INJECTION, SOLUTION INTRAVENOUS ONCE
Status: COMPLETED | OUTPATIENT
Start: 2021-06-07 | End: 2021-06-07

## 2021-06-07 RX ORDER — ONDANSETRON 2 MG/ML
4 INJECTION INTRAMUSCULAR; INTRAVENOUS ONCE
Status: COMPLETED | OUTPATIENT
Start: 2021-06-07 | End: 2021-06-07

## 2021-06-07 RX ORDER — BUTALBITAL, ACETAMINOPHEN AND CAFFEINE 50; 325; 40 MG/1; MG/1; MG/1
1 TABLET ORAL EVERY 6 HOURS PRN
Qty: 16 TABLET | Refills: 0 | Status: SHIPPED | OUTPATIENT
Start: 2021-06-07 | End: 2021-06-12

## 2021-06-07 RX ADMIN — KETOROLAC TROMETHAMINE 15 MG: 30 INJECTION, SOLUTION INTRAMUSCULAR; INTRAVENOUS at 10:51

## 2021-06-07 RX ADMIN — ONDANSETRON 4 MG: 2 INJECTION INTRAMUSCULAR; INTRAVENOUS at 10:51

## 2021-06-07 RX ADMIN — SODIUM CHLORIDE 1000 ML: 9 INJECTION, SOLUTION INTRAVENOUS at 10:49

## 2021-06-07 ASSESSMENT — FIBROSIS 4 INDEX: FIB4 SCORE: 4.94

## 2021-06-07 NOTE — ED PROVIDER NOTES
"ED Provider Note    ER PROVIDER NOTE        CHIEF COMPLAINT  Chief Complaint   Patient presents with   • Generalized Body Aches     \"from head to toe\" started last NOC   • Migraine     started last NOC       HPI  Elvia Roberto is a 51 y.o. female who presents to the emergency department complaining of body aches as well as headache.  Patient reports that she began to have body aches last night, no particular area of pain just aching all over.  No fevers that she knows although did feel hot.  Subsequently she developed a headache that feels typical for her migraines, gradual in onset, more of a pressure sensation, she did have some nausea and one episode of emesis.  She reports no visual symptoms, no focal weakness or numbness.  No cough or congestion, no abdominal pain.  No urinary symptoms.    Patient has longstanding history of migraines and states this feels the same, also history of lupus and states when she gets flares she often feels like this.    REVIEW OF SYSTEMS  Pertinent positives include body aches. Pertinent negatives include no weakness or numbness. See HPI for details. All other systems reviewed and are negative.    PAST MEDICAL HISTORY   has a past medical history of Heart burn, Indigestion, Liver disease, Lupus (HCC), Other specified disorder of intestines, and Other specified symptom associated with female genital organs.    SURGICAL HISTORY   has a past surgical history that includes bowel resection laparoscopic (4/4/2012); lysis adhesions general (4/4/2012); gyn surgery (1993); gyn surgery (1993); other orthopedic surgery (2004); other orthopedic surgery (2000); other abdominal surgery (2010); laparoscopy (12/16/2012); gastroscopy (12/16/2012); primary c section; remove tonsils/adenoids,<13 y/o; and hysteroscopy with video operative (8/20/2013).    FAMILY HISTORY  History reviewed. No pertinent family history.    SOCIAL HISTORY  Social History     Socioeconomic History   • Marital status: " Single     Spouse name: Not on file   • Number of children: Not on file   • Years of education: Not on file   • Highest education level: Not on file   Occupational History   • Not on file   Tobacco Use   • Smoking status: Current Every Day Smoker     Packs/day: 0.50     Years: 30.00     Pack years: 15.00     Types: Cigarettes   • Smokeless tobacco: Never Used   Vaping Use   • Vaping Use: Never used   Substance and Sexual Activity   • Alcohol use: Yes   • Drug use: No   • Sexual activity: Not on file   Other Topics Concern   • Not on file   Social History Narrative   • Not on file     Social Determinants of Health     Financial Resource Strain:    • Difficulty of Paying Living Expenses:    Food Insecurity:    • Worried About Running Out of Food in the Last Year:    • Ran Out of Food in the Last Year:    Transportation Needs:    • Lack of Transportation (Medical):    • Lack of Transportation (Non-Medical):    Physical Activity:    • Days of Exercise per Week:    • Minutes of Exercise per Session:    Stress:    • Feeling of Stress :    Social Connections:    • Frequency of Communication with Friends and Family:    • Frequency of Social Gatherings with Friends and Family:    • Attends J[employer] Services:    • Active Member of Clubs or Organizations:    • Attends Club or Organization Meetings:    • Marital Status:    Intimate Partner Violence:    • Fear of Current or Ex-Partner:    • Emotionally Abused:    • Physically Abused:    • Sexually Abused:       Social History     Substance and Sexual Activity   Drug Use No       CURRENT MEDICATIONS  Home Medications     Reviewed by Maria G Starks (Pharmacy Tech) on 06/07/21 at 1059  Med List Status: Complete   Medication Last Dose Status   acetaminophen (TYLENOL) 500 MG Tab 6/7/2021 Active   diphenhydrAMINE (BENADRYL) 25 MG Tab 6/7/2021 Active   DULoxetine (CYMBALTA) 60 MG Cap DR Particles delayed-release capsule 6/6/2021 Active   ferrous sulfate 325 (65 Fe) MG tablet  "6/7/2021 Active   metoclopramide (REGLAN) 10 MG Tab 6/7/2021 Active   ondansetron (ZOFRAN ODT) 4 MG TABLET DISPERSIBLE PRN Active   pantoprazole (PROTONIX) 40 MG Tablet Delayed Response 6/6/2021 Active   ursodiol (ACTIGALL) 300 MG Cap 6/7/2021 Active                ALLERGIES  Allergies   Allergen Reactions   • Asa [Aspirin] Unspecified     GI upset, bleeding, Gastric Bypass     • Nsaids Nausea     GI upset, bleeding, Gastric Bypass     • Tape Unspecified     Blisters all over, Paper tape is ok       PHYSICAL EXAM  VITAL SIGNS: /57   Pulse 65   Temp 37.3 °C (99.2 °F) (Temporal)   Resp 15   Ht 1.6 m (5' 3\")   Wt 71.5 kg (157 lb 10.1 oz)   LMP 05/04/2013   SpO2 95%   BMI 27.92 kg/m²   Pulse ox interpretation: I interpret this pulse ox as normal.    Constitutional: Alert in no apparent distress.  HENT: No signs of trauma, Bilateral external ears normal, Nose normal.   Eyes: Pupils are equal and reactive, Conjunctiva normal, Non-icteric.   Neck: Normal range of motion, No tenderness, Supple, No stridor.   Lymphatic: No lymphadenopathy noted.   Cardiovascular: Regular rate and rhythm, no murmurs.   Thorax & Lungs: Normal breath sounds, No respiratory distress, No wheezing, No chest tenderness.   Abdomen: Bowel sounds normal, Soft, No tenderness, No masses, No pulsatile masses. No peritoneal signs.  Skin: Warm, Dry, No erythema, No rash.   Back: No bony tenderness, No CVA tenderness.   Extremities: Intact distal pulses, No edema, No tenderness, No cyanosis, Negative Ritchie's sign.  Musculoskeletal: Good range of motion in all major joints. No tenderness to palpation or major deformities noted.   Neurologic: Alert , cranial nerves are grossly intact, there is no drift, equal  strength bilaterally, sensation is intact to light touch throughout, steady gait, normal motor function, Normal sensory function, No focal deficits noted.   Psychiatric: Affect normal, Judgment normal, Mood normal.     DIAGNOSTIC " STUDIES / PROCEDURES        LABS  Labs Reviewed   CBC WITH DIFFERENTIAL - Abnormal; Notable for the following components:       Result Value    WBC 3.3 (*)     MCH 26.3 (*)     MCHC 30.6 (*)     Platelet Count 74 (*)     MPV 8.8 (*)     Neutrophils (Absolute) 1.66 (*)     Anisocytosis 3+ (*)     All other components within normal limits   COMP METABOLIC PANEL - Abnormal; Notable for the following components:    Glucose 64 (*)     AST(SGOT) 47 (*)     Alkaline Phosphatase 126 (*)     Total Protein 8.5 (*)     Globulin 4.6 (*)     All other components within normal limits   URINALYSIS - Abnormal; Notable for the following components:    Bilirubin Small (*)     All other components within normal limits   CRP QUANTITIVE (NON-CARDIAC)   CREATINE KINASE   SARS-COV-2, PCR (IN-HOUSE)    Narrative:     Have you been in close contact with a person who is suspected  or known to be positive for COVID-19 within the last 30 days  (e.g. last seen that person < 30 days ago)->Unknown   PLATELET ESTIMATE   MORPHOLOGY   DIFFERENTIAL COMMENT   ESTIMATED GFR         RADIOLOGY  No orders to display     The radiologist's interpretation of all radiological studies have been reviewed and images independently viewed by me.    COURSE & MEDICAL DECISION MAKING  Nursing notes, VS, PMSFHx reviewed in chart.    10:36 AM Patient seen and examined at bedside. Patient will be treated with ketorolac, Zofran, IV fluid. Ordered for cbc, cmp, ck, crp, cv to evaluate her symptoms.     1:16 PM  Patient is reevaluated, she notes improvement, updated on results and plan for discharge    HYDRATION: Based on the patient's presentation of Acute Vomiting the patient was given IV fluids. IV Hydration was used because oral hydration was not as rapid as required. Upon recheck following hydration, the patient was improved.    Decision Making:  This is a 51 y.o. female presenting with body aches and headache.  Patient reports her headache feels the same as her past  migraines and this is likely etiology.  Will prescribe Fioricet, she will follow-up with her primary care. The lack of rapid onset and severity of the headache, in addition to the well appearance of the pt makes the dx of subarchnoid hemorrhage less likely. The normal vital signs, lack of a temperature and lack of meningeal signs (supple neck without pain with rom) makes the dx of meningitis less likely.   The patient has no neurologic signs, no fever, and is immunocompetent therefore the time course would be inconsistent with abscess. The absence of neurologic signs and the short duration of sx make neoplasm unlikely. There is no history or physical exam sign of trauma, and a normal neurologic exam making any traumatic head bleed (sdh/sah/iph/edh) unlikely. Toxic and metabolic causes of headache are also unlikely given no other signs or symptoms of such a disorder.  No findings suggestive of acute infectious process.  Metabolic panel chemistries are normal as well.  Pt does have a longstanding and cytopenia    The patient is improved with normal vitals, a normal neurologic exam, normal gait, and is discharged home with pcp follow-up and return precautions.    I reviewed prescription monitoring program for patient's narcotic use before prescribing a scheduled drug.The patient will not drink alcohol nor drive with prescribed medications. The patient will return for new or worsening symptoms and is stable at the time of discharge.    The patient is referred to a primary physician for blood pressure management, diabetic screening, and for all other preventative health concerns.    DISPOSITION:  Patient will be discharged home in stable condition.    FOLLOW UP:  Cipriano Gomez M.D.  [address] [address]  [phone]    In 1 week        OUTPATIENT MEDICATIONS:  New Prescriptions    BUTALBITAL/APAP/CAFFEINE -40 MG (FIORICET) -40 MG TAB    Take 1 tablet by mouth every 6 hours as needed  for Headache or Migraine for up to 5 days.         FINAL IMPRESSION  1. Other migraine without status migrainosus, not intractable    2. Myalgia      The note accurately reflects work and decisions made by me.  Pérez Garg M.D.  6/7/2021  1:18 PM

## 2021-06-07 NOTE — ED TRIAGE NOTES
"Chief Complaint   Patient presents with   • Generalized Body Aches     \"from head to toe\" started last NOC   • Migraine     started last NOC     Pt ambulated to ED by self, states generalized body aches and Migraine may be related to Lupus.  Pt denies c/o CP or shortness of breath.    Covid Screen Negative.  Pt stated has received Covid Vaccines x 2.    "

## 2021-06-07 NOTE — ED NOTES
Discharge instructions provided. Rx sent to pharmacy, and pt educated on how and when to take medications.  Pt verbalized the understanding of discharge instructions to follow up with PCP and to return to ER if condition worsens.  Pt ambulated out of ER without difficulty.

## 2021-06-07 NOTE — ED NOTES
Labs in process.  Medicated as ordered.  NS infusing.  Call light in reach; aware to call for any needs/changes.

## 2021-06-17 ENCOUNTER — HOSPITAL ENCOUNTER (EMERGENCY)
Facility: MEDICAL CENTER | Age: 51
End: 2021-06-17
Attending: EMERGENCY MEDICINE
Payer: COMMERCIAL

## 2021-06-17 VITALS
DIASTOLIC BLOOD PRESSURE: 70 MMHG | HEIGHT: 63 IN | TEMPERATURE: 98.1 F | RESPIRATION RATE: 16 BRPM | SYSTOLIC BLOOD PRESSURE: 114 MMHG | BODY MASS INDEX: 27.93 KG/M2 | WEIGHT: 157.63 LBS | HEART RATE: 51 BPM | OXYGEN SATURATION: 95 %

## 2021-06-17 DIAGNOSIS — G43.009 MIGRAINE WITHOUT AURA AND WITHOUT STATUS MIGRAINOSUS, NOT INTRACTABLE: ICD-10-CM

## 2021-06-17 PROCEDURE — 96372 THER/PROPH/DIAG INJ SC/IM: CPT

## 2021-06-17 PROCEDURE — 96374 THER/PROPH/DIAG INJ IV PUSH: CPT

## 2021-06-17 PROCEDURE — 700111 HCHG RX REV CODE 636 W/ 250 OVERRIDE (IP)

## 2021-06-17 PROCEDURE — 99284 EMERGENCY DEPT VISIT MOD MDM: CPT

## 2021-06-17 PROCEDURE — 96375 TX/PRO/DX INJ NEW DRUG ADDON: CPT

## 2021-06-17 RX ORDER — PROCHLORPERAZINE EDISYLATE 5 MG/ML
INJECTION INTRAMUSCULAR; INTRAVENOUS
Status: COMPLETED
Start: 2021-06-17 | End: 2021-06-17

## 2021-06-17 RX ORDER — DIPHENHYDRAMINE HYDROCHLORIDE 50 MG/ML
12.5 INJECTION INTRAMUSCULAR; INTRAVENOUS ONCE
Status: COMPLETED | OUTPATIENT
Start: 2021-06-17 | End: 2021-06-17

## 2021-06-17 RX ORDER — SUMATRIPTAN 6 MG/.5ML
INJECTION, SOLUTION SUBCUTANEOUS
Status: COMPLETED
Start: 2021-06-17 | End: 2021-06-17

## 2021-06-17 RX ORDER — DIPHENHYDRAMINE HYDROCHLORIDE 50 MG/ML
INJECTION INTRAMUSCULAR; INTRAVENOUS
Status: COMPLETED
Start: 2021-06-17 | End: 2021-06-17

## 2021-06-17 RX ORDER — PROCHLORPERAZINE EDISYLATE 5 MG/ML
10 INJECTION INTRAMUSCULAR; INTRAVENOUS ONCE
Status: COMPLETED | OUTPATIENT
Start: 2021-06-17 | End: 2021-06-17

## 2021-06-17 RX ORDER — SUMATRIPTAN 6 MG/.5ML
6 INJECTION, SOLUTION SUBCUTANEOUS ONCE
Status: COMPLETED | OUTPATIENT
Start: 2021-06-17 | End: 2021-06-17

## 2021-06-17 RX ORDER — SUMATRIPTAN 25 MG/1
25-50 TABLET, FILM COATED ORAL
Qty: 10 TABLET | Refills: 0 | Status: SHIPPED | OUTPATIENT
Start: 2021-06-17 | End: 2022-07-10

## 2021-06-17 RX ADMIN — DIPHENHYDRAMINE HYDROCHLORIDE 12.5 MG: 50 INJECTION INTRAMUSCULAR; INTRAVENOUS at 18:23

## 2021-06-17 RX ADMIN — PROCHLORPERAZINE EDISYLATE 10 MG: 5 INJECTION INTRAMUSCULAR; INTRAVENOUS at 18:23

## 2021-06-17 RX ADMIN — SUMATRIPTAN 6 MG: 6 INJECTION, SOLUTION SUBCUTANEOUS at 19:17

## 2021-06-17 ASSESSMENT — FIBROSIS 4 INDEX: FIB4 SCORE: 5.66

## 2021-06-18 NOTE — ED NOTES
Patient verbalized understanding to plan of care and discharge information. Patient in stable condition. No signs of distress. Patient ambulatory out of ED to personal vehicle with stable gait with  driving pt home.

## 2021-06-18 NOTE — ED PROVIDER NOTES
ED Provider Note    CHIEF COMPLAINT  Chief Complaint   Patient presents with   • Migraine     present for past 24 hours, c/o nausea, light sensitivity. Pt took fiorcet around 12 today w/o relief       HPI  Elvia Roberto is a 51 y.o. female who presents with headache since yesterday of gradual onset.  Occipital and frontal.  There is throbbing photophobia and nausea without vomiting or aura phobia.  Severity 8 of 10.  History of migraine diagnosis and this is typical.  She had a migraine 1 week ago and presented to the ER and prior to that had not had one for months.  Grandfather has a history of aneurysm.  Denies fever weakness numbness facial droop or speech difficulty.  No aura.  She has had prior imaging in the past.  She tried Fioricet at home without benefit.  Last week Fioricet at home was beneficial and her headache resolved for 5 or 6 days.    REVIEW OF SYSTEMS  Pertinent positives include: Headache, nausea, photophobia.  Pertinent negatives include: Fever, tooth pain, jaw pain, loss of taste or smell, Covid exposure, blood thinner use, head injury.    PAST MEDICAL HISTORY  Past Medical History:   Diagnosis Date   • Heart burn    • Indigestion    • Liver disease     Auto immune hepatitis   • Lupus (HCC)    • Other specified disorder of intestines    • Other specified symptom associated with female genital organs     tubiligation       SOCIAL HISTORY  Social History     Tobacco Use   • Smoking status: Current Every Day Smoker     Packs/day: 0.50     Years: 30.00     Pack years: 15.00     Types: Cigarettes   • Smokeless tobacco: Never Used   Vaping Use   • Vaping Use: Never used   Substance Use Topics   • Alcohol use: Yes   • Drug use: No           CURRENT MEDICATIONS    •  ferrous sulfate 325 (65 Fe) MG tablet, Take 1 tablet by mouth every day., Disp: 90 tablet, Rfl: 1  •  metoclopramide (REGLAN) 10 MG Tab, Take 10 mg by mouth every morning. Indications: Nausea and Vomiting, Disp: , Rfl:   •   "acetaminophen (TYLENOL) 500 MG Tab, Take 1,000 mg by mouth 2 times a day as needed (Pain, Headache). 2 tablets = 1,000 mg. , Disp: , Rfl:   •  ursodiol (ACTIGALL) 300 MG Cap, Take 300 mg by mouth 3 times a day., Disp: , Rfl:   •  pantoprazole (PROTONIX) 40 MG Tablet Delayed Response, Take 1 Tab by mouth every day., Disp: 30 Tab, Rfl: 0  •  ondansetron (ZOFRAN ODT) 4 MG TABLET DISPERSIBLE, Take 1 Tab by mouth every 6 hours as needed for Nausea., Disp: 20 Tab, Rfl: 0  •  DULoxetine (CYMBALTA) 60 MG Cap DR Particles delayed-release capsule, Take 60 mg by mouth every day., Disp: , Rfl:   •  diphenhydrAMINE (BENADRYL) 25 MG Tab, Take 50 mg by mouth 2 times a day as needed (Allergies). 2 tablets = 50 mg., Disp: , Rfl:       ALLERGIES  Allergies   Allergen Reactions   • Asa [Aspirin] Unspecified     GI upset, bleeding, Gastric Bypass     • Nsaids Nausea     GI upset, bleeding, Gastric Bypass     • Tape Unspecified     Blisters all over, Paper tape is ok       PHYSICAL EXAM  VITAL SIGNS: /97   Pulse 77   Temp 36.8 °C (98.3 °F) (Temporal)   Resp 16   Ht 1.6 m (5' 3\")   Wt 71.5 kg (157 lb 10.1 oz)   LMP 05/04/2013   SpO2 95%   BMI 27.92 kg/m² . Reviewed and afebrile  Constitutional :  Well developed, Well nourished, well-appearing.   HNT: atraumatic, wearing a mask.  No TMJ crepitus.  Edentulous  Ears: external ears normal.  Eyes: pupils reactive without eye discharge nor conjunctival hyperemia.  Extraocular movements intact.  Neck: Normal range of motion, No tenderness, Supple, No stridor.   Lymphatic: No cervical adenopathy.   Cardiovascular: Regular rhythm, No murmurs, No rubs, No gallops.  No cyanosis.   Respiratory: No rales, rhonchi, wheeze, cough  Abdomen:  Soft, nontender  Skin: Warm, dry, no erythema, no rash.   Musculoskeletal: no limb deformities.  Neurologic:Cranial nerves II-XII are intact, Grasp, biceps, extensor hallucis longus, ankle plantar flexion are 5/5 and symmetric, Finger nose finger and " fine motor normal. Sensation is intact to light touch in all 4 limbs.  No focal deficits noted.      RADIOLOGY:  Chart review patient has had 3 normal head CTs in our system.  Most recently in April    INTERVENTIONS:  Medications   prochlorperazine (COMPAZINE) injection 10 mg (10 mg Intravenous Given 6/17/21 1823)   diphenhydrAMINE (BENADRYL) injection 12.5 mg (12.5 mg Intravenous Given 6/17/21 1823)   SUMAtriptan Succinate (IMITREX) injection 6 mg (6 mg Subcutaneous Given 6/17/21 1917)       Response: Improved headache    COURSE & MEDICAL DECISION MAKING  Well-appearing patient presents with migraine headache without evidence of subarachnoid hemorrhage, venous sinus thrombosis, carotid dissection or other more serious cause of her headache.    PLAN:  Discharge Medication List as of 6/17/2021  8:03 PM      START taking these medications    Details   SUMAtriptan (IMITREX) 25 MG Tab tablet Take 1-2 Tablets by mouth one time as needed for Migraine for up to 1 dose., Disp-10 tablet, R-0, Normal           Continue NSAIDs and Fioricet as needed  Migraine handout given  Return for thunder clap headache headache and fever headache and neurologic deficit    Cipriano Gomez M.D.  [address] [facility] [address]  [facility] [address]  [phone]    Schedule an appointment as soon as possible for a visit   As needed if not better 1-2 days      CONDITION:  Good.    FINAL IMPRESSION:  1. Migraine without aura and without status migrainosus, not intractable          Electronically signed by: Willis Gutierrez M.D., 6/17/2021

## 2021-06-18 NOTE — DISCHARGE INSTRUCTIONS
Migraine Headache    Take ibuprofen and Imitrex or Fioricet immediately at the first hint of headache.  Take the additional medication if no improvement in 1 to 2 hours.  Return for sudden onset, severe headache, headache and fever or headache and weakness.    You had a borderline or high normal blood pressure reading today.  This does not necessarily mean you have hypertension.  Please followup with your/a primary physician for comprehensive blood pressure evaluation and yearly fasting cholesterol assessment.  BP Readings from Last 3 Encounters:   06/17/21 114/70   06/07/21 107/59   04/28/21 110/72         You have a migraine headache. Symptoms of migraines include a throbbing headache, made worse by activity. Sometimes only one side of the head hurts. Nausea, vomiting and sinus pain or stuffiness is common with migraines. Visual symptoms such as light sensitivity, blind spots, or flashing lights may also occur. Loud noises may make migraine pain worse. Migraine headaches are caused by changes in the size of the blood vessels in the head and neck. Many factors may cause this problem including:  Emotional stress, lack of sleep, and menstrual periods.  Alcohol and some drugs (such as birth control pills).  Diet factors (fasting, caffeine, food preservatives, chocolate).  Environmental factors (weather changes, bright lights, odors, smoke).  Jarring motions and loud noises may also bring on a migraine. Prevention of migraines includes dealing with the trigger factors.    Treatment may require medicines for pain, inflammation, and vomiting. Injections for pain and IV fluids can be used if the headache is very severe, or if you are vomiting repeatedly. Get plenty of rest over the next 24 hours.  Lie down in a quiet, dark place and try to sleep  Medicines to prevent the blood vessel changes may be prescribed.  For people with frequent migraines, other medicines such as beta blockers and antidepressants may be helpful.  Please see your caregiver if you are not better in 1-2 days.     seek immediate medical care if:  You develop a high fever, repeated vomiting, dehydration, or extreme weakness  You develop fainting, worsening headache, confusion, or seizures  You develop numbness or weakness of the limbs    [employer]Care® Patient Information ©2007 [employer].

## 2021-06-18 NOTE — ED TRIAGE NOTES
"Chief Complaint   Patient presents with   • Migraine     present for past 24 hours, c/o nausea, light sensitivity. Pt took fiorcet around 12 today w/o relief     /97   Pulse 77   Temp 36.8 °C (98.3 °F) (Temporal)   Resp 16   Ht 1.6 m (5' 3\")   Wt 71.5 kg (157 lb 10.1 oz)   LMP 05/04/2013   SpO2 95%   BMI 27.92 kg/m²     Pt arrived w/ above concern.   "

## 2021-06-29 ENCOUNTER — OFFICE VISIT (OUTPATIENT)
Dept: URGENT CARE | Facility: CLINIC | Age: 51
End: 2021-06-29
Payer: COMMERCIAL

## 2021-06-29 VITALS
BODY MASS INDEX: 29.63 KG/M2 | SYSTOLIC BLOOD PRESSURE: 106 MMHG | DIASTOLIC BLOOD PRESSURE: 74 MMHG | RESPIRATION RATE: 16 BRPM | TEMPERATURE: 98.3 F | HEART RATE: 74 BPM | OXYGEN SATURATION: 97 % | WEIGHT: 161 LBS | HEIGHT: 62 IN

## 2021-06-29 DIAGNOSIS — S29.019A ACUTE THORACIC MYOFASCIAL STRAIN, INITIAL ENCOUNTER: ICD-10-CM

## 2021-06-29 PROCEDURE — 99214 OFFICE O/P EST MOD 30 MIN: CPT | Performed by: NURSE PRACTITIONER

## 2021-06-29 RX ORDER — CYCLOBENZAPRINE HCL 5 MG
5-10 TABLET ORAL 3 TIMES DAILY PRN
Qty: 30 TABLET | Refills: 0 | Status: SHIPPED | OUTPATIENT
Start: 2021-06-29 | End: 2022-04-05

## 2021-06-29 ASSESSMENT — ENCOUNTER SYMPTOMS
NECK PAIN: 0
FEVER: 0
MYALGIAS: 1
CHILLS: 0
BACK PAIN: 1
BOWEL INCONTINENCE: 0
ABDOMINAL PAIN: 0
WEAKNESS: 0
NUMBNESS: 0
TINGLING: 0
SHORTNESS OF BREATH: 0
FOCAL WEAKNESS: 0
PARESTHESIAS: 0
NAUSEA: 0
SPEECH CHANGE: 0
FALLS: 0
PARESIS: 0
VOMITING: 0

## 2021-06-29 ASSESSMENT — FIBROSIS 4 INDEX: FIB4 SCORE: 5.66

## 2021-06-29 NOTE — PROGRESS NOTES
"Subjective:      Elvia Roberto is a 51 y.o. female who presents with Back Pain (Upper back/Mid - since yesterday after getting up from the couch)            Back Pain  This is a new problem. The current episode started yesterday. The problem occurs constantly. The problem is unchanged. The pain is present in the thoracic spine. The quality of the pain is described as aching. The pain does not radiate. The pain is moderate. The pain is the same all the time. The symptoms are aggravated by lying down, bending and twisting. Pertinent negatives include no abdominal pain, bladder incontinence, bowel incontinence, chest pain, fever, numbness, paresis, paresthesias, pelvic pain, tingling or weakness. She has tried ice and analgesics for the symptoms. The treatment provided no relief.       Review of Systems   Constitutional: Negative for chills, fever and malaise/fatigue.   Respiratory: Negative for shortness of breath.    Cardiovascular: Negative for chest pain.   Gastrointestinal: Negative for abdominal pain, bowel incontinence, nausea and vomiting.   Genitourinary: Negative for bladder incontinence and pelvic pain.   Musculoskeletal: Positive for back pain and myalgias. Negative for falls, joint pain and neck pain.   Skin: Negative for rash.   Neurological: Negative for tingling, speech change, focal weakness, weakness, numbness and paresthesias.   All other systems reviewed and are negative.         Objective:     /74 (BP Location: Left arm, Patient Position: Sitting, BP Cuff Size: Adult long)   Pulse 74   Temp 36.8 °C (98.3 °F) (Temporal)   Resp 16   Ht 1.575 m (5' 2\") Comment: pt stated  Wt 73 kg (161 lb)   LMP 05/04/2013   SpO2 97%   BMI 29.45 kg/m²      Physical Exam  Vitals reviewed.   Constitutional:       General: She is not in acute distress.     Appearance: Normal appearance.   HENT:      Head: Normocephalic.      Right Ear: External ear normal.      Left Ear: External ear normal. "   Cardiovascular:      Rate and Rhythm: Normal rate.      Pulses: Normal pulses.   Pulmonary:      Effort: Pulmonary effort is normal.   Abdominal:      Palpations: Abdomen is soft.   Musculoskeletal:         General: Normal range of motion.      Cervical back: Normal and normal range of motion. No rigidity.      Thoracic back: Spasms and tenderness present. No swelling, edema, lacerations or bony tenderness. Normal range of motion.      Lumbar back: Normal.        Back:    Skin:     General: Skin is warm.   Neurological:      Mental Status: She is alert and oriented to person, place, and time.   Psychiatric:         Mood and Affect: Mood normal.         Behavior: Behavior normal.                        Assessment/Plan:        1. Acute thoracic myofascial strain, initial encounter  cyclobenzaprine (FLEXERIL) 5 mg tablet       Discussed with patient signs and symptoms consistent with a back strain.   Treatment of initial rest with no heavy lifting, stooping, or strenuous activity. Massage, ice and/or heat which ever feels better, and Ibuprofen per manufacture's instructions. Encouraged walking, stretching, and range of motion exercises as tolerated. Avoid sitting or laying down for long periods of time except for at night during sleep.     Patient is overall very well-appearing, no acute distress, and normal vital signs. Suspicions for acute emergent pathology are low. Patient ambulating without assistance.  Patient has no obvious signs of cauda equina or epidural abscess on examination.      Discussed sedating effects of muscle relaxer and to not drive or operate heavy machinery while taking the medication or combine with any other sedating medications.    Follow up with your PCP or return to urgent care if symptoms not improving in 1-2 weeks.     My total time spent caring for the patient on the day of the encounter was at least 30 minutes.  Time includes record review, exam, communication with the patient, and  documentation of this encounter. This does not include time spent on separately billable procedures/tests.    Please note that this dictation was created using voice recognition software. I have made every reasonable attempt to correct obvious errors, but I expect that there are errors of grammar and possibly content that I did not discover before finalizing the note.

## 2021-07-01 NOTE — PROGRESS NOTES
07/26/21    Subjective    Chief Complaint:  Pancytopenia    HPI:  51 female with history of autoimmune hepatitis and SLE was seen in consultation by Dr. Gilliam 4/28/21 for mild neutropenia and thrombocytopenia and microcytic anemia. He ordered iron dextran but I do not see that it was given? But her H/H and MCV have improved considerably. WBC and platelet counts remain low. CT abdomen in 1/21 showed splenomegaly and a dilated portal vein and possible esophageal varices.     ROS:    Constitutional: No weight loss  Skin: No rash or jaundice  HENT: No change in eyesight or hearing  Cardiovascular:No chest pain or arrythmia  Respiratory:No cough or SOB  GI:No nausea, vomiting, diarrhea, constipation  :No dysuria or frequency  Musculoskeletal:No bone or joint pain  Neuro:No sx's of neuropathy  Psych: No complaints    PMH:      Allergies   Allergen Reactions   • Asa [Aspirin] Unspecified     GI upset, bleeding, Gastric Bypass     • Nsaids Nausea     GI upset, bleeding, Gastric Bypass     • Tape Unspecified     Blisters all over, Paper tape is ok       Past Medical History:   Diagnosis Date   • Heart burn    • Indigestion    • Liver disease     Auto immune hepatitis   • Lupus (HCC)    • Other specified disorder of intestines    • Other specified symptom associated with female genital organs     tubiligation        Past Surgical History:   Procedure Laterality Date   • HYSTEROSCOPY WITH VIDEO OPERATIVE  8/20/2013    Performed by Robin Frederick M.D. at SURGERY [facility]   • LAPAROSCOPY  12/16/2012    Performed by Hayes Luog M.D. at [facility]   • GASTROSCOPY  12/16/2012    Performed by Hayes Lugo M.D. at [facility]   • BOWEL RESECTION LAPAROSCOPIC  4/4/2012    Performed by HAYES LUGO at [facility]   • LYSIS ADHESIONS GENERAL  4/4/2012    Performed by HAYES LUGO at [facility]   • OTHER ABDOMINAL SURGERY  2010    Kristi En Y gastric bypass   • OTHER  ORTHOPEDIC SURGERY      Left ankle surgery   • OTHER ORTHOPEDIC SURGERY      Left knee surgery   • GYN SURGERY      tubal ligation   • GYN SURGERY         • PB REMOVE TONSILS/ADENOIDS,<11 Y/O     • PRIMARY C SECTION              Medications:    Current Outpatient Medications on File Prior to Visit   Medication Sig Dispense Refill   • cyclobenzaprine (FLEXERIL) 5 mg tablet Take 1-2 Tablets by mouth 3 times a day as needed. 30 tablet 0   • SUMAtriptan (IMITREX) 25 MG Tab tablet Take 1-2 Tablets by mouth one time as needed for Migraine for up to 1 dose. 10 tablet 0   • ferrous sulfate 325 (65 Fe) MG tablet Take 1 tablet by mouth every day. 90 tablet 1   • metoclopramide (REGLAN) 10 MG Tab Take 10 mg by mouth every morning. Indications: Nausea and Vomiting     • acetaminophen (TYLENOL) 500 MG Tab Take 1,000 mg by mouth 2 times a day as needed (Pain, Headache). 2 tablets = 1,000 mg.      • ursodiol (ACTIGALL) 300 MG Cap Take 300 mg by mouth 3 times a day.     • pantoprazole (PROTONIX) 40 MG Tablet Delayed Response Take 1 Tab by mouth every day. 30 Tab 0   • ondansetron (ZOFRAN ODT) 4 MG TABLET DISPERSIBLE Take 1 Tab by mouth every 6 hours as needed for Nausea. 20 Tab 0   • DULoxetine (CYMBALTA) 60 MG Cap DR Particles delayed-release capsule Take 60 mg by mouth every day.     • diphenhydrAMINE (BENADRYL) 25 MG Tab Take 50 mg by mouth 2 times a day as needed (Allergies). 2 tablets = 50 mg.       No current facility-administered medications on file prior to visit.       Social History     Tobacco Use   • Smoking status: Current Every Day Smoker     Packs/day: 0.50     Years: 30.00     Pack years: 15.00     Types: Cigarettes   • Smokeless tobacco: Never Used   Substance Use Topics   • Alcohol use: Not Currently        No family history on file.     Objective    Vitals:    [facility] 2013     Physical Exam:    Appears well-developed and well-nourished. No distress.    Head -  Normocephalic .    Eyes - Pupils are equal, round, and reactive to light. Conjunctivae and EOM are normal. No scleral icterus.   Ears - normal hearing  Mouth - Throat: Oropharynx is clear and moist. No oropharyngeal exudate  Neck - Normal range of motion. Neck supple. No thyromegaly  Cardiovascular - Normal rate, regular rhythm, normal heart sounds and intact distal pulses. No  gallop, murmur or rub  Pulmonary - Normal breath sounds.  No wheeze, rales or rhonci  Breast - symmetrical. No mass on indentation.  Abdominal -Soft. No distension, tenderness, organomegaly or mass  Extremities-  No edema or tenderness.    Nodes - No submental, submandibular, preauricular, cervical, axillary or inguinal adenopathy.    Neurological -   Alert and oriented to person, place, and time. No focal findings  Skin - Skin is warm and dry. No rash noted. Not diaphoretic. No erythema. No pallor. No jaundice   Psychiatric -  Normal mood and affect.    Labs:  Results for JA CAMPOS (MRN [id number])    Ref. Range 4/16/2021 01:40 6/7/2021 10:38   WBC Latest Ref Range: 4.8 - 10.8 K/uL 3.3 (L) 3.3 (L)   RBC Latest Ref Range: 4.20 - 5.40 M/uL 3.98 (L) 5.05   Hemoglobin Latest Ref Range: 12.0 - 16.0 g/dL 8.6 (L) 13.3   Hematocrit Latest Ref Range: 37.0 - 47.0 % 29.0 (L) 43.4   MCV Latest Ref Range: 81.4 - 97.8 fL 72.9 (L) 85.9   MCH Latest Ref Range: 27.0 - 33.0 pg 21.6 (L) 26.3 (L)   MCHC Latest Ref Range: 33.6 - 35.0 g/dL 29.7 (L) 30.6 (L)   RDW Latest Ref Range: 35.9 - 50.0 fL 51.7 (H)    Platelet Count Latest Ref Range: 164 - 446 K/uL 87 (L) 74 (L)   Results for JA CAMPOS (MRN [id number])    Ref. Range 4/16/2021 09:55   Iron Latest Ref Range: 40 - 170 ug/dL 30 (L)   Total Iron Binding Latest Ref Range: 250 - 450 ug/dL 412   % Saturation Latest Ref Range: 15 - 55 % 7 (L)       Assessment    Imp:    Visit Diagnosis:    1. Pancytopenia (HCC)     2. Iron deficiency     3. Portal hypertension (HCC)     4. Splenomegaly           Plan:      Job Leavitt,  M.D.

## 2021-07-06 ENCOUNTER — APPOINTMENT (OUTPATIENT)
Dept: RADIOLOGY | Facility: MEDICAL CENTER | Age: 51
End: 2021-07-06
Attending: EMERGENCY MEDICINE
Payer: COMMERCIAL

## 2021-07-06 ENCOUNTER — TELEPHONE (OUTPATIENT)
Dept: HEMATOLOGY ONCOLOGY | Facility: MEDICAL CENTER | Age: 51
End: 2021-07-06

## 2021-07-06 ENCOUNTER — HOSPITAL ENCOUNTER (EMERGENCY)
Facility: MEDICAL CENTER | Age: 51
End: 2021-07-07
Attending: EMERGENCY MEDICINE
Payer: COMMERCIAL

## 2021-07-06 DIAGNOSIS — S83.92XA SPRAIN OF LEFT KNEE, UNSPECIFIED LIGAMENT, INITIAL ENCOUNTER: ICD-10-CM

## 2021-07-06 PROCEDURE — 99283 EMERGENCY DEPT VISIT LOW MDM: CPT

## 2021-07-06 PROCEDURE — 73562 X-RAY EXAM OF KNEE 3: CPT | Mod: LT

## 2021-07-06 ASSESSMENT — FIBROSIS 4 INDEX: FIB4 SCORE: 5.66

## 2021-07-06 NOTE — LETTER
"  FORM C-4:  EMPLOYEE’S CLAIM FOR COMPENSATION/ REPORT OF INITIAL TREATMENT  EMPLOYEE’S CLAIM - PROVIDE ALL INFORMATION REQUESTED   First Name Elvia Last Name Beth Birthdate 1970  Sex female Claim Number   Home Address [address]   [facility]             Zip [address]                                   Age  51 y.o. Height  1.575 m (5' 2\") Weight  73.6 kg (162 lb 4.1 oz) N  [id number]   Mailing Address [address]  [facility] [place]              Zip [address] Telephone  [phone] (home)  Primary Language Spoken   Insurer  *** Third Party   BRIAN CLAIMS [employer] Employee's Occupation (Job Title) When Injury or Occupational Disease Occurred  [employer]   Employer's Name [employer] Telephone [phone]    Employer Address [address] [facility] [address]] Zip [address]   Date of Injury  7/6/2021       Hour of Injury  9:30 AM Date Employer Notified  7/6/2021 Last Day of Work after Injury or Occupational Disease  7/6/2021 Supervisor to Whom Injury Reported  Breanne   Address or Location of Accident (if applicable) Work [1]   What were you doing at the time of accident? (if applicable) Walking in back room    How did this injury or occupational disease occur? Be specific and answer in detail. Use additional sheet if necessary)  Walked into back room to get bagels. There was ice cubes on the floor  that I slid on.   If you believe that you have an occupational disease, when did you first have knowledge of the disability and it relationship to your employment? N/A Witnesses to the Accident  Breanne   Nature of Injury or Occupational Disease  Workers' Compensation Part(s) of Body Injured or Affected  Knee (L), N/A, N/A    I CERTIFY THAT THE ABOVE IS TRUE AND CORRECT TO THE BEST OF MY KNOWLEDGE AND THAT I HAVE PROVIDED THIS INFORMATION IN ORDER TO OBTAIN THE BENEFITS OF [place] " [place]).  I HEREBY AUTHORIZE ANY PHYSICIAN, CHIROPRACTOR, SURGEON, PRACTITIONER, OR OTHER PERSON, ANY HOSPITAL, INCLUDING [facility], ANY MEDICAL SERVICE ORGANIZATION, ANY INSURANCE COMPANY, OR OTHER INSTITUTION OR ORGANIZATION TO RELEASE TO EACH OTHER, ANY MEDICAL OR OTHER INFORMATION, INCLUDING BENEFITS PAID OR PAYABLE, PERTINENT TO THIS INJURY OR DISEASE, EXCEPT INFORMATION RELATIVE TO DIAGNOSIS, TREATMENT AND/OR COUNSELING FOR AIDS, PSYCHOLOGICAL CONDITIONS, ALCOHOL OR CONTROLLED SUBSTANCES, FOR WHICH I MUST GIVE SPECIFIC AUTHORIZATION.  A PHOTOSTAT OF THIS AUTHORIZATION SHALL BE AS VALID AS THE ORIGINAL.  Date                                      Place                                                                             Employee’s Signature   THIS REPORT MUST BE COMPLETED AND MAILED WITHIN 3 WORKING DAYS OF TREATMENT   Place [facility], EMERGENCY DEPT                       Name of Facility [facility]   Date  7/6/2021 Diagnosis  No diagnosis found. Is there evidence the injured employee was under the influence of alcohol and/or another controlled substance at the time of accident?   Hour  10:57 PM Description of Injury or Disease       Treatment     Have you advised the patient to remain off work five days or more?             X-Ray Findings    If Yes   From Date    To Date      From information given by the employee, together with medical evidence, can you directly connect this injury or occupational disease as job incurred?   If No, is employee capable of: Full Duty    Modified Duty      Is additional medical care by a physician indicated?   If Modified Duty, Specify any Limitations / Restrictions       Do you know of any previous injury or disease contributing to this condition or occupational disease?      Date 7/6/2021 Print Doctor’s Name Damien Crain I certify the employer’s copy of this form  "was mailed on:   Address [address] [place] [address] [phone] INSURER’S USE ONLY   Provider’s Tax ID Number [id number] Telephone Dept: [phone]    Doctor’s Signature   Degree        Form C-4 (rev.10/07)                                                                         BRIEF DESCRIPTION OF RIGHTS AND BENEFITS  (Pursuant to [place] 616[place])    Notice of Injury or Occupational Disease (Incident Report Form C-1): If an injury or occupational disease (OD) arises out of and in the course of employment, you must provide written notice to your employer as soon as practicable, but no later than 7 days after the accident or OD. Your employer shall maintain a sufficient supply of the required forms.    Claim for Compensation (Form C-4): If medical treatment is sought, the form C-4 is available at the place of initial treatment. A completed \"Claim for Compensation\" (Form C-4) must be filed within 90 days after an accident or OD. The treating physician or chiropractor must, within 3 working days after treatment, complete and mail to the employer, the employer's insurer and third-party , the Claim for Compensation.    Medical Treatment: If you require medical treatment for your on-the-job injury or OD, you may be required to select a physician or chiropractor from a list provided by your workers’ compensation insurer, if it has contracted with an Organization for Managed Care (MCO) or Preferred Provider Organization (PPO) or providers of health care. If your employer has not entered into a contract with an MCO or PPO, you may select a physician or chiropractor from the Panel of Physicians and Chiropractors. Any medical costs related to your industrial injury or OD will be paid by your insurer.    Temporary Total Disability (TTD): If your doctor has certified that you are unable to work for a period of at least 5 consecutive days, or 5 cumulative days in a 20-day period, or places restrictions " on you that your employer does not accommodate, you may be entitled to TTD compensation.    Temporary Partial Disability (TPD): If the wage you receive upon reemployment is less than the compensation for TTD to which you are entitled, the insurer may be required to pay you TPD compensation to make up the difference. TPD can only be paid for a maximum of 24 months.    Permanent Partial Disability (PPD): When your medical condition is stable and there is an indication of a PPD as a result of your injury or OD, within 30 days, your insurer must arrange for an evaluation by a rating physician or chiropractor to determine the degree of your PPD. The amount of your PPD award depends on the date of injury, the results of the PPD evaluation, your age and wage.    Permanent Total Disability (PTD): If you are medically certified by a treating physician or chiropractor as permanently and totally disabled and have been granted a PTD status by your insurer, you are entitled to receive monthly benefits not to exceed 66 2/3% of your average monthly wage. The amount of your PTD payments is subject to reduction if you previously received a lump-sum PPD award.    Vocational Rehabilitation Services: You may be eligible for vocational rehabilitation services if you are unable to return to the job due to a permanent physical impairment or permanent restrictions as a result of your injury or occupational disease.    Transportation and Per Zana Reimbursement: You may be eligible for travel expenses and per zana associated with medical treatment.    Reopening: You may be able to reopen your claim if your condition worsens after claim closure.     Appeal Process: If you disagree with a written determination issued by the insurer or the insurer does not respond to your request, you may appeal to the Department of Administration, , by following the instructions contained in your determination letter. You must appeal the  determination within 70 days from the date of the determination letter at [address], [address], [place], [place] [address], or [address], [address], [place], [place] [address]. If you disagree with the  decision, you may appeal to the Department of Administration, . You must file your appeal within 30 days from the date of the  decision letter at [address], [address], [place], [place] [address], or [address], [address], [place], [place] [address]. If you disagree with a decision of an , you may file a petition for judicial review with the District Court. You must do so within 30 days of the Appeal Officer’s decision. You may be represented by an  at your own expense or you may contact the [facility] for possible representation.    [place]  for Injured Workers (NAIW): If you disagree with a  decision, you may request that NAIW represent you without charge at an  Hearing. For information regarding denial of benefits, you may contact the [facility] at: [address], [address], [place] [address], [phone], or [address], [place] [address], [phone]    To File a Complaint with the Division: If you wish to file a complaint with the  of the Division of Industrial Relations (DIR),  please contact the Workers’ Compensation Section, [address], [address], [place], [place] [address], telephone [phone], or [address], [address], [place], [place] [address], telephone [phone].    For assistance with Workers’ Compensation Issues: You may contact the [facility] Office for Consumer Health Assistance, [address], [address], [place], [place] [address], Toll Free 1-311.238.4419, Web site: http://[facility].[place].gov/Programs/SINDY E-mail: sindy@[facility].[place].gov  D-2 (rev. 10/20)               __________________________________________________________________                                    _________________            Employee Name / Signature                                                                                                                            Date

## 2021-07-06 NOTE — TELEPHONE ENCOUNTER
Attempted to LVM to let patient know of appointment time change, as well as to get labs done prior. Unable to lvm.

## 2021-07-06 NOTE — TELEPHONE ENCOUNTER
Spoke to patient and informed her of labs that needed to be done prior to her appointment. She stated she got hurt at work and would need to reschedule. Appointment was rescheduled.

## 2021-07-06 NOTE — LETTER
"  FORM C-4:  EMPLOYEE’S CLAIM FOR COMPENSATION/ REPORT OF INITIAL TREATMENT  EMPLOYEE’S CLAIM - PROVIDE ALL INFORMATION REQUESTED   First Name Elvia Last Name Beth Birthdate 1970  Sex female Claim Number   Home Address [address]   [facility]             [place] [address]                                   Age  51 y.o. Height  1.575 m (5' 2\") Weight  73.6 kg (162 lb 4.1 oz) N  [id number]   Mailing Address [address]  [facility]              Zip [address] Telephone  [phone] (home)  Primary Language Spoken   Insurer  *** Third Party   BRIAN CLAIMS [employer] Employee's Occupation (Job Title) When Injury or Occupational Disease Occurred  [employer]   Employer's Name [employer] Telephone [phone]    Employer Address [address] [facility] [address]] Zip [address]   Date of Injury  7/6/2021       Hour of Injury  9:30 AM Date Employer Notified  7/6/2021 Last Day of Work after Injury or Occupational Disease  7/6/2021 Supervisor to Whom Injury Reported  Breanne   Address or Location of Accident (if applicable) Work [1]   What were you doing at the time of accident? (if applicable) Walking in back room    How did this injury or occupational disease occur? Be specific and answer in detail. Use additional sheet if necessary)  Walked into back room to get bagels. There was ice cubes on the floor  that I slid on.   If you believe that you have an occupational disease, when did you first have knowledge of the disability and it relationship to your employment? N/A Witnesses to the Accident  Breanne   Nature of Injury or Occupational Disease  Workers' Compensation Part(s) of Body Injured or Affected  Knee (L), N/A, N/A    I CERTIFY THAT THE ABOVE IS TRUE AND CORRECT TO THE BEST OF MY KNOWLEDGE AND THAT I HAVE PROVIDED THIS INFORMATION IN ORDER TO OBTAIN THE BENEF[place] OF [place] " CHAPTER 617 OF [place]).  I HEREBY AUTHORIZE ANY PHYSICIAN, CHIROPRACTOR, SURGEON, PRACTITIONER, OR OTHER PERSON, ANY HOSPITAL, INCLUDING [facility] OR [facility] HOSPITAL, ANY MEDICAL SERVICE ORGANIZATION, ANY INSURANCE COMPANY, OR OTHER INSTITUTION OR ORGANIZATION TO RELEASE TO EACH OTHER, ANY MEDICAL OR OTHER INFORMATION, INCLUDING BENEFITS PAID OR PAYABLE, PERTINENT TO THIS INJURY OR DISEASE, EXCEPT INFORMATION RELATIVE TO DIAGNOSIS, TREATMENT AND/OR COUNSELING FOR AIDS, PSYCHOLOGICAL CONDITIONS, ALCOHOL OR CONTROLLED SUBSTANCES, FOR WHICH I MUST GIVE SPECIFIC AUTHORIZATION.  A PHOTOSTAT OF THIS AUTHORIZATION SHALL BE AS VALID AS THE ORIGINAL.  Date                                      Place                                                                             Employee’s Signature   THIS REPORT MUST BE COMPLETED AND MAILED WITHIN 3 WORKING DAYS OF TREATMENT   Place [facility], EMERGENCY DEPT                       Name of Facility [facility]   Date  7/6/2021 Diagnosis  No diagnosis found. Is there evidence the injured employee was under the influence of alcohol and/or another controlled substance at the time of accident?   Hour  11:43 PM Description of Injury or Disease       Treatment     Have you advised the patient to remain off work five days or more?             X-Ray Findings    If Yes   From Date    To Date      From information given by the employee, together with medical evidence, can you directly connect this injury or occupational disease as job incurred?   If No, is employee capable of: Full Duty    Modified Duty      Is additional medical care by a physician indicated?   If Modified Duty, Specify any Limitations / Restrictions       Do you know of any previous injury or disease contributing to this condition or occupational disease?      Date 7/6/2021 Print Doctor’s Name Damien Crain I certify the employer’s copy of this form  "was mailed on:   Address [address] [place] [address] [phone] INSURER’S USE ONLY   Provider’s Tax ID Number [id number] Telephone Dept: [phone]    Doctor’s Signature   Degree        Form C-4 (rev.10/07)                                                                         BRIEF DESCRIPTION OF RIGHTS AND BENEFITS  ([place]ursuant to [place] 616C.050)    Notice of Injury or Occupational Disease (Incident Report Form C-1): If an injury or occupational disease (OD) arises out of and in the course of employment, you must provide written notice to your employer as soon as practicable, but no later than 7 days after the accident or OD. Your employer shall maintain a sufficient supply of the required forms.    Claim for Compensation (Form C-4): If medical treatment is sought, the form C-4 is available at the place of initial treatment. A completed \"Claim for Compensation\" (Form C-4) must be filed within 90 days after an accident or OD. The treating physician or chiropractor must, within 3 working days after treatment, complete and mail to the employer, the employer's insurer and third-party , the Claim for Compensation.    Medical Treatment: If you require medical treatment for your on-the-job injury or OD, you may be required to select a physician or chiropractor from a list provided by your workers’ compensation insurer, if it has contracted with an Organization for Managed Care (MCO) or Preferred Provider Organization (PPO) or providers of health care. If your employer has not entered into a contract with an MCO or PPO, you may select a physician or chiropractor from the Panel of Physicians and Chiropractors. Any medical costs related to your industrial injury or OD will be paid by your insurer.    Temporary Total Disability (TTD): If your doctor has certified that you are unable to work for a period of at least 5 consecutive days, or 5 cumulative days in a 20-day period, or places restrictions " on you that your employer does not accommodate, you may be entitled to TTD compensation.    Temporary Partial Disability (TPD): If the wage you receive upon reemployment is less than the compensation for TTD to which you are entitled, the insurer may be required to pay you TPD compensation to make up the difference. TPD can only be paid for a maximum of 24 months.    Permanent Partial Disability (PPD): When your medical condition is stable and there is an indication of a PPD as a result of your injury or OD, within 30 days, your insurer must arrange for an evaluation by a rating physician or chiropractor to determine the degree of your PPD. The amount of your PPD award depends on the date of injury, the results of the PPD evaluation, your age and wage.    Permanent Total Disability (PTD): If you are medically certified by a treating physician or chiropractor as permanently and totally disabled and have been granted a PTD status by your insurer, you are entitled to receive monthly benefits not to exceed 66 2/3% of your average monthly wage. The amount of your PTD payments is subject to reduction if you previously received a lump-sum PPD award.    Vocational Rehabilitation Services: You may be eligible for vocational rehabilitation services if you are unable to return to the job due to a permanent physical impairment or permanent restrictions as a result of your injury or occupational disease.    Transportation and Per Zana Reimbursement: You may be eligible for travel expenses and per zana associated with medical treatment.    Reopening: You may be able to reopen your claim if your condition worsens after claim closure.     Appeal Process: If you disagree with a written determination issued by the insurer or the insurer does not respond to your request, you may appeal to the Department of Administration, , by following the instructions contained in your determination letter. You must appeal the  determination within 70 days from the date of the determination letter at [address], [address], [place], [place] [address], or [address], [place], [place] [address]. If you disagree with the  decision, you may appeal to the Department of Administration, . You must file your appeal within 30 days from the date of the  decision letter at [address], [address], [place], [place] [address], or [address], [place], [place] [address]. If you disagree with a decision of an , you may file a petition for judicial review with the District Court. You must do so within 30 days of the Appeal Officer’s decision. You may be represented by an  at your own expense or you may contact the [facility] for possible representation.    [place]  for Injured Workers (NAIW): If you disagree with a  decision, you may request that NAIW represent you without charge at an  Hearing. For information regarding denial of benefits, you may contact the [facility] at: [address], [address], [place] [address], [phone], or [address], [address], [place] [address], [phone]    To File a Complaint with the Division: If you wish to file a complaint with the  of the Division of Industrial Relations (DIR),  please contact the Workers’ Compensation Section, [address], [address], [place], [place] [address], telephone [phone]) [phone], or [address], [address], [place], [place] [address], telephone [phone].    For assistance with Workers’ Compensation Issues: You may contact the [facility] Office for Consumer Health Assistance, [address], [place], [place] [address], Toll Free 1-638.674.2891, Web site: http://[facility].[place].gov/Programs/SINDY E-mail: sindy@[facility].nv.gov  D-2 (rev. 10/20)               __________________________________________________________________                                    _________________            Employee Name / Signature                                                                                                                            Date

## 2021-07-06 NOTE — LETTER
"  FORM C-4:  EMPLOYEE’S CLAIM FOR COMPENSATION/ REPORT OF INITIAL TREATMENT  EMPLOYEE’S CLAIM - PROVIDE ALL INFORMATION REQUESTED   First Name Elvia Last Name Beth Birthdate 1970  Sex female Claim Number   Home Address [address]   [facility]             [place] [address]                                   Age  51 y.o. Height  1.575 m (5' 2\") Weight  73.6 kg (162 lb 4.1 oz) [facility]  [id number]  [id number]   Mailing Address [address]  [facility]              [place] [address] Telephone  [phone] (home)  Primary Language Spoken   Insurer   Third Party   BRIAN CLAIMS [employer] Employee's Occupation (Job Title) When Injury or Occupational Disease Occurred  [employer]   Employer's Name [employer] Telephone [phone]    Employer Address [address] [facility] [address]] [place] [address]   Date of Injury  7/6/2021       Hour of Injury  9:30 AM Date Employer Notified  7/6/2021 Last Day of Work after Injury or Occupational Disease  7/6/2021 Supervisor to Whom Injury Reported  Breanne   Address or Location of Accident (if applicable) Work [1]   What were you doing at the time of accident? (if applicable) Walking in back room    How did this injury or occupational disease occur? Be specific and answer in detail. Use additional sheet if necessary)  Walked into back room to get bagels. There was ice cubes on the floor  that I slid on.   If you believe that you have an occupational disease, when did you first have knowledge of the disability and it relationship to your employment? N/A Witnesses to the Accident  Breanne   Nature of Injury or Occupational Disease  Workers' Compensation Part(s) of Body Injured or Affected  Knee (L), N/A, N/A    I CERTIFY THAT THE ABOVE IS TRUE AND CORRECT TO THE BEST OF MY KNOWLEDGE AND THAT I HAVE PROVIDED THIS INFORMATION IN ORDER TO OBTAIN THE BENEFITS OF [place], " INCLUSIVE [place] 617 [place]).  I HEREBY AUTHORIZE ANY PHYSICIAN, CHIROPRACTOR, SURGEON, PRACTITIONER, OR OTHER PERSON, ANY HOSPITAL, INCLUDING [facility] OR [facility] HOSPITAL, ANY MEDICAL SERVICE ORGANIZATION, ANY INSURANCE COMPANY, OR OTHER INSTITUTION OR ORGANIZATION TO RELEASE TO EACH OTHER, ANY MEDICAL OR OTHER INFORMATION, INCLUDING BENEFITS PAID OR PAYABLE, PERTINENT TO THIS INJURY OR DISEASE, EXCEPT INFORMATION RELATIVE TO DIAGNOSIS, TREATMENT AND/OR COUNSELING FOR AIDS, PSYCHOLOGICAL CONDITIONS, ALCOHOL OR CONTROLLED SUBSTANCES, FOR WHICH I MUST GIVE SPECIFIC AUTHORIZATION.  A PHOTOSTAT OF THIS AUTHORIZATION SHALL BE AS VALID AS THE ORIGINAL.  Date      07/06/2021                                Place         [facility]                                                                 Employee’s Signature   THIS REPORT MUST BE COMPLETED AND MAILED WITHIN 3 WORKING DAYS OF TREATMENT   Place [facility], EMERGENCY DEPT                       Name of Facility [facility]   Date  7/6/2021 Diagnosis  (S83.92XA) Sprain of left knee, unspecified ligament, initial encounter Is there evidence the injured employee was under the influence of alcohol and/or another controlled substance at the time of accident?   Hour  12:03 AM Description of Injury or Disease  Sprain of left knee, unspecified ligament, initial encounter No   Treatment  Knee immobilizer, crutches  Have you advised the patient to remain off work five days or more?         No   X-Ray Findings  Negative  Comments:Knee If Yes   From Date    To Date      From information given by the employee, together with medical evidence, can you directly connect this injury or occupational disease as job incurred? Yes If No, is employee capable of: Full Duty  No Modified Duty  Yes   Is additional medical care by a physician indicated? Yes If Modified Duty, Specify any Limitations / Restrictions   Must wear knee  "immobilizer and use crutches   Do you know of any previous injury or disease contributing to this condition or occupational disease? Yes  Comments:Tibial plateau fracture    Date 7/7/2021 Print Doctor’s Name Damien Crain REJI certify the employer’s copy of this form was mailed on:   Address [address] [place] [address] [phone] INSURER’S USE ONLY   Provider’s Tax ID Number [id number] Telephone Dept: [phone]    Doctor’s Signature e-DAMIEN Guillory M.D. Degree        Form C-4 (rev.10/07)                                                                         BRIEF DESCRIPTION OF RIGHTS AND BENEFITS  ([place]ursuant to [place] 616C.050)    Notice of Injury or Occupational Disease (Incident Report Form C-1): If an injury or occupational disease (OD) arises out of and in the course of employment, you must provide written notice to your employer as soon as practicable, but no later than 7 days after the accident or OD. Your employer shall maintain a sufficient supply of the required forms.    Claim for Compensation (Form C-4): If medical treatment is sought, the form C-4 is available at the place of initial treatment. A completed \"Claim for Compensation\" (Form C-4) must be filed within 90 days after an accident or OD. The treating physician or chiropractor must, within 3 working days after treatment, complete and mail to the employer, the employer's insurer and third-party , the Claim for Compensation.    Medical Treatment: If you require medical treatment for your on-the-job injury or OD, you may be required to select a physician or chiropractor from a list provided by your workers’ compensation insurer, if it has contracted with an Organization for Managed Care (MCO) or Preferred Provider Organization (PPO) or providers of health care. If your employer has not entered into a contract with an MCO or PPO, you may select a physician or chiropractor from the Panel of Physicians and " Chiropractors. Any medical costs related to your industrial injury or OD will be paid by your insurer.    Temporary Total Disability (TTD): If your doctor has certified that you are unable to work for a period of at least 5 consecutive days, or 5 cumulative days in a 20-day period, or places restrictions on you that your employer does not accommodate, you may be entitled to TTD compensation.    Temporary Partial Disability (TPD): If the wage you receive upon reemployment is less than the compensation for TTD to which you are entitled, the insurer may be required to pay you TPD compensation to make up the difference. TPD can only be paid for a maximum of 24 months.    Permanent Partial Disability (PPD): When your medical condition is stable and there is an indication of a PPD as a result of your injury or OD, within 30 days, your insurer must arrange for an evaluation by a rating physician or chiropractor to determine the degree of your PPD. The amount of your PPD award depends on the date of injury, the results of the PPD evaluation, your age and wage.    Permanent Total Disability (PTD): If you are medically certified by a treating physician or chiropractor as permanently and totally disabled and have been granted a PTD status by your insurer, you are entitled to receive monthly benefits not to exceed 66 2/3% of your average monthly wage. The amount of your PTD payments is subject to reduction if you previously received a lump-sum PPD award.    Vocational Rehabilitation Services: You may be eligible for vocational rehabilitation services if you are unable to return to the job due to a permanent physical impairment or permanent restrictions as a result of your injury or occupational disease.    Transportation and Per Zana Reimbursement: You may be eligible for travel expenses and per zana associated with medical treatment.    Reopening: You may be able to reopen your claim if your condition worsens after claim  closure.     Appeal Process: If you disagree with a written determination issued by the insurer or the insurer does not respond to your request, you may appeal to the Department of Administration, , by following the instructions contained in your determination letter. You must appeal the determination within 70 days from the date of the determination letter at [address], [address], [place], [place] [address], or [address], [place], [place] [address]. If you disagree with the  decision, you may appeal to the Department of Administration, . You must file your appeal within 30 days from the date of the  decision letter at [address], [address], [place], [place] [address], or [address], [place], [place] [address]. If you disagree with a decision of an , you may file a petition for judicial review with the District Court. You must do so within 30 days of the Appeal Officer’s decision. You may be represented by an  at your own expense or you may contact the [facility] for possible representation.    [place]  for In[place] Workers ([place]IW): If you disagree with a  decision, you may request that NAIW represent you without charge at an  Hearing. For information regarding denial of benefits, you may contact the [facility] at: [address], [address], [place] [address], [phone], or [address], [address], [place] [address], [phone]    To File a Complaint with the Division: If you wish to file a complaint with the  of the Division of Industrial Relations (DIR),  please contact the Workers’ Compensation Section, [address], [place], [place] [address], telephone (131) [phone], or [address], [place], [place] [address], telephone [phone].    For assistance with Workers’  Compensation Issues: You may contact the [facility] Office for Consumer Health Assistance, [address] [facility] [address], [place] [address], Toll Free 1-987.835.4992, Web site: http://[facility].[place].gov/Programs/SINDY E-mail: [email or web address]@[email or web address].[place].gov  D-2 (rev. 10/20)              __________________________________________________________________                                    _________________            Employee Name / Signature                                                                                                                            Date

## 2021-07-06 NOTE — LETTER
"  FORM C-4:  EMPLOYEE’S CLAIM FOR COMPENSATION/ REPORT OF INITIAL TREATMENT  EMPLOYEE’S CLAIM - PROVIDE ALL INFORMATION REQUESTED   First Name Elvia Last Name Beth Birthdate 1970  Sex female Claim Number   Home Address [address]   [facility]             Zip [address]                                   Age  51 y.o. Height  1.575 m (5' 2\") Weight  73.6 kg (162 lb 4.1 oz) N  [id number]   Mailing Address [address]  [facility]              Zip [address] Telephone  [phone] (home)  Primary Language Spoken   Insurer  *** Third Party   BRIAN CLAIMS [id number] Employee's Occupation (Job Title) When Injury or Occupational Disease Occurred  [employer]   Employer's Name [employer] Telephone [phone]    Employer Address [address] [facility] [address]] Zip [address]   Date of Injury  7/6/2021       Hour of Injury  9:30 AM Date Employer Notified  7/6/2021 Last Day of Work after Injury or Occupational Disease  7/6/2021 Supervisor to Whom Injury Reported  Breanne   Address or Location of Accident (if applicable) Work [1]   What were you doing at the time of accident? (if applicable) Walking in back room    How did this injury or occupational disease occur? Be specific and answer in detail. Use additional sheet if necessary)  Walked into back room to get bagels. There was ice cubes on the floor  that I slid on.   If you believe that you have an occupational disease, when did you first have knowledge of the disability and it relationship to your employment? N/A Witnesses to the Accident  Breanne   Nature of Injury or Occupational Disease  Workers' Compensation Part(s) of Body Injured or Affected  Knee (L), N/A, N/A    I CERTIFY THAT THE ABOVE IS TRUE AND CORRECT TO THE BEST OF MY KNOWLEDGE AND THAT I HAVE PROVIDED THIS INFORMATION IN ORDER TO OBTAIN THE BENEFITS OF [place] IN[place] OR " [place]).  I HEREBY AUTHORIZE ANY PHYSICIAN, CHIROPRACTOR, SURGEON, PRACTITIONER, OR OTHER PERSON, ANY HOSPITAL, INCLUDING [facility] OR [facility], ANY MEDICAL SERVICE ORGANIZATION, ANY INSURANCE COMPANY, OR OTHER INSTITUTION OR ORGANIZATION TO RELEASE TO EACH OTHER, ANY MEDICAL OR OTHER INFORMATION, INCLUDING BENEFITS PAID OR PAYABLE, PERTINENT TO THIS INJURY OR DISEASE, EXCEPT INFORMATION RELATIVE TO DIAGNOSIS, TREATMENT AND/OR COUNSELING FOR AIDS, PSYCHOLOGICAL CONDITIONS, ALCOHOL OR CONTROLLED SUBSTANCES, FOR WHICH I MUST GIVE SPECIFIC AUTHORIZATION.  A PHOTOSTAT OF THIS AUTHORIZATION SHALL BE AS VALID AS THE ORIGINAL.  Date                                      Place                                                                             Employee’s Signature   THIS REPORT MUST BE COMPLETED AND MAILED WITHIN 3 WORKING DAYS OF TREATMENT   Place [facility], EMERGENCY DEPT                       Name of Facility [facility]   Date  7/6/2021 Diagnosis  No diagnosis found. Is there evidence the injured employee was under the influence of alcohol and/or another controlled substance at the time of accident?   Hour  11:12 PM Description of Injury or Disease       Treatment     Have you advised the patient to remain off work five days or more?             X-Ray Findings    If Yes   From Date    To Date      From information given by the employee, together with medical evidence, can you directly connect this injury or occupational disease as job incurred?   If No, is employee capable of: Full Duty    Modified Duty      Is additional medical care by a physician indicated?   If Modified Duty, Specify any Limitations / Restrictions       Do you know of any previous injury or disease contributing to this condition or occupational disease?      Date 7/6/2021 Print Doctor’s Name Damien Crain I certify the employer’s copy of this form  "was mailed on:   Address [address] [place] [address] [phone] INSURER’S USE ONLY   Provider’s Tax ID Number [id number] Telephone Dept: [phone]    Doctor’s Signature   Degree        Form C-4 (rev.10/07)                                                                         BRIEF DESCRIPTION OF RIGHTS AND BENEFITS  (Pursuant to [place] 616C.050)    Notice of Injury or Occupational Disease (Incident Report Form C-1): If an injury or occupational disease (OD) arises out of and in the course of employment, you must provide written notice to your employer as soon as practicable, but no later than 7 days after the accident or OD. Your employer shall maintain a sufficient supply of the required forms.    Claim for Compensation (Form C-4): If medical treatment is sought, the form C-4 is available at the place of initial treatment. A completed \"Claim for Compensation\" (Form C-4) must be filed within 90 days after an accident or OD. The treating physician or chiropractor must, within 3 working days after treatment, complete and mail to the employer, the employer's insurer and third-party , the Claim for Compensation.    Medical Treatment: If you require medical treatment for your on-the-job injury or OD, you may be required to select a physician or chiropractor from a list provided by your workers’ compensation insurer, if it has contracted with an Organization for Managed Care (MCO) or Preferred Provider Organization (PPO) or providers of health care. If your employer has not entered into a contract with an MCO or PPO, you may select a physician or chiropractor from the Panel of Physicians and Chiropractors. Any medical costs related to your industrial injury or OD will be paid by your insurer.    Temporary Total Disability (TTD): If your doctor has certified that you are unable to work for a period of at least 5 consecutive days, or 5 cumulative days in a 20-day period, or places restrictions " on you that your employer does not accommodate, you may be entitled to TTD compensation.    Temporary Partial Disability (TPD): If the wage you receive upon reemployment is less than the compensation for TTD to which you are entitled, the insurer may be required to pay you TPD compensation to make up the difference. TPD can only be paid for a maximum of 24 months.    Permanent Partial Disability (PPD): When your medical condition is stable and there is an indication of a PPD as a result of your injury or OD, within 30 days, your insurer must arrange for an evaluation by a rating physician or chiropractor to determine the degree of your PPD. The amount of your PPD award depends on the date of injury, the results of the PPD evaluation, your age and wage.    Permanent Total Disability (PTD): If you are medically certified by a treating physician or chiropractor as permanently and totally disabled and have been granted a PTD status by your insurer, you are entitled to receive monthly benefits not to exceed 66 2/3% of your average monthly wage. The amount of your PTD payments is subject to reduction if you previously received a lump-sum PPD award.    Vocational Rehabilitation Services: You may be eligible for vocational rehabilitation services if you are unable to return to the job due to a permanent physical impairment or permanent restrictions as a result of your injury or occupational disease.    Transportation and Per Zana Reimbursement: You may be eligible for travel expenses and per zana associated with medical treatment.    Reopening: You may be able to reopen your claim if your condition worsens after claim closure.     Appeal Process: If you disagree with a written determination issued by the insurer or the insurer does not respond to your request, you may appeal to the Department of Administration, , by following the instructions contained in your determination letter. You must appeal the  determination within 70 days from the date of the determination letter at [address], [address], [place], [place] [address], or [address], [address], [place], [place] [address]. If you disagree with the  decision, you may appeal to the Department of Administration, . You must file your appeal within 30 days from the date of the  decision letter at [address], [address], [place], [place] [address], or [address], [address], [place], [place] [address]. If you disagree with a decision of an , you may file a petition for judicial review with the District Court. You must do so within 30 days of the Appeal Officer’s decision. You may be represented by an  at your own expense or you may contact the [facility] for possible representation.    [place]  for Injured Workers (NAIW): If you disagree with a  decision, you may request that NAIW represent you without charge at an  Hearing. For information regarding denial of benefits, you may contact the [facility] at: [address], [address], [place] [address], [phone], or [address], [place] [address], [phone]    To File a Complaint with the Division: If you wish to file a complaint with the  of the Division of Industrial Relations (DIR),  please contact the Workers’ Compensation Section, [address], [address], [place], [place] [address], telephone [phone], or [address], [address], [place], [place] [address], telephone [phone].    For assistance with Workers’ Compensation Issues: You may contact the [facility] Office for Consumer Health Assistance, [address], [address], [place], [place] [address], Toll Free [phone], Web site: http://[facility].nv.gov/Programs/ISNDY E-mail: sindy@[facility].[place].gov  D-2 (rev. 10/20)               __________________________________________________________________                                    _________________            Employee Name / Signature                                                                                                                            Date

## 2021-07-07 VITALS
TEMPERATURE: 98.1 F | HEART RATE: 60 BPM | HEIGHT: 62 IN | BODY MASS INDEX: 29.86 KG/M2 | SYSTOLIC BLOOD PRESSURE: 115 MMHG | RESPIRATION RATE: 16 BRPM | WEIGHT: 162.26 LBS | OXYGEN SATURATION: 96 % | DIASTOLIC BLOOD PRESSURE: 61 MMHG

## 2021-07-07 NOTE — ED PROVIDER NOTES
ED Provider Note    CHIEF COMPLAINT  Chief Complaint   Patient presents with   • T-5000 Extremity Pain     SIIS injury. Slipped on ice cubes at work 0930 this am Injured LT knee       HPI  Elvia Roberto is a 51 y.o. female who presents left knee pain.  About 9 AM this morning she was walking into a break room where she stepped on a piece of ice and nearly did the splits.  Patient states she did not fall and did not hit her knee on anything.  She immediately had pain along the medial aspect of her knee.  She states that she has been icing her knee on and off all day.  She states she is able to bear a little weight on her knee but it is painful.  Her knee does not lock.  It has not given out on her.  She does have a history of a previous fracture requiring surgical fixation of her tibial plateau quite a while ago.    REVIEW OF SYSTEMS  Pertinent positives include knee pain.. Pertinent negatives include numbness, tingling, loss of consciousness.     PAST MEDICAL HISTORY   has a past medical history of Heart burn, Indigestion, Liver disease, Lupus (HCC), Other specified disorder of intestines, and Other specified symptom associated with female genital organs.    SOCIAL HISTORY  Social History     Tobacco Use   • Smoking status: Current Every Day Smoker     Packs/day: 0.50     Years: 30.00     Pack years: 15.00     Types: Cigarettes   • Smokeless tobacco: Never Used   Vaping Use   • Vaping Use: Never used   Substance and Sexual Activity   • Alcohol use: Not Currently   • Drug use: No   • Sexual activity: Not on file       SURGICAL HISTORY   has a past surgical history that includes bowel resection laparoscopic (4/4/2012); lysis adhesions general (4/4/2012); gyn surgery (1993); gyn surgery (1993); other orthopedic surgery (2004); other orthopedic surgery (2000); other abdominal surgery (2010); laparoscopy (12/16/2012); gastroscopy (12/16/2012); primary c section; remove tonsils/adenoids,<11 y/o; and hysteroscopy with  "video operative (8/20/2013).    CURRENT MEDICATIONS  Home Medications    **Home medications have not yet been reviewed for this encounter**         ALLERGIES  Allergies   Allergen Reactions   • Asa [Aspirin] Unspecified     GI upset, bleeding, Gastric Bypass     • Nsaids Nausea     GI upset, bleeding, Gastric Bypass     • Tape Unspecified     Blisters all over, Paper tape is ok       PHYSICAL EXAM  VITAL SIGNS: /61   Pulse 60   Temp 36.7 °C (98.1 °F) (Temporal)   Resp 16   Ht 1.575 m (5' 2\")   Wt 73.6 kg (162 lb 4.1 oz)   LMP 05/04/2013   SpO2 96%   BMI 29.68 kg/m²   Constitutional: Well developed, Well nourished, mild distress.   HENT: Normocephalic, Atraumatic, .   Eyes: Conjunctiva normal, No discharge.   Cardiovascular: Normal heart rate, Normal rhythm, No murmurs, equal pulses.   Pulmonary: Normal breath sounds, No respiratory distress, No wheezing, No rales, No rhonchi.  Musculoskeletal: Patient has pain and tenderness along the medial aspect of her left knee joint.  There is no effusion.  She has negative anterior and posterior drawer test.  No pain or tenderness in the left ankle or hip.  There is no joint laxity with varus or valgus stress the knee.   skin: Warm, Dry, No erythema, No rash.   Neurologic: Alert & oriented x 3, Normal motor function,  No focal deficits noted.   Psychiatric: Affect normal, Judgment normal, Mood normal.         RADIOLOGY/PROCEDURES  DX-KNEE 3 VIEWS LEFT   Final Result      No radiographic evidence of acute traumatic injury      Healed lateral tibial plateau fracture with hardware in place          Laboratory tests  Results for orders placed or performed during the hospital encounter of 06/07/21   CBC WITH DIFFERENTIAL   Result Value Ref Range    WBC 3.3 (L) 4.8 - 10.8 K/uL    RBC 5.05 4.20 - 5.40 M/uL    Hemoglobin 13.3 12.0 - 16.0 g/dL    Hematocrit 43.4 37.0 - 47.0 %    MCV 85.9 81.4 - 97.8 fL    MCH 26.3 (L) 27.0 - 33.0 pg    MCHC 30.6 (L) 33.6 - 35.0 g/dL    " Platelet Count 74 (L) 164 - 446 K/uL    MPV 8.8 (L) 9.0 - 12.9 fL    Neutrophils-Polys 51.10 44.00 - 72.00 %    Lymphocytes 32.30 22.00 - 41.00 %    Monocytes 12.90 0.00 - 13.40 %    Eosinophils 2.80 0.00 - 6.90 %    Basophils 0.60 0.00 - 1.80 %    Immature Granulocytes 0.30 0.00 - 0.90 %    Nucleated RBC 0.00 /100 WBC    Neutrophils (Absolute) 1.66 (L) 2.00 - 7.15 K/uL    Lymphs (Absolute) 1.05 1.00 - 4.80 K/uL    Monos (Absolute) 0.42 0.00 - 0.85 K/uL    Eos (Absolute) 0.09 0.00 - 0.51 K/uL    Baso (Absolute) 0.02 0.00 - 0.12 K/uL    Immature Granulocytes (abs) 0.01 0.00 - 0.11 K/uL    NRBC (Absolute) 0.00 K/uL    Hypochromia 1+     Anisocytosis 3+ (A)     Macrocytosis 1+     Microcytosis 1+    COMP METABOLIC PANEL   Result Value Ref Range    Sodium 135 135 - 145 mmol/L    Potassium 4.0 3.6 - 5.5 mmol/L    Chloride 102 96 - 112 mmol/L    Co2 22 20 - 33 mmol/L    Anion Gap 11.0 7.0 - 16.0    Glucose 64 (L) 65 - 99 mg/dL    Bun 15 8 - 22 mg/dL    Creatinine 0.77 0.50 - 1.40 mg/dL    Calcium 9.0 8.4 - 10.2 mg/dL    AST(SGOT) 47 (H) 12 - 45 U/L    ALT(SGPT) 34 2 - 50 U/L    Alkaline Phosphatase 126 (H) 30 - 99 U/L    Total Bilirubin 0.3 0.1 - 1.5 mg/dL    Albumin 3.9 3.2 - 4.9 g/dL    Total Protein 8.5 (H) 6.0 - 8.2 g/dL    Globulin 4.6 (H) 1.9 - 3.5 g/dL    A-G Ratio 0.8 g/dL   CRP QUANTITIVE (NON-CARDIAC)   Result Value Ref Range    Stat C-Reactive Protein <0.30 0.00 - 0.75 mg/dL   CREATINE KINASE   Result Value Ref Range    CPK Total 48 0 - 154 U/L   SARS-CoV-2 PCR (24 hour In-House): Collect NP swab in VTM    Specimen: Respirate   Result Value Ref Range    SARS-CoV-2 Source NP Swab     SARS-CoV-2 by PCR NotDetected    URINALYSIS    Specimen: Urine   Result Value Ref Range    Color Yellow     Character Clear     Specific Gravity 1.020 <1.035    Ph 6.0 5.0 - 8.0    Glucose Negative Negative mg/dL    Ketones Negative Negative mg/dL    Protein Negative Negative mg/dL    Bilirubin Small (A) Negative    Nitrite Negative  Negative    Leukocyte Esterase Negative Negative    Occult Blood Negative Negative    Micro Urine Req see below    PLATELET ESTIMATE   Result Value Ref Range    Plt Estimation Decreased    MORPHOLOGY   Result Value Ref Range    RBC Morphology Present    DIFFERENTIAL COMMENT   Result Value Ref Range    Comments-Diff see below    ESTIMATED GFR   Result Value Ref Range    GFR If African American >60 >60 mL/min/1.73 m 2    GFR If Non African American >60 >60 mL/min/1.73 m 2         Medications given in the ER  Medications - No data to display    COURSE & MEDICAL DECISION MAKING  Pertinent Labs & Imaging studies reviewed. (See chart for details)  Differential includes fracture, sprain, ligamentous tear  I verified that the patient was wearing a mask and I was wearing appropriate PPE every time I entered the room. The patient's mask was on the patient at all times during my encounter except for a brief view of the oropharynx.        Medical decision making: At this point time I think she has a sprain to her knee.  We will place her in a knee immobilizer and give her crutches.  Discussed using Tylenol for pain relief.  We will have her follow-up with orthopedics she may need an MRI to rule out a ligamentous tear.     The patient will return for new or worsening symptoms and is stable at the time of discharge.    The patient is referred to a primary physician for blood pressure management, diabetic screening, and for all other preventative health concerns.        DISPOSITION:  Patient will be discharged home in stable condition.    FOLLOW UP:  Kai Jade M.D.  [address]  [place] [address]  [phone]    Schedule an appointment as soon as possible for a visit in 1 week      [place] [address]  [place] [address]  [phone]  Schedule an appointment as soon as possible for a visit in 1 week        OUTPATIENT MEDICATIONS:  Discharge Medication List as of 7/7/2021  12:14 AM      FINAL IMPRESSION  1. Sprain of left knee, unspecified ligament, initial encounter      Electronically signed by: Damien Crain M.D., 7/6/2021 10:31 PM    This record was made with a voice recognition software. The software is not perfect. I have tried to correct any grammar, spelling or context errors to the best of my ability, but errors may still remain. Interpretation of this chart should be taken in this context.

## 2021-07-07 NOTE — DISCHARGE INSTRUCTIONS
Ice to knee 20 minutes at a time 3-4 times a day. Wear the knee immobilizer and use crutches for comfort. Return the emergency department if you have increasing pain, no the tingling or unable to bear weight.

## 2021-07-13 ENCOUNTER — OCCUPATIONAL MEDICINE (OUTPATIENT)
Dept: OCCUPATIONAL MEDICINE | Facility: CLINIC | Age: 51
End: 2021-07-13
Payer: COMMERCIAL

## 2021-07-13 VITALS
HEIGHT: 62 IN | OXYGEN SATURATION: 98 % | RESPIRATION RATE: 16 BRPM | BODY MASS INDEX: 28.52 KG/M2 | HEART RATE: 82 BPM | DIASTOLIC BLOOD PRESSURE: 80 MMHG | WEIGHT: 155 LBS | SYSTOLIC BLOOD PRESSURE: 102 MMHG

## 2021-07-13 DIAGNOSIS — S86.912D KNEE STRAIN, LEFT, SUBSEQUENT ENCOUNTER: ICD-10-CM

## 2021-07-13 PROCEDURE — 99213 OFFICE O/P EST LOW 20 MIN: CPT | Performed by: NURSE PRACTITIONER

## 2021-07-13 ASSESSMENT — FIBROSIS 4 INDEX: FIB4 SCORE: 5.66

## 2021-07-13 NOTE — LETTER
"   [address],   [address] [place] [address]  Phone:  [phone] - Fax:  [phone]   [facility] Progress Report and Disability Certification  Date of Service: 7/13/2021   No Show:  No  Date / Time of Next Visit: 7/21/2021 @ 2:30 pm    Claim Information   Patient Name: Elvia Roberto  Claim Number:     Employer:   [employer] Date of Injury: 7/6/2021     Insurer / TPA: [employer]  ID / SSN:     Occupation:   Diagnosis: The encounter diagnosis was Knee strain, left, subsequent encounter.    Medical Information   Related to Industrial Injury? Yes    Subjective Complaints:  DOI 7/6/21:  About 9 AM this morning she was walking into a break room where she stepped on a piece of ice and nearly did the splits.  Patient states she did not fall and did not hit her knee on anything.  She immediately had pain along the medial aspect of her knee. Patient seen in ED x 1 for this injury, placed in knee immobilizer, xray negative for acute findings, and recommend that she follow-up with Orthopedics. Today reports symptoms unchanged.  Patient states continued pain to the inside of the knee \"feels like someone stabbing me\".  She also states that it feels like her knee wants to give out if she is not wearing the knee immobilizer.  She denies numbness or tingling in the left leg.  She is taking Tylenol which does not touch the pain.  She is also icing and elevating with varied relief.  She has not been back to work since the incident.  Orthopedics referral and physical therapy referral placed at this visit for further evaluation and management.  Plan of care discussed with patient.   Objective Findings: Left knee: No gross deformity or discoloration.  Mild medial joint line tenderness and mild patellar joint line tenderness, otherwise denies any other tenderness.  LROM, secondary to pain.  Moderate discomfort with extension. NEG " Anterior/posterior drawer test. NEG laxity with varus or valgus stress.  Antalgic gait noted.  Distal neurovascular strength and sensation intact.  Dorsalis and pedal pulses 2+.     Pre-Existing Condition(s):     Assessment:   Condition Same    Status: Discharged / Care Transfer  Permanent Disability:No    Plan: PTTransfer Care    Diagnostics:      Comments:  Follow-up in 1 week, if not seen by orthopedics  Restricted duty, per orthopedics  Orthopedics referral placed, transfer care  Physical therapy referral placed  Recommend continue with OTC Tylenol, ice application, and elevation  Weightbearing as estrada  erated with use of hinged knee brace in place while out walking or at work otherwise wean at home  Gentle range of motion and stretching exercises as tolerated    Disability Information   Status: Released to Restricted Duty    From:  2021  Through: 2021 Restrictions are: Temporary   Physical Restrictions   Sitting:    Standing:  < or = to 2 hrs/day Stooping:    Bending:      Squattin hrs/day Walking:  < or = to 2 hrs/day Climbing:    Pushing:      Pulling:    Other:    Reaching Above Shoulder (L):   Reaching Above Shoulder (R):       Reaching Below Shoulder (L):    Reaching Below Shoulder (R):      Not to exceed Weight Limits   Carrying(hrs):   Weight Limit(lb): < or = to 10 pounds Lifting(hrs):   Weight  Limit(lb): < or = to 10 pounds   Comments:      Repetitive Actions   Hands: i.e. Fine Manipulations from Grasping:     Feet: i.e. Operating Foot Controls:     Driving / Operate Machinery:     Provider Name:   DEJON Aldridge Physician Signature:  Physician Name:     Clinic Name / Location: [address],   [address] [address] Clinic Phone Number: Dept: [phone]   Appointment Time: 2:15 Pm Visit Start Time: 2:27 PM   Check-In Time:  2:26 Pm Visit Discharge Time: 3:07 pm    Original-Treating Physician or Chiropractor    Page 2-Insurer/TPA     Page 3-Employer    Page 4-Employee

## 2021-07-13 NOTE — PROGRESS NOTES
"Subjective:     Elvia Roberto is a 51 y.o. female who presents for Follow-Up (WC DOI: 7/6/21 Left knee; Same Rm 16)      DOI 7/6/21:  About 9 AM this morning she was walking into a break room where she stepped on a piece of ice and nearly did the splits.  Patient states she did not fall and did not hit her knee on anything.  She immediately had pain along the medial aspect of her knee. Patient seen in ED x 1 for this injury, placed in knee immobilizer, xray negative for acute findings, and recommend that she follow-up with Orthopedics. Today reports symptoms unchanged.  Patient states continued pain to the inside of the knee \"feels like someone stabbing me\".  She also states that it feels like her knee wants to give out if she is not wearing the knee immobilizer.  She denies numbness or tingling in the left leg.  She is taking Tylenol which does not touch the pain.  She is also icing and elevating with varied relief.  She has not been back to work since the incident.  Orthopedics referral and physical therapy referral placed at this visit for further evaluation and management.  Plan of care discussed with patient.    ROS: All systems were reviewed on intake form, form was reviewed and signed. See scanned documents in media. Pertinent positives and negatives included in HPI.    PMH: No pertinent past medical history to this problem  MEDS: Medications were reviewed in Epic  ALLERGIES:   Allergies   Allergen Reactions   • Asa [Aspirin] Unspecified     GI upset, bleeding, Gastric Bypass     • Nsaids Nausea     GI upset, bleeding, Gastric Bypass     • Tape Unspecified     Blisters all over, Paper tape is ok     SOCHX: Works as Barista at [employer]  FH: No pertinent family history to this problem       Objective:     /80   Pulse 82   Resp 16   Ht 1.575 m (5' 2\")   Wt 70.3 kg (155 lb)   LMP 05/04/2013   SpO2 98%   BMI 28.35 kg/m²     [unfilled]    Left knee: No gross deformity or discoloration.  Mild " medial joint line tenderness and mild patellar joint line tenderness, otherwise denies any other tenderness.  LROM, secondary to pain.  Moderate discomfort with extension. NEG Anterior/posterior drawer test. NEG laxity with varus or valgus stress.  Antalgic gait noted.  Distal neurovascular strength and sensation intact.  Dorsalis and pedal pulses 2+.      Assessment/Plan:       1. Knee strain, left, subsequent encounter  - REFERRAL TO ORTHOPEDICS  - REFERRAL TO PHYSICAL THERAPY    Released to Restricted Duty FROM 7/13/2021 TO 7/21/2021     Follow-up in 1 week, if not seen by orthopedics  Restricted duty, per orthopedics  Orthopedics referral placed, transfer care  Physical therapy referral placed  Recommend continue with OTC Tylenol, ice application, and elevation  Weightbearing as estrada  erated with use of hinged knee brace in place while out walking or at work otherwise wean at home  Gentle range of motion and stretching exercises as tolerated    Differential diagnosis, natural history, supportive care, and indications for immediate follow-up discussed.    Approximately 25 minutes were spent in reviewing notes, preparing for visit, obtaining history, exam and evaluation, patient counseling/education and post visit documentation/orders.

## 2021-07-21 ENCOUNTER — OCCUPATIONAL MEDICINE (OUTPATIENT)
Dept: OCCUPATIONAL MEDICINE | Facility: CLINIC | Age: 51
End: 2021-07-21
Payer: COMMERCIAL

## 2021-07-21 VITALS
HEIGHT: 62 IN | BODY MASS INDEX: 28.52 KG/M2 | TEMPERATURE: 97.9 F | SYSTOLIC BLOOD PRESSURE: 116 MMHG | RESPIRATION RATE: 14 BRPM | DIASTOLIC BLOOD PRESSURE: 64 MMHG | HEART RATE: 78 BPM | WEIGHT: 155 LBS | OXYGEN SATURATION: 97 %

## 2021-07-21 DIAGNOSIS — S86.912D KNEE STRAIN, LEFT, SUBSEQUENT ENCOUNTER: ICD-10-CM

## 2021-07-21 PROCEDURE — 99213 OFFICE O/P EST LOW 20 MIN: CPT | Performed by: NURSE PRACTITIONER

## 2021-07-21 ASSESSMENT — ENCOUNTER SYMPTOMS
PSYCHIATRIC NEGATIVE: 1
RESPIRATORY NEGATIVE: 1
CONSTITUTIONAL NEGATIVE: 1
MYALGIAS: 1
CARDIOVASCULAR NEGATIVE: 1
WEAKNESS: 1
SENSORY CHANGE: 0
TINGLING: 0

## 2021-07-21 ASSESSMENT — FIBROSIS 4 INDEX: FIB4 SCORE: 5.66

## 2021-07-21 NOTE — LETTER
[address],   [address] [place] [address]  Phone:  [phone] - Fax:  [phone]   Occupational Health [facility] Progress Report and Disability Certification  Date of Service: 7/21/2021   No Show:  No  Date / Time of Next Visit: 8/12/2021 @ 10:45am   Claim Information   Patient Name: Elvia Roberto  Claim Number:     Employer:   [employer] Date of Injury:      Insurer / TPA: [employer]  ID / SSN:     Occupation:   Diagnosis: The encounter diagnosis was Knee strain, left, subsequent encounter.    Medical Information   Related to Industrial Injury? Yes    Subjective Complaints:  DOI 7/6/21:  About 9 AM this morning she was walking into a break room where she stepped on a piece of ice and nearly did the splits.  Patient states she did not fall and did not hit her knee on anything.  She immediately had pain along the medial aspect of her knee. Patient seen in ED x 1 for this injury, placed in knee immobilizer, xray negative for acute findings, and recommend that she follow-up with Orthopedics. Today reports symptoms unchanged.  Patient states after doing exercises she had a pop and now it has been throbbing.  She also states that it feels like her knee wants to give out if she is not wearing the knee immobilizer but feels it does help support her.  She denies numbness or tingling in the left leg.  She is taking Tylenol which does not touch the pain.  She is also icing and elevating with varied relief.  She has not been back to work since the incident.  Orthopedics referral approved, she has been trying to schedule had to leave a message. Physical therapy pending scheduling.  Plan of care discussed with patient.   Objective Findings: Left knee: No gross deformity or discoloration.  Mild medial joint line tenderness and mild patellar joint line tenderness, otherwise denies any other tenderness.  LROM, secondary to pain.  Moderate discomfort with  extension. NEG Anterior/posterior drawer test. NEG laxity with varus or valgus stress.  Antalgic gait noted.  Distal neurovascular strength and sensation intact.  Dorsalis and pedal pulses 2+.   Pre-Existing Condition(s):     Assessment:   Condition Same    Status: Discharged / Care Transfer  Permanent Disability:No    Plan: Transfer CarePT    Diagnostics:      Comments:  Follow-up in 3 weeks, if not seen by orthopedics  Restricted duty, per orthopedics  Orthopedics referral approved, transfer care  Physical therapy appointments as scheduled  Recommend continue with OTC Tylenol, ice application, and elevation  Weightb  earing as tolerated with use of hinged knee brace in place while out walking or at work otherwise wean at home  Gentle range of motion and stretching exercises as tolerated       Disability Information   Status: Released to Restricted Duty    From:  2021  Through: 2021 Restrictions are: Temporary   Physical Restrictions   Sitting:    Standing:  < or = to 2 hrs/day Stooping:    Bending:      Squattin hrs/day Walking:  < or = to 2 hrs/day Climbing:    Pushing:      Pulling:    Other:    Reaching Above Shoulder (L):   Reaching Above Shoulder (R):       Reaching Below Shoulder (L):    Reaching Below Shoulder (R):      Not to exceed Weight Limits   Carrying(hrs):   Weight Limit(lb): < or = to 10 pounds Lifting(hrs):   Weight  Limit(lb): < or = to 10 pounds   Comments:      Repetitive Actions   Hands: i.e. Fine Manipulations from Grasping:     Feet: i.e. Operating Foot Controls:     Driving / Operate Machinery:     Provider Name:   DEJON Aldridge Physician Signature:  Physician Name:     Clinic Name / Location: [address],   [address] [place] [address] Clinic Phone Number: Dept: [phone]   Appointment Time: 2:30 Pm Visit Start Time: 2:21 PM   Check-In Time:  2:11 Pm Visit Discharge Time:  2:46pm   Original-Treating Physician or Chiropractor     Page 2-Insurer/TPA    Page 3-Employer    Page 4-Employee

## 2021-07-21 NOTE — PROGRESS NOTES
"Subjective:     Elvia Roberto is a 51 y.o. female who presents for Follow-Up (WC DOI 7/6/21 Lt knee, worse, rm 17)      DOI 7/6/21:  About 9 AM this morning she was walking into a break room where she stepped on a piece of ice and nearly did the splits.  Patient states she did not fall and did not hit her knee on anything.  She immediately had pain along the medial aspect of her knee. Patient seen in ED x 1 for this injury, placed in knee immobilizer, xray negative for acute findings, and recommend that she follow-up with Orthopedics. Today reports symptoms unchanged.  Patient states after doing exercises she had a pop and now it has been throbbing.  She also states that it feels like her knee wants to give out if she is not wearing the knee immobilizer but feels it does help support her.  She denies numbness or tingling in the left leg.  She is taking Tylenol which does not touch the pain.  She is also icing and elevating with varied relief.  She has not been back to work since the incident.  Orthopedics referral approved, she has been trying to schedule had to leave a message. Physical therapy pending scheduling.  Plan of care discussed with patient.    Review of Systems   Constitutional: Negative.    Respiratory: Negative.    Cardiovascular: Negative.    Musculoskeletal: Positive for joint pain and myalgias.   Skin:        Swelling and throbbing    Neurological: Positive for weakness. Negative for tingling and sensory change.   Psychiatric/Behavioral: Negative.        SOCHX: Works as  at [employer]  FH: No pertinent family history to this problem       Objective:     /64   Pulse 78   Temp 36.6 °C (97.9 °F)   Resp 14   Ht 1.575 m (5' 2\")   Wt 70.3 kg (155 lb)   LMP 05/04/2013   SpO2 97%   BMI 28.35 kg/m²     Constitutional: Patient is in no acute distress. Appears well-developed and well-nourished.   Cardiovascular: Normal rate.    Pulmonary/Chest: Effort normal. No respiratory distress. "   Neurological: Patient is alert and oriented to person, place, and time.   Skin: Skin is warm and dry.   Psychiatric: Normal mood and affect. Behavior is normal.     Left knee: No gross deformity or discoloration.  Mild medial joint line tenderness and mild patellar joint line tenderness, otherwise denies any other tenderness.  LROM, secondary to pain.  Moderate discomfort with extension. NEG Anterior/posterior drawer test. NEG laxity with varus or valgus stress.  Antalgic gait noted.  Distal neurovascular strength and sensation intact.  Dorsalis and pedal pulses 2+.    Assessment/Plan:       1. Knee strain, left, subsequent encounter    Released to Restricted Duty FROM 7/21/2021 TO 8/12/2021       Follow-up in 3 weeks, if not seen by orthopedics  Restricted duty, per orthopedics  Orthopedics referral approved, transfer care  Physical therapy appointments as scheduled  Recommend continue with OTC Tylenol, ice application, and elevation  Weightb  earing as tolerated with use of hinged knee brace in place while out walking or at work otherwise wean at home  Gentle range of motion and stretching exercises as tolerated       Differential diagnosis, natural history, supportive care, and indications for immediate follow-up discussed.    Approximately 25 minutes was spent in preparing for visit, obtaining history, exam and evaluation, patient counseling/education and post visit documentation/orders.

## 2021-07-22 ENCOUNTER — TELEPHONE (OUTPATIENT)
Dept: HEMATOLOGY ONCOLOGY | Facility: MEDICAL CENTER | Age: 51
End: 2021-07-22

## 2021-07-23 ENCOUNTER — TELEPHONE (OUTPATIENT)
Dept: HEMATOLOGY ONCOLOGY | Facility: MEDICAL CENTER | Age: 51
End: 2021-07-23

## 2021-07-23 NOTE — TELEPHONE ENCOUNTER
I called pt to remind her to do labs by Monday and she states that she wont be able to make it to her appointment on Monday. Because she hurt her foot so we got her rescheduled for 8/16.   I told her she will have to get her labs done prior to her appointment.

## 2021-07-26 ENCOUNTER — APPOINTMENT (OUTPATIENT)
Dept: HEMATOLOGY ONCOLOGY | Facility: MEDICAL CENTER | Age: 51
End: 2021-07-26
Payer: COMMERCIAL

## 2021-08-12 ENCOUNTER — OCCUPATIONAL MEDICINE (OUTPATIENT)
Dept: OCCUPATIONAL MEDICINE | Facility: CLINIC | Age: 51
End: 2021-08-12
Payer: COMMERCIAL

## 2021-08-12 ENCOUNTER — TELEPHONE (OUTPATIENT)
Dept: HEMATOLOGY ONCOLOGY | Facility: MEDICAL CENTER | Age: 51
End: 2021-08-12

## 2021-08-12 VITALS
OXYGEN SATURATION: 99 % | SYSTOLIC BLOOD PRESSURE: 116 MMHG | HEIGHT: 62 IN | HEART RATE: 105 BPM | WEIGHT: 155 LBS | BODY MASS INDEX: 28.52 KG/M2 | DIASTOLIC BLOOD PRESSURE: 78 MMHG

## 2021-08-12 DIAGNOSIS — S86.912D KNEE STRAIN, LEFT, SUBSEQUENT ENCOUNTER: ICD-10-CM

## 2021-08-12 PROCEDURE — 99213 OFFICE O/P EST LOW 20 MIN: CPT | Performed by: NURSE PRACTITIONER

## 2021-08-12 ASSESSMENT — ENCOUNTER SYMPTOMS
WEAKNESS: 1
TINGLING: 0
CARDIOVASCULAR NEGATIVE: 1
MYALGIAS: 1
PSYCHIATRIC NEGATIVE: 1
CONSTITUTIONAL NEGATIVE: 1
RESPIRATORY NEGATIVE: 1
SENSORY CHANGE: 0

## 2021-08-12 ASSESSMENT — FIBROSIS 4 INDEX: FIB4 SCORE: 5.66

## 2021-08-12 NOTE — LETTER
[address],   [address] [place] [address]  Phone:  [phone] - Fax:  [phone]   Occupational Health [facility] Progress Report and Disability Certification  Date of Service: 8/12/2021   No Show:  No  Date / Time of Next Visit: 9/10/2021 @ 10:45 AM    Claim Information   Patient Name: Elvia Roberto  Claim Number:     Employer:   [employer] Date of Injury: 7/6/2021     Insurer / TPA: [employer]  ID / SSN:     Occupation:   Diagnosis: The encounter diagnosis was Knee strain, left, subsequent encounter.    Medical Information   Related to Industrial Injury? Yes    Subjective Complaints:  DOI 7/6/21:  About 9 AM this morning she was walking into a break room where she stepped on a piece of ice and nearly did the splits.  Patient states she did not fall and did not hit her knee on anything.  She immediately had pain along the medial aspect of her knee. Patient seen in ED x 1 for this injury, placed in knee immobilizer, xray negative for acute findings, and recommend that she follow-up with Orthopedics. Today very minimal improvement.  She states that in the morning and late in the evening the knee feels achy otherwise symptoms throughout the day seem tolerable.  She states she has not been able to really wean the brace at home the knee feels like it wants to give out especially with pivoting motions.  She continues to use the knee brace while walking in order to provide continued support.  She denies numbness or tingling in the left leg.  She is taking Tylenol which does not touch the pain.  She is also icing and elevating with varied relief.  She has not been back to work since the incident.  Orthopedics referral approved unable to get an appointment scheduled.  New referral to another orthopedic office.  Physical therapy is starting next week.  Plan of care discussed with patient.   Objective Findings: Left knee: No gross deformity or  discoloration.  Mild medial joint line tenderness and mild patellar joint line tenderness, otherwise denies any other tenderness.  LROM, secondary to pain.  Moderate discomfort with extension. NEG Anterior/posterior drawer test. NEG laxity with varus or valgus stress.  Antalgic gait noted.  Distal neurovascular strength and sensation intact.  Dorsalis and pedal pulses 2+.   Pre-Existing Condition(s):     Assessment:   Condition Same    Status: Discharged / Care Transfer  Permanent Disability:No    Plan: PTTransfer CareDiagnostics    Diagnostics: MRI    Comments:  Follow-up in 4 weeks, if not seen by orthopedics  Restricted duty, per orthopedics  Orthopedics referral approved, transfer care  Physical therapy appointments as scheduled  MRI ordered  Recommend continue with OTC Tylenol, ice application, and eleva  tion  Weightbearing as tolerated with use of hinged knee brace in place while out walking or at work otherwise wean at home  Gentle range of motion and stretching exercises as tolerated    Disability Information   Status: Released to Restricted Duty    From:  2021  Through: 9/10/2021 Restrictions are: Temporary   Physical Restrictions   Sitting:    Standing:  < or = to 2 hrs/day Stooping:    Bending:      Squattin hrs/day Walking:  < or = to 2 hrs/day Climbing:    Pushing:      Pulling:    Other:    Reaching Above Shoulder (L):   Reaching Above Shoulder (R):       Reaching Below Shoulder (L):    Reaching Below Shoulder (R):      Not to exceed Weight Limits   Carrying(hrs):   Weight Limit(lb): < or = to 10 pounds Lifting(hrs):   Weight  Limit(lb): < or = to 10 pounds   Comments:      Repetitive Actions   Hands: i.e. Fine Manipulations from Grasping:     Feet: i.e. Operating Foot Controls:     Driving / Operate Machinery:     Provider Name:   DEJON Aldridge Physician Signature:  Physician Name:     Clinic Name / Location: [address],   [address]  [place]  [address] Clinic Phone Number: Dept: [phone]   Appointment Time: 10:45 Am Visit Start Time: 10:37 AM   Check-In Time:  10:30 Am Visit Discharge Time:  11:08 AM    Original-Treating Physician or Chiropractor    Page 2-Insurer/TPA    Page 3-Employer    Page 4-Employee

## 2021-08-12 NOTE — TELEPHONE ENCOUNTER
Spoke to patient and she stated that she was in a doctors appointment and would call back.     Please transfer to [place]

## 2021-08-12 NOTE — PROGRESS NOTES
"Subjective:     Elvia Roberto is a 51 y.o. female who presents for No chief complaint on file.      DOI 7/6/21:  About 9 AM this morning she was walking into a break room where she stepped on a piece of ice and nearly did the splits.  Patient states she did not fall and did not hit her knee on anything.  She immediately had pain along the medial aspect of her knee. Patient seen in ED x 1 for this injury, placed in knee immobilizer, xray negative for acute findings, and recommend that she follow-up with Orthopedics. Today very minimal improvement.  She states that in the morning and late in the evening the knee feels achy otherwise symptoms throughout the day seem tolerable.  She states she has not been able to really wean the brace at home the knee feels like it wants to give out especially with pivoting motions.  She continues to use the knee brace while walking in order to provide continued support.  She denies numbness or tingling in the left leg.  She is taking Tylenol which does not touch the pain.  She is also icing and elevating with varied relief.  She has not been back to work since the incident.  Orthopedics referral approved unable to get an appointment scheduled.  New referral to another orthopedic office.  Physical therapy is starting next week.  Plan of care discussed with patient.    Review of Systems   Constitutional: Negative.    Respiratory: Negative.    Cardiovascular: Negative.    Musculoskeletal: Positive for joint pain and myalgias.   Skin: Negative.    Neurological: Positive for weakness. Negative for tingling and sensory change.   Psychiatric/Behavioral: Negative.        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 Appears well-developed and well-nourished. "   Cardiovascular: Normal rate.    Pulmonary/Chest: Effort normal. No respiratory distress.   Neurological: Patient is alert and oriented to person, place, and time.   Skin: Skin is warm and dry.   Psychiatric: Normal mood and affect. Behavior is normal.     Left knee: No gross deformity or discoloration.  Mild medial joint line tenderness and mild patellar joint line tenderness, otherwise denies any other tenderness.  LROM, secondary to pain.  Moderate discomfort with extension. NEG Anterior/posterior drawer test. NEG laxity with varus or valgus stress.  Antalgic gait noted.  Distal neurovascular strength and sensation intact.  Dorsalis and pedal pulses 2+.    Assessment/Plan:       1. Knee strain, left, subsequent encounter  - MR-KNEE-W/O LEFT; Future  - REFERRAL TO RADIOLOGY    Released to Restricted Duty FROM 8/12/2021 TO 9/10/2021       Follow-up in 4 weeks, if not seen by orthopedics  Restricted duty, per orthopedics  Orthopedics referral approved, transfer care  Physical therapy appointments as scheduled  MRI ordered  Recommend continue with OTC Tylenol, ice application, and eleva  tion  Weightbearing as tolerated with use of hinged knee brace in place while out walking or at work otherwise wean at home  Gentle range of motion and stretching exercises as tolerated    Differential diagnosis, natural history, supportive care, and indications for immediate follow-up discussed.    Approximately 25 minutes was spent in preparing for visit, obtaining history, exam and evaluation, patient counseling/education and post visit documentation/orders.

## 2021-08-16 ENCOUNTER — TELEPHONE (OUTPATIENT)
Dept: HEMATOLOGY ONCOLOGY | Facility: MEDICAL CENTER | Age: 51
End: 2021-08-16

## 2021-08-16 NOTE — TELEPHONE ENCOUNTER
2nd attempt to reach patient,     Patients appointment needs to be rescheduled due to labs not being complete

## 2021-08-16 NOTE — TELEPHONE ENCOUNTER
LVM for patient to return call.     Patients appointment needs to be canceled due to no lab work being done.

## 2021-09-10 ENCOUNTER — OCCUPATIONAL MEDICINE (OUTPATIENT)
Dept: OCCUPATIONAL MEDICINE | Facility: CLINIC | Age: 51
End: 2021-09-10
Payer: COMMERCIAL

## 2021-09-10 VITALS
HEART RATE: 84 BPM | HEIGHT: 62 IN | DIASTOLIC BLOOD PRESSURE: 80 MMHG | OXYGEN SATURATION: 97 % | SYSTOLIC BLOOD PRESSURE: 100 MMHG | BODY MASS INDEX: 30.18 KG/M2 | RESPIRATION RATE: 14 BRPM | WEIGHT: 164 LBS | TEMPERATURE: 97.3 F

## 2021-09-10 DIAGNOSIS — S86.912D KNEE STRAIN, LEFT, SUBSEQUENT ENCOUNTER: ICD-10-CM

## 2021-09-10 PROCEDURE — 99213 OFFICE O/P EST LOW 20 MIN: CPT | Performed by: NURSE PRACTITIONER

## 2021-09-10 ASSESSMENT — ENCOUNTER SYMPTOMS
WEAKNESS: 1
SENSORY CHANGE: 0
CARDIOVASCULAR NEGATIVE: 1
RESPIRATORY NEGATIVE: 1
MYALGIAS: 1
CONSTITUTIONAL NEGATIVE: 1
PSYCHIATRIC NEGATIVE: 1
TINGLING: 0

## 2021-09-10 ASSESSMENT — FIBROSIS 4 INDEX: FIB4 SCORE: 5.66

## 2021-09-10 NOTE — PROGRESS NOTES
"Subjective:     Elvia Roberto is a 51 y.o. female who presents for Follow-Up (better - RM 18)      DOI 7/6/21:  About 9 AM this morning she was walking into a break room where she stepped on a piece of ice and nearly did the splits.  Patient states she did not fall and did not hit her knee on anything.  She immediately had pain along the medial aspect of her knee. Today reports symptoms unchanged.  Patient states that if she does not use her brace then her knee feels like it wants to give out therefor she is wearing the brace for support.  She denies numbness or tingling in the left leg.  She is using Tylenol, icing, and elevating with varied relief.  She has not been back to work since the incident.  Orthopedics is scheduled 9/20/21. Physical therapy has been helpful.  MRI is scheduled for 9/13/21. Plan of care discussed with patient.    Review of Systems   Constitutional: Negative.    Respiratory: Negative.    Cardiovascular: Negative.    Musculoskeletal: Positive for joint pain and myalgias.   Skin: Negative.    Neurological: Positive for weakness. Negative for tingling and sensory change.   Psychiatric/Behavioral: Negative.          SOCHX: Works as a  at [employer].  FH: No pertinent family history to this problem.       Objective:     /80   Pulse 84   Temp 36.3 °C (97.3 °F) (Temporal)   Resp 14   Ht 1.575 m (5' 2\")   Wt 74.4 kg (164 lb)   LMP 05/04/2013   SpO2 97%   BMI 30.00 kg/m²     Constitutional: Patient is in no acute distress. Appears well-developed and well-nourished.   Cardiovascular: Normal rate.    Pulmonary/Chest: Effort normal. No respiratory distress.   Neurological: Patient is alert and oriented to person, place, and time.   Skin: Skin is warm and dry.   Psychiatric: Normal mood and affect. Behavior is normal.     Left knee: No gross deformity or discoloration.  Mild medial joint line tenderness and mild patellar joint line tenderness, otherwise denies any other " tenderness.  LROM, secondary to pain.  Mild discomfort with extension. NEG Anterior/posterior drawer test. NEG laxity with varus or valgus stress.  Antalgic gait noted.  Distal neurovascular strength and sensation intact.  Dorsalis and pedal pulses 2+.    Assessment/Plan:       1. Knee strain, left, subsequent encounter    Released to Restricted Duty FROM 9/10/2021 TO         Follow-up in 3 weeks, if not seen by orthopedics  Restricted duty, per orthopedics  Orthopedics referral approved, transfer care 9/20/21  Physical therapy appointments as scheduled  MRI scheduled 9/13/21  Recommend continue with OTC Tylenol, ice appl  ication, and elevation  Weightbearing as tolerated with use of hinged knee brace in place while out walking or at work otherwise wean at home  Gentle range of motion and stretching exercises as tolerated to minimize frozen knee     Differential diagnosis, natural history, supportive care, and indications for immediate follow-up discussed.    Approximately 25 minutes was spent in preparing for visit, obtaining history, exam and evaluation, patient counseling/education and post visit documentation/orders.

## 2021-09-10 NOTE — LETTER
[address],   [address] [place] [address]  Phone:  [phone] - Fax:  [phone]   [facility] Progress Report and Disability Certification  Date of Service: 9/10/2021   No Show:  No  Date / Time of Next Visit:  Discharged/Care transfer to Orthopedics 9/20/21   Claim Information   Patient Name: Elvia Roberto  Claim Number:     Employer:   [employer] Date of Injury: 7/6/2021     Insurer / TPA: [employer]  ID / SSN:     Occupation:   Diagnosis: The encounter diagnosis was Knee strain, left, subsequent encounter.    Medical Information   Related to Industrial Injury? Yes    Subjective Complaints:  DOI 7/6/21:  About 9 AM this morning she was walking into a break room where she stepped on a piece of ice and nearly did the splits.  Patient states she did not fall and did not hit her knee on anything.  She immediately had pain along the medial aspect of her knee. Today reports symptoms unchanged.  Patient states that if she does not use her brace then her knee feels like it wants to give out therefor she is wearing the brace for support.  She denies numbness or tingling in the left leg.  She is using Tylenol, icing, and elevating with varied relief.  She has not been back to work since the incident.  Orthopedics is scheduled 9/20/21. Physical therapy has been helpful.  MRI is scheduled for 9/13/21. Plan of care discussed with patient.   Objective Findings: Left knee: No gross deformity or discoloration.  Mild medial joint line tenderness and mild patellar joint line tenderness, otherwise denies any other tenderness.  LROM, secondary to pain.  Mild discomfort with extension. NEG Anterior/posterior drawer test. NEG laxity with varus or valgus stress.  Antalgic gait noted.  Distal neurovascular strength and sensation intact.  Dorsalis and pedal pulses 2+.   Pre-Existing Condition(s):     Assessment:   Condition Same    Status:  Discharged / Care Transfer  Permanent Disability:No    Plan: Transfer CarePTDiagnostics    Diagnostics: MRI    Comments:  Follow-up in 3 weeks, if not seen by orthopedics  Restricted duty, per orthopedics  Orthopedics referral approved, transfer care 21  Physical therapy appointments as scheduled  MRI scheduled 21  Recommend continue with OTC Tylenol, ice appl  ication, and elevation  Weightbearing as tolerated with use of hinged knee brace in place while out walking or at work otherwise wean at home  Gentle range of motion and stretching exercises as tolerated to minimize frozen knee     Disability Information   Status: Released to Restricted Duty    From:  9/10/2021  Through:   Restrictions are: Temporary   Physical Restrictions   Sitting:    Standing:  < or = to 2 hrs/day Stooping:    Bending:      Squattin hrs/day Walking:  < or = to 2 hrs/day Climbing:  < or = to 1 hr/day  Comments:Limit to no more than 2-5 stairs  Pushing:      Pulling:    Other:    Reaching Above Shoulder (L):   Reaching Above Shoulder (R):       Reaching Below Shoulder (L):    Reaching Below Shoulder (R):      Not to exceed Weight Limits   Carrying(hrs):   Weight Limit(lb):   Lifting(hrs):   Weight  Limit(lb):     Comments:      Repetitive Actions   Hands: i.e. Fine Manipulations from Grasping:     Feet: i.e. Operating Foot Controls:     Driving / Operate Machinery:     Health Care Provider’s Original or Electronic Signature  DEJON Aldridge Health Care Provider’s Original or Electronic Signature    Alf Sloan MD       Clinic Name / Location: [address],   [address] [place] [id number] Clinic Phone Number: Dept: [phone]   Appointment Time: 10:45 Am Visit Start Time: 10:41 AM   Check-In Time:  10:38 Am Visit Discharge Time: 11:41 AM    Original-Treating Physician or Chiropractor    Page 2-Insurer/TPA    Page 3-Employer    Page 4-Employee

## 2021-11-11 ENCOUNTER — HOSPITAL ENCOUNTER (EMERGENCY)
Facility: MEDICAL CENTER | Age: 51
End: 2021-11-11
Attending: EMERGENCY MEDICINE
Payer: COMMERCIAL

## 2021-11-11 VITALS
TEMPERATURE: 98.5 F | HEIGHT: 62 IN | HEART RATE: 67 BPM | OXYGEN SATURATION: 96 % | RESPIRATION RATE: 16 BRPM | BODY MASS INDEX: 31.44 KG/M2 | WEIGHT: 170.86 LBS | DIASTOLIC BLOOD PRESSURE: 68 MMHG | SYSTOLIC BLOOD PRESSURE: 99 MMHG

## 2021-11-11 DIAGNOSIS — G43.001 MIGRAINE WITHOUT AURA AND WITH STATUS MIGRAINOSUS, NOT INTRACTABLE: ICD-10-CM

## 2021-11-11 LAB
FLUAV RNA SPEC QL NAA+PROBE: NEGATIVE
FLUBV RNA SPEC QL NAA+PROBE: NEGATIVE
RSV RNA SPEC QL NAA+PROBE: NEGATIVE
SARS-COV-2 RNA RESP QL NAA+PROBE: NOTDETECTED
SPECIMEN SOURCE: NORMAL

## 2021-11-11 PROCEDURE — C9803 HOPD COVID-19 SPEC COLLECT: HCPCS | Performed by: EMERGENCY MEDICINE

## 2021-11-11 PROCEDURE — 700111 HCHG RX REV CODE 636 W/ 250 OVERRIDE (IP): Performed by: EMERGENCY MEDICINE

## 2021-11-11 PROCEDURE — 700102 HCHG RX REV CODE 250 W/ 637 OVERRIDE(OP): Performed by: EMERGENCY MEDICINE

## 2021-11-11 PROCEDURE — A9270 NON-COVERED ITEM OR SERVICE: HCPCS | Performed by: EMERGENCY MEDICINE

## 2021-11-11 PROCEDURE — 96372 THER/PROPH/DIAG INJ SC/IM: CPT

## 2021-11-11 PROCEDURE — 0241U HCHG SARS-COV-2 COVID-19 NFCT DS RESP RNA 4 TRGT MIC: CPT

## 2021-11-11 PROCEDURE — 99284 EMERGENCY DEPT VISIT MOD MDM: CPT

## 2021-11-11 RX ORDER — PROCHLORPERAZINE EDISYLATE 5 MG/ML
10 INJECTION INTRAMUSCULAR; INTRAVENOUS ONCE
Status: COMPLETED | OUTPATIENT
Start: 2021-11-11 | End: 2021-11-11

## 2021-11-11 RX ORDER — OXYCODONE HYDROCHLORIDE AND ACETAMINOPHEN 5; 325 MG/1; MG/1
2 TABLET ORAL ONCE
Status: COMPLETED | OUTPATIENT
Start: 2021-11-11 | End: 2021-11-11

## 2021-11-11 RX ORDER — DIPHENHYDRAMINE HYDROCHLORIDE 50 MG/ML
50 INJECTION INTRAMUSCULAR; INTRAVENOUS ONCE
Status: COMPLETED | OUTPATIENT
Start: 2021-11-11 | End: 2021-11-11

## 2021-11-11 RX ADMIN — PROCHLORPERAZINE EDISYLATE 10 MG: 5 INJECTION INTRAMUSCULAR; INTRAVENOUS at 15:51

## 2021-11-11 RX ADMIN — DIPHENHYDRAMINE HYDROCHLORIDE 50 MG: 50 INJECTION INTRAMUSCULAR; INTRAVENOUS at 15:51

## 2021-11-11 RX ADMIN — OXYCODONE AND ACETAMINOPHEN 2 TABLET: 5; 325 TABLET ORAL at 16:46

## 2021-11-11 ASSESSMENT — FIBROSIS 4 INDEX: FIB4 SCORE: 5.66

## 2021-11-11 NOTE — ED NOTES
Ambulatory to ER Room 15 with c/o migraine x 3 days.  Pt has a history of migraines but reports she has not had one in a long time.  Pt is c/o generalized pain and rates her pain 7/10.  Pt is also c/o N/V.  Warm blanket provided.  Chart up for ERP.

## 2021-11-11 NOTE — Clinical Note
Elvia Pardeep was seen and treated in our emergency department on 11/11/2021.  She may return to work on 11/15/2021.       If you have any questions or concerns, please don't hesitate to call.      Sandor Charles D.O.

## 2021-11-11 NOTE — ED TRIAGE NOTES
"Chief Complaint   Patient presents with   • Migraine     present last 2 days, c/o n/v, sound sensitivity, pain in front of head, body aches     /79   Pulse 83   Temp 36.4 °C (97.6 °F) (Temporal)   Resp 16   Ht 1.575 m (5' 2\")   Wt 77.5 kg (170 lb 13.7 oz)   LMP 05/04/2013   SpO2 100%   BMI 31.25 kg/m²     Pt arrived for above concern, pt has been using tylenol at home for pain    Has this patient been vaccinated for COVID - YES  If not, would they like to be vaccinated while in the ER if eligible?  n/a  Would the patient like to speak with the ERP about the possibility of receiving the COVID vaccine today before making a decision? n/a    "

## 2021-11-11 NOTE — ED PROVIDER NOTES
"ED Provider Note    CHIEF COMPLAINT  Chief Complaint   Patient presents with   • Migraine     present last 2 days, c/o n/v, sound sensitivity, pain in front of head, body aches       HPI  Elvia Roberto is a 51 y.o. female here for an evaluation of a migraine headache.  She has sound sensitivity, and has a history of these in the past.  She states that she used to get them \"all the time, and then they \"went away\" and now they are back.  She has no fever or vomiting, she has no paresthesias or weakness.  She did not take anything prior to arrival for discomfort.  Patient states that she has no trauma, trouble walking, or vision changes.      ROS  See HPI for further details, o/w negative.     PAST MEDICAL HISTORY   has a past medical history of Heart burn, Indigestion, Liver disease, Lupus (HCC), Other specified disorder of intestines, and Other specified symptom associated with female genital organs.    SOCIAL HISTORY  Social History     Tobacco Use   • Smoking status: Current Every Day Smoker     Packs/day: 0.50     Years: 30.00     Pack years: 15.00     Types: Cigarettes   • Smokeless tobacco: Never Used   Vaping Use   • Vaping Use: Never used   Substance and Sexual Activity   • Alcohol use: Not Currently   • Drug use: No   • Sexual activity: Not on file       Family History  No bleeding disorders     SURGICAL HISTORY   has a past surgical history that includes bowel resection laparoscopic (4/4/2012); lysis adhesions general (4/4/2012); gyn surgery (1993); gyn surgery (1993); other orthopedic surgery (2004); other orthopedic surgery (2000); other abdominal surgery (2010); laparoscopy (12/16/2012); gastroscopy (12/16/2012); primary c section; remove tonsils/adenoids,<11 y/o; and hysteroscopy with video operative (8/20/2013).    CURRENT MEDICATIONS  Home Medications     Reviewed by Ana Luisa García R.N. (Registered Nurse) on 11/11/21 at 1511  Med List Status: Not Addressed   Medication Last Dose Status "   acetaminophen (TYLENOL) 500 MG Tab  Active   cyclobenzaprine (FLEXERIL) 5 mg tablet  Active   diphenhydrAMINE (BENADRYL) 25 MG Tab  Active   DULoxetine (CYMBALTA) 60 MG Cap DR Particles delayed-release capsule  Active   ferrous sulfate 325 (65 Fe) MG tablet  Active   metoclopramide (REGLAN) 10 MG Tab  Active   ondansetron (ZOFRAN ODT) 4 MG TABLET DISPERSIBLE  Active   pantoprazole (PROTONIX) 40 MG Tablet Delayed Response  Active   SUMAtriptan (IMITREX) 25 MG Tab tablet  Active   ursodiol (ACTIGALL) 300 MG Cap  Active                ALLERGIES  Allergies   Allergen Reactions   • Asa [Aspirin] Unspecified     GI upset, bleeding, Gastric Bypass     • Nsaids Nausea     GI upset, bleeding, Gastric Bypass     • Tape Unspecified     Blisters all over, Paper tape is ok       REVIEW OF SYSTEMS  See HPI for further details. Review of systems as above, otherwise all other systems are negative.     PHYSICAL EXAM  Constitutional: Well developed, well nourished. No acute distress.  HEENT: Normocephalic, atraumatic. Posterior pharynx clear and moist.  Eyes:  EOMI. Normal sclera.  Neck: Supple, Full range of motion, nontender.  Chest/Pulmonary: clear to ausculation. Symmetrical expansion.   Cardio: Regular rate and rhythm with no murmur.   Abdomen: Soft, nontender. No peritoneal signs. No guarding. No palpable masses.  Back: No CVA tenderness, nontender midline, no step offs.  Musculoskeletal: No deformity, no edema, neurovascular intact.   Neuro: Clear speech, appropriate, cooperative, cranial nerves II-XII grossly intact.  Good finger-nose, negative Romberg, steady unassisted gait  Psych: Normal mood and affect    PROCEDURES     MEDICAL RECORD  I have reviewed patient's medical record and pertinent results are listed.    COURSE & MEDICAL DECISION MAKING  I have reviewed any medical record information, laboratory studies and radiographic results as noted above.    4:32 PM  The pt has some mild relief. I will add two percocet, she  will get a ride, and will follow up with her doctor for more continued care of her migraine headaches.   She has no fever/chills.  No paresthesias or weakness.     The patient agrees to have someone come and pick her up since she is getting Compazine and Benadryl.  She knows she is not supposed to drive.      If you have had any blood pressure issues while here in the emergency department, please see your doctor for a further evaluation or work up.    Differential diagnoses include but not limited to: Typical migraine headache, subarachnoid, subdural, aneurysm    This patient presents with migraine headache.  At this time, I have counseled the patient/family regarding their medications, pain control, and follow up.  They will continue their medications, if any, as prescribed.  They will return immediately for any worsening symptoms and/or any other medical concerns.  They will see their doctor, or contact the doctor provided, in 1-2 days for follow up.       FINAL IMPRESSION  Migraine headache      Electronically signed by: Sandor Charles D.O., 11/11/2021 3:46 PM

## 2021-11-12 NOTE — PROGRESS NOTES
Report received from Jazmín day-shift RN. Pt POC discussed, pt resting comfortably in bed, all safety precautions in place.    Intermediate / Complex Repair - Final Wound Length In Cm: 1.7

## 2021-11-12 NOTE — ED NOTES
Discharge instructions & work note given to pt with verbalized understanding. Pt ambulatory out of the ER at this time.

## 2021-11-15 ENCOUNTER — APPOINTMENT (OUTPATIENT)
Dept: RADIOLOGY | Facility: MEDICAL CENTER | Age: 51
End: 2021-11-15
Attending: EMERGENCY MEDICINE
Payer: COMMERCIAL

## 2021-11-15 ENCOUNTER — HOSPITAL ENCOUNTER (EMERGENCY)
Facility: MEDICAL CENTER | Age: 51
End: 2021-11-15
Attending: EMERGENCY MEDICINE
Payer: COMMERCIAL

## 2021-11-15 VITALS
OXYGEN SATURATION: 95 % | TEMPERATURE: 98.3 F | SYSTOLIC BLOOD PRESSURE: 112 MMHG | WEIGHT: 169.97 LBS | HEIGHT: 62 IN | RESPIRATION RATE: 16 BRPM | BODY MASS INDEX: 31.28 KG/M2 | DIASTOLIC BLOOD PRESSURE: 73 MMHG | HEART RATE: 70 BPM

## 2021-11-15 DIAGNOSIS — R11.2 NON-INTRACTABLE VOMITING WITH NAUSEA, UNSPECIFIED VOMITING TYPE: ICD-10-CM

## 2021-11-15 DIAGNOSIS — K74.60 CIRRHOSIS OF LIVER WITHOUT ASCITES, UNSPECIFIED HEPATIC CIRRHOSIS TYPE (HCC): ICD-10-CM

## 2021-11-15 DIAGNOSIS — R10.9 RIGHT SIDED ABDOMINAL PAIN: Primary | ICD-10-CM

## 2021-11-15 LAB
ALBUMIN SERPL BCP-MCNC: 3.6 G/DL (ref 3.2–4.9)
ALBUMIN/GLOB SERPL: 0.6 G/DL
ALP SERPL-CCNC: 165 U/L (ref 30–99)
ALT SERPL-CCNC: 47 U/L (ref 2–50)
ANION GAP SERPL CALC-SCNC: 12 MMOL/L (ref 7–16)
APPEARANCE UR: CLEAR
AST SERPL-CCNC: 65 U/L (ref 12–45)
BASOPHILS # BLD AUTO: 0.7 % (ref 0–1.8)
BASOPHILS # BLD: 0.03 K/UL (ref 0–0.12)
BILIRUB SERPL-MCNC: 0.6 MG/DL (ref 0.1–1.5)
BILIRUB UR QL STRIP.AUTO: ABNORMAL
BUN SERPL-MCNC: 10 MG/DL (ref 8–22)
CALCIUM SERPL-MCNC: 8.9 MG/DL (ref 8.4–10.2)
CHLORIDE SERPL-SCNC: 103 MMOL/L (ref 96–112)
CO2 SERPL-SCNC: 22 MMOL/L (ref 20–33)
COLOR UR: YELLOW
CREAT SERPL-MCNC: 0.82 MG/DL (ref 0.5–1.4)
EOSINOPHIL # BLD AUTO: 0.09 K/UL (ref 0–0.51)
EOSINOPHIL NFR BLD: 2.2 % (ref 0–6.9)
ERYTHROCYTE [DISTWIDTH] IN BLOOD BY AUTOMATED COUNT: 53.5 FL (ref 35.9–50)
GLOBULIN SER CALC-MCNC: 5.7 G/DL (ref 1.9–3.5)
GLUCOSE SERPL-MCNC: 90 MG/DL (ref 65–99)
GLUCOSE UR STRIP.AUTO-MCNC: NEGATIVE MG/DL
HCT VFR BLD AUTO: 45.6 % (ref 37–47)
HGB BLD-MCNC: 15 G/DL (ref 12–16)
IMM GRANULOCYTES # BLD AUTO: 0.01 K/UL (ref 0–0.11)
IMM GRANULOCYTES NFR BLD AUTO: 0.2 % (ref 0–0.9)
KETONES UR STRIP.AUTO-MCNC: ABNORMAL MG/DL
LEUKOCYTE ESTERASE UR QL STRIP.AUTO: NEGATIVE
LIPASE SERPL-CCNC: 29 U/L (ref 7–58)
LYMPHOCYTES # BLD AUTO: 1.12 K/UL (ref 1–4.8)
LYMPHOCYTES NFR BLD: 27.9 % (ref 22–41)
MCH RBC QN AUTO: 33.5 PG (ref 27–33)
MCHC RBC AUTO-ENTMCNC: 32.9 G/DL (ref 33.6–35)
MCV RBC AUTO: 101.8 FL (ref 81.4–97.8)
MICRO URNS: ABNORMAL
MONOCYTES # BLD AUTO: 0.39 K/UL (ref 0–0.85)
MONOCYTES NFR BLD AUTO: 9.7 % (ref 0–13.4)
NEUTROPHILS # BLD AUTO: 2.38 K/UL (ref 2–7.15)
NEUTROPHILS NFR BLD: 59.3 % (ref 44–72)
NITRITE UR QL STRIP.AUTO: NEGATIVE
NRBC # BLD AUTO: 0 K/UL
NRBC BLD-RTO: 0 /100 WBC
PH UR STRIP.AUTO: 6.5 [PH] (ref 5–8)
PLATELET # BLD AUTO: 94 K/UL (ref 164–446)
PMV BLD AUTO: 9.9 FL (ref 9–12.9)
POTASSIUM SERPL-SCNC: 4.2 MMOL/L (ref 3.6–5.5)
PROT SERPL-MCNC: 9.3 G/DL (ref 6–8.2)
PROT UR QL STRIP: NEGATIVE MG/DL
RBC # BLD AUTO: 4.48 M/UL (ref 4.2–5.4)
RBC UR QL AUTO: NEGATIVE
SODIUM SERPL-SCNC: 137 MMOL/L (ref 135–145)
SP GR UR STRIP.AUTO: 1.02
WBC # BLD AUTO: 4 K/UL (ref 4.8–10.8)

## 2021-11-15 PROCEDURE — 700102 HCHG RX REV CODE 250 W/ 637 OVERRIDE(OP): Performed by: EMERGENCY MEDICINE

## 2021-11-15 PROCEDURE — 96376 TX/PRO/DX INJ SAME DRUG ADON: CPT

## 2021-11-15 PROCEDURE — 99285 EMERGENCY DEPT VISIT HI MDM: CPT

## 2021-11-15 PROCEDURE — 74177 CT ABD & PELVIS W/CONTRAST: CPT

## 2021-11-15 PROCEDURE — 96374 THER/PROPH/DIAG INJ IV PUSH: CPT | Mod: XU

## 2021-11-15 PROCEDURE — 700111 HCHG RX REV CODE 636 W/ 250 OVERRIDE (IP): Performed by: EMERGENCY MEDICINE

## 2021-11-15 PROCEDURE — 83690 ASSAY OF LIPASE: CPT

## 2021-11-15 PROCEDURE — 700105 HCHG RX REV CODE 258: Performed by: EMERGENCY MEDICINE

## 2021-11-15 PROCEDURE — 80053 COMPREHEN METABOLIC PANEL: CPT

## 2021-11-15 PROCEDURE — 81003 URINALYSIS AUTO W/O SCOPE: CPT

## 2021-11-15 PROCEDURE — 96375 TX/PRO/DX INJ NEW DRUG ADDON: CPT

## 2021-11-15 PROCEDURE — 700117 HCHG RX CONTRAST REV CODE 255: Performed by: EMERGENCY MEDICINE

## 2021-11-15 PROCEDURE — 85025 COMPLETE CBC W/AUTO DIFF WBC: CPT

## 2021-11-15 PROCEDURE — A9270 NON-COVERED ITEM OR SERVICE: HCPCS | Performed by: EMERGENCY MEDICINE

## 2021-11-15 RX ORDER — SODIUM CHLORIDE 9 MG/ML
1000 INJECTION, SOLUTION INTRAVENOUS ONCE
Status: COMPLETED | OUTPATIENT
Start: 2021-11-15 | End: 2021-11-15

## 2021-11-15 RX ORDER — METOCLOPRAMIDE 10 MG/1
10 TABLET ORAL ONCE
Status: COMPLETED | OUTPATIENT
Start: 2021-11-15 | End: 2021-11-15

## 2021-11-15 RX ORDER — ONDANSETRON 2 MG/ML
4 INJECTION INTRAMUSCULAR; INTRAVENOUS ONCE
Status: COMPLETED | OUTPATIENT
Start: 2021-11-15 | End: 2021-11-15

## 2021-11-15 RX ORDER — ALUMINA, MAGNESIA, AND SIMETHICONE 2400; 2400; 240 MG/30ML; MG/30ML; MG/30ML
10 SUSPENSION ORAL ONCE
Status: COMPLETED | OUTPATIENT
Start: 2021-11-15 | End: 2021-11-15

## 2021-11-15 RX ORDER — METOCLOPRAMIDE 10 MG/1
10 TABLET ORAL EVERY 8 HOURS PRN
Qty: 14 TABLET | Refills: 0 | Status: SHIPPED | OUTPATIENT
Start: 2021-11-15 | End: 2022-07-10

## 2021-11-15 RX ORDER — MORPHINE SULFATE 4 MG/ML
4 INJECTION, SOLUTION INTRAMUSCULAR; INTRAVENOUS ONCE
Status: COMPLETED | OUTPATIENT
Start: 2021-11-15 | End: 2021-11-15

## 2021-11-15 RX ADMIN — SODIUM CHLORIDE 1000 ML: 9 INJECTION, SOLUTION INTRAVENOUS at 15:27

## 2021-11-15 RX ADMIN — MORPHINE SULFATE 4 MG: 4 INJECTION INTRAVENOUS at 15:27

## 2021-11-15 RX ADMIN — ALUMINUM HYDROXIDE, MAGNESIUM HYDROXIDE, AND DIMETHICONE 10 ML: 400; 400; 40 SUSPENSION ORAL at 18:12

## 2021-11-15 RX ADMIN — ONDANSETRON 4 MG: 2 INJECTION INTRAMUSCULAR; INTRAVENOUS at 15:27

## 2021-11-15 RX ADMIN — IOHEXOL 100 ML: 350 INJECTION, SOLUTION INTRAVENOUS at 17:43

## 2021-11-15 RX ADMIN — METOCLOPRAMIDE 10 MG: 10 TABLET ORAL at 18:21

## 2021-11-15 RX ADMIN — MORPHINE SULFATE 4 MG: 4 INJECTION INTRAVENOUS at 17:04

## 2021-11-15 ASSESSMENT — ENCOUNTER SYMPTOMS
CHILLS: 0
FEVER: 0
SORE THROAT: 0

## 2021-11-15 ASSESSMENT — FIBROSIS 4 INDEX: FIB4 SCORE: 5.66

## 2021-11-15 ASSESSMENT — PAIN DESCRIPTION - DESCRIPTORS: DESCRIPTORS: STABBING

## 2021-11-15 NOTE — ED TRIAGE NOTES
"Pt ambulates to triage.    Chief Complaint   Patient presents with   • N/V   • Flank Pain     Pain in right mid quadrent radiates to back, Emesis x3 today.    /74   Pulse 77   Temp 36.7 °C (98.1 °F) (Temporal)   Resp 16   Ht 1.575 m (5' 2\")   Wt 77.1 kg (169 lb 15.6 oz)   LMP 05/04/2013   SpO2 97%   BMI 31.09 kg/m²     +COVID vaccine    Pt updated on triage process and asked to inform RN of any changes while waiting in lobby.  "

## 2021-11-15 NOTE — ED PROVIDER NOTES
ED Provider Note     11/15/2021  3:07 PM    Means of Arrival: Walk In  History obtained by: patient  Limitations: None  PCP: Cipriano Gomez  CODE STATUS: Full    CHIEF COMPLAINT  Chief Complaint   Patient presents with   • N/V   • Flank Pain       HPI  Elvia Roberto is a 51 y.o. female with history of multiple abdominal surgeries including Kristi-en-Y, cholecystectomy, laparoscopy who presents with concerns of acute onset right lower quadrant pain starting last night.  Pain persisted through the night.  Today she has been vomiting and unable to tolerate any oral intake.  Pain is constant and sharp when pushing on the area.  Nothing seems to relieve it.  No back pain.  No pain with urination.  No blood in urine.  No history of kidney stones.  No fevers.  No diarrhea.  She is still passing flatus.    REVIEW OF SYSTEMS  Review of Systems   Constitutional: Negative for chills and fever.   HENT: Negative for congestion and sore throat.    Cardiovascular: Negative for chest pain.   All other systems reviewed and are negative.    See HPI for further details.    PAST MEDICAL HISTORY   has a past medical history of Heart burn, Indigestion, Liver disease, Lupus (HCC), Other specified disorder of intestines, and Other specified symptom associated with female genital organs.    FAMILY HISTORY  No family history on file.    SOCIAL HISTORY  Social History     Tobacco Use   • Smoking status: Current Every Day Smoker     Packs/day: 0.50     Years: 30.00     Pack years: 15.00     Types: Cigarettes   • Smokeless tobacco: Never Used   Vaping Use   • Vaping Use: Never used   Substance and Sexual Activity   • Alcohol use: Not Currently   • Drug use: No   • Sexual activity: Not on file       SURGICAL HISTORY   has a past surgical history that includes bowel resection laparoscopic (4/4/2012); lysis adhesions general (4/4/2012); gyn surgery (1993); gyn surgery (1993); other orthopedic surgery (2004); other orthopedic surgery (2000); other  "abdominal surgery (2010); laparoscopy (12/16/2012); gastroscopy (12/16/2012); primary c section; remove tonsils/adenoids,<10 y/o; and hysteroscopy with video operative (8/20/2013).    CURRENT MEDICATIONS  Home Medications     Reviewed by Gi Harris R.N. (Registered Nurse) on 11/15/21 at 1355  Med List Status: Not Addressed   Medication Last Dose Status   acetaminophen (TYLENOL) 500 MG Tab  Active   cyclobenzaprine (FLEXERIL) 5 mg tablet  Active   diphenhydrAMINE (BENADRYL) 25 MG Tab  Active   DULoxetine (CYMBALTA) 60 MG Cap DR Particles delayed-release capsule  Active   ferrous sulfate 325 (65 Fe) MG tablet  Active   metoclopramide (REGLAN) 10 MG Tab  Active   ondansetron (ZOFRAN ODT) 4 MG TABLET DISPERSIBLE  Active   pantoprazole (PROTONIX) 40 MG Tablet Delayed Response  Active   SUMAtriptan (IMITREX) 25 MG Tab tablet  Active   ursodiol (ACTIGALL) 300 MG Cap  Active                ALLERGIES  Allergies   Allergen Reactions   • Asa [Aspirin] Unspecified     GI upset, bleeding, Gastric Bypass     • Nsaids Nausea     GI upset, bleeding, Gastric Bypass     • Tape Unspecified     Blisters all over, Paper tape is ok       PHYSICAL EXAM  VITAL SIGNS: /73   Pulse 70   Temp 36.8 °C (98.3 °F) (Temporal)   Resp 16   Ht 1.575 m (5' 2\")   Wt 77.1 kg (169 lb 15.6 oz)   LMP 05/04/2013   SpO2 95%   BMI 31.09 kg/m²     Pulse ox interpretation: I interpret this pulse ox as normal.  Constitutional: Alert in no apparent distress.  HENT: No signs of trauma, Bilateral external ears normal, Nose normal.   Eyes: Pupils are equal, Conjunctiva normal, Non-icteric.   Neck: Normal range of motion,  No stridor.   Cardiovascular: Regular rate and rhythm, no murmurs. Symmetric distal pulses. No cyanosis of extremities. No peripheral edema of extremities.  Thorax & Lungs: Normal breath sounds, No respiratory distress, No wheezing, No chest tenderness.   Abdomen: Soft, tenderness at RLQ with mild guarding to palpation.  Skin: " Warm, Dry, No erythema, No rash.   Back: No midline bony tenderness, No CVA tenderness.   Musculoskeletal: Good range of motion in all major joints. No tenderness to palpation or major deformities noted.   Neurologic: Alert , Normal motor function, Normal sensory function, No focal deficits noted.   Psychiatric: Affect normal, Judgment normal, Mood normal.   Physical Exam      DIAGNOSTIC STUDIES / PROCEDURES    LABS  Pertinent Labs & Imaging studies reviewed. (See chart for details)    RADIOLOGY  Pertinent Labs & Imaging studies reviewed. (See chart for details)    COURSE & MEDICAL DECISION MAKING  Pertinent Labs & Imaging studies reviewed. (See chart for details)    3:07 PM This is an emergent evaluation of a  51 y.o. female with history of multiple abdominal surgeries who presents with concerns of right lower quadrant pain. Physical exam significant for tenderness at RLQ  The differential diagnosis includes but is not limited to appendicitis, bowel obstruction, UTI, ureteral stone, constipation, pancreatitis. Ordered for cbc, cmp, lipase, UA, CT abdomen and pelvis to evaluate. Patient will be treated with morphine and zofran for her symptoms. I have also ordered a 1L NS fluid bolus because she is not tolerating oral intake.     6:12 PM  White count 4, this is near baseline.  No anemia.  Metabolic panel with increased AST and alk phos which is also chronic.  She does have a history of cirrhosis.  The CT scan does not show any signs of obstruction or appendicitis.  There are findings consistent with cirrhosis.  There is no ascites.  Indication for any further diagnostics.  Unclear exact origin of pain.  I did review CT scan and there is stool and gas in the right side of the colon and this could be the cause of her pain.  She was given Maalox.  I believe she is safe for discharge at this time. At time of discharge she was still having some nausea, but tolerating oral intake. Given Po dose of reglan prior to  discharge. She has taken this before and asked for prescription. This was sent to her pharmacy.      The patient will return for worsening symptoms and is stable at the time of discharge. The patient verbalizes understanding. Guidance was provided on appropriate use of medications including driving under the influence, overdose, and side effects.         FINAL IMPRESSION    ICD-10-CM   1. Right sided abdominal pain  R10.9   2. Cirrhosis of liver without ascites, unspecified hepatic cirrhosis type (HCC) Chronic K74.60   3. Non-intractable vomiting with nausea, unspecified vomiting type  R11.2            This dictation was created using voice recognition software. The accuracy of the dictation is limited to the abilities of the software. I expect there may be some errors of grammar and possibly content. The nursing notes were reviewed and certain aspects of this information were incorporated into this note.    Electronically signed by: Mickey Greenwood II, M.D., 11/15/2021 3:07 PM

## 2021-11-16 NOTE — ED NOTES
Pt medicated per MAR and provided with oral challenge. Pt declines questions of care plan at this time.

## 2021-11-16 NOTE — ED NOTES
Pt did well with oral challenge. ERP updated and states that it is safe for d/c. Pt provided with education on prescriptions and follow up. Pt ambulated out of ER without complication.

## 2021-11-17 ENCOUNTER — APPOINTMENT (OUTPATIENT)
Dept: RADIOLOGY | Facility: MEDICAL CENTER | Age: 51
End: 2021-11-17
Attending: EMERGENCY MEDICINE
Payer: COMMERCIAL

## 2021-11-17 ENCOUNTER — HOSPITAL ENCOUNTER (EMERGENCY)
Facility: MEDICAL CENTER | Age: 51
End: 2021-11-18
Attending: EMERGENCY MEDICINE
Payer: COMMERCIAL

## 2021-11-17 DIAGNOSIS — M25.562 ACUTE PAIN OF LEFT KNEE: ICD-10-CM

## 2021-11-17 DIAGNOSIS — R07.89 OTHER CHEST PAIN: ICD-10-CM

## 2021-11-17 LAB
ANION GAP SERPL CALC-SCNC: 7 MMOL/L (ref 7–16)
BASOPHILS # BLD AUTO: 0.3 % (ref 0–1.8)
BASOPHILS # BLD: 0.02 K/UL (ref 0–0.12)
BUN SERPL-MCNC: 20 MG/DL (ref 8–22)
CALCIUM SERPL-MCNC: 9.1 MG/DL (ref 8.4–10.2)
CHLORIDE SERPL-SCNC: 106 MMOL/L (ref 96–112)
CO2 SERPL-SCNC: 24 MMOL/L (ref 20–33)
CREAT SERPL-MCNC: 0.68 MG/DL (ref 0.5–1.4)
EKG IMPRESSION: NORMAL
EOSINOPHIL # BLD AUTO: 0.01 K/UL (ref 0–0.51)
EOSINOPHIL NFR BLD: 0.1 % (ref 0–6.9)
ERYTHROCYTE [DISTWIDTH] IN BLOOD BY AUTOMATED COUNT: 51.7 FL (ref 35.9–50)
GLUCOSE SERPL-MCNC: 103 MG/DL (ref 65–99)
HCT VFR BLD AUTO: 44.1 % (ref 37–47)
HGB BLD-MCNC: 14.6 G/DL (ref 12–16)
IMM GRANULOCYTES # BLD AUTO: 0.03 K/UL (ref 0–0.11)
IMM GRANULOCYTES NFR BLD AUTO: 0.4 % (ref 0–0.9)
LYMPHOCYTES # BLD AUTO: 1.48 K/UL (ref 1–4.8)
LYMPHOCYTES NFR BLD: 20.6 % (ref 22–41)
MCH RBC QN AUTO: 33 PG (ref 27–33)
MCHC RBC AUTO-ENTMCNC: 33.1 G/DL (ref 33.6–35)
MCV RBC AUTO: 99.5 FL (ref 81.4–97.8)
MONOCYTES # BLD AUTO: 0.63 K/UL (ref 0–0.85)
MONOCYTES NFR BLD AUTO: 8.8 % (ref 0–13.4)
NEUTROPHILS # BLD AUTO: 5.01 K/UL (ref 2–7.15)
NEUTROPHILS NFR BLD: 69.8 % (ref 44–72)
NRBC # BLD AUTO: 0 K/UL
NRBC BLD-RTO: 0 /100 WBC
PLATELET # BLD AUTO: 100 K/UL (ref 164–446)
PMV BLD AUTO: 10.3 FL (ref 9–12.9)
POTASSIUM SERPL-SCNC: 5.3 MMOL/L (ref 3.6–5.5)
RBC # BLD AUTO: 4.43 M/UL (ref 4.2–5.4)
SODIUM SERPL-SCNC: 137 MMOL/L (ref 135–145)
TROPONIN T SERPL-MCNC: <6 NG/L (ref 6–19)
WBC # BLD AUTO: 7.2 K/UL (ref 4.8–10.8)

## 2021-11-17 PROCEDURE — A9270 NON-COVERED ITEM OR SERVICE: HCPCS | Performed by: EMERGENCY MEDICINE

## 2021-11-17 PROCEDURE — 99284 EMERGENCY DEPT VISIT MOD MDM: CPT

## 2021-11-17 PROCEDURE — 84484 ASSAY OF TROPONIN QUANT: CPT

## 2021-11-17 PROCEDURE — 80048 BASIC METABOLIC PNL TOTAL CA: CPT

## 2021-11-17 PROCEDURE — 71045 X-RAY EXAM CHEST 1 VIEW: CPT

## 2021-11-17 PROCEDURE — 85025 COMPLETE CBC W/AUTO DIFF WBC: CPT

## 2021-11-17 PROCEDURE — 73564 X-RAY EXAM KNEE 4 OR MORE: CPT | Mod: LT

## 2021-11-17 PROCEDURE — 93005 ELECTROCARDIOGRAM TRACING: CPT

## 2021-11-17 PROCEDURE — 700102 HCHG RX REV CODE 250 W/ 637 OVERRIDE(OP): Performed by: EMERGENCY MEDICINE

## 2021-11-17 PROCEDURE — 93005 ELECTROCARDIOGRAM TRACING: CPT | Performed by: EMERGENCY MEDICINE

## 2021-11-17 RX ORDER — HYDROCODONE BITARTRATE AND ACETAMINOPHEN 5; 325 MG/1; MG/1
1 TABLET ORAL ONCE
Status: COMPLETED | OUTPATIENT
Start: 2021-11-17 | End: 2021-11-17

## 2021-11-17 RX ORDER — HYDROCODONE BITARTRATE AND ACETAMINOPHEN 5; 325 MG/1; MG/1
1 TABLET ORAL EVERY 4 HOURS PRN
Qty: 10 TABLET | Refills: 0 | Status: SHIPPED | OUTPATIENT
Start: 2021-11-17 | End: 2021-11-20

## 2021-11-17 RX ADMIN — HYDROCODONE BITARTRATE AND ACETAMINOPHEN 1 TABLET: 5; 325 TABLET ORAL at 21:48

## 2021-11-17 ASSESSMENT — FIBROSIS 4 INDEX: FIB4 SCORE: 5.24

## 2021-11-18 VITALS
HEART RATE: 58 BPM | WEIGHT: 172.4 LBS | SYSTOLIC BLOOD PRESSURE: 111 MMHG | RESPIRATION RATE: 16 BRPM | OXYGEN SATURATION: 95 % | DIASTOLIC BLOOD PRESSURE: 62 MMHG | BODY MASS INDEX: 31.73 KG/M2 | TEMPERATURE: 97.5 F | HEIGHT: 62 IN

## 2021-11-18 LAB — TROPONIN T SERPL-MCNC: <6 NG/L (ref 6–19)

## 2021-11-18 ASSESSMENT — HEART SCORE: AGE: 45-64

## 2021-11-18 NOTE — ED TRIAGE NOTES
"Chief Complaint   Patient presents with   • Knee Pain     Patient walk-in with single crutch. Per pt, had steroid injection in LEFT knee yesterday. This morning when woke up, pain to L knee. Pt called clinic who advised to ice & elevation but has not alleviated the pain. Pt took Tylenol approx 2 hours ago.   • Chest Pain     Pt states R sternal border chest pain, thinks it's anxiety secondary to knee pain.       /71   Pulse 75   Temp 36.2 °C (97.2 °F) (Temporal)   Resp 16   Ht 1.575 m (5' 2\")   Wt 78.2 kg (172 lb 6.4 oz)   SpO2 99%  RA    Has this patient been vaccinated for COVID:  YES  "

## 2021-11-18 NOTE — DISCHARGE INSTRUCTIONS
We could not find a dangerous cause of your pain.  Please take Norco for the knee pain and call Dr. Florez tomorrow to notify them of your pain and that you came to the ER.  See him immediately or return for fever redness or warmth of the knee.  Return for changing chest pain, leg swelling, shortness of breath or ill appearance.  This is not expected.  Use crutches as needed.

## 2021-11-18 NOTE — ED NOTES
Patient is stable for discharge at this time, anticipatory guidance provided, close follow-up is encouraged, and ED return instructions have been detailed. Patient and family are both agreeable to the disposition and plan and discharged home in ambulatory state and in good condition.     Rx education provided, Pt verbalized understanding; Narcotic paper signed;

## 2021-11-18 NOTE — ED PROVIDER NOTES
ED Provider Note    CHIEF COMPLAINT  Chief Complaint   Patient presents with   • Knee Pain     Patient walk-in with single crutch. Per pt, had steroid injection in LEFT knee yesterday. This morning when woke up, pain to L knee. Pt called clinic who advised to ice & elevation but has not alleviated the pain. Pt took Tylenol approx 2 hours ago.   • Chest Pain     Pt states R sternal border chest pain, thinks it's anxiety secondary to knee pain.       HPI  Elvia Roberto is a 51 y.o. female who presents with severe left knee pain.  The patient received a steroid injection in the knee yesterday morning with an anesthetic and when the anesthetic wore off her pain was much more severe than the pain prior to the injection.  She was injected in the suprapatellar space.  She sustained a work-related knee injury in July.  She has a prior lateral tibial plateau fracture that was treated with open reduction and internal fixation in 2004.  Today she was tearful and having severe knee pain and developed a 30-minute recurrent episodes of right anterior chest pain.  This was nonradiating and not associated with nausea shortness of breath or diaphoresis.  This is the worst chest pain she is ever had.  No hypertension diabetes dyslipidemia but she does smoke.  No family history.  No prior stress test.  No gout.  No immunosuppressive    REVIEW OF SYSTEMS  Pertinent positives include: Left knee pain, decreased range of motion after injection, chest pain.  Pertinent negatives include: Shortness of breath, leg swelling, calf pain, DVT or PE, weakness, numbness.  Fever, flulike symptoms, redness, nausea, body aches  10+ systems reviewed and negative.      PAST MEDICAL HISTORY  Past Medical History:   Diagnosis Date   • Heart burn    • Indigestion    • Liver disease     Auto immune hepatitis   • Lupus (HCC)    • Other specified disorder of intestines    • Other specified symptom associated with female genital organs     tubiligation        FAMILY HISTORY  No Coronary artery disease    SOCIAL HISTORY  Social History     Tobacco Use   • Smoking status: Current Every Day Smoker     Packs/day: 0.40     Years: 30.00     Pack years: 12.00     Types: Cigarettes   • Smokeless tobacco: Never Used   Vaping Use   • Vaping Use: Never used   Substance Use Topics   • Alcohol use: Not Currently   • Drug use: No     Social History     Substance and Sexual Activity   Drug Use No       SURGICAL HISTORY  Past Surgical History:   Procedure Laterality Date   • HYSTEROSCOPY WITH VIDEO OPERATIVE  2013    Performed by Robin Frederick M.D. at SURGERY [facility]   • LAPAROSCOPY  2012    Performed by Hayes Lugo M.D. at SURGERY [facility]   • GASTROSCOPY  2012    Performed by Hayes Lugo M.D. at SURGERY [facility]   • BOWEL RESECTION LAPAROSCOPIC  2012    Performed by HAYES LUGO at SURGERY [facility]   • LYSIS ADHESIONS GENERAL  2012    Performed by HAYES LUGO at SURGERY [facility]   • OTHER ABDOMINAL SURGERY      Kristi En Y gastric bypass   • OTHER ORTHOPEDIC SURGERY      Left ankle surgery   • OTHER ORTHOPEDIC SURGERY      Left knee surgery   • GYN SURGERY      tubal ligation   • GYN SURGERY         • PB REMOVE TONSILS/ADENOIDS,<11 Y/O     • PRIMARY C SECTION             CURRENT MEDICATIONS    Current Outpatient Medications   Medication Sig Dispense Refill   • metoclopramide (REGLAN) 10 MG Tab Take 1 Tablet by mouth every 8 hours as needed (nausea). Indications: Nausea and Vomiting 14 Tablet 0   • cyclobenzaprine (FLEXERIL) 5 mg tablet Take 1-2 Tablets by mouth 3 times a day as needed. 30 tablet 0   • SUMAtriptan (IMITREX) 25 MG Tab tablet Take 1-2 Tablets by mouth one time as needed for Migraine for up to 1 dose. 10 tablet 0   • ferrous sulfate 325 (65 Fe) MG tablet Take 1 tablet by mouth every day. 90 tablet 1   • acetaminophen (TYLENOL) 500 MG Tab Take 1,000 mg by mouth  "2 times a day as needed (Pain, Headache). 2 tablets = 1,000 mg.      • ursodiol (ACTIGALL) 300 MG Cap Take 300 mg by mouth 3 times a day.     • pantoprazole (PROTONIX) 40 MG Tablet Delayed Response Take 1 Tab by mouth every day. 30 Tab 0   • ondansetron (ZOFRAN ODT) 4 MG TABLET DISPERSIBLE Take 1 Tab by mouth every 6 hours as needed for Nausea. 20 Tab 0   • DULoxetine (CYMBALTA) 60 MG Cap DR Particles delayed-release capsule Take 60 mg by mouth every day.     • diphenhydrAMINE (BENADRYL) 25 MG Tab Take 50 mg by mouth 2 times a day as needed (Allergies). 2 tablets = 50 mg.         ALLERGIES  Allergies   Allergen Reactions   • Asa [Aspirin] Unspecified     GI upset, bleeding, Gastric Bypass     • Nsaids Nausea     GI upset, bleeding, Gastric Bypass     • Tape Unspecified     Blisters all over, Paper tape is ok       PHYSICAL EXAM  VITAL SIGNS: /71   Pulse 75   Temp 36.2 °C (97.2 °F) (Temporal)   Resp 16   Ht 1.575 m (5' 2\")   Wt 78.2 kg (172 lb 6.4 oz)   LMP 05/04/2013   SpO2 99%   BMI 31.53 kg/m²   Reviewed and afebrile, no hypoxia room air  Constitutional: Well developed, Well nourished, well-appearing, appears to be in pain from the knee.  HENT: Normocephalic, atraumatic, bilateral external ears normal, Wearing mask.   Eyes: PERRLA, conjunctiva pink, no scleral icterus.   Cardiovascular: Regular S1-S2 without murmur, rub, gallop.  No dependent edema or calf tenderness.  Right parasternal chest tenderness that is not exactly reproduce symptoms  Respiratory: No rales, rhonchi, wheeze or cough.  Gastrointestinal: Soft, nontender, nondistended, no organomegaly.  Skin: No erythema, no rash.  No warmth over the knee  Genitourinary:  No costovertebral angle tenderness.   Neurologic: Alert & oriented x 3, cranial nerves 2-12 intact by passive exam.  No focal deficit noted.  Psychiatric: Affect normal, Judgment normal, Mood normal.  Musculoskeletal: No obvious left knee effusion.  Diffuse joint line patellar " and prepatellar tenderness.  Range of motion limited to 20 degrees of flexion.  Full active extension preserved.    DIFFERENTIAL DIAGNOSIS:  Gout, septic arthritis, myocardial ischemia, anxiety, doubt pneumonia, doubt pneumothorax, doubt PE, doubt aortic dissection.    EKG  EKG Interpretation 11:54 PM    Interpreted by me.  Indication: Chest pain    Rhythm: normal sinus   Rate: Normal at 65  Axis: normal  Ectopy: none  Conduction: normal  ST/T Waves: no acute change  Q Waves: none  R Wave progression: normal    Clinical Impression: Normal sinus rhythm    RADIOLOGY/PROCEDURES  DX-KNEE COMPLETE 4+ LEFT   Final Result         1.  No acute traumatic bony injury.      DX-CHEST-PORTABLE (1 VIEW)   Final Result         1.  No acute cardiopulmonary disease.          LABORATORY:  Results for orders placed or performed during the hospital encounter of 11/17/21   CBC WITH DIFFERENTIAL   Result Value Ref Range    WBC 7.2 4.8 - 10.8 K/uL    RBC 4.43 4.20 - 5.40 M/uL    Hemoglobin 14.6 12.0 - 16.0 g/dL    Hematocrit 44.1 37.0 - 47.0 %    MCV 99.5 (H) 81.4 - 97.8 fL    MCH 33.0 27.0 - 33.0 pg    MCHC 33.1 (L) 33.6 - 35.0 g/dL    RDW 51.7 (H) 35.9 - 50.0 fL    Platelet Count 100 (L) 164 - 446 K/uL    MPV 10.3 9.0 - 12.9 fL    Neutrophils-Polys 69.80 44.00 - 72.00 %    Lymphocytes 20.60 (L) 22.00 - 41.00 %    Monocytes 8.80 0.00 - 13.40 %    Eosinophils 0.10 0.00 - 6.90 %    Basophils 0.30 0.00 - 1.80 %    Immature Granulocytes 0.40 0.00 - 0.90 %    Nucleated RBC 0.00 /100 WBC    Neutrophils (Absolute) 5.01 2.00 - 7.15 K/uL    Lymphs (Absolute) 1.48 1.00 - 4.80 K/uL    Monos (Absolute) 0.63 0.00 - 0.85 K/uL    Eos (Absolute) 0.01 0.00 - 0.51 K/uL    Baso (Absolute) 0.02 0.00 - 0.12 K/uL    Immature Granulocytes (abs) 0.03 0.00 - 0.11 K/uL    NRBC (Absolute) 0.00 K/uL   Basic Metabolic Panel   Result Value Ref Range    Sodium 137 135 - 145 mmol/L    Potassium 5.3 3.6 - 5.5 mmol/L    Chloride 106 96 - 112 mmol/L    Co2 24 20 - 33  mmol/L    Glucose 103 (H) 65 - 99 mg/dL    Bun 20 8 - 22 mg/dL    Creatinine 0.68 0.50 - 1.40 mg/dL    Calcium 9.1 8.4 - 10.2 mg/dL    Anion Gap 7.0 7.0 - 16.0   TROPONIN   Result Value Ref Range    Troponin T <6 6 - 19 ng/L   TROPONIN   Result Value Ref Range    Troponin T <6 6 - 19 ng/L         INTERVENTIONS:  Medications   HYDROcodone-acetaminophen (NORCO) 5-325 MG per tablet 1 Tablet (has no administration in time range)     Response: Proved pain.    COURSE & MEDICAL DECISION MAKING  Well-appearing patient presents with knee pain of unclear etiology after a steroid and anesthetic injection yesterday.  There is no clinical effusion on exam or on x-ray.  There is no findings suggestive of septic arthritis.  There is no evidence of DVT.  She also has chest pain today which may be anxiety.  There is no evidence of myocardial infarction pneumonia or pneumothorax.  PE and aortic dissection would be unlikely.  Heart score is 2 which makes her low risk.    PLAN:  New Prescriptions    HYDROCODONE-ACETAMINOPHEN (NORCO) 5-325 MG TAB PER TABLET    Take 1 Tablet by mouth every four hours as needed (mild pain) for up to 3 days.       Prescription monitoring queried and score 110  Opiate risk tool utilized and patient low risk  Informed consent obtained for opiate analgesic  Indication opiate analgesic left knee pain    nonspecific chest pain handout given  Return for changing chest pain, leg swelling, shortness of breath, fever, redness, red streaks of the knee    Raji Florez M.D.  [address] [facility] [address]  [phone]    Call   tomorrow      CONDITION: Stable, improved.    FINAL IMPRESSION  1. Acute pain of left knee    2. Other chest pain          Electronically signed by: Willis Gutierrez M.D., 11/17/2021 9:38 PM

## 2022-01-19 ENCOUNTER — HOSPITAL ENCOUNTER (EMERGENCY)
Facility: MEDICAL CENTER | Age: 52
End: 2022-01-19
Attending: EMERGENCY MEDICINE
Payer: COMMERCIAL

## 2022-01-19 VITALS
OXYGEN SATURATION: 97 % | BODY MASS INDEX: 32.13 KG/M2 | TEMPERATURE: 98 F | HEART RATE: 85 BPM | RESPIRATION RATE: 18 BRPM | HEIGHT: 62 IN | SYSTOLIC BLOOD PRESSURE: 132 MMHG | WEIGHT: 174.6 LBS | DIASTOLIC BLOOD PRESSURE: 70 MMHG

## 2022-01-19 DIAGNOSIS — R51.9 NONINTRACTABLE HEADACHE, UNSPECIFIED CHRONICITY PATTERN, UNSPECIFIED HEADACHE TYPE: ICD-10-CM

## 2022-01-19 DIAGNOSIS — B34.9 VIRAL SYNDROME: ICD-10-CM

## 2022-01-19 DIAGNOSIS — R52 BODY ACHES: ICD-10-CM

## 2022-01-19 LAB
SARS-COV+SARS-COV-2 AG RESP QL IA.RAPID: NOTDETECTED
SPECIMEN SOURCE: NORMAL

## 2022-01-19 PROCEDURE — 99283 EMERGENCY DEPT VISIT LOW MDM: CPT

## 2022-01-19 PROCEDURE — 87426 SARSCOV CORONAVIRUS AG IA: CPT

## 2022-01-19 ASSESSMENT — FIBROSIS 4 INDEX: FIB4 SCORE: 4.93

## 2022-01-19 NOTE — ED NOTES
Pt presents to ER :  Chief Complaint   Patient presents with   • Headache     started yesterday   • N/V     started yesterday     States she was exposed to someone from work who has COVID    States she is vaccinatedbut has not received the booster

## 2022-01-19 NOTE — ED TRIAGE NOTES
"Chief Complaint   Patient presents with   • Headache     started yesterday   • N/V     started yesterday     /68   Pulse 67   Temp 36.4 °C (97.6 °F) (Temporal)   Resp 15   Ht 1.575 m (5' 2\")   Wt 79.2 kg (174 lb 9.7 oz)   LMP 05/04/2013   SpO2 98%   BMI 31.94 kg/m²     Has this patient been vaccinated for COVID YES  If not, would they like to be vaccinated while in the ER if eligible?  na  Would the patient like to speak with the ERP about the possibility of receiving the COVID vaccine today before making a decision? Na    Pt ambulated to ED by self for c/o generalized body aches and N/V started yesterday.      "

## 2022-01-19 NOTE — ED PROVIDER NOTES
ED Provider Note    Scribed for Rio Deshpande M.D. by Jacinta Cotter. 1/19/2022  11:34 AM    Primary care provider: Cipriano Gomez M.D.  Means of arrival: Walk-in  History obtained from: Patient  History limited by: None    CHIEF COMPLAINT  Chief Complaint   Patient presents with    Headache     started yesterday    N/V     started yesterday     HPI  Elvia Roberto is a 51 y.o. female who presents to the Emergency Department for evaluation of worsening headache onset yesterday. She admits to associated symptoms of body aches, nausea, vomiting, and sore throat, but denies cough, fever, or rhinorrhea. She reports medicating with Tylenol this morning with minimal relief. She reports she cannot take NSAIDS due to history of gastric bypass surgery.    REVIEW OF SYSTEMS  Pertinent positives include headache, body aches, nausea, vomiting, and sore throat.   Pertinent negatives include no cough, fever, or rhinorrhea.    All other systems reviewed and negative. See HPI for further details.     PAST MEDICAL HISTORY   has a past medical history of Heart burn, Indigestion, Liver disease, Lupus (HCC), Other specified disorder of intestines, and Other specified symptom associated with female genital organs.    SURGICAL HISTORY   has a past surgical history that includes bowel resection laparoscopic (4/4/2012); lysis adhesions general (4/4/2012); gyn surgery (1993); gyn surgery (1993); other orthopedic surgery (2004); other orthopedic surgery (2000); other abdominal surgery (2010); laparoscopy (12/16/2012); gastroscopy (12/16/2012); primary c section; remove tonsils/adenoids,<13 y/o; and hysteroscopy with video operative (8/20/2013).    SOCIAL HISTORY  Social History     Tobacco Use    Smoking status: Current Every Day Smoker     Packs/day: 0.40     Years: 30.00     Pack years: 12.00     Types: Cigarettes    Smokeless tobacco: Never Used   Vaping Use    Vaping Use: Never used   Substance Use Topics    Alcohol use: Not  "Currently    Drug use: No      Social History     Substance and Sexual Activity   Drug Use No     FAMILY HISTORY  History reviewed. No pertinent family history.    CURRENT MEDICATIONS  Current Outpatient Medications   Medication Instructions    acetaminophen (TYLENOL) 1,000 mg, Oral, 2 TIMES DAILY PRN, 2 tablets = 1,000 mg.    cyclobenzaprine (FLEXERIL) 5-10 mg, Oral, 3 TIMES DAILY PRN    diphenhydrAMINE (BENADRYL) 50 mg, Oral, 2 TIMES DAILY PRN, 2 tablets = 50 mg.    DULoxetine (CYMBALTA) 60 mg, Oral, DAILY    ferrous sulfate 325 mg, Oral, DAILY    metoclopramide (REGLAN) 10 mg, Oral, EVERY 8 HOURS PRN    ondansetron (ZOFRAN ODT) 4 mg, Oral, EVERY 6 HOURS PRN    pantoprazole (PROTONIX) 40 mg, Oral, DAILY    SUMAtriptan (IMITREX) 25-50 mg, Oral, ONCE PRN    ursodiol (ACTIGALL) 300 mg, Oral, 3 TIMES DAILY     ALLERGIES  Allergies   Allergen Reactions    Asa [Aspirin] Unspecified     GI upset, bleeding, Gastric Bypass      Nsaids Nausea     GI upset, bleeding, Gastric Bypass      Tape Unspecified     Blisters all over, Paper tape is ok       PHYSICAL EXAM  VITAL SIGNS: /68   Pulse 67   Temp 36.4 °C (97.6 °F) (Temporal)   Resp 15   Ht 1.575 m (5' 2\")   Wt 79.2 kg (174 lb 9.7 oz)   LMP 05/04/2013   SpO2 98%   BMI 31.94 kg/m²     Nursing note and vitals reviewed.  Constitutional: Well-developed and well-nourished. No distress.   HENT: Head is normocephalic and atraumatic. Oropharynx is clear and moist without exudate or erythema.   Eyes: Pupils are equal, round, and reactive to light. Conjunctiva are normal.   Cardiovascular: Normal rate and regular rhythm. No murmur heard. Normal radial pulses.  Pulmonary/Chest: Breath sounds normal. No wheezes or rales.   Abdominal: Soft and non-tender. No distention    Musculoskeletal: Extremities exhibit normal range of motion without edema or tenderness.   Neurological: Awake, alert and oriented to person, place, and time. No focal deficits noted.  Skin: Skin is warm " and dry. No rash.   Psychiatric: Normal mood and affect. Appropriate for clinical situation.    DIAGNOSTIC STUDIES / PROCEDURES    LABS  Results for orders placed or performed during the hospital encounter of 01/19/22   SARS-COV Antigen GERMANIA: Collect dry nasal swab   Result Value Ref Range    SARS-CoV-2 Source Nasal Swab     SARS-COV ANTIGEN GERMANIA NotDetected NotDetected     All labs reviewed by me.    COURSE & MEDICAL DECISION MAKING  Nursing notes, VS, PMSFHx reviewed in chart.     11:34 AM - Patient seen and examined at bedside. Ordered SARS-COV Antigen GER[place]IA to evaluate her symptoms. The differential diagnoses include but are not limited to: COVID-19 vs viral URI     1:21 PM - On repeat evaluation, I informed the patient of negative COVID-19 rapid test results. The patient presents today with signs and symptoms consistent with a viral upper respiratory infection. They have a normal pulse oximetry on room air and a normal pulmonary exam. Therefore, I feel that the likelihood of pneumonia is low. This patient does not demonstrate any clinical evidence of pneumonia, meningitis, appendicitis, or other acute medical emergency. Overall, the patient is very well appearing. I do not feel that this patient would benefit from antibiotics at this time. I have recommended Tylenol and/or ibuprofen for fever.     The patient will return for new or worsening symptoms and is stable at the time of discharge.    The patient is referred to a primary physician for blood pressure management, diabetic screening, and for all other preventative health concerns.    DISPOSITION:  Patient will be discharged home in stable condition.    FOLLOW UP:  Cipriano Gomez M.D.  [address] [facility] [address]  [facility] [address]  [phone]    Schedule an appointment as soon as possible for a visit       [facility], Emergency Dept  [address]  [facility] [address] [phone]    If symptoms worsen    FINAL  IMPRESSION  1. Viral syndrome    2. Body aches    3. Nonintractable headache, unspecified chronicity pattern, unspecified headache type        I, Jacinta Cotter (Scribe), am scribing for, and in the presence of, Rio Deshpande M.D..    Electronically signed by: Jacinta Cotter (Scribe), 1/19/2022    IRio M.D. personally performed the services described in this documentation, as scribed by Jacinta Cotter in my presence, and it is both accurate and complete.    The note accurately reflects work and decisions made by me.  Rio Deshpande M.D.  1/19/2022  4:04 PM

## 2022-01-29 ENCOUNTER — HOSPITAL ENCOUNTER (OUTPATIENT)
Facility: MEDICAL CENTER | Age: 52
End: 2022-01-29
Attending: NURSE PRACTITIONER
Payer: COMMERCIAL

## 2022-01-29 ENCOUNTER — OFFICE VISIT (OUTPATIENT)
Dept: URGENT CARE | Facility: CLINIC | Age: 52
End: 2022-01-29
Payer: COMMERCIAL

## 2022-01-29 VITALS
RESPIRATION RATE: 16 BRPM | SYSTOLIC BLOOD PRESSURE: 114 MMHG | TEMPERATURE: 97.4 F | WEIGHT: 178.8 LBS | HEART RATE: 79 BPM | HEIGHT: 62 IN | BODY MASS INDEX: 32.9 KG/M2 | OXYGEN SATURATION: 96 % | DIASTOLIC BLOOD PRESSURE: 80 MMHG

## 2022-01-29 DIAGNOSIS — Z20.822 SUSPECTED COVID-19 VIRUS INFECTION: ICD-10-CM

## 2022-01-29 DIAGNOSIS — J01.40 ACUTE NON-RECURRENT PANSINUSITIS: ICD-10-CM

## 2022-01-29 DIAGNOSIS — H65.03 NON-RECURRENT ACUTE SEROUS OTITIS MEDIA OF BOTH EARS: Primary | ICD-10-CM

## 2022-01-29 DIAGNOSIS — R05.9 COUGH IN ADULT PATIENT: ICD-10-CM

## 2022-01-29 PROBLEM — K91.2 MALNUTRITION FOLLOWING GASTROINTESTINAL SURGERY: Status: ACTIVE | Noted: 2021-01-28

## 2022-01-29 PROBLEM — F32.9 REACTIVE DEPRESSION (SITUATIONAL): Status: ACTIVE | Noted: 2021-01-28

## 2022-01-29 PROBLEM — K21.9 GASTROESOPHAGEAL REFLUX DISEASE: Status: ACTIVE | Noted: 2021-01-28

## 2022-01-29 PROCEDURE — 99213 OFFICE O/P EST LOW 20 MIN: CPT | Mod: CS | Performed by: NURSE PRACTITIONER

## 2022-01-29 PROCEDURE — 0240U HCHG SARS-COV-2 COVID-19 NFCT DS RESP RNA 3 TRGT MIC: CPT

## 2022-01-29 RX ORDER — BENZONATATE 200 MG/1
200 CAPSULE ORAL 3 TIMES DAILY PRN
Qty: 60 CAPSULE | Refills: 0 | Status: SHIPPED | OUTPATIENT
Start: 2022-01-29 | End: 2022-07-10

## 2022-01-29 RX ORDER — CEFDINIR 300 MG/1
300 CAPSULE ORAL 2 TIMES DAILY
Qty: 20 CAPSULE | Refills: 0 | Status: SHIPPED | OUTPATIENT
Start: 2022-01-29 | End: 2022-02-08

## 2022-01-29 ASSESSMENT — ENCOUNTER SYMPTOMS
SHORTNESS OF BREATH: 0
SORE THROAT: 1
ABDOMINAL PAIN: 0
FEVER: 0
VOMITING: 0
WHEEZING: 0
MYALGIAS: 0
COUGH: 1
SPUTUM PRODUCTION: 0
CHILLS: 0
SINUS PAIN: 1
HEADACHES: 1
DIARRHEA: 0
SINUS PRESSURE: 1
HEMOPTYSIS: 0
NAUSEA: 0

## 2022-01-29 ASSESSMENT — FIBROSIS 4 INDEX: FIB4 SCORE: 4.93

## 2022-01-29 NOTE — PROGRESS NOTES
Subjective:     Elvia Roberto is a 51 y.o. female who presents for Cough (stuffy nose, sore throat, ear pain x 3 days)      Sinusitis  This is a new problem. The current episode started in the past 7 days (Elvia is a pleasant 51 year old female who presents to  today with complaints of sinus pain, congestion, sore throat, mild dry cough and ear pain). There has been no fever. Associated symptoms include congestion, coughing, ear pain, headaches, sinus pressure and a sore throat. Pertinent negatives include no chills or shortness of breath. Past treatments include acetaminophen and spray decongestants (Nyquil, Benadryl, flonase). The treatment provided no relief.         Review of Systems   Constitutional: Positive for malaise/fatigue. Negative for chills and fever.   HENT: Positive for congestion, ear pain, sinus pressure, sinus pain and sore throat.    Respiratory: Positive for cough. Negative for hemoptysis, sputum production, shortness of breath and wheezing.    Gastrointestinal: Negative for abdominal pain, diarrhea, nausea and vomiting.   Musculoskeletal: Negative for myalgias.   Neurological: Positive for headaches.       PMH:   Past Medical History:   Diagnosis Date   • Heart burn    • Indigestion    • Liver disease     Auto immune hepatitis   • Lupus (HCC)    • Other specified disorder of intestines    • Other specified symptom associated with female genital organs     tubiligation     ALLERGIES:   Allergies   Allergen Reactions   • Asa [Aspirin] Unspecified     GI upset, bleeding, Gastric Bypass     • Nsaids Nausea     GI upset, bleeding, Gastric Bypass     • Tape Unspecified     Blisters all over, Paper tape is ok     SURGHX:   Past Surgical History:   Procedure Laterality Date   • HYSTEROSCOPY WITH VIDEO OPERATIVE  8/20/2013    Performed by Robin Frederick M.D. at SURGERY [facility]   • LAPAROSCOPY  12/16/2012    Performed by Hayes Regalado M.D. at SURGERY [facility]S   • GASTROSCOPY   2012    Performed by Hayes Lugo M.D. at SURGERY [facility] ORS   • BOWEL RESECTION LAPAROSCOPIC  2012    Performed by HAYES LUGO at SURGERY [facility] ORS   • LYSIS ADHESIONS GENERAL  2012    Performed by HAYES LUGO at SURGERY [facility] ORS   • OTHER ABDOMINAL SURGERY      Kristi En Y gastric bypass   • OTHER ORTHOPEDIC SURGERY      Left ankle surgery   • OTHER ORTHOPEDIC SURGERY      Left knee surgery   • GYN SURGERY      tubal ligation   • GYN SURGERY         • [place] REMOVE TONSILS/ADENOIDS,<11 Y/O     • PRIMARY C SECTION           SOCHX:   Social History     Socioeconomic History   • Marital status: Single     Spouse name: Not on file   • Number of children: Not on file   • Years of education: Not on file   • Highest education level: Not on file   Occupational History   • Not on file   Tobacco Use   • Smoking status: Current Every Day Smoker     Packs/day: 0.40     Years: 30.00     Pack years: 12.00     Types: Cigarettes   • Smokeless tobacco: Never Used   Vaping Use   • Vaping Use: Never used   Substance and Sexual Activity   • Alcohol use: Not Currently   • Drug use: No   • Sexual activity: Not on file   Other Topics Concern   • Not on file   Social History Narrative   • Not on file     Social Determinants of Health     Financial Resource Strain:    • Difficulty of Paying Living Expenses: Not on file   Food Insecurity:    • Worried About Running Out of Food in the Last Year: Not on file   • Ran Out of Food in the Last Year: Not on file   Transportation Needs:    • Lack of Transportation (Medical): Not on file   • Lack of Transportation (Non-Medical): Not on file   Physical Activity:    • Days of Exercise per Week: Not on file   • Minutes of Exercise per Session: Not on file   Stress:    • Feeling of Stress : Not on file   Social Connections:    • Frequency of Communication with Friends and Family: Not on file   • Frequency of Social Gatherings with  "Friends and Family: Not on file   • Attends Denominational Services: Not on file   • Active Member of Clubs or Organizations: Not on file   • Attends Club or Organization Meetings: Not on file   • Marital Status: Not on file   Intimate Partner Violence:    • Fear of Current or Ex-Partner: Not on file   • Emotionally Abused: Not on file   • Physically Abused: Not on file   • Sexually Abused: Not on file   Housing Stability:    • Unable to Pay for Housing in the Last Year: Not on file   • Number of Places Lived in the Last Year: Not on file   • Unstable Housing in the Last Year: Not on file     FH: No family history on file.      Objective:   /80   Pulse 79   Temp 36.3 °C (97.4 °F) (Temporal)   Resp 16   Ht 1.575 m (5' 2\")   Wt 81.1 kg (178 lb 12.8 oz)   LMP 05/04/2013   SpO2 96%   BMI 32.70 kg/m²     Physical Exam  Vitals and nursing note reviewed.   Constitutional:       General: She is not in acute distress.     Appearance: Normal appearance. She is ill-appearing.   HENT:      Head: Normocephalic and atraumatic.      Right Ear: External ear normal. Swelling and tenderness present. There is no impacted cerumen. Tympanic membrane is erythematous.      Left Ear: External ear normal. Swelling and tenderness present. There is no impacted cerumen. Tympanic membrane is erythematous.      Nose: No congestion or rhinorrhea.      Right Turbinates: Swollen.      Left Turbinates: Swollen.      Right Sinus: Maxillary sinus tenderness and frontal sinus tenderness present.      Left Sinus: Maxillary sinus tenderness and frontal sinus tenderness present.      Mouth/Throat:      Mouth: Mucous membranes are moist.      Pharynx: Posterior oropharyngeal erythema present. No oropharyngeal exudate.   Eyes:      General:         Right eye: No discharge.         Left eye: No discharge.      Extraocular Movements: Extraocular movements intact.      Conjunctiva/sclera: Conjunctivae normal.      Pupils: Pupils are equal, round, and " reactive to light.   Cardiovascular:      Rate and Rhythm: Normal rate and regular rhythm.      Pulses: Normal pulses.      Heart sounds: Normal heart sounds.   Pulmonary:      Effort: Pulmonary effort is normal. No respiratory distress.      Breath sounds: Normal breath sounds. No stridor. No wheezing, rhonchi or rales.   Chest:      Chest wall: No tenderness.   Abdominal:      General: Abdomen is flat. Bowel sounds are normal.      Palpations: Abdomen is soft.      Tenderness: There is no abdominal tenderness. There is no right CVA tenderness or left CVA tenderness.   Musculoskeletal:         General: Normal range of motion.      Cervical back: Normal range of motion and neck supple. Tenderness present.   Lymphadenopathy:      Cervical: Cervical adenopathy present.   Skin:     General: Skin is warm and dry.      Capillary Refill: Capillary refill takes less than 2 seconds.   Neurological:      General: No focal deficit present.      Mental Status: She is alert and oriented to person, place, and time. Mental status is at baseline.   Psychiatric:         Mood and Affect: Mood normal.         Behavior: Behavior normal.         Thought Content: Thought content normal.         Judgment: Judgment normal.         Assessment/Plan:   Assessment    1. Non-recurrent acute serous otitis media of both ears  cefdinir (OMNICEF) 300 MG Cap   2. Suspected COVID-19 virus infection  CoV-2 and Flu A/B by PCR (24 hour In-House): Collect NP swab in VTM   3. Acute non-recurrent pansinusitis  CoV-2 and Flu A/B by PCR (24 hour In-House): Collect NP swab in VTM   4. Cough in adult patient  benzonatate (TESSALON) 200 MG capsule     Pt was tested for COVID today. I will call with results. Upon entering the room PPE was worn throughout the duration of his visit for both provider and patient. Mask of patient briefly removed for examination of oropharynx. PT was educated to remain in self isolation for at least 10 days from onset of symptoms  followed by an additional 24-hour period of being symptom-free without the use of symptom altering medication.    We discussed supportive measures including humidifier, warm salt water gargles, over-the-counter Cepacol throat lozenges, rest  and increased fluids. Pt was encouraged to seek treatment back in the ER or urgent care for worsening symptoms,  fever greater than 100.5, wheezes or shortness of breath.    AVS handout given and reviewed with patient. Pt educated on red flags and when to seek treatment back in ER or UC.

## 2022-01-30 DIAGNOSIS — Z20.822 SUSPECTED COVID-19 VIRUS INFECTION: ICD-10-CM

## 2022-01-30 DIAGNOSIS — J01.40 ACUTE NON-RECURRENT PANSINUSITIS: ICD-10-CM

## 2022-01-31 LAB
FLUAV RNA SPEC QL NAA+PROBE: NEGATIVE
FLUBV RNA SPEC QL NAA+PROBE: NEGATIVE
SARS-COV-2 RNA RESP QL NAA+PROBE: DETECTED
SPECIMEN SOURCE: ABNORMAL

## 2022-02-25 ENCOUNTER — OFFICE VISIT (OUTPATIENT)
Dept: URGENT CARE | Facility: CLINIC | Age: 52
End: 2022-02-25
Payer: COMMERCIAL

## 2022-02-25 VITALS
HEART RATE: 73 BPM | BODY MASS INDEX: 31.39 KG/M2 | RESPIRATION RATE: 16 BRPM | SYSTOLIC BLOOD PRESSURE: 106 MMHG | WEIGHT: 170.6 LBS | TEMPERATURE: 97.4 F | OXYGEN SATURATION: 97 % | HEIGHT: 62 IN | DIASTOLIC BLOOD PRESSURE: 80 MMHG

## 2022-02-25 DIAGNOSIS — M25.50 ARTHRALGIA, UNSPECIFIED JOINT: ICD-10-CM

## 2022-02-25 DIAGNOSIS — M62.838 MUSCLE SPASM: ICD-10-CM

## 2022-02-25 DIAGNOSIS — M32.9 LUPUS (HCC): ICD-10-CM

## 2022-02-25 PROCEDURE — 99214 OFFICE O/P EST MOD 30 MIN: CPT | Performed by: PHYSICIAN ASSISTANT

## 2022-02-25 RX ORDER — PREDNISONE 10 MG/1
40 TABLET ORAL DAILY
Qty: 20 TABLET | Refills: 0 | Status: SHIPPED | OUTPATIENT
Start: 2022-02-25 | End: 2022-03-02

## 2022-02-25 RX ORDER — CYCLOBENZAPRINE HCL 5 MG
5-10 TABLET ORAL NIGHTLY PRN
Qty: 20 TABLET | Refills: 0 | Status: SHIPPED | OUTPATIENT
Start: 2022-02-25 | End: 2022-04-05

## 2022-02-25 ASSESSMENT — ENCOUNTER SYMPTOMS
NAUSEA: 0
SHORTNESS OF BREATH: 0
SORE THROAT: 0
VOMITING: 0
EYE DISCHARGE: 0
HEADACHES: 1
EYE REDNESS: 0
COUGH: 0
FEVER: 0
MYALGIAS: 1

## 2022-02-25 ASSESSMENT — FIBROSIS 4 INDEX: FIB4 SCORE: 4.93

## 2022-02-25 NOTE — LETTER
February 25, 2022         Patient: Elvia Roberto   YOB: 1970   Date of Visit: 2/25/2022           To Whom it May Concern:    Elvia Roberto was seen in my clinic on 2/25/2022.  Please excuse her from work today, 2/25/2022 she may return to work on 2/28/2022.    If you have any questions or concerns, please don't hesitate to call.        Sincerely,           Gracy Garay P.A.-C.  Electronically Signed

## 2022-02-25 NOTE — PROGRESS NOTES
Subjective     Elvia Roberto is a 51 y.o. female who presents with Lupus (X 4 days)            HPI  This is a new problem.  The patient presents to clinic complaining of an acute exacerbation of her lupus x4 days.  The patient reports a history of lupus.  The patient states she has been experiencing a lupus flare over the past several days with associated joint pain, joint swelling, and body aches.  The patient states her bilateral hands are most affected.  The patient reports no associated fever.  She also reports no URI-like symptoms.  No cough.  No congestion.  No ear pain.  No sore throat.  No chest pain.  No shortness of breath.  The patient also reports no abdominal pain.  No nausea/vomiting.  The patient has not taken any OTC medications for her current symptoms.  The patient is requesting steroids and muscle relaxers at this time, stating this is what is typically prescribed for her lupus flares.    PMH:  has a past medical history of Heart burn, Indigestion, Liver disease, Lupus (HCC), Other specified disorder of intestines, and Other specified symptom associated with female genital organs.  MEDS:   Current Outpatient Medications:   •  acetaminophen (TYLENOL) 500 MG Tab, Take 1,000 mg by mouth 2 times a day as needed (Pain, Headache). 2 tablets = 1,000 mg., Disp: , Rfl:   •  ursodiol (ACTIGALL) 300 MG Cap, Take 300 mg by mouth 3 times a day., Disp: , Rfl:   •  pantoprazole (PROTONIX) 40 MG Tablet Delayed Response, Take 1 Tab by mouth every day., Disp: 30 Tab, Rfl: 0  •  ondansetron (ZOFRAN ODT) 4 MG TABLET DISPERSIBLE, Take 1 Tab by mouth every 6 hours as needed for Nausea., Disp: 20 Tab, Rfl: 0  •  DULoxetine (CYMBALTA) 60 MG Cap DR Particles delayed-release capsule, Take 60 mg by mouth every day., Disp: , Rfl:   •  benzonatate (TESSALON) 200 MG capsule, Take 1 Capsule by mouth 3 times a day as needed for Cough. (Patient not taking: Reported on 2/25/2022), Disp: 60 Capsule, Rfl: 0  •  metoclopramide  (REGLAN) 10 MG Tab, Take 1 Tablet by mouth every 8 hours as needed (nausea). Indications: Nausea and Vomiting (Patient not taking: Reported on 2022), Disp: 14 Tablet, Rfl: 0  •  cyclobenzaprine (FLEXERIL) 5 mg tablet, Take 1-2 Tablets by mouth 3 times a day as needed. (Patient not taking: Reported on 2022), Disp: 30 tablet, Rfl: 0  •  SUMAtriptan (IMITREX) 25 MG Tab tablet, Take 1-2 Tablets by mouth one time as needed for Migraine for up to 1 dose. (Patient not taking: No sig reported), Disp: 10 tablet, Rfl: 0  •  ferrous sulfate 325 (65 Fe) MG tablet, Take 1 tablet by mouth every day. (Patient not taking: Reported on 2022), Disp: 90 tablet, Rfl: 1  •  diphenhydrAMINE (BENADRYL) 25 MG Tab, Take 50 mg by mouth 2 times a day as needed (Allergies). 2 tablets = 50 mg. (Patient not taking: Reported on 2022), Disp: , Rfl:   ALLERGIES:   Allergies   Allergen Reactions   • Asa [Aspirin] Unspecified     GI upset, bleeding, Gastric Bypass     • Nsaids Nausea     GI upset, bleeding, Gastric Bypass     • Tape Unspecified     Blisters all over, Paper tape is ok     SURGHX:   Past Surgical History:   Procedure Laterality Date   • HYSTEROSCOPY WITH VIDEO OPERATIVE  2013    Performed by Robin Frederick M.D. at [facility]   • LAPAROSCOPY  2012    Performed by Hayes Lugo M.D. at SURGERY [facility]   • GASTROSCOPY  2012    Performed by Hayes Lugo M.D. at [facility]   • BOWEL RESECTION LAPAROSCOPIC  2012    Performed by HAYES LUGO at [facility]   • LYSIS ADHESIONS GENERAL  2012    Performed by HAYES LUGO at [facility]   • OTHER ABDOMINAL SURGERY      Kristi En Y gastric bypass   • OTHER ORTHOPEDIC SURGERY      Left ankle surgery   • OTHER ORTHOPEDIC SURGERY      Left knee surgery   • GYN SURGERY      tubal ligation   • GYN SURGERY         • [place] REMOVE TONSILS/ADENOIDS,<11 Y/O     • PRIMARY C  "SECTION           SOCHX:  reports that she has been smoking cigarettes. She has a 12.00 pack-year smoking history. She has never used smokeless tobacco. She reports previous alcohol use. She reports that she does not use drugs.  FH: Family history was reviewed, no pertinent findings to report    Review of Systems   Constitutional: Negative for fever.   HENT: Negative for congestion, ear pain and sore throat.    Eyes: Negative for discharge and redness.   Respiratory: Negative for cough and shortness of breath.    Cardiovascular: Negative for chest pain and leg swelling.   Gastrointestinal: Negative for nausea and vomiting.   Musculoskeletal: Positive for joint pain and myalgias.   Skin: Negative for rash.   Neurological: Positive for headaches.              Objective     /80   Pulse 73   Temp 36.3 °C (97.4 °F) (Temporal)   Resp 16   Ht 1.575 m (5' 2\")   Wt 77.4 kg (170 lb 9.6 oz)   LMP 2013   SpO2 97%   BMI 31.20 kg/m²      Physical Exam  Constitutional:       General: She is not in acute distress.     Appearance: Normal appearance. She is well-developed. She is not ill-appearing.   HENT:      Head: Normocephalic and atraumatic.      Right Ear: External ear normal.      Left Ear: External ear normal.   Eyes:      Extraocular Movements: Extraocular movements intact.      Conjunctiva/sclera: Conjunctivae normal.   Cardiovascular:      Rate and Rhythm: Normal rate and regular rhythm.      Heart sounds: Normal heart sounds.   Pulmonary:      Effort: Pulmonary effort is normal. No respiratory distress.      Breath sounds: Normal breath sounds. No wheezing.   Musculoskeletal:         General: Normal range of motion.      Right hand: Tenderness present. No swelling or deformity. Normal range of motion. Normal strength. Normal capillary refill.      Left hand: Tenderness present. No swelling or deformity. Normal range of motion. Normal strength. Normal capillary refill.      Cervical back: " Normal range of motion and neck supple.   Skin:     General: Skin is warm and dry.   Neurological:      Mental Status: She is alert and oriented to person, place, and time.                             Assessment & Plan          1. Lupus (HCC)    2. Arthralgia, unspecified joint  - predniSONE (DELTASONE) 10 MG Tab; Take 4 Tablets by mouth every day for 5 days.  Dispense: 20 Tablet; Refill: 0    3. Muscle spasm  - cyclobenzaprine (FLEXERIL) 5 mg tablet; Take 1-2 Tablets by mouth at bedtime as needed.  Dispense: 20 Tablet; Refill: 0  --Advised the patient to only take this medication at night, as it may cause drowsiness. Instructed the patient not to take the medication while at work, driving, or operating machinery.  Instructed the patient not to drink alcohol while taking this medication.    The patient's presenting symptoms and physical exam findings are consistent with arthralgia and muscle spasms related to an acute exacerbation of her underlying lupus.  The patient reports a history of lupus.  This is an acute exacerbation of a chronic problem.  The patient states her hands are mostly affected at this time.  On physical exam, the patient had tenderness to her bilateral hands without swelling, overlying erythema, increased warmth, or ecchymosis.  The patient demonstrates full active range of motion of her bilateral hands, as well as full and equal strength.  The remainder the patient's physical exam today in clinic was normal.  No focal neurological deficits were appreciated.  The patient appears in no acute distress.  The patient's vital signs are stable and within normal limits.  She is afebrile today in clinic.  The patient is requesting a prescription for steroids and muscle relaxants at this time.  The patient states this is what is typically prescribed for her lupus flares.  Will prescribe the patient prednisone for acute arthralgia.  We will also prescribe the patient Flexeril for her muscle spasms.   Advised patient to only take the Flexeril at night, as it may cause drowsiness.  Instructed the patient not to take this medication while at work, driving, or operating machinery.  Advised the patient to monitor worsening signs or symptoms.  Recommend OTC medications and supportive care for symptomatic management.  Recommend patient follow-up with her PCP as needed.  Discussed return precautions with the patient, and she verbalized understanding.    Differential diagnoses, supportive care, and indications for immediate follow-up discussed with patient.   Instructed to return to clinic or nearest emergency department for any change in condition, further concerns, or worsening of symptoms.    OTC NSAIDs for pain/discomfort   Apply ice and or heat to the affected area for symptomatic relief  Follow-up with PCP   Work note provided  Return to clinic or go tot he ED if symptoms worsen or fail to improve, or if the patient should develop worsening/increasing pain/tenderness, swelling, bruising, redness or warmth to the affected area, decreased ROM, numbness, tingling or weakness, difficulty walking, fever/chills, and/or any concerning symptoms.     Discussed plan with the patient, and she agrees to the above.     I personally reviewed prior external notes and test results pertinent to today's visit.  I have independently reviewed and interpreted all diagnostics ordered during this urgent care visit.       Please note that this dictation was created using voice recognition software. I have made every reasonable attempt to correct obvious errors, but I expect that there may be errors of grammar and possibly content that I did not discover before finalizing the note.     This note was electronically signed by Gracy Garay PA-C

## 2022-03-24 ENCOUNTER — HOSPITAL ENCOUNTER (EMERGENCY)
Facility: MEDICAL CENTER | Age: 52
End: 2022-03-24
Attending: EMERGENCY MEDICINE
Payer: COMMERCIAL

## 2022-03-24 VITALS
BODY MASS INDEX: 32.58 KG/M2 | SYSTOLIC BLOOD PRESSURE: 114 MMHG | OXYGEN SATURATION: 95 % | DIASTOLIC BLOOD PRESSURE: 77 MMHG | TEMPERATURE: 97.5 F | HEART RATE: 57 BPM | RESPIRATION RATE: 16 BRPM | WEIGHT: 177.03 LBS | HEIGHT: 62 IN

## 2022-03-24 DIAGNOSIS — E86.0 DEHYDRATION: ICD-10-CM

## 2022-03-24 DIAGNOSIS — M32.9 HISTORY OF SYSTEMIC LUPUS ERYTHEMATOSUS (HCC): ICD-10-CM

## 2022-03-24 DIAGNOSIS — R79.89 ELEVATED LFTS: ICD-10-CM

## 2022-03-24 DIAGNOSIS — R10.13 EPIGASTRIC ABDOMINAL PAIN: ICD-10-CM

## 2022-03-24 DIAGNOSIS — R11.2 NON-INTRACTABLE VOMITING WITH NAUSEA, UNSPECIFIED VOMITING TYPE: ICD-10-CM

## 2022-03-24 DIAGNOSIS — K75.4 AUTOIMMUNE HEPATITIS (HCC): ICD-10-CM

## 2022-03-24 LAB
ALBUMIN SERPL BCP-MCNC: 3.6 G/DL (ref 3.2–4.9)
ALBUMIN/GLOB SERPL: 0.7 G/DL
ALP SERPL-CCNC: 195 U/L (ref 30–99)
ALT SERPL-CCNC: 81 U/L (ref 2–50)
ANION GAP SERPL CALC-SCNC: 7 MMOL/L (ref 7–16)
AST SERPL-CCNC: 98 U/L (ref 12–45)
BASOPHILS # BLD AUTO: 0.7 % (ref 0–1.8)
BASOPHILS # BLD: 0.02 K/UL (ref 0–0.12)
BILIRUB SERPL-MCNC: 0.4 MG/DL (ref 0.1–1.5)
BUN SERPL-MCNC: 12 MG/DL (ref 8–22)
CALCIUM SERPL-MCNC: 8.8 MG/DL (ref 8.4–10.2)
CHLORIDE SERPL-SCNC: 107 MMOL/L (ref 96–112)
CO2 SERPL-SCNC: 23 MMOL/L (ref 20–33)
CREAT SERPL-MCNC: 0.7 MG/DL (ref 0.5–1.4)
EOSINOPHIL # BLD AUTO: 0.1 K/UL (ref 0–0.51)
EOSINOPHIL NFR BLD: 3.6 % (ref 0–6.9)
ERYTHROCYTE [DISTWIDTH] IN BLOOD BY AUTOMATED COUNT: 53.4 FL (ref 35.9–50)
GFR SERPLBLD CREATININE-BSD FMLA CKD-EPI: 103 ML/MIN/1.73 M 2
GLOBULIN SER CALC-MCNC: 5.1 G/DL (ref 1.9–3.5)
GLUCOSE SERPL-MCNC: 86 MG/DL (ref 65–99)
HCT VFR BLD AUTO: 44 % (ref 37–47)
HGB BLD-MCNC: 14.2 G/DL (ref 12–16)
IMM GRANULOCYTES # BLD AUTO: 0.01 K/UL (ref 0–0.11)
IMM GRANULOCYTES NFR BLD AUTO: 0.4 % (ref 0–0.9)
LIPASE SERPL-CCNC: 36 U/L (ref 7–58)
LYMPHOCYTES # BLD AUTO: 1.17 K/UL (ref 1–4.8)
LYMPHOCYTES NFR BLD: 41.9 % (ref 22–41)
MCH RBC QN AUTO: 31.4 PG (ref 27–33)
MCHC RBC AUTO-ENTMCNC: 32.3 G/DL (ref 33.6–35)
MCV RBC AUTO: 97.3 FL (ref 81.4–97.8)
MONOCYTES # BLD AUTO: 0.25 K/UL (ref 0–0.85)
MONOCYTES NFR BLD AUTO: 9 % (ref 0–13.4)
NEUTROPHILS # BLD AUTO: 1.24 K/UL (ref 2–7.15)
NEUTROPHILS NFR BLD: 44.4 % (ref 44–72)
NRBC # BLD AUTO: 0 K/UL
NRBC BLD-RTO: 0 /100 WBC
PLATELET # BLD AUTO: 88 K/UL (ref 164–446)
PMV BLD AUTO: 10.1 FL (ref 9–12.9)
POTASSIUM SERPL-SCNC: 3.9 MMOL/L (ref 3.6–5.5)
PROT SERPL-MCNC: 8.7 G/DL (ref 6–8.2)
RBC # BLD AUTO: 4.52 M/UL (ref 4.2–5.4)
SODIUM SERPL-SCNC: 137 MMOL/L (ref 135–145)
WBC # BLD AUTO: 2.8 K/UL (ref 4.8–10.8)

## 2022-03-24 PROCEDURE — 700105 HCHG RX REV CODE 258: Performed by: EMERGENCY MEDICINE

## 2022-03-24 PROCEDURE — 96374 THER/PROPH/DIAG INJ IV PUSH: CPT

## 2022-03-24 PROCEDURE — 700111 HCHG RX REV CODE 636 W/ 250 OVERRIDE (IP): Performed by: EMERGENCY MEDICINE

## 2022-03-24 PROCEDURE — 99284 EMERGENCY DEPT VISIT MOD MDM: CPT

## 2022-03-24 PROCEDURE — 85025 COMPLETE CBC W/AUTO DIFF WBC: CPT

## 2022-03-24 PROCEDURE — 36415 COLL VENOUS BLD VENIPUNCTURE: CPT

## 2022-03-24 PROCEDURE — 96375 TX/PRO/DX INJ NEW DRUG ADDON: CPT

## 2022-03-24 PROCEDURE — 83690 ASSAY OF LIPASE: CPT

## 2022-03-24 PROCEDURE — 80053 COMPREHEN METABOLIC PANEL: CPT

## 2022-03-24 RX ORDER — OMEPRAZOLE 40 MG/1
40 CAPSULE, DELAYED RELEASE ORAL DAILY
Qty: 30 CAPSULE | Refills: 0 | Status: SHIPPED | OUTPATIENT
Start: 2022-03-24 | End: 2022-09-23

## 2022-03-24 RX ORDER — SODIUM CHLORIDE 9 MG/ML
1000 INJECTION, SOLUTION INTRAVENOUS ONCE
Status: COMPLETED | OUTPATIENT
Start: 2022-03-24 | End: 2022-03-24

## 2022-03-24 RX ORDER — ONDANSETRON 4 MG/1
4 TABLET, ORALLY DISINTEGRATING ORAL EVERY 6 HOURS PRN
Qty: 10 TABLET | Refills: 0 | Status: SHIPPED | OUTPATIENT
Start: 2022-03-24 | End: 2022-07-10

## 2022-03-24 RX ORDER — PROCHLORPERAZINE EDISYLATE 5 MG/ML
10 INJECTION INTRAMUSCULAR; INTRAVENOUS ONCE
Status: COMPLETED | OUTPATIENT
Start: 2022-03-24 | End: 2022-03-24

## 2022-03-24 RX ORDER — PROCHLORPERAZINE MALEATE 10 MG
10 TABLET ORAL EVERY 6 HOURS PRN
Qty: 15 TABLET | Refills: 0 | Status: SHIPPED | OUTPATIENT
Start: 2022-03-24 | End: 2022-07-10

## 2022-03-24 RX ORDER — ONDANSETRON 2 MG/ML
4 INJECTION INTRAMUSCULAR; INTRAVENOUS ONCE
Status: COMPLETED | OUTPATIENT
Start: 2022-03-24 | End: 2022-03-24

## 2022-03-24 RX ADMIN — FAMOTIDINE 40 MG: 10 INJECTION INTRAVENOUS at 05:05

## 2022-03-24 RX ADMIN — SODIUM CHLORIDE 1000 ML: 9 INJECTION, SOLUTION INTRAVENOUS at 04:45

## 2022-03-24 RX ADMIN — ONDANSETRON 4 MG: 2 INJECTION INTRAMUSCULAR; INTRAVENOUS at 04:45

## 2022-03-24 RX ADMIN — PROCHLORPERAZINE EDISYLATE 10 MG: 5 INJECTION INTRAMUSCULAR; INTRAVENOUS at 05:44

## 2022-03-24 ASSESSMENT — FIBROSIS 4 INDEX: FIB4 SCORE: 4.93

## 2022-03-24 NOTE — DISCHARGE INSTRUCTIONS
Follow a clear liquid diet for the next 24 hours then advance as tolerated  Take the medication as directed and needed.  Follow-up with your primary care doctor within the next 2 to 3 days for recheck and return if uncontrolled vomiting, fever or abdominal pain.

## 2022-03-24 NOTE — ED NOTES
PT presents with N/V that has been ongoing since 1300 the prior day.  PT denies chest pain/ SOB, fever or chills, or any other complaints.

## 2022-03-24 NOTE — ED PROVIDER NOTES
CHIEF COMPLAINT  Chief Complaint   Patient presents with   • Vomiting     Pt reports that she has vomited 6-7 time since yesterday at 1300. Denies abdominal pain, diarrhea, or fevers.       HPI  Elvia Roberto is a 51 y.o. female who presents tonight with a chief complaint of nausea with vomiting since 1 PM yesterday afternoon.  She states she is vomited about 6-7 times.  She denies any abdominal pain, hematemesis, melena, diarrhea.  She denies any fever or chills.    REVIEW OF SYSTEMS  See HPI for further details. All other system reviews are negative.    PAST MEDICAL HISTORY  Past Medical History:   Diagnosis Date   • Heart burn    • Indigestion    • Liver disease     Auto immune hepatitis   • Lupus (HCC)    • Other specified disorder of intestines    • Other specified symptom associated with female genital organs     tubiligation       FAMILY HISTORY  History reviewed. No pertinent family history.    SOCIAL HISTORY  Social History     Socioeconomic History   • Marital status: Single   Tobacco Use   • Smoking status: Current Every Day Smoker     Packs/day: 0.40     Years: 30.00     Pack years: 12.00     Types: Cigarettes   • Smokeless tobacco: Never Used   Vaping Use   • Vaping Use: Never used   Substance and Sexual Activity   • Alcohol use: Not Currently   • Drug use: No       SURGICAL HISTORY  Past Surgical History:   Procedure Laterality Date   • HYSTEROSCOPY WITH VIDEO OPERATIVE  8/20/2013    Performed by Robin Frederick M.D. at SURGERY [facility]   • LAPAROSCOPY  12/16/2012    Performed by Hayes Lugo M.D. at SURGERY [facility]   • GASTROSCOPY  12/16/2012    Performed by Hayes Lugo M.D. at SURGERY [facility]   • BOWEL RESECTION LAPAROSCOPIC  4/4/2012    Performed by HAYES LUGO at SURGERY [facility]   • LYSIS ADHESIONS GENERAL  4/4/2012    Performed by HAYES LUGO at [facility]   • OTHER ABDOMINAL SURGERY  2010    Kristi En Y gastric bypass   • OTHER ORTHOPEDIC  "SURGERY      Left ankle surgery   • OTHER ORTHOPEDIC SURGERY      Left knee surgery   • GYN SURGERY      tubal ligation   • GYN SURGERY         • [place] REMOVE TONSILS/ADENOIDS,<13 Y/O     • PRIMARY C SECTION             CURRENT MEDICATIONS  See nurses notes    ALLERGIES  Allergies   Allergen Reactions   • Asa [Aspirin] Unspecified     GI upset, bleeding, Gastric Bypass     • Nsaids Nausea     GI upset, bleeding, Gastric Bypass     • Tape Unspecified     Blisters all over, Paper tape is ok       PHYSICAL EXAM  VITAL SIGNS: /67   Pulse 70   Temp 36.4 °C (97.6 °F) (Temporal)   Resp 18   Ht 1.575 m (5' 2\")   Wt 80.3 kg (177 lb 0.5 oz)   LMP 2013   SpO2 99%   BMI 32.38 kg/m²     Constitutional: Patient is well developed, well nourished in mild distress.  HENT: Normocephalic,  Oropharynx moist without erythema or exudates, nose normal with no mucosal edema or drainage.   Eyes: PERRL, EOMI  Neck: Supple   Cardiovascular: Normal heart rate and rhythm. No murmur  Thorax & Lungs: Clear and equal breath sounds with good excursion. No respiratory distress  Abdomen: Bowel sounds normal in all four quadrants. Soft,nontender  Skin: Warm, Dry  Back: No cervical, thoracic, or lumbosacral tenderness. No CVA tenderness.   Extremities: Peripheral pulses 4/4 No edema, No tenderness  Musculoskeletal: Normal range of motion in all major joints.   Neurologic: Alert & oriented x 3, Normal motor function, Normal sensory function  Psychiatric: Affect normal, Judgment normal, Mood normal.       Results for orders placed or performed during the hospital encounter of 22   CBC WITH DIFFERENTIAL   Result Value Ref Range    WBC 2.8 (L) 4.8 - 10.8 K/uL    RBC 4.52 4.20 - 5.40 M/uL    Hemoglobin 14.2 12.0 - 16.0 g/dL    Hematocrit 44.0 37.0 - 47.0 %    MCV 97.3 81.4 - 97.8 fL    MCH 31.4 27.0 - 33.0 pg    MCHC 32.3 (L) 33.6 - 35.0 g/dL    RDW 53.4 (H) 35.9 - 50.0 fL    Platelet Count 88 (L) 164 " - 446 K/uL    MPV 10.1 9.0 - 12.9 fL    Neutrophils-Polys 44.40 44.00 - 72.00 %    Lymphocytes 41.90 (H) 22.00 - 41.00 %    Monocytes 9.00 0.00 - 13.40 %    Eosinophils 3.60 0.00 - 6.90 %    Basophils 0.70 0.00 - 1.80 %    Immature Granulocytes 0.40 0.00 - 0.90 %    Nucleated RBC 0.00 /100 WBC    Neutrophils (Absolute) 1.24 (L) 2.00 - 7.15 K/uL    Lymphs (Absolute) 1.17 1.00 - 4.80 K/uL    Monos (Absolute) 0.25 0.00 - 0.85 K/uL    Eos (Absolute) 0.10 0.00 - 0.51 K/uL    Baso (Absolute) 0.02 0.00 - 0.12 K/uL    Immature Granulocytes (abs) 0.01 0.00 - 0.11 K/uL    NRBC (Absolute) 0.00 K/uL   COMP METABOLIC PANEL   Result Value Ref Range    Sodium 137 135 - 145 mmol/L    Potassium 3.9 3.6 - 5.5 mmol/L    Chloride 107 96 - 112 mmol/L    Co2 23 20 - 33 mmol/L    Anion Gap 7.0 7.0 - 16.0    Glucose 86 65 - 99 mg/dL    Bun 12 8 - 22 mg/dL    Creatinine 0.70 0.50 - 1.40 mg/dL    Calcium 8.8 8.4 - 10.2 mg/dL    AST(SGOT) 98 (H) 12 - 45 U/L    ALT(SGPT) 81 (H) 2 - 50 U/L    Alkaline Phosphatase 195 (H) 30 - 99 U/L    Total Bilirubin 0.4 0.1 - 1.5 mg/dL    Albumin 3.6 3.2 - 4.9 g/dL    Total Protein 8.7 (H) 6.0 - 8.2 g/dL    Globulin 5.1 (H) 1.9 - 3.5 g/dL    A-G Ratio 0.7 g/dL   LIPASE   Result Value Ref Range    Lipase 36 7 - 58 U/L   ESTIMATED GFR   Result Value Ref Range    GFR (CKD-EPI) 103 >60 mL/min/1.73 m 2           COURSE & MEDICAL DECISION MAKING  Pertinent Labs & Imaging studies reviewed. (See chart for details)  Patient received an IV of normal saline with a fluid bolus and Zofran.  Laboratories were drawn.  Patient's laboratories were unremarkable she has a slightly depleted white count of 2.8 with stable H&H.  She has a platelet count of 88,000.  LFTs are elevated with an SGOT of 98 SGPT 81 alk phos 195.  These correlate with her history of autoimmune hepatitis.  Since the patient does not appear jaundiced, no slight scleral icterus or any significant abdominal pain I am not concerned about this.  Patient has  received IV fluids, Zofran and Compazine and when she is able to tolerate p.o. fluids she will be discharged home.  I have sent prescriptions for omeprazole, Zofran ODT and Compazine to her local pharmacy.  Her care will be signed out to my partner for further disposition.    FINAL IMPRESSION  1.  Nausea with vomiting  2.  Dehydration  3.  Elevated LFTs with history of autoimmune hepatitis  4.  History of lupus         Electronically signed by: Renee Ferreira D.O., 3/24/2022 4:53 [facility] Provider Note

## 2022-04-05 ENCOUNTER — OFFICE VISIT (OUTPATIENT)
Dept: URGENT CARE | Facility: CLINIC | Age: 52
End: 2022-04-05
Payer: COMMERCIAL

## 2022-04-05 VITALS
OXYGEN SATURATION: 97 % | TEMPERATURE: 98.5 F | DIASTOLIC BLOOD PRESSURE: 78 MMHG | RESPIRATION RATE: 20 BRPM | HEART RATE: 67 BPM | WEIGHT: 179.8 LBS | HEIGHT: 62 IN | SYSTOLIC BLOOD PRESSURE: 110 MMHG | BODY MASS INDEX: 33.09 KG/M2

## 2022-04-05 DIAGNOSIS — M25.50 ARTHRALGIA, UNSPECIFIED JOINT: ICD-10-CM

## 2022-04-05 DIAGNOSIS — M32.9 PERSONAL HISTORY OF SYSTEMIC LUPUS ERYTHEMATOSUS (SLE) (HCC): ICD-10-CM

## 2022-04-05 PROCEDURE — 99214 OFFICE O/P EST MOD 30 MIN: CPT | Performed by: PHYSICIAN ASSISTANT

## 2022-04-05 RX ORDER — PREDNISONE 10 MG/1
TABLET ORAL
Qty: 20 TABLET | Refills: 0 | Status: SHIPPED | OUTPATIENT
Start: 2022-04-05 | End: 2022-04-17

## 2022-04-05 RX ORDER — CYCLOBENZAPRINE HCL 5 MG
5 TABLET ORAL 3 TIMES DAILY PRN
Qty: 10 TABLET | Refills: 0 | Status: SHIPPED | OUTPATIENT
Start: 2022-04-05 | End: 2022-07-10

## 2022-04-05 ASSESSMENT — ENCOUNTER SYMPTOMS
FEVER: 0
MYALGIAS: 1
NAUSEA: 0
CHILLS: 0
VOMITING: 0

## 2022-04-05 ASSESSMENT — FIBROSIS 4 INDEX: FIB4 SCORE: 6.43

## 2022-04-05 NOTE — PROGRESS NOTES
Subjective:   Elvia Roberto is a 51 y.o. female who presents for Lupus (Flare up, swelling 3 days.)        Patient presents today with concerns of a lupus flare.  States that she is currently experiencing severe achiness in her shoulders and hands as well as some swelling in her hands.  States that she spent a lot of time out in the sun this weekend and was very stressed out by her grandson and suspects that this may have precipitated her symptoms.  She was seen in clinic a couple months ago for a flare and was prescribed a 5-day course of prednisone.  She states that this significantly improved her symptoms but did not fully resolve them.  States that historically she has done well on a taper.  To her knowledge she has never been treated with hydroxychloroquine.  She was previously following with rheumatology, but her insurance is no longer accepted and she has not been able to reestablish with a new rheumatologist.  Was unable to get into see her PCP today.  She denies abdominal pain, nausea, vomiting, rashes, flank pain, hematuria, frothy urine, chest pain, pain with breathing, shortness of breath.      Review of Systems   Constitutional: Negative for chills and fever.   Gastrointestinal: Negative for nausea and vomiting.   Musculoskeletal: Positive for joint pain and myalgias.   Skin: Negative for rash.       PMH:  has a past medical history of Heart burn, Indigestion, Liver disease, Lupus (HCC), Other specified disorder of intestines, and Other specified symptom associated with female genital organs.  MEDS:   Current Outpatient Medications:   •  predniSONE (DELTASONE) 10 MG Tab, Take 3 Tablets by mouth every day for 3 days, THEN 2 Tablets every day for 3 days, THEN 1 Tablet every day for 3 days, THEN 0.5 Tablets every day for 3 days., Disp: 20 Tablet, Rfl: 0  •  cyclobenzaprine (FLEXERIL) 5 mg tablet, Take 1 Tablet by mouth 3 times a day as needed., Disp: 10 Tablet, Rfl: 0  •  omeprazole (PRILOSEC) 40 MG  delayed-release capsule, Take 1 Capsule by mouth every day., Disp: 30 Capsule, Rfl: 0  •  acetaminophen (TYLENOL) 500 MG Tab, Take 1,000 mg by mouth 2 times a day as needed (Pain, Headache). 2 tablets = 1,000 mg., Disp: , Rfl:   •  pantoprazole (PROTONIX) 40 MG Tablet Delayed Response, Take 1 Tab by mouth every day., Disp: 30 Tab, Rfl: 0  •  DULoxetine (CYMBALTA) 60 MG Cap DR Particles delayed-release capsule, Take 60 mg by mouth every day., Disp: , Rfl:   •  diphenhydrAMINE (BENADRYL) 25 MG Tab, Take 50 mg by mouth 2 times a day as needed (Allergies). 2 tablets = 50 mg., Disp: , Rfl:   •  ondansetron (ZOFRAN ODT) 4 MG TABLET DISPERSIBLE, Take 1 Tablet by mouth every 6 hours as needed for Nausea. (Patient not taking: Reported on 4/5/2022), Disp: 10 Tablet, Rfl: 0  •  prochlorperazine (COMPAZINE) 10 MG Tab, Take 1 Tablet by mouth every 6 hours as needed for Nausea/Vomiting. (Patient not taking: Reported on 4/5/2022), Disp: 15 Tablet, Rfl: 0  •  benzonatate (TESSALON) 200 MG capsule, Take 1 Capsule by mouth 3 times a day as needed for Cough. (Patient not taking: Reported on 2/25/2022), Disp: 60 Capsule, Rfl: 0  •  metoclopramide (REGLAN) 10 MG Tab, Take 1 Tablet by mouth every 8 hours as needed (nausea). Indications: Nausea and Vomiting (Patient not taking: Reported on 2/25/2022), Disp: 14 Tablet, Rfl: 0  •  SUMAtriptan (IMITREX) 25 MG Tab tablet, Take 1-2 Tablets by mouth one time as needed for Migraine for up to 1 dose. (Patient not taking: No sig reported), Disp: 10 tablet, Rfl: 0  •  ferrous sulfate 325 (65 Fe) MG tablet, Take 1 tablet by mouth every day. (Patient not taking: Reported on 1/29/2022), Disp: 90 tablet, Rfl: 1  •  ursodiol (ACTIGALL) 300 MG Cap, Take 300 mg by mouth 3 times a day., Disp: , Rfl:   •  ondansetron (ZOFRAN ODT) 4 MG TABLET DISPERSIBLE, Take 1 Tab by mouth every 6 hours as needed for Nausea., Disp: 20 Tab, Rfl: 0  ALLERGIES:   Allergies   Allergen Reactions   • Asa [Aspirin] Unspecified     " GI upset, bleeding, Gastric Bypass     • Nsaids Nausea     GI upset, bleeding, Gastric Bypass     • Tape Unspecified     Blisters all over, Paper tape is ok     SURGHX:   Past Surgical History:   Procedure Laterality Date   • HYSTEROSCOPY WITH VIDEO OPERATIVE  2013    Performed by Robin Frederick M.D. at SURGERY [facility]   • LAPAROSCOPY  2012    Performed by Hayes Lugo M.D. at SURGERY [facility]   • GASTROSCOPY  2012    Performed by Hayes Lugo M.D. at SURGERY [facility]   • BOWEL RESECTION LAPAROSCOPIC  2012    Performed by HAYES LUGO at SURGERY [facility]   • LYSIS ADHESIONS GENERAL  2012    Performed by HAYES LUGO at SURGERY [facility]   • OTHER ABDOMINAL SURGERY      Kristi En Y gastric bypass   • OTHER ORTHOPEDIC SURGERY      Left ankle surgery   • OTHER ORTHOPEDIC SURGERY      Left knee surgery   • GYN SURGERY      tubal ligation   • GYN SURGERY         • [place] REMOVE TONSILS/ADENOIDS,<13 Y/O     • PRIMARY C SECTION           SOCHX:  reports that she has been smoking cigarettes. She has a 12.00 pack-year smoking history. She has never used smokeless tobacco. She reports previous alcohol use. She reports that she does not use drugs.  FH: Family history was reviewed, no pertinent findings to report   Objective:   /78 (BP Location: Left arm, Patient Position: Sitting, BP Cuff Size: Adult)   Pulse 67   Temp 36.9 °C (98.5 °F) (Temporal)   Resp 20   Ht 1.575 m (5' 2\")   Wt 81.6 kg (179 lb 12.8 oz)   LMP 2013   SpO2 97%   BMI 32.89 kg/m²   Physical Exam  Vitals reviewed.   Constitutional:       General: She is not in acute distress.     Appearance: Normal appearance. She is well-developed. She is not toxic-appearing.   HENT:      Head: Normocephalic and atraumatic.      Right Ear: External ear normal.      Left Ear: External ear normal.      Nose: Nose normal.   Cardiovascular:      Rate and Rhythm: " Normal rate and regular rhythm.      Heart sounds: Normal heart sounds, S1 normal and S2 normal.   Pulmonary:      Effort: Pulmonary effort is normal. No respiratory distress.      Breath sounds: Normal breath sounds. No stridor. No decreased breath sounds, wheezing, rhonchi or rales.   Skin:     General: Skin is dry.      Comments: No facial rashes.   Neurological:      Comments: Alert and oriented.    Psychiatric:         Speech: Speech normal.         Behavior: Behavior normal.           Assessment/Plan:   1. Arthralgia, unspecified joint  - predniSONE (DELTASONE) 10 MG Tab; Take 3 Tablets by mouth every day for 3 days, THEN 2 Tablets every day for 3 days, THEN 1 Tablet every day for 3 days, THEN 0.5 Tablets every day for 3 days.  Dispense: 20 Tablet; Refill: 0  - cyclobenzaprine (FLEXERIL) 5 mg tablet; Take 1 Tablet by mouth 3 times a day as needed.  Dispense: 10 Tablet; Refill: 0    2. Personal history of systemic lupus erythematosus (SLE) (HCC)  - predniSONE (DELTASONE) 10 MG Tab; Take 3 Tablets by mouth every day for 3 days, THEN 2 Tablets every day for 3 days, THEN 1 Tablet every day for 3 days, THEN 0.5 Tablets every day for 3 days.  Dispense: 20 Tablet; Refill: 0    Records from 2/25/2022 reviewed for continuity of care.  Patient treated in clinic for similar symptoms and prescribed a 5-day burst of prednisone and muscle relaxants.  Per patient this improved her symptoms but did not fully resolve them.      History and symptoms consistent with mild SLE flare.  Patient is otherwise well and vital signs are stable.  Patient treated with prednisone again today.  We will start her at 30 mg and taper her down to 5.  I would like her to follow-up with her PCP next week for reevaluation and to discuss need for maintenance dose of prednisone or hydroxychloroquine.  If patient develops any new or worsening symptoms I recommend immediate medical reevaluation.  If she cannot get into see PCP return to clinic for  reevaluation.  Severe symptoms-to ED.

## 2022-06-19 ENCOUNTER — OFFICE VISIT (OUTPATIENT)
Dept: URGENT CARE | Facility: CLINIC | Age: 52
End: 2022-06-19
Payer: COMMERCIAL

## 2022-06-19 VITALS
WEIGHT: 177.6 LBS | HEIGHT: 62 IN | TEMPERATURE: 96.7 F | SYSTOLIC BLOOD PRESSURE: 138 MMHG | BODY MASS INDEX: 32.68 KG/M2 | OXYGEN SATURATION: 95 % | RESPIRATION RATE: 16 BRPM | HEART RATE: 83 BPM | DIASTOLIC BLOOD PRESSURE: 80 MMHG

## 2022-06-19 DIAGNOSIS — T30.0 SUPERFICIAL BURN: ICD-10-CM

## 2022-06-19 PROCEDURE — 99213 OFFICE O/P EST LOW 20 MIN: CPT | Performed by: PHYSICIAN ASSISTANT

## 2022-06-19 ASSESSMENT — ENCOUNTER SYMPTOMS
TINGLING: 0
WEAKNESS: 0
FEVER: 0
NAUSEA: 0
CHILLS: 0
MYALGIAS: 0
VOMITING: 0

## 2022-06-19 ASSESSMENT — FIBROSIS 4 INDEX: FIB4 SCORE: 6.56

## 2022-06-19 NOTE — PROGRESS NOTES
Subjective:     CHIEF COMPLAINT  Chief Complaint   Patient presents with   • Burn     (L) arm, today while cooking, butter.        HPI  Elvia Roberto is a very pleasant 52 y.o. female who presents to the clinic after sustaining a burn to her left forearm this morning.  She was cooking in the kitchen when she tripped over her dog spilling hot butter on her left forearm.  She sustained a small superficial welt.  Very small blister formation present.  Pain is mild at this time.  No numbness or tingling distally.  No pain with range of motion of the upper extremity.  No limitations in range of motion.  Tetanus is up-to-date.    REVIEW OF SYSTEMS  Review of Systems   Constitutional: Negative for chills and fever.   Gastrointestinal: Negative for nausea and vomiting.   Musculoskeletal: Negative for joint pain and myalgias.   Skin:        Superficial burn to the left forearm   Neurological: Negative for tingling and weakness.       PAST MEDICAL HISTORY  Patient Active Problem List    Diagnosis Date Noted   • Iron deficiency 04/29/2021   • Lupus (systemic lupus erythematosus) (HCC) 04/15/2021   • Other cirrhosis of liver (HCC) 04/15/2021   • Pancytopenia (HCC) 04/15/2021   • Reactive depression (situational) 01/28/2021   • Gastroesophageal reflux disease 01/28/2021   • Malnutrition following gastrointestinal surgery 01/28/2021   • Chronic migraine 10/16/2018   • Thrombocytopenia (HCC) 11/15/2017   • Retroperitoneal lymphadenopathy 11/15/2017   • Diarrhea 11/13/2017   • Dehydration 11/11/2017   • Hypokalemia 11/11/2017   • Hypomagnesemia 11/11/2017   • Tobacco abuse 11/11/2017   • Chronic pain 11/11/2017   • Epigastric abdominal pain 12/14/2012   • Abdominal pain, other specified site 04/22/2012       SURGICAL HISTORY   has a past surgical history that includes bowel resection laparoscopic (4/4/2012); lysis adhesions general (4/4/2012); gyn surgery (1993); gyn surgery (1993); other orthopedic surgery (2004); other  orthopedic surgery (2000); other abdominal surgery (2010); laparoscopy (12/16/2012); gastroscopy (12/16/2012); primary c section; remove tonsils/adenoids,<11 y/o; and hysteroscopy with video operative (8/20/2013).    ALLERGIES  Allergies   Allergen Reactions   • Asa [Aspirin] Unspecified     GI upset, bleeding, Gastric Bypass     • Nsaids Nausea     GI upset, bleeding, Gastric Bypass     • Tape Unspecified     Blisters all over, Paper tape is ok       CURRENT MEDICATIONS  Home Medications     Reviewed by Remi Brownlee P.A.-C. (Physician Assistant) on 06/19/22 at 1624  Med List Status: <None>   Medication Last Dose Status   acetaminophen (TYLENOL) 500 MG Tab Taking Active   benzonatate (TESSALON) 200 MG capsule  Active   cyclobenzaprine (FLEXERIL) 5 mg tablet Not Taking Active   diphenhydrAMINE (BENADRYL) 25 MG Tab Taking Active   DULoxetine (CYMBALTA) 60 MG Cap DR Particles delayed-release capsule Not Taking Active   ferrous sulfate 325 (65 Fe) MG tablet  Active   metoclopramide (REGLAN) 10 MG Tab  Active   omeprazole (PRILOSEC) 40 MG delayed-release capsule Taking Active   ondansetron (ZOFRAN ODT) 4 MG TABLET DISPERSIBLE  Active   ondansetron (ZOFRAN ODT) 4 MG TABLET DISPERSIBLE Not Taking Active   pantoprazole (PROTONIX) 40 MG Tablet Delayed Response Not Taking Active   prochlorperazine (COMPAZINE) 10 MG Tab Not Taking Active   SUMAtriptan (IMITREX) 25 MG Tab tablet  Active   ursodiol (ACTIGALL) 300 MG Cap  Active                SOCIAL HISTORY  Social History     Tobacco Use   • Smoking status: Current Every Day Smoker     Packs/day: 0.40     Years: 30.00     Pack years: 12.00     Types: Cigarettes   • Smokeless tobacco: Never Used   Vaping Use   • Vaping Use: Never used   Substance and Sexual Activity   • Alcohol use: Not Currently   • Drug use: No   • Sexual activity: Not on file       FAMILY HISTORY  History reviewed. No pertinent family history.       Objective:     VITAL SIGNS: /80   Pulse 83   Temp  "35.9 °C (96.7 °F)   Resp 16   Ht 1.575 m (5' 2\")   Wt 80.6 kg (177 lb 9.6 oz)   LMP 05/04/2013   SpO2 95%   BMI 32.48 kg/m²     PHYSICAL EXAM  Physical Exam  Constitutional:       Appearance: Normal appearance.   HENT:      Head: Normocephalic and atraumatic.   Eyes:      Conjunctiva/sclera: Conjunctivae normal.   Pulmonary:      Effort: Pulmonary effort is normal.   Musculoskeletal:         General: Normal range of motion.      Cervical back: Normal range of motion.      Comments: Full and pain-free range of motion of the left upper extremity   Skin:            Comments: 1 x 2 cm superficial burn to the proximal dorsal aspect of the left forearm.  Very small 2 x 2 millimeter blister formation present.  No skin break.  Mildly tender to palpation.   Neurological:      General: No focal deficit present.      Mental Status: She is alert and oriented to person, place, and time. Mental status is at baseline.         Assessment/Plan:     1. Superficial burn  - silver sulfADIAZINE (SILVADENE) 1 % Cream; Apply 1 g topically 2 times a day for 7 days.  Dispense: 20 g; Refill: 0      MDM/Comments:    Superficial skin burn to the left forearm.  All skin intact.  Very small blister formation present.  No surrounding erythema.  No pain or limitations in range of motion of the left upper extremity.  Tetanus is up-to-date.  Wound care instructions discussed.  Topical Silvadene sent to pharmacy.  Follow-up in clinic for any worsening pain or signs of infection.    Differential diagnosis, natural history, supportive care, and indications for immediate follow-up discussed. All questions answered. Patient agrees with the plan of care.    Follow-up as needed if symptoms worsen or fail to improve to PCP, Urgent care or Emergency Room.    I have personally reviewed prior external notes and test results pertinent to today's visit.  I have independently reviewed and interpreted all diagnostics ordered during this urgent care acute " visit.   Discussed management options (risks,benefits, and alternatives to treatment). Pt expresses understanding and the treatment plan was agreed upon. Questions were encouraged and answered to pt's satisfaction.    Please note that this dictation was created using voice recognition software. I have made a reasonable attempt to correct obvious errors, but I expect that there are errors of grammar and possibly content that I did not discover before finalizing the note.

## 2022-06-21 ENCOUNTER — HOSPITAL ENCOUNTER (EMERGENCY)
Facility: MEDICAL CENTER | Age: 52
End: 2022-06-21
Attending: EMERGENCY MEDICINE
Payer: COMMERCIAL

## 2022-06-21 ENCOUNTER — APPOINTMENT (OUTPATIENT)
Dept: RADIOLOGY | Facility: MEDICAL CENTER | Age: 52
End: 2022-06-21
Attending: EMERGENCY MEDICINE
Payer: COMMERCIAL

## 2022-06-21 VITALS
HEIGHT: 62 IN | TEMPERATURE: 98.1 F | WEIGHT: 174.38 LBS | HEART RATE: 70 BPM | BODY MASS INDEX: 32.09 KG/M2 | SYSTOLIC BLOOD PRESSURE: 120 MMHG | OXYGEN SATURATION: 96 % | DIASTOLIC BLOOD PRESSURE: 81 MMHG | RESPIRATION RATE: 16 BRPM

## 2022-06-21 DIAGNOSIS — R09.81 SINUS CONGESTION: ICD-10-CM

## 2022-06-21 DIAGNOSIS — J40 BRONCHITIS: ICD-10-CM

## 2022-06-21 PROCEDURE — C9803 HOPD COVID-19 SPEC COLLECT: HCPCS | Performed by: EMERGENCY MEDICINE

## 2022-06-21 PROCEDURE — A9270 NON-COVERED ITEM OR SERVICE: HCPCS | Performed by: EMERGENCY MEDICINE

## 2022-06-21 PROCEDURE — 71045 X-RAY EXAM CHEST 1 VIEW: CPT

## 2022-06-21 PROCEDURE — 0241U HCHG SARS-COV-2 COVID-19 NFCT DS RESP RNA 4 TRGT MIC: CPT

## 2022-06-21 PROCEDURE — 99283 EMERGENCY DEPT VISIT LOW MDM: CPT

## 2022-06-21 PROCEDURE — 700102 HCHG RX REV CODE 250 W/ 637 OVERRIDE(OP): Performed by: EMERGENCY MEDICINE

## 2022-06-21 RX ORDER — BENZONATATE 100 MG/1
100 CAPSULE ORAL 3 TIMES DAILY PRN
Qty: 15 CAPSULE | Refills: 0 | Status: SHIPPED | OUTPATIENT
Start: 2022-06-21 | End: 2022-06-26

## 2022-06-21 RX ORDER — ALBUTEROL SULFATE 90 UG/1
2 AEROSOL, METERED RESPIRATORY (INHALATION) EVERY 6 HOURS PRN
Qty: 8.5 G | Refills: 0 | Status: SHIPPED | OUTPATIENT
Start: 2022-06-21 | End: 2022-07-10

## 2022-06-21 RX ORDER — ALBUTEROL SULFATE 90 UG/1
2 AEROSOL, METERED RESPIRATORY (INHALATION) ONCE
Status: COMPLETED | OUTPATIENT
Start: 2022-06-21 | End: 2022-06-21

## 2022-06-21 RX ORDER — DOXYCYCLINE 100 MG/1
100 CAPSULE ORAL 2 TIMES DAILY
Qty: 20 CAPSULE | Refills: 0 | Status: SHIPPED | OUTPATIENT
Start: 2022-06-21 | End: 2022-07-01

## 2022-06-21 RX ADMIN — ALBUTEROL SULFATE 2 PUFF: 90 AEROSOL, METERED RESPIRATORY (INHALATION) at 14:44

## 2022-06-21 ASSESSMENT — FIBROSIS 4 INDEX: FIB4 SCORE: 6.56

## 2022-06-21 ASSESSMENT — LIFESTYLE VARIABLES: DO YOU DRINK ALCOHOL: NO

## 2022-06-21 NOTE — ED PROVIDER NOTES
ED Provider Note  CHIEF COMPLAINT  Chief Complaint   Patient presents with   • Congestion     Presents with reports of congestions, cough, runny nose for the past 3 days, unknown fever.  + exposure to a Covid positive individual.  Denies N/V/D        HPI  Elvia Roberto is a 52 y.o. female who presents to the ER with complaint of runny nose, nasal congestion, cough productive of phlegm, some mild chest discomfort/congestion, and chills.  She also complains of a headache.  She had chills and sweats last night.  She also complains of sinus pressure and pain.  Cough began yesterday.  She feels postnasal drip coming from her nasal passages down into her throat.  She noted some chest discomfort that occurs and gets worse with with deep breathing.  She reports she is had similar chest high since in the past when she has had upper respiratory infections and bronchitis.  Patient was recently exposed to COVID.  Someone in her office just tested positive few days ago.  They do not wear masks at work.  Patient is vaccinated for COVID but not boosted.  She did not get her flu shot this year.  No coughing of blood.  No pain or swelling in the legs.  She said that her right foot was mildly swollen yesterday but that is since resolved.  No abdominal pain.  No nausea, vomiting or diarrhea.  Patient has history of lupus.  She is not currently on any medications for lupus.  No sore throat.  No fever.  Patient had COVID concurrently with a sinusitis in January 2022.    REVIEW OF SYSTEMS  See HPI for further details. All other systems are negative.    PAST MEDICAL HISTORY  Past Medical History:   Diagnosis Date   • Heart burn    • Indigestion    • Liver disease     Auto immune hepatitis   • Lupus (HCC)    • Other specified disorder of intestines    • Other specified symptom associated with female genital organs     tubiligation       FAMILY HISTORY  History reviewed. No pertinent family history.    SOCIAL HISTORY  Social History      Socioeconomic History   • Marital status: Single   Tobacco Use   • Smoking status: Current Every Day Smoker     Packs/day: 0.40     Years: 30.00     Pack years: 12.00     Types: Cigarettes   • Smokeless tobacco: Never Used   Vaping Use   • Vaping Use: Never used   Substance and Sexual Activity   • Alcohol use: Not Currently   • Drug use: No       SURGICAL HISTORY  Past Surgical History:   Procedure Laterality Date   • HYSTEROSCOPY WITH VIDEO OPERATIVE  2013    Performed by Robin Frederick M.D. at SURGERY [facility]   • LAPAROSCOPY  2012    Performed by Hayes Lugo M.D. at SURGERY [facility]   • GASTROSCOPY  2012    Performed by Hayes Lugo M.D. at SURGERY [facility]   • BOWEL RESECTION LAPAROSCOPIC  2012    Performed by HAYES LUGO at SURGERY [facility]   • LYSIS ADHESIONS GENERAL  2012    Performed by HAYES LUGO at SURGERY [facility]   • OTHER ABDOMINAL SURGERY      Kristi En Y gastric bypass   • OTHER ORTHOPEDIC SURGERY      Left ankle surgery   • OTHER ORTHOPEDIC SURGERY      Left knee surgery   • GYN SURGERY      tubal ligation   • GYN SURGERY         • [place] REMOVE TONSILS/ADENOIDS,<13 Y/O     • PRIMARY C SECTION             CURRENT MEDICATIONS  Home Medications     Reviewed by Cordelia Ladd R.N. (Registered Nurse) on 22 at 1250  Med List Status: Not Addressed   Medication Last Dose Status   acetaminophen (TYLENOL) 500 MG Tab  Active   benzonatate (TESSALON) 200 MG capsule  Active   cyclobenzaprine (FLEXERIL) 5 mg tablet  Active   diphenhydrAMINE (BENADRYL) 25 MG Tab  Active   DULoxetine (CYMBALTA) 60 MG Cap DR Particles delayed-release capsule  Active   ferrous sulfate 325 (65 Fe) MG tablet  Active   metoclopramide (REGLAN) 10 MG Tab  Active   omeprazole (PRILOSEC) 40 MG delayed-release capsule  Active   ondansetron (ZOFRAN ODT) 4 MG TABLET DISPERSIBLE  Active   ondansetron (ZOFRAN ODT) 4 MG TABLET  "DISPERSIBLE  Active   pantoprazole (PROTONIX) 40 MG Tablet Delayed Response  Active   prochlorperazine (COMPAZINE) 10 MG Tab  Active   silver sulfADIAZINE (SILVADENE) 1 % Cream  Active   SUMAtriptan (IMITREX) 25 MG Tab tablet  Active   ursodiol (ACTIGALL) 300 MG Cap  Active                ALLERGIES  Allergies   Allergen Reactions   • Asa [Aspirin] Unspecified     GI upset, bleeding, Gastric Bypass     • Nsaids Nausea     GI upset, bleeding, Gastric Bypass     • Tape Unspecified     Blisters all over, Paper tape is ok       PHYSICAL EXAM  VITAL SIGNS: /86   Pulse 74   Temp 37 °C (98.6 °F) (Temporal)   Resp 18   Ht 1.575 m (5' 2\")   Wt 79.1 kg (174 lb 6.1 oz)   LMP 05/04/2013   SpO2 96%   BMI 31.90 kg/m²      Constitutional: Well developed, well nourished; No acute distress; Non-toxic appearance.  Comfortably playing a card game on her cell phone.  HENT: Normocephalic, atraumatic; Bilateral external ears normal; dry mucous membranes.  No erythema or exudate in the posterior oropharynx.  Eyes: PERRL, EOMI, Conjunctiva normal. No discharge.   Neck:  Supple, nontender midline; No stridor; No nuchal rigidity.   Lymphatic: No cervical lymphadenopathy noted.   Cardiovascular: Regular rate and rhythm without murmurs, rubs, or gallop.   Thorax & Lungs: No respiratory distress, breath sounds clear to auscultation bilaterally without wheezing, rales or rhonchi. Nontender chest wall. No crepitus or subcutaneous air  Abdomen: Soft, nontender, bowel sounds normal. No obvious masses; No pulsatile masses; no rebound, guarding, or peritoneal signs.   Skin: Good color; warm and dry without rash or petechia.  Back: Nontender, No CVA tenderness.   Extremities: Distal radial, dorsalis pedis, posterior tibial pulses are equal bilaterally; No edema; Nontender calves or saphenous, No cyanosis, No clubbing.   Musculoskeletal: Good range of motion in all major joints. No tenderness to palpation or major deformities noted. "   Neurologic: Alert & oriented x 4, clear speech      RADIOLOGY/PROCEDURES  DX-CHEST-PORTABLE (1 VIEW)   Final Result      No evidence of acute cardiopulmonary process.          COURSE & MEDICAL DECISION MAKING  Pertinent Labs & Imaging studies reviewed. (See chart for details)    Results for orders placed or performed during the hospital encounter of 06/21/22   CoV-2, FLU A/B, and RSV by PCR (2-4 Hours CEPHEID) : Collect NP swab in VTM    Specimen: Respirate   Result Value Ref Range    Influenza virus A RNA Negative Negative    Influenza virus B, PCR Negative Negative    RSV, PCR Negative Negative    SARS-CoV-2 by PCR NotDetected     SARS-CoV-2 Source NP Swab      Patient presents to the ER complaining of nasal congestion, runny nose, cough productive of phlegm, chest congestion, sinus pain/pressure with a sinus headache, chills, and cold sweats last night.  No fever.  Patient is vaccinated for COVID but not boosted.  She was concerned that she might have COVID because someone in her workplace just tested positive and do not wear masks.  It was a close contact.  No hemoptysis.  No pain or swelling in the legs.  Patient has clear breath sounds bilaterally.  O2 sats are 96% on room air.  She is not in any respiratory distress.  She says she is blowing out a lot of thick discharge from her nose.  Occasionally she will get a little bit of blood in the nasal discharge.  Again, no hemoptysis.  No shortness of breath.  She is a smoker.  Patient reports that the chest tightness occurs and gets worse with deep breathing.  She says she has had this type of chest tightness before when she has had a bronchitis.  The patient is well-appearing.  I suspect she has a viral upper respiratory and lower respiratory infection.  We will send her home with albuterol inhaler, Tessalon, and some doxycycline.  She is to be tested for COVID in 2 days if she is still having symptoms, especially with her close contact.  Patient is safe and  stable for outpatient management discharge home.  Vital signs are normal stable.  She is been given strict return precautions and discharge instructions and she understands treatment plan and follow-up.    I verified that the patient was wearing a mask and I was wearing appropriate PPE every time I entered the room. The patient's mask was on the patient at all times during my encounter except for a brief view of the oropharynx.    FINAL IMPRESSION  1. Bronchitis Acute         This dictation has been created using voice recognition software. The accuracy of the dictation is limited by the abilities of the software. I expect there may be some errors of grammar and possibly content. I made every attempt to manually correct the errors within my dictation. However, errors related to voice recognition software may still exist and should be interpreted within the appropriate context.  Electronically signed by: Crystal Mora M.D., 6/21/2022 1:01 PM

## 2022-06-21 NOTE — ED TRIAGE NOTES
"51 yo female presents to triage with reports of   Chief Complaint   Patient presents with   • Congestion     Presents with reports of congestions, cough, runny nose for the past 3 days, unknown fever.  + exposure to a Covid positive individual.  Denies N/V/D    /86   Pulse 74   Temp 37 °C (98.6 °F) (Temporal)   Resp 18   Ht 1.575 m (5' 2\")   Wt 79.1 kg (174 lb 6.1 oz)   LMP 05/04/2013   SpO2 96%   BMI 31.90 kg/m²      Patient is Covid Vaccinated   "

## 2022-06-21 NOTE — DISCHARGE INSTRUCTIONS
Return to the ER for any worsening cough, coughing of rust colored phlegm or blood, worsening nasal congestion/sinus pain, fevers over 100.4, shaking chills, worsening headache, worsening chest tightness, shortness of breath, stiff neck, rash, vomiting, diarrhea, or for any concerns.    Follow-up with your primary care physician within the next 1 to 2 days.  Please call for appointment.    Take over-the-counter Tylenol and ibuprofen for discomfort.

## 2022-07-10 ENCOUNTER — HOSPITAL ENCOUNTER (EMERGENCY)
Facility: MEDICAL CENTER | Age: 52
End: 2022-07-11
Attending: EMERGENCY MEDICINE
Payer: COMMERCIAL

## 2022-07-10 ENCOUNTER — OFFICE VISIT (OUTPATIENT)
Dept: URGENT CARE | Facility: CLINIC | Age: 52
End: 2022-07-10
Payer: COMMERCIAL

## 2022-07-10 VITALS
HEIGHT: 62 IN | DIASTOLIC BLOOD PRESSURE: 78 MMHG | SYSTOLIC BLOOD PRESSURE: 118 MMHG | HEART RATE: 69 BPM | RESPIRATION RATE: 15 BRPM | WEIGHT: 172.4 LBS | OXYGEN SATURATION: 97 % | BODY MASS INDEX: 31.73 KG/M2 | TEMPERATURE: 97.5 F

## 2022-07-10 DIAGNOSIS — S69.91XD INJURY OF RIGHT WRIST, SUBSEQUENT ENCOUNTER: ICD-10-CM

## 2022-07-10 DIAGNOSIS — S66.112A STRAIN OF FLEXOR MUSCLE, FASCIA AND TENDON OF RIGHT MIDDLE FINGER AT WRIST AND HAND LEVEL, INITIAL ENCOUNTER: ICD-10-CM

## 2022-07-10 DIAGNOSIS — S66.114A: ICD-10-CM

## 2022-07-10 PROCEDURE — 99284 EMERGENCY DEPT VISIT MOD MDM: CPT

## 2022-07-10 PROCEDURE — 700102 HCHG RX REV CODE 250 W/ 637 OVERRIDE(OP): Performed by: EMERGENCY MEDICINE

## 2022-07-10 PROCEDURE — 96372 THER/PROPH/DIAG INJ SC/IM: CPT | Mod: XU

## 2022-07-10 PROCEDURE — A9270 NON-COVERED ITEM OR SERVICE: HCPCS | Performed by: EMERGENCY MEDICINE

## 2022-07-10 PROCEDURE — 700111 HCHG RX REV CODE 636 W/ 250 OVERRIDE (IP): Performed by: EMERGENCY MEDICINE

## 2022-07-10 PROCEDURE — 99213 OFFICE O/P EST LOW 20 MIN: CPT | Performed by: NURSE PRACTITIONER

## 2022-07-10 PROCEDURE — 302874 HCHG BANDAGE ACE 2 OR 3""

## 2022-07-10 PROCEDURE — 29125 APPL SHORT ARM SPLINT STATIC: CPT

## 2022-07-10 PROCEDURE — 20605 DRAIN/INJ JOINT/BURSA W/O US: CPT

## 2022-07-10 RX ORDER — LIDOCAINE HYDROCHLORIDE 20 MG/ML
20 INJECTION, SOLUTION INFILTRATION; PERINEURAL ONCE
Status: COMPLETED | OUTPATIENT
Start: 2022-07-11 | End: 2022-07-11

## 2022-07-10 RX ORDER — KETOROLAC TROMETHAMINE 30 MG/ML
60 INJECTION, SOLUTION INTRAMUSCULAR; INTRAVENOUS ONCE
Status: COMPLETED | OUTPATIENT
Start: 2022-07-11 | End: 2022-07-10

## 2022-07-10 RX ORDER — OXYCODONE HYDROCHLORIDE AND ACETAMINOPHEN 5; 325 MG/1; MG/1
2 TABLET ORAL ONCE
Status: COMPLETED | OUTPATIENT
Start: 2022-07-11 | End: 2022-07-10

## 2022-07-10 RX ADMIN — OXYCODONE AND ACETAMINOPHEN 2 TABLET: 5; 325 TABLET ORAL at 23:51

## 2022-07-10 RX ADMIN — KETOROLAC TROMETHAMINE 60 MG: 30 INJECTION, SOLUTION INTRAMUSCULAR; INTRAVENOUS at 23:51

## 2022-07-10 ASSESSMENT — ENCOUNTER SYMPTOMS
VOMITING: 0
EYE PAIN: 0
MUSCLE WEAKNESS: 0
NUMBNESS: 0
MYALGIAS: 0
SORE THROAT: 0
FEVER: 0
SHORTNESS OF BREATH: 0
NAUSEA: 0
TINGLING: 1
CHILLS: 0
DIZZINESS: 0

## 2022-07-10 ASSESSMENT — PAIN DESCRIPTION - PAIN TYPE: TYPE: ACUTE PAIN

## 2022-07-10 ASSESSMENT — FIBROSIS 4 INDEX
FIB4 SCORE: 6.56
FIB4 SCORE: 6.56

## 2022-07-11 ENCOUNTER — HOSPITAL ENCOUNTER (OUTPATIENT)
Dept: RADIOLOGY | Facility: MEDICAL CENTER | Age: 52
End: 2022-07-11
Attending: EMERGENCY MEDICINE
Payer: COMMERCIAL

## 2022-07-11 VITALS
SYSTOLIC BLOOD PRESSURE: 106 MMHG | HEIGHT: 62 IN | OXYGEN SATURATION: 94 % | WEIGHT: 171.96 LBS | TEMPERATURE: 98.2 F | BODY MASS INDEX: 31.64 KG/M2 | HEART RATE: 59 BPM | DIASTOLIC BLOOD PRESSURE: 64 MMHG | RESPIRATION RATE: 16 BRPM

## 2022-07-11 PROCEDURE — 73110 X-RAY EXAM OF WRIST: CPT | Mod: RT

## 2022-07-11 PROCEDURE — 700101 HCHG RX REV CODE 250: Performed by: EMERGENCY MEDICINE

## 2022-07-11 RX ORDER — OXYCODONE HYDROCHLORIDE AND ACETAMINOPHEN 5; 325 MG/1; MG/1
1-2 TABLET ORAL EVERY 6 HOURS PRN
Qty: 10 TABLET | Refills: 0 | Status: SHIPPED | OUTPATIENT
Start: 2022-07-11 | End: 2022-07-14

## 2022-07-11 RX ADMIN — LIDOCAINE HYDROCHLORIDE 20 ML: 20 INJECTION, SOLUTION INFILTRATION; PERINEURAL at 00:00

## 2022-07-11 NOTE — PROGRESS NOTES
Subjective:   Elvia Roberto is a 52 y.o. female who presents for Wrist Injury (Right injury )      Wrist Injury   The incident occurred 3 to 6 hours ago. Incident location: was helping dig out a stuck truck using a shovel ,  no known injury  The injury mechanism was repetitive motion. The pain is present in the right hand and right wrist. The quality of the pain is described as aching and cramping. The pain does not radiate. The pain is at a severity of 6/10. The pain is moderate. The pain has been constant since the incident. Associated symptoms include tingling. Pertinent negatives include no chest pain, muscle weakness or numbness. The symptoms are aggravated by movement, lifting and palpation. She has tried nothing for the symptoms. The treatment provided no relief.       Review of Systems   Constitutional: Negative for chills and fever.   HENT: Negative for sore throat.    Eyes: Negative for pain.   Respiratory: Negative for shortness of breath.    Cardiovascular: Negative for chest pain.   Gastrointestinal: Negative for nausea and vomiting.   Genitourinary: Negative for hematuria.   Musculoskeletal: Positive for joint pain. Negative for myalgias.        Right hand pain 3&4th finger and wrist     Skin: Negative for rash.   Neurological: Positive for tingling. Negative for dizziness and numbness.       Medications:    • acetaminophen Tabs  • diphenhydrAMINE Tabs  • omeprazole    Allergies: Asa [aspirin], Nsaids, and Tape    Problem List: Elvia Roberto does not have any pertinent problems on file.    Surgical History:  Past Surgical History:   Procedure Laterality Date   • HYSTEROSCOPY WITH VIDEO OPERATIVE  8/20/2013    Performed by Robin Frederick M.D. at SURGERY [facility]   • LAPAROSCOPY  12/16/2012    Performed by Hayes Regalado M.D. at SURGERY [facility]   • GASTROSCOPY  12/16/2012    Performed by Hayes Regalado M.D. at SURGERY [facility]   • BOWEL RESECTION LAPAROSCOPIC  4/4/2012     "Performed by TOYIN LUGO at SURGERY [facility]   • LYSIS ADHESIONS GENERAL  2012    Performed by TOYIN LUGO at SURGERY [facility]   • OTHER ABDOMINAL SURGERY      Kristi En Y gastric bypass   • OTHER ORTHOPEDIC SURGERY      Left ankle surgery   • OTHER ORTHOPEDIC SURGERY      Left knee surgery   • GYN SURGERY      tubal ligation   • GYN SURGERY         • [place] REMOVE TONSILS/ADENOIDS,<11 Y/O     • PRIMARY C SECTION             Past Social Hx: Elvia Roberto  reports that she has been smoking cigarettes. She has a 12.00 pack-year smoking history. She has never used smokeless tobacco. She reports previous alcohol use. She reports that she does not use drugs.     Past Family Hx:  Elvia Roberto family history is not on file.     Problem list, medications, and allergies reviewed by myself today in Epic.     Objective:     /78 (BP Location: Right arm, Patient Position: Sitting, BP Cuff Size: Adult long)   Pulse 69   Temp 36.4 °C (97.5 °F) (Temporal)   Resp 15   Ht 1.575 m (5' 2\")   Wt 78.2 kg (172 lb 6.4 oz)   LMP 2013   SpO2 97%   BMI 31.53 kg/m²     Physical Exam  Constitutional:       Appearance: Normal appearance. She is not ill-appearing or toxic-appearing.   HENT:      Head: Normocephalic.      Right Ear: External ear normal.      Left Ear: External ear normal.      Nose: Nose normal.      Mouth/Throat:      Lips: Pink.      Mouth: Mucous membranes are moist.   Eyes:      General: Lids are normal.         Right eye: No discharge.         Left eye: No discharge.   Pulmonary:      Effort: Pulmonary effort is normal. No accessory muscle usage or respiratory distress.   Musculoskeletal:         General: Normal range of motion.      Right wrist: Tenderness present. No swelling, deformity, effusion, bony tenderness, snuff box tenderness or crepitus. Normal range of motion. Normal pulse.      Right hand: Tenderness present. No swelling or bony " tenderness. Normal range of motion. Decreased strength. Normal strength of thumb/finger opposition. Normal sensation. There is no disruption of two-point discrimination. Normal capillary refill. Normal pulse.      Cervical back: Full passive range of motion without pain.      Comments: Tenderness palpation to the volar aspect along the flexor tendon of the third and fourth phalanx.  Muscle tenderness elicited on the forearm.  Skin with erythema, no edema.   strength 3 out of 5.  Sensation intact distally, neurovascular intact.  No bony tenderness, Tinel's and Phalen's negative.   Skin:     Coloration: Skin is not pale.   Neurological:      Mental Status: She is alert and oriented to person, place, and time.   Psychiatric:         Mood and Affect: Mood normal.         Thought Content: Thought content normal.         Assessment/Plan:     Diagnosis and associated orders:     1. Strain of flexor muscle, fascia and tendon of right middle finger at wrist and hand level, initial encounter  Referral to Sports Medicine   2. Strain of flexor muscle, fascia and tendon of right ring finger at wrist and hand level, initial encounter  Referral to Sports Medicine      Comments/MDM:     • Patient with no acute trauma or injury to the right wrist and/or hand, likely strained while shoveling reassured patient of clinical findings.  Encouraged ice, Tylenol as needed for pain as she is unable to take NSAID medications.  Encouraged gentle range of motion and stretching.  Wrist brace provided.  Resume activity as tolerated.  Will have patient follow-up with sports medicine pain persist.  I personally reviewed prior external notes and prior test results pertinent to today's visit.   Discussed management options, risks and benefits, and alternatives to treatment plan agreed upon.   Red flags discussed and indications to immediately call 911 or present to the Emergency Department.   Supportive care, differential diagnoses, and  indications for immediate follow-up discussed with patient.    • Patient expresses understanding and agrees to plan. Patient denies any other questions or concerns.                 Please note that this dictation was created using voice recognition software. I have made a reasonable attempt to correct obvious errors, but I expect that there are errors of grammar and possibly content that I did not discover before finalizing the note.    This note was electronically signed by Toney LEE.

## 2022-07-11 NOTE — ED NOTES
Pt verbalized understanding of d/c instructions, meds and f/u. Pt has a ride waiting outside to take her home. Pt left ambulatory with a steady gait, splint and sling in place.

## 2022-07-11 NOTE — ED PROVIDER NOTES
"ED Provider Note    CHIEF COMPLAINT   Chief Complaint   Patient presents with   • Wrist Injury     States she injured her wrist earlier today while helping get truck unstuck. Went to Urgent care, dx with strain. Now presents with increased pain that \"shoots all the way up my arm into my chest.\"       HPI   Elvia Roberto is a 52 y.o. female who presents with a chief complaint of wrist pain.  It is the right wrist.  Duration has been since 10:00 today.  It is painful.  Is worse when she moves it it radiates down to her fingers and also radiates up to her bicep triceps.  Associated painful numbness of her fingers mostly her middle finger.  Occasionally her second and third and fourth as well as the fifth.  There is no associated discoloration    Patient is right-handed she was helping her  who got his truck caught up in the mud to the bottom of the truck.  She had a day come out by shoveling.  She had to put wood underneath there she eventually walked a mile to another camper near her patella.  When she got home about 4:00 her wrists are hurting her more to the point where she could not help carry things at the house so she went to the local urgent care see their note.  She despite that she got worsening pain she cannot take anti-inflammatories because of history of gastric sleeve surgery.    REVIEW OF SYSTEMS   Cardiovascular: Discoloration of her fingers.  Musculoskeletal:  See above  Dermatologic: See above no lacerations  Neurologic: See above    PAST MEDICAL HISTORY   Past Medical History:   Diagnosis Date   • Heart burn    • Indigestion    • Liver disease     Auto immune hepatitis   • Lupus (HCC)    • Other specified disorder of intestines    • Other specified symptom associated with female genital organs     tubiligation       SOCIAL HISTORY  Social History     Socioeconomic History   • Marital status: Single   Tobacco Use   • Smoking status: Current Every Day Smoker     Packs/day: 0.40     Years: " "30.00     Pack years: 12.00     Types: Cigarettes   • Smokeless tobacco: Never Used   Vaping Use   • Vaping Use: Never used   Substance and Sexual Activity   • Alcohol use: Not Currently   • Drug use: No       SURGICAL HISTORY  Past Surgical History:   Procedure Laterality Date   • HYSTEROSCOPY WITH VIDEO OPERATIVE  2013    Performed by Robin Frederick M.D. at SURGERY [facility]   • LAPAROSCOPY  2012    Performed by Hayes Lugo M.D. at SURGERY [facility]   • GASTROSCOPY  2012    Performed by Hayes Lugo M.D. at SURGERY [facility]   • BOWEL RESECTION LAPAROSCOPIC  2012    Performed by HAYES LUGO at SURGERY [facility]   • LYSIS ADHESIONS GENERAL  2012    Performed by HAYES LUGO at SURGERY [facility]   • OTHER ABDOMINAL SURGERY      Kristi En Y gastric bypass   • OTHER ORTHOPEDIC SURGERY      Left ankle surgery   • OTHER ORTHOPEDIC SURGERY      Left knee surgery   • GYN SURGERY      tubal ligation   • GYN SURGERY         • [place] REMOVE TONSILS/ADENOIDS,<11 Y/O     • PRIMARY C SECTION             CURRENT MEDICATIONS   Home Medications     Reviewed by Keyon Soto R.N. (Registered Nurse) on 07/10/22 at 2252  Med List Status: Complete   Medication Last Dose Status   acetaminophen (TYLENOL) 500 MG Tab  Active   diphenhydrAMINE (BENADRYL) 25 MG Tab  Active   omeprazole (PRILOSEC) 40 MG delayed-release capsule  Active                ALLERGIES   Allergies   Allergen Reactions   • Asa [Aspirin] Unspecified     GI upset, bleeding, Gastric Bypass     • Nsaids Nausea     GI upset, bleeding, Gastric Bypass     • Tape Unspecified     Blisters all over, Paper tape is ok       PHYSICAL EXAM  VITAL SIGNS: /58   Pulse 63   Temp 36.9 °C (98.4 °F) (Temporal)   Resp 16   Ht 1.575 m (5' 2\")   Wt 78 kg (171 lb 15.3 oz)   LMP 2013   SpO2 97%   BMI 31.45 kg/m²    Constitutional: Well developed, Well nourished, No acute " distress, Non-toxic appearance.   Cardiovascular: Good pulses on the affected extremity. Good capillary refill.  Thorax & Lungs: No respiratory distress  Skin: No lacerations or abrasions.  She has chronic ecchymotic reactions on her forearms.  Musculoskeletal: Patient has some swelling located at the right wrist.  It is tender to palpation.  She has difficulty flexing and extending her wrist without significant pain.  When extending the fingers she has pain radiating shooting up her forearm.  There is bony tenderness on the volar surface of all the wrist bones.  But no specific discrete point.  No pain to palpation of the elbow.  Neurologic: Good sensation to light touch on the distal affected extremity.  Medial, ulnar, radial nerve intact motor function.    RADIOLOGY/PROCEDURES  DX-WRIST-COMPLETE 3+ RIGHT   Final Result      No acute osseous abnormality.        Lidocaine injection  The patient was educated regarding risks alternative benefits was including infection alternatives including just simply immobilization.  Patient agreed to injection she was prepped with Betadine.  3 times it was sterilized and injected a total of 8 cc of 2% lidocaine.  She tolerated the procedure with some pain but had significant relief afterwards.    COURSE & MEDICAL DECISION MAKING  Pertinent Labs & Imaging studies reviewed. (See chart for details)  Very pleasant patient with wrist pain most likely overuse injury had gone to the urgent care and wrist splint and continued pain.  Here the patient was in severe pain because of the symptoms I felt lidocaine will probably give her the most relief.  We did do 1 dose of Toradol as it was intramuscular Misys my clinical impression is that it would not cause significant issues with her gastric sleeve.  That along with Percocet and as long with the injections she is actually almost pain-free and feels much better.  Patient was very grateful    There is anticipatory guidance regarding the need  to stretch it every day ice it every night.  Use heat as needed and obviously follow-up with physical therapy if not better within 1 week.  Recommend wearing the splint for the next 24 to 48 hours.  Then the wrist splint as needed.  Patient is discharged home in stable condition    I placed a volar splint myself.  This was with fiberglass.  The patient tolerated procedure well and was neurovascular intact afterwards.    Prescription drug monitoring search done patient was deemed low risk.  Told not to drink or drive while taking medication and risks of addiction and he was given less than 3-day course.    FINAL IMPRESSION  1.  Overuse wrist injury  2.  Right wrist sprain  3.  Local lidocaine injection  4.  Splint application under ED supervision      Electronically signed by: Tyler Varghese M.D., 7/10/2022 11:31 PM

## 2022-07-11 NOTE — ED NOTES
Provider placed splint to the RUE using Orthoglass for a thumb/arm splint covered with ACE wrap. RUE was placed in a sling. Cap refill to the right fingers WNL, less than 3 seconds.

## 2022-07-11 NOTE — ED NOTES
"Pt reports she was using a shovel to help get a truck unstuck. Pt reports a repetitive motion using the RUE while doing this. Pt denies any one motion that caused the right wrist pain but reports that once she finished, she then felt the pain. Pt was seen at an Urgent Care and received a wrist splint and dx of strain. Pt reports that her pain increased and she has pain that \"shoots from the medial right wrist up the medial aspect of the arm to the right axillary/breast area. Pt reports this is intermittent but does report a constant throb and ache.  "

## 2022-07-11 NOTE — DISCHARGE INSTRUCTIONS
I recommended that with your injury you should see a physical therapist to learn how to relieve the pain if not better in 1 weeks  I recommend wearing the big bulky splint for 2 days then the velcro one for next week  You should stretch your forearm and wrist every day at least 2-3 times a day  Ice the wrist every day after work if you over use it at work  Use heat in the morning everyday for 1 week to increase blood flow to the area

## 2022-07-11 NOTE — ED TRIAGE NOTES
"Chief Complaint   Patient presents with   • Wrist Injury     States she injured her wrist earlier today while helping get truck unstuck. Went to Urgent care, dx with strain. Now presents with increased pain that \"shoots all the way up my arm into my chest.\"     ED Triage Vitals [07/10/22 2250]   Enc Vitals Group      Blood Pressure 126/71      Pulse 62      Respiration 16      Temperature 36.9 °C (98.4 °F)      Temp src Temporal      Pulse Oximetry 98 %      Weight 78 kg (171 lb 15.3 oz)      Height 1.575 m (5' 2\")       "

## 2022-07-26 ENCOUNTER — APPOINTMENT (OUTPATIENT)
Dept: RADIOLOGY | Facility: MEDICAL CENTER | Age: 52
End: 2022-07-26
Attending: EMERGENCY MEDICINE
Payer: COMMERCIAL

## 2022-07-26 ENCOUNTER — HOSPITAL ENCOUNTER (EMERGENCY)
Facility: MEDICAL CENTER | Age: 52
End: 2022-07-26
Attending: EMERGENCY MEDICINE
Payer: COMMERCIAL

## 2022-07-26 VITALS
OXYGEN SATURATION: 99 % | RESPIRATION RATE: 16 BRPM | TEMPERATURE: 98.2 F | SYSTOLIC BLOOD PRESSURE: 109 MMHG | HEART RATE: 64 BPM | DIASTOLIC BLOOD PRESSURE: 65 MMHG | WEIGHT: 172.4 LBS | BODY MASS INDEX: 31.73 KG/M2 | HEIGHT: 62 IN

## 2022-07-26 DIAGNOSIS — M77.8 RIGHT WRIST TENDINITIS: ICD-10-CM

## 2022-07-26 PROCEDURE — 99283 EMERGENCY DEPT VISIT LOW MDM: CPT

## 2022-07-26 PROCEDURE — 73110 X-RAY EXAM OF WRIST: CPT | Mod: RT

## 2022-07-26 ASSESSMENT — FIBROSIS 4 INDEX: FIB4 SCORE: 6.56

## 2022-07-26 ASSESSMENT — PAIN DESCRIPTION - DESCRIPTORS: DESCRIPTORS: STABBING

## 2022-07-26 NOTE — ED TRIAGE NOTES
Chief Complaint   Patient presents with   • Arm Pain     Right wrist injury 2 weeks ago. Sudden onset [place] pain  radiating up right arm.

## 2022-07-26 NOTE — ED PROVIDER NOTES
ED Provider Note    CHIEF COMPLAINT  Chief Complaint   Patient presents with   • Arm Pain       HPI  Elvia Roberto is a 52 y.o. female who presents for evaluation of ongoing pain in the right wrist.  The patient presented here approximately 2 weeks ago.  She injured her right wrist while helping her  get his truck that was stuck in the mud dog.  She was seen here had a normal radiograph placed in a Velcro splint but reports no new trauma but ongoing pain now somewhat radiates from the right wrist up to the forearm.  She denies fevers or chills.  No numbness tingling or weakness.  She denies any pain in the shoulder no swelling to the arm but she still has some ongoing ecchymosis.    REVIEW OF SYSTEMS  See HPI for further details.  No numbness tingling weakness all other systems are negative.     PAST MEDICAL HISTORY  Past Medical History:   Diagnosis Date   • Heart burn    • Indigestion    • Liver disease     Auto immune hepatitis   • Lupus (HCC)    • Other specified disorder of intestines    • Other specified symptom associated with female genital organs     tubiligation       FAMILY HISTORY  Noncontributory    SOCIAL HISTORY  Social History     Socioeconomic History   • Marital status: Single   • Highest education level: Associate degree: occupational, technical, or vocational program   Tobacco Use   • Smoking status: Current Every Day Smoker     Packs/day: 0.50     Years: 30.00     Pack years: 15.00     Types: Cigarettes   • Smokeless tobacco: Never Used   Vaping Use   • Vaping Use: Never used   Substance and Sexual Activity   • Alcohol use: Not Currently   • Drug use: No     Social Determinants of Health     Financial Resource Strain: Medium Risk   • Difficulty of Paying Living Expenses: Somewhat hard   Food Insecurity: Food Insecurity Present   • Worried About Running Out of Food in the Last Year: Sometimes true   • Ran Out of Food in the Last Year: Sometimes true   Transportation Needs: No  Transportation Needs   • Lack of Transportation (Medical): No   • Lack of Transportation (Non-Medical): No   Physical Activity: Insufficiently Active   • Days of Exercise per Week: 7 days   • Minutes of Exercise per Session: 10 min   Stress: Stress Concern Present   • Feeling of Stress : To some extent   Social Connections: Moderately Isolated   • Frequency of Communication with Friends and Family: More than three times a week   • Frequency of Social Gatherings with Friends and Family: Patient refused   • Attends Buddhist Services: Never   • Active Member of Clubs or Organizations: No   • Attends Club or Organization Meetings: Patient refused   • Marital Status: Living with partner   Housing Stability: High Risk   • Unable to Pay for Housing in the Last Year: Yes   • Number of Places Lived in the Last Year: 1   • Unstable Housing in the Last Year: No       SURGICAL HISTORY  Past Surgical History:   Procedure Laterality Date   • HYSTEROSCOPY WITH VIDEO OPERATIVE  2013    Performed by Robin Frederick M.D. at SURGERY [facility]   • LAPAROSCOPY  2012    Performed by Hayes Lugo M.D. at SURGERY [facility]   • GASTROSCOPY  2012    Performed by Hayes Lugo M.D. at SURGERY [facility]   • BOWEL RESECTION LAPAROSCOPIC  2012    Performed by HAYES LUGO at [facility]   • LYSIS ADHESIONS GENERAL  2012    Performed by HAYES LUGO at [facility]   • OTHER ABDOMINAL SURGERY      Kristi En Y gastric bypass   • OTHER ORTHOPEDIC SURGERY      Left ankle surgery   • OTHER ORTHOPEDIC SURGERY      Left knee surgery   • GYN SURGERY      tubal ligation   • GYN SURGERY         • LA REMOVE TONSILS/ADENOIDS,<11 Y/O     • PRIMARY C SECTION             CURRENT MEDICATIONS  No current facility-administered medications for this encounter.    Current Outpatient Medications:   •  omeprazole (PRILOSEC) 40 MG delayed-release capsule, Take 1  "Capsule by mouth every day., Disp: 30 Capsule, Rfl: 0  •  acetaminophen (TYLENOL) 500 MG Tab, Take 1,000 mg by mouth 2 times a day as needed (Pain, Headache). 2 tablets = 1,000 mg., Disp: , Rfl:   •  diphenhydrAMINE (BENADRYL) 25 MG Tab, Take 50 mg by mouth 2 times a day as needed (Allergies). 2 tablets = 50 mg., Disp: , Rfl:       ALLERGIES  Allergies   Allergen Reactions   • Asa [Aspirin] Unspecified     GI upset, bleeding, Gastric Bypass     • Nsaids Nausea     GI upset, bleeding, Gastric Bypass     • Tape Unspecified     Blisters all over, Paper tape is ok       PHYSICAL EXAM  VITAL SIGNS: /74   Pulse 71   Temp 36.7 °C (98 °F) (Temporal)   Resp 16   Ht 1.575 m (5' 2\")   Wt 78.2 kg (172 lb 6.4 oz)   LMP 05/04/2013   SpO2 98%   BMI 31.53 kg/m²       Constitutional: Well developed, Well nourished, No acute distress, Non-toxic appearance.   HENT: Normocephalic, Atraumatic, Bilateral external ears normal, Oropharynx moist, No oral exudates, Nose normal.   Eyes: PERRLA, EOMI, Conjunctiva normal, No discharge.   Neck: Normal range of motion, No tenderness, Supple, No stridor.   Cardiovascular: Normal heart rate, Normal rhythm, No murmurs, No rubs, No gallops.   Thorax & Lungs: Normal breath sounds, No respiratory distress, No wheezing, No chest tenderness.   Abdomen: Bowel sounds normal, Soft, No tenderness, No masses, No pulsatile masses.   Skin: Warm, Dry, No erythema, No rash.   Extremities: Right upper extremity exam is notable for tenderness and resolving ecchymosis on the dorsal aspect of the right wrist.  No pain over anatomical snuffbox.  Radial pulses intact no erythema or swelling.  No bony tenderness overlying the elbow acromioclavicular joint or right clavicle.  Neurologic: Alert & oriented x 3, Normal motor function, Normal sensory function, No focal deficits noted.   Psychiatric: Anxious    DX-WRIST-COMPLETE 3+ RIGHT   Final Result      No evidence of acute fracture or dislocation.       "         COURSE & MEDICAL DECISION MAKING  Pertinent Labs & Imaging studies reviewed. (See chart for details)  I reviewed the patient's prior visit history as well as her radiograph.  I was concerned about possible occult fracture or hairline fracture that may have been missed.  Radiograph was repeated today and is negative for acute fracture.  I suspect she has some posttraumatic tendinitis.  She has prior gastric bypass and cannot take NSAIDs.  I will recommend and prescribe some Voltaren gel to the affected area as I think this would provide her some symptomatic relief.  I also recommended continue using the brace for the next 5 days and apply ice to affected area    FINAL IMPRESSION  1.  Right wrist posttraumatic tendinitis         Electronically signed by: Yusef Herrera M.D., 7/26/2022 1:26 PM

## 2022-08-31 ENCOUNTER — HOSPITAL ENCOUNTER (OUTPATIENT)
Dept: LAB | Facility: MEDICAL CENTER | Age: 52
End: 2022-08-31
Attending: ORTHOPAEDIC SURGERY
Payer: COMMERCIAL

## 2022-08-31 LAB
ALBUMIN SERPL BCP-MCNC: 3.7 G/DL (ref 3.2–4.9)
ALBUMIN/GLOB SERPL: 0.7 G/DL
ALP SERPL-CCNC: 160 U/L (ref 30–99)
ALT SERPL-CCNC: 38 U/L (ref 2–50)
ANION GAP SERPL CALC-SCNC: 11 MMOL/L (ref 7–16)
AST SERPL-CCNC: 64 U/L (ref 12–45)
BASOPHILS # BLD AUTO: 1 % (ref 0–1.8)
BASOPHILS # BLD: 0.03 K/UL (ref 0–0.12)
BILIRUB SERPL-MCNC: 0.4 MG/DL (ref 0.1–1.5)
BUN SERPL-MCNC: 19 MG/DL (ref 8–22)
CALCIUM SERPL-MCNC: 8.8 MG/DL (ref 8.4–10.2)
CHLORIDE SERPL-SCNC: 105 MMOL/L (ref 96–112)
CO2 SERPL-SCNC: 20 MMOL/L (ref 20–33)
CREAT SERPL-MCNC: 0.63 MG/DL (ref 0.5–1.4)
EOSINOPHIL # BLD AUTO: 0.11 K/UL (ref 0–0.51)
EOSINOPHIL NFR BLD: 3.8 % (ref 0–6.9)
ERYTHROCYTE [DISTWIDTH] IN BLOOD BY AUTOMATED COUNT: 58.4 FL (ref 35.9–50)
FASTING STATUS PATIENT QL REPORTED: NORMAL
GFR SERPLBLD CREATININE-BSD FMLA CKD-EPI: 106 ML/MIN/1.73 M 2
GLOBULIN SER CALC-MCNC: 5.2 G/DL (ref 1.9–3.5)
GLUCOSE SERPL-MCNC: 82 MG/DL (ref 65–99)
HCT VFR BLD AUTO: 35.7 % (ref 37–47)
HGB BLD-MCNC: 11.7 G/DL (ref 12–16)
IMM GRANULOCYTES # BLD AUTO: 0.01 K/UL (ref 0–0.11)
IMM GRANULOCYTES NFR BLD AUTO: 0.3 % (ref 0–0.9)
LYMPHOCYTES # BLD AUTO: 1.04 K/UL (ref 1–4.8)
LYMPHOCYTES NFR BLD: 35.9 % (ref 22–41)
MCH RBC QN AUTO: 30.1 PG (ref 27–33)
MCHC RBC AUTO-ENTMCNC: 32.8 G/DL (ref 33.6–35)
MCV RBC AUTO: 91.8 FL (ref 81.4–97.8)
MONOCYTES # BLD AUTO: 0.33 K/UL (ref 0–0.85)
MONOCYTES NFR BLD AUTO: 11.4 % (ref 0–13.4)
NEUTROPHILS # BLD AUTO: 1.38 K/UL (ref 2–7.15)
NEUTROPHILS NFR BLD: 47.6 % (ref 44–72)
NRBC # BLD AUTO: 0 K/UL
NRBC BLD-RTO: 0 /100 WBC
PLATELET # BLD AUTO: 76 K/UL (ref 164–446)
PMV BLD AUTO: 10.7 FL (ref 9–12.9)
POTASSIUM SERPL-SCNC: 3.9 MMOL/L (ref 3.6–5.5)
PROT SERPL-MCNC: 8.9 G/DL (ref 6–8.2)
RBC # BLD AUTO: 3.89 M/UL (ref 4.2–5.4)
SODIUM SERPL-SCNC: 136 MMOL/L (ref 135–145)
WBC # BLD AUTO: 2.9 K/UL (ref 4.8–10.8)

## 2022-08-31 PROCEDURE — 36415 COLL VENOUS BLD VENIPUNCTURE: CPT

## 2022-08-31 PROCEDURE — 80053 COMPREHEN METABOLIC PANEL: CPT

## 2022-08-31 PROCEDURE — 85025 COMPLETE CBC W/AUTO DIFF WBC: CPT

## 2022-09-16 ENCOUNTER — HOSPITAL ENCOUNTER (EMERGENCY)
Facility: MEDICAL CENTER | Age: 52
End: 2022-09-16
Attending: EMERGENCY MEDICINE
Payer: COMMERCIAL

## 2022-09-16 VITALS
TEMPERATURE: 98.2 F | OXYGEN SATURATION: 98 % | HEART RATE: 96 BPM | BODY MASS INDEX: 30.83 KG/M2 | SYSTOLIC BLOOD PRESSURE: 119 MMHG | HEIGHT: 62 IN | DIASTOLIC BLOOD PRESSURE: 74 MMHG | WEIGHT: 167.55 LBS | RESPIRATION RATE: 16 BRPM

## 2022-09-16 DIAGNOSIS — M25.50 ARTHRALGIA, UNSPECIFIED JOINT: ICD-10-CM

## 2022-09-16 PROCEDURE — 700111 HCHG RX REV CODE 636 W/ 250 OVERRIDE (IP): Performed by: EMERGENCY MEDICINE

## 2022-09-16 PROCEDURE — 99283 EMERGENCY DEPT VISIT LOW MDM: CPT

## 2022-09-16 RX ORDER — PREDNISONE 20 MG/1
60 TABLET ORAL DAILY
Qty: 12 TABLET | Refills: 0 | Status: SHIPPED | OUTPATIENT
Start: 2022-09-16 | End: 2022-09-20

## 2022-09-16 RX ADMIN — PREDNISONE 60 MG: 50 TABLET ORAL at 14:30

## 2022-09-16 ASSESSMENT — FIBROSIS 4 INDEX: FIB4 SCORE: 7.24

## 2022-09-16 NOTE — ED NOTES
Discharge instructions provided.  Pt verbalized the understanding of discharge instructions to follow up with PCP and to return to ER if condition worsens.  Pt ambulated out of ER without difficulty.

## 2022-09-16 NOTE — ED PROVIDER NOTES
ED Provider Note    CHIEF COMPLAINT  Chief Complaint   Patient presents with    Generalized Body Aches       HPI  Elvia Roberto is a 52 y.o. female who presents with diffuse myalgias.  The patient states this started with some pain and swelling to her hands and her feet a couple of days ago.  She states now the pain is expanded to involve almost her whole body.  She has not had any associated fevers.  She has not had any vomiting or diarrhea.  She is unaware of any sick contacts.  She says she has a known history of lupus.  She currently does not have a rheumatologist.    REVIEW OF SYSTEMS  See HPI for further details. All other systems are negative.     PAST MEDICAL HISTORY  Past Medical History:   Diagnosis Date    Heart burn     Indigestion     Liver disease     Auto immune hepatitis    Lupus (HCC)     Other specified disorder of intestines     Other specified symptom associated with female genital organs     tubiligation       FAMILY HISTORY  [unfilled]    SOCIAL HISTORY  Social History     Socioeconomic History    Marital status: Single    Highest education level: Associate degree: occupational, technical, or vocational program   Tobacco Use    Smoking status: Every Day     Packs/day: 0.50     Years: 30.00     Pack years: 15.00     Types: Cigarettes    Smokeless tobacco: Never   Vaping Use    Vaping Use: Never used   Substance and Sexual Activity    Alcohol use: Not Currently    Drug use: No     Social Determinants of Health     Financial Resource Strain: Medium Risk    Difficulty of Paying Living Expenses: Somewhat hard   Food Insecurity: Food Insecurity Present    Worried About Running Out of Food in the Last Year: Sometimes true    Ran Out of Food in the Last Year: Sometimes true   Transportation Needs: No Transportation Needs    Lack of Transportation (Medical): No    Lack of Transportation (Non-Medical): No   Physical Activity: Insufficiently Active    Days of Exercise per Week: 7 days    Minutes of  Exercise per Session: 10 min   Stress: Stress Concern Present    Feeling of Stress : To some extent   Social Connections: Moderately Isolated    Frequency of Communication with Friends and Family: More than three times a week    Frequency of Social Gatherings with Friends and Family: Patient refused    Attends Confucianist Services: Never    Active Member of Clubs or Organizations: No    Attends Club or Organization Meetings: Patient refused    Marital Status: Living with partner   Housing Stability: High Risk    Unable to Pay for Housing in the Last Year: Yes    Number of Places Lived in the Last Year: 1    Unstable Housing in the Last Year: No       SURGICAL HISTORY  Past Surgical History:   Procedure Laterality Date    HYSTEROSCOPY WITH VIDEO OPERATIVE  2013    Performed by Robin Frederick M.D. at SURGERY [facility]    LAPAROSCOPY  2012    Performed by Hayes Lugo M.D. at SURGERY [facility]    GASTROSCOPY  2012    Performed by Hayse Lugo M.D. at SURGERY [facility]    BOWEL RESECTION LAPAROSCOPIC  2012    Performed by HAYES LUGO at SURGERY [facility]    LYSIS ADHESIONS GENERAL  2012    Performed by HAYES LUGO at SURGERY [facility]    OTHER ABDOMINAL SURGERY      Kristi En Y gastric bypass    OTHER ORTHOPEDIC SURGERY      Left ankle surgery    OTHER ORTHOPEDIC SURGERY      Left knee surgery    GYN SURGERY      tubal ligation    GYN SURGERY          AK REMOVE TONSILS/ADENOIDS,<13 Y/O      PRIMARY C SECTION             CURRENT MEDICATIONS  Home Medications       Reviewed by Alejandrina Hylton R.N. (Registered Nurse) on 22 at 1327  Med List Status: Not Addressed     Medication Last Dose Status   acetaminophen (TYLENOL) 500 MG Tab  Active   diclofenac sodium (VOLTAREN) 1 % Gel  Active   diphenhydrAMINE (BENADRYL) 25 MG Tab  Active   omeprazole (PRILOSEC) 40 MG delayed-release capsule  Active               "      ALLERGIES  Allergies   Allergen Reactions    Asa [Aspirin] Unspecified     GI upset, bleeding, Gastric Bypass      Nsaids Nausea     GI upset, bleeding, Gastric Bypass      Tape Unspecified     Blisters all over, Paper tape is ok       PHYSICAL EXAM  VITAL SIGNS: /74   Pulse 68   Temp 37.1 °C (98.7 °F)   Resp 18   Ht 1.575 m (5' 2\")   Wt 76 kg (167 lb 8.8 oz)   LMP 05/04/2013   SpO2 98%   BMI 30.65 kg/m²       Constitutional: Well developed, Well nourished, No acute distress, Non-toxic appearance.   HENT: Normocephalic, Atraumatic, Bilateral external ears normal, Oropharynx moist, No oral exudates, Nose normal.   Eyes: PERRLA, EOMI, Conjunctiva normal, No discharge.   Neck: Normal range of motion, No tenderness, Supple, No stridor.   Lymphatic: No lymphadenopathy noted.   Cardiovascular: Normal heart rate, Normal rhythm, No murmurs, No rubs, No gallops.   Thorax & Lungs: Normal breath sounds, No respiratory distress, No wheezing, No chest tenderness.   Abdomen: Bowel sounds normal, Soft, No tenderness, No masses, No pulsatile masses.   Skin: Warm, Dry, No erythema, No rash.   Back: No tenderness, No CVA tenderness.   Extremities: Diffuse arthralgias and myalgias with palpation, intact distal pulses, No edema, No cyanosis, No clubbing.   Neurologic: Alert & oriented x 3, Normal motor function, Normal sensory function, No focal deficits noted.   Psychiatric: Affect normal, Judgment normal, Mood normal.         COURSE & MEDICAL DECISION MAKING  Pertinent Labs & Imaging studies reviewed. (See chart for details)  This a 52-year-old female who presents with diffuse myalgias and arthralgias.  This could be from a lupus flare versus a viral process.  She states Motrin and Tylenol as not been effective.  Therefore the patient will be placed on a 5-day course of prednisone.  She will follow-up with her primary care doctor for a possible rheumatologic consultation and referral next week if she is not " better.  She will return if she is acutely worse.  I do not see any evidence of a focal bacterial infection.  The patient does not appear toxic.    FINAL IMPRESSION  1.  Diffuse myalgias and arthralgias    Disposition  The patient will be discharged in stable condition      Electronically signed by: Parish Choudhury M.D., 9/16/2022 2:16 PM

## 2022-09-16 NOTE — ED TRIAGE NOTES
Pt presents with son from home generalized body soreness that began in her hands about 3 days ago. Soreness is now generalized. No fever. Last used tylenol this morning without relief. Pt A&Ox4 and ambulatory. Hx of lupus. Reports soreness like this before but not generalized.

## 2022-09-23 ENCOUNTER — OFFICE VISIT (OUTPATIENT)
Dept: URGENT CARE | Facility: CLINIC | Age: 52
End: 2022-09-23
Payer: COMMERCIAL

## 2022-09-23 VITALS
DIASTOLIC BLOOD PRESSURE: 66 MMHG | RESPIRATION RATE: 16 BRPM | WEIGHT: 168.4 LBS | HEIGHT: 63 IN | TEMPERATURE: 98.1 F | SYSTOLIC BLOOD PRESSURE: 106 MMHG | BODY MASS INDEX: 29.84 KG/M2 | OXYGEN SATURATION: 98 % | HEART RATE: 74 BPM

## 2022-09-23 DIAGNOSIS — M32.19 OTHER SYSTEMIC LUPUS ERYTHEMATOSUS WITH OTHER ORGAN INVOLVEMENT (HCC): ICD-10-CM

## 2022-09-23 PROCEDURE — 99214 OFFICE O/P EST MOD 30 MIN: CPT | Performed by: EMERGENCY MEDICINE

## 2022-09-23 RX ORDER — GABAPENTIN 100 MG/1
CAPSULE ORAL
COMMUNITY

## 2022-09-23 RX ORDER — PANTOPRAZOLE SODIUM 40 MG/1
40 TABLET, DELAYED RELEASE ORAL DAILY
COMMUNITY
End: 2023-05-29

## 2022-09-23 RX ORDER — PREDNISONE 20 MG/1
TABLET ORAL
Qty: 18 TABLET | Refills: 0 | Status: SHIPPED | OUTPATIENT
Start: 2022-09-23 | End: 2023-08-15

## 2022-09-23 RX ORDER — PANTOPRAZOLE SODIUM 40 MG/10ML
40 INJECTION, POWDER, LYOPHILIZED, FOR SOLUTION INTRAVENOUS DAILY
Status: ON HOLD | COMMUNITY
End: 2022-10-11

## 2022-09-23 RX ORDER — CYCLOBENZAPRINE HCL 5 MG
5-10 TABLET ORAL NIGHTLY PRN
Qty: 12 TABLET | Refills: 0 | Status: SHIPPED | OUTPATIENT
Start: 2022-09-23 | End: 2022-12-04

## 2022-09-23 ASSESSMENT — ENCOUNTER SYMPTOMS
PAIN: 1
ABDOMINAL PAIN: 0
FEVER: 0
COUGH: 0
ARTHRALGIAS: 1
NAUSEA: 0
VOMITING: 0
HEADACHES: 1

## 2022-09-23 ASSESSMENT — FIBROSIS 4 INDEX: FIB4 SCORE: 7.24

## 2022-09-23 NOTE — PROGRESS NOTES
"Subjective     Elvia Roberto is a 52 y.o. female who presents with Lupus (Flare up ( body aches, migraine ) )            Pain  This is a recurrent problem. Episode onset: over one week. The problem has been gradually worsening. Associated symptoms include arthralgias and headaches. Pertinent negatives include no abdominal pain, congestion, coughing, fever, nausea, rash or vomiting. Treatments tried: prednisone. The treatment provided moderate relief.   Patient notes apparent stress-induced flare of lupus.  Notes typical symptoms of myalgias, hand and knee arthralgias some headache.  Noted some transient improvement on prednisone from the ED; worsened after discontinuance.  Notes prior better success with short taper, has also had some relief with Flexeril in the past.  No current rheumatologist.  Review of Systems   Constitutional:  Negative for fever.   HENT:  Negative for congestion.    Respiratory:  Negative for cough.    Gastrointestinal:  Negative for abdominal pain, nausea and vomiting.   Musculoskeletal:  Positive for arthralgias.   Skin:  Negative for rash.   Neurological:  Positive for headaches.            Objective     /66   Pulse 74   Temp 36.7 °C (98.1 °F) (Temporal)   Resp 16   Ht 1.6 m (5' 3\")   Wt 76.4 kg (168 lb 6.4 oz)   LMP 05/04/2013   SpO2 98%   BMI 29.83 kg/m²      Physical Exam  Constitutional:       General: She is not in acute distress.     Appearance: She is well-developed. She is not ill-appearing.   Cardiovascular:      Rate and Rhythm: Normal rate and regular rhythm.      Heart sounds: Normal heart sounds.   Pulmonary:      Effort: Pulmonary effort is normal.      Breath sounds: Normal breath sounds.   Abdominal:      General: There is no distension.   Musculoskeletal:      Right lower leg: No edema.      Left lower leg: No edema.      Comments: No significant swelling is noted.   Skin:     General: Skin is warm and dry.   Neurological:      Mental Status: She is alert. "      Motor: No tremor or abnormal muscle tone.      Gait: Gait is intact.   Psychiatric:         Mood and Affect: Mood normal.         Speech: Speech normal.         Behavior: Behavior is cooperative.                           Assessment & Plan        1. Other systemic lupus erythematosus with other organ involvement (HCC)  - predniSONE (DELTASONE) 20 MG Tab; Take 3 tablets once a day for 3 days, then 2 tablets once a day for 3 days, then 1 tablet once a day for 3 days.  Dispense: 18 Tablet; Refill: 0  - cyclobenzaprine (FLEXERIL) 5 mg tablet; Take 1-2 Tablets by mouth at bedtime as needed for Moderate Pain.  Dispense: 12 Tablet; Refill: 0  Continue with rest, Tylenol as needed.  - Referral to Rheumatology

## 2022-10-10 ENCOUNTER — APPOINTMENT (OUTPATIENT)
Dept: RADIOLOGY | Facility: MEDICAL CENTER | Age: 52
End: 2022-10-10
Attending: EMERGENCY MEDICINE
Payer: COMMERCIAL

## 2022-10-10 ENCOUNTER — HOSPITAL ENCOUNTER (OUTPATIENT)
Facility: MEDICAL CENTER | Age: 52
End: 2022-10-11
Attending: EMERGENCY MEDICINE | Admitting: HOSPITALIST
Payer: COMMERCIAL

## 2022-10-10 DIAGNOSIS — D69.6 THROMBOCYTOPENIA (HCC): ICD-10-CM

## 2022-10-10 DIAGNOSIS — E87.6 HYPOKALEMIA: ICD-10-CM

## 2022-10-10 DIAGNOSIS — R07.9 CHEST PAIN, UNSPECIFIED TYPE: ICD-10-CM

## 2022-10-10 LAB
ALBUMIN SERPL BCP-MCNC: 3.7 G/DL (ref 3.2–4.9)
ALBUMIN/GLOB SERPL: 0.9 G/DL
ALP SERPL-CCNC: 120 U/L (ref 30–99)
ALT SERPL-CCNC: 20 U/L (ref 2–50)
ANION GAP SERPL CALC-SCNC: 9 MMOL/L (ref 7–16)
APTT PPP: 29.5 SEC (ref 24.7–36)
AST SERPL-CCNC: 23 U/L (ref 12–45)
BASOPHILS # BLD AUTO: 0.4 % (ref 0–1.8)
BASOPHILS # BLD: 0.02 K/UL (ref 0–0.12)
BILIRUB SERPL-MCNC: 0.2 MG/DL (ref 0.1–1.5)
BUN SERPL-MCNC: 18 MG/DL (ref 8–22)
CALCIUM SERPL-MCNC: 8.5 MG/DL (ref 8.4–10.2)
CHLORIDE SERPL-SCNC: 105 MMOL/L (ref 96–112)
CO2 SERPL-SCNC: 24 MMOL/L (ref 20–33)
CREAT SERPL-MCNC: 0.63 MG/DL (ref 0.5–1.4)
D DIMER PPP IA.FEU-MCNC: 0.41 UG/ML (FEU) (ref 0–0.5)
EKG IMPRESSION: NORMAL
EOSINOPHIL # BLD AUTO: 0.07 K/UL (ref 0–0.51)
EOSINOPHIL NFR BLD: 1.5 % (ref 0–6.9)
ERYTHROCYTE [DISTWIDTH] IN BLOOD BY AUTOMATED COUNT: 57.3 FL (ref 35.9–50)
GFR SERPLBLD CREATININE-BSD FMLA CKD-EPI: 106 ML/MIN/1.73 M 2
GLOBULIN SER CALC-MCNC: 4.2 G/DL (ref 1.9–3.5)
GLUCOSE SERPL-MCNC: 180 MG/DL (ref 65–99)
HCT VFR BLD AUTO: 38 % (ref 37–47)
HGB BLD-MCNC: 11.9 G/DL (ref 12–16)
IMM GRANULOCYTES # BLD AUTO: 0.01 K/UL (ref 0–0.11)
IMM GRANULOCYTES NFR BLD AUTO: 0.2 % (ref 0–0.9)
INR PPP: 1.31 (ref 0.87–1.13)
LIPASE SERPL-CCNC: 37 U/L (ref 7–58)
LYMPHOCYTES # BLD AUTO: 1.72 K/UL (ref 1–4.8)
LYMPHOCYTES NFR BLD: 35.7 % (ref 22–41)
MCH RBC QN AUTO: 29.3 PG (ref 27–33)
MCHC RBC AUTO-ENTMCNC: 31.3 G/DL (ref 33.6–35)
MCV RBC AUTO: 93.6 FL (ref 81.4–97.8)
MONOCYTES # BLD AUTO: 0.35 K/UL (ref 0–0.85)
MONOCYTES NFR BLD AUTO: 7.3 % (ref 0–13.4)
NEUTROPHILS # BLD AUTO: 2.65 K/UL (ref 2–7.15)
NEUTROPHILS NFR BLD: 54.9 % (ref 44–72)
NRBC # BLD AUTO: 0 K/UL
NRBC BLD-RTO: 0 /100 WBC
PLATELET # BLD AUTO: 74 K/UL (ref 164–446)
PMV BLD AUTO: 10.3 FL (ref 9–12.9)
POTASSIUM SERPL-SCNC: 3.2 MMOL/L (ref 3.6–5.5)
PROT SERPL-MCNC: 7.9 G/DL (ref 6–8.2)
PROTHROMBIN TIME: 15.7 SEC (ref 12–14.6)
RBC # BLD AUTO: 4.06 M/UL (ref 4.2–5.4)
SODIUM SERPL-SCNC: 138 MMOL/L (ref 135–145)
TROPONIN T SERPL-MCNC: <6 NG/L (ref 6–19)
TROPONIN T SERPL-MCNC: <6 NG/L (ref 6–19)
WBC # BLD AUTO: 4.8 K/UL (ref 4.8–10.8)

## 2022-10-10 PROCEDURE — 85379 FIBRIN DEGRADATION QUANT: CPT

## 2022-10-10 PROCEDURE — 93005 ELECTROCARDIOGRAM TRACING: CPT | Performed by: EMERGENCY MEDICINE

## 2022-10-10 PROCEDURE — 700111 HCHG RX REV CODE 636 W/ 250 OVERRIDE (IP): Performed by: EMERGENCY MEDICINE

## 2022-10-10 PROCEDURE — 85025 COMPLETE CBC W/AUTO DIFF WBC: CPT

## 2022-10-10 PROCEDURE — 85730 THROMBOPLASTIN TIME PARTIAL: CPT

## 2022-10-10 PROCEDURE — 85610 PROTHROMBIN TIME: CPT

## 2022-10-10 PROCEDURE — 93005 ELECTROCARDIOGRAM TRACING: CPT

## 2022-10-10 PROCEDURE — 96374 THER/PROPH/DIAG INJ IV PUSH: CPT

## 2022-10-10 PROCEDURE — 80053 COMPREHEN METABOLIC PANEL: CPT

## 2022-10-10 PROCEDURE — 71045 X-RAY EXAM CHEST 1 VIEW: CPT

## 2022-10-10 PROCEDURE — 0240U HCHG SARS-COV-2 COVID-19 NFCT DS RESP RNA 3 TRGT MIC: CPT

## 2022-10-10 PROCEDURE — 36415 COLL VENOUS BLD VENIPUNCTURE: CPT

## 2022-10-10 PROCEDURE — 84484 ASSAY OF TROPONIN QUANT: CPT | Mod: 91

## 2022-10-10 PROCEDURE — 99285 EMERGENCY DEPT VISIT HI MDM: CPT

## 2022-10-10 PROCEDURE — C9803 HOPD COVID-19 SPEC COLLECT: HCPCS | Performed by: EMERGENCY MEDICINE

## 2022-10-10 PROCEDURE — 83690 ASSAY OF LIPASE: CPT

## 2022-10-10 RX ORDER — MORPHINE SULFATE 4 MG/ML
4 INJECTION INTRAVENOUS ONCE
Status: COMPLETED | OUTPATIENT
Start: 2022-10-10 | End: 2022-10-10

## 2022-10-10 RX ADMIN — MORPHINE SULFATE 4 MG: 4 INJECTION INTRAVENOUS at 22:03

## 2022-10-10 ASSESSMENT — PAIN DESCRIPTION - PAIN TYPE: TYPE: ACUTE PAIN

## 2022-10-10 ASSESSMENT — FIBROSIS 4 INDEX: FIB4 SCORE: 7.24

## 2022-10-11 ENCOUNTER — APPOINTMENT (OUTPATIENT)
Dept: RADIOLOGY | Facility: MEDICAL CENTER | Age: 52
End: 2022-10-11
Attending: HOSPITALIST
Payer: COMMERCIAL

## 2022-10-11 VITALS
HEIGHT: 62 IN | RESPIRATION RATE: 18 BRPM | TEMPERATURE: 98.3 F | HEART RATE: 64 BPM | WEIGHT: 173.06 LBS | SYSTOLIC BLOOD PRESSURE: 121 MMHG | DIASTOLIC BLOOD PRESSURE: 73 MMHG | BODY MASS INDEX: 31.85 KG/M2 | OXYGEN SATURATION: 94 %

## 2022-10-11 PROBLEM — D64.9 NORMOCYTIC ANEMIA: Status: ACTIVE | Noted: 2022-10-11

## 2022-10-11 PROBLEM — R73.09 ELEVATED GLUCOSE: Status: ACTIVE | Noted: 2022-10-11

## 2022-10-11 PROBLEM — R79.1 SUPRATHERAPEUTIC INR: Status: ACTIVE | Noted: 2022-10-11

## 2022-10-11 PROBLEM — R07.89 ATYPICAL CHEST PAIN: Status: ACTIVE | Noted: 2022-10-11

## 2022-10-11 LAB
EST. AVERAGE GLUCOSE BLD GHB EST-MCNC: 103 MG/DL
FLUAV RNA SPEC QL NAA+PROBE: NEGATIVE
FLUAV RNA SPEC QL NAA+PROBE: NEGATIVE
FLUBV RNA SPEC QL NAA+PROBE: NEGATIVE
FLUBV RNA SPEC QL NAA+PROBE: NEGATIVE
HBA1C MFR BLD: 5.2 % (ref 4–5.6)
RSV RNA SPEC QL NAA+PROBE: NEGATIVE
SARS-COV-2 RNA RESP QL NAA+PROBE: NOTDETECTED
SARS-COV-2 RNA RESP QL NAA+PROBE: NOTDETECTED
SPECIMEN SOURCE: NORMAL
SPECIMEN SOURCE: NORMAL
TROPONIN T SERPL-MCNC: <6 NG/L (ref 6–19)
TROPONIN T SERPL-MCNC: <6 NG/L (ref 6–19)

## 2022-10-11 PROCEDURE — 99220 PR INITIAL OBSERVATION CARE,LEVL III: CPT | Mod: 25 | Performed by: HOSPITALIST

## 2022-10-11 PROCEDURE — 84484 ASSAY OF TROPONIN QUANT: CPT

## 2022-10-11 PROCEDURE — 93018 CV STRESS TEST I&R ONLY: CPT | Performed by: INTERNAL MEDICINE

## 2022-10-11 PROCEDURE — 0241U HCHG SARS-COV-2 COVID-19 NFCT DS RESP RNA 4 TRGT MIC: CPT

## 2022-10-11 PROCEDURE — G0378 HOSPITAL OBSERVATION PER HR: HCPCS

## 2022-10-11 PROCEDURE — C9803 HOPD COVID-19 SPEC COLLECT: HCPCS | Performed by: STUDENT IN AN ORGANIZED HEALTH CARE EDUCATION/TRAINING PROGRAM

## 2022-10-11 PROCEDURE — A9270 NON-COVERED ITEM OR SERVICE: HCPCS | Performed by: EMERGENCY MEDICINE

## 2022-10-11 PROCEDURE — A9502 TC99M TETROFOSMIN: HCPCS

## 2022-10-11 PROCEDURE — 94760 N-INVAS EAR/PLS OXIMETRY 1: CPT

## 2022-10-11 PROCEDURE — 83036 HEMOGLOBIN GLYCOSYLATED A1C: CPT

## 2022-10-11 PROCEDURE — 700111 HCHG RX REV CODE 636 W/ 250 OVERRIDE (IP)

## 2022-10-11 PROCEDURE — 36415 COLL VENOUS BLD VENIPUNCTURE: CPT

## 2022-10-11 PROCEDURE — 700102 HCHG RX REV CODE 250 W/ 637 OVERRIDE(OP): Performed by: HOSPITALIST

## 2022-10-11 PROCEDURE — 700102 HCHG RX REV CODE 250 W/ 637 OVERRIDE(OP): Performed by: EMERGENCY MEDICINE

## 2022-10-11 PROCEDURE — 78452 HT MUSCLE IMAGE SPECT MULT: CPT | Mod: 26 | Performed by: INTERNAL MEDICINE

## 2022-10-11 PROCEDURE — 99406 BEHAV CHNG SMOKING 3-10 MIN: CPT | Performed by: HOSPITALIST

## 2022-10-11 PROCEDURE — 700111 HCHG RX REV CODE 636 W/ 250 OVERRIDE (IP): Performed by: HOSPITALIST

## 2022-10-11 PROCEDURE — A9270 NON-COVERED ITEM OR SERVICE: HCPCS | Performed by: HOSPITALIST

## 2022-10-11 RX ORDER — ASPIRIN 81 MG/1
324 TABLET, CHEWABLE ORAL DAILY
Status: DISCONTINUED | OUTPATIENT
Start: 2022-10-11 | End: 2022-10-11 | Stop reason: HOSPADM

## 2022-10-11 RX ORDER — ONDANSETRON 4 MG/1
4 TABLET, ORALLY DISINTEGRATING ORAL EVERY 4 HOURS PRN
Status: DISCONTINUED | OUTPATIENT
Start: 2022-10-11 | End: 2022-10-11 | Stop reason: HOSPADM

## 2022-10-11 RX ORDER — AMOXICILLIN 250 MG
2 CAPSULE ORAL 2 TIMES DAILY
Status: DISCONTINUED | OUTPATIENT
Start: 2022-10-11 | End: 2022-10-11 | Stop reason: HOSPADM

## 2022-10-11 RX ORDER — POTASSIUM CHLORIDE 20 MEQ/1
40 TABLET, EXTENDED RELEASE ORAL DAILY
Status: DISCONTINUED | OUTPATIENT
Start: 2022-10-11 | End: 2022-10-11

## 2022-10-11 RX ORDER — REGADENOSON 0.08 MG/ML
INJECTION, SOLUTION INTRAVENOUS
Status: COMPLETED
Start: 2022-10-11 | End: 2022-10-11

## 2022-10-11 RX ORDER — PROMETHAZINE HYDROCHLORIDE 25 MG/1
12.5-25 TABLET ORAL EVERY 4 HOURS PRN
Status: DISCONTINUED | OUTPATIENT
Start: 2022-10-11 | End: 2022-10-11 | Stop reason: HOSPADM

## 2022-10-11 RX ORDER — PROCHLORPERAZINE EDISYLATE 5 MG/ML
5-10 INJECTION INTRAMUSCULAR; INTRAVENOUS EVERY 4 HOURS PRN
Status: DISCONTINUED | OUTPATIENT
Start: 2022-10-11 | End: 2022-10-11 | Stop reason: HOSPADM

## 2022-10-11 RX ORDER — OMEPRAZOLE 20 MG/1
20 CAPSULE, DELAYED RELEASE ORAL DAILY
Status: DISCONTINUED | OUTPATIENT
Start: 2022-10-11 | End: 2022-10-11 | Stop reason: HOSPADM

## 2022-10-11 RX ORDER — ASPIRIN 325 MG
325 TABLET ORAL DAILY
Status: DISCONTINUED | OUTPATIENT
Start: 2022-10-11 | End: 2022-10-11

## 2022-10-11 RX ORDER — ASPIRIN 325 MG
325 TABLET ORAL DAILY
Status: DISCONTINUED | OUTPATIENT
Start: 2022-10-11 | End: 2022-10-11 | Stop reason: HOSPADM

## 2022-10-11 RX ORDER — ASPIRIN 300 MG/1
300 SUPPOSITORY RECTAL DAILY
Status: DISCONTINUED | OUTPATIENT
Start: 2022-10-11 | End: 2022-10-11

## 2022-10-11 RX ORDER — ACETAMINOPHEN 325 MG/1
650 TABLET ORAL EVERY 6 HOURS PRN
Status: DISCONTINUED | OUTPATIENT
Start: 2022-10-11 | End: 2022-10-11 | Stop reason: HOSPADM

## 2022-10-11 RX ORDER — ASPIRIN 300 MG/1
300 SUPPOSITORY RECTAL DAILY
Status: DISCONTINUED | OUTPATIENT
Start: 2022-10-11 | End: 2022-10-11 | Stop reason: HOSPADM

## 2022-10-11 RX ORDER — ASPIRIN 81 MG/1
324 TABLET, CHEWABLE ORAL DAILY
Status: DISCONTINUED | OUTPATIENT
Start: 2022-10-11 | End: 2022-10-11

## 2022-10-11 RX ORDER — PREDNISONE 10 MG/1
10 TABLET ORAL DAILY
Status: DISCONTINUED | OUTPATIENT
Start: 2022-10-11 | End: 2022-10-11 | Stop reason: HOSPADM

## 2022-10-11 RX ORDER — PROMETHAZINE HYDROCHLORIDE 25 MG/1
12.5-25 SUPPOSITORY RECTAL EVERY 4 HOURS PRN
Status: DISCONTINUED | OUTPATIENT
Start: 2022-10-11 | End: 2022-10-11 | Stop reason: HOSPADM

## 2022-10-11 RX ORDER — BISACODYL 10 MG
10 SUPPOSITORY, RECTAL RECTAL
Status: DISCONTINUED | OUTPATIENT
Start: 2022-10-11 | End: 2022-10-11 | Stop reason: HOSPADM

## 2022-10-11 RX ORDER — POTASSIUM CHLORIDE 20 MEQ/1
40 TABLET, EXTENDED RELEASE ORAL ONCE
Status: COMPLETED | OUTPATIENT
Start: 2022-10-11 | End: 2022-10-11

## 2022-10-11 RX ORDER — POLYETHYLENE GLYCOL 3350 17 G/17G
1 POWDER, FOR SOLUTION ORAL
Status: DISCONTINUED | OUTPATIENT
Start: 2022-10-11 | End: 2022-10-11 | Stop reason: HOSPADM

## 2022-10-11 RX ORDER — ONDANSETRON 2 MG/ML
4 INJECTION INTRAMUSCULAR; INTRAVENOUS EVERY 4 HOURS PRN
Status: DISCONTINUED | OUTPATIENT
Start: 2022-10-11 | End: 2022-10-11 | Stop reason: HOSPADM

## 2022-10-11 RX ORDER — AMINOPHYLLINE 25 MG/ML
100 INJECTION, SOLUTION INTRAVENOUS
Status: DISCONTINUED | OUTPATIENT
Start: 2022-10-11 | End: 2022-10-11 | Stop reason: HOSPADM

## 2022-10-11 RX ORDER — REGADENOSON 0.08 MG/ML
0.4 INJECTION, SOLUTION INTRAVENOUS ONCE
Status: COMPLETED | OUTPATIENT
Start: 2022-10-11 | End: 2022-10-11

## 2022-10-11 RX ADMIN — ASPIRIN 325 MG ORAL TABLET 325 MG: 325 PILL ORAL at 03:34

## 2022-10-11 RX ADMIN — OMEPRAZOLE 20 MG: 20 CAPSULE, DELAYED RELEASE ORAL at 06:07

## 2022-10-11 RX ADMIN — REGADENOSON 0.4 MG: 0.08 INJECTION, SOLUTION INTRAVENOUS at 09:45

## 2022-10-11 RX ADMIN — POTASSIUM CHLORIDE 40 MEQ: 20 TABLET, EXTENDED RELEASE ORAL at 00:45

## 2022-10-11 RX ADMIN — PREDNISONE 10 MG: 10 TABLET ORAL at 06:07

## 2022-10-11 ASSESSMENT — LIFESTYLE VARIABLES
HAVE YOU EVER FELT YOU SHOULD CUT DOWN ON YOUR DRINKING: NO
AVERAGE NUMBER OF DAYS PER WEEK YOU HAVE A DRINK CONTAINING ALCOHOL: 0
HOW MANY TIMES IN THE PAST YEAR HAVE YOU HAD 5 OR MORE DRINKS IN A DAY: 0
EVER HAD A DRINK FIRST THING IN THE MORNING TO STEADY YOUR NERVES TO GET RID OF A HANGOVER: NO
ON A TYPICAL DAY WHEN YOU DRINK ALCOHOL HOW MANY DRINKS DO YOU HAVE: 0
ALCOHOL_USE: NO
HAVE PEOPLE ANNOYED YOU BY CRITICIZING YOUR DRINKING: NO
TOTAL SCORE: 0
CONSUMPTION TOTAL: NEGATIVE
TOTAL SCORE: 0
TOTAL SCORE: 0
EVER FELT BAD OR GUILTY ABOUT YOUR DRINKING: NO

## 2022-10-11 ASSESSMENT — ENCOUNTER SYMPTOMS
DIZZINESS: 0
HEADACHES: 0
NECK PAIN: 0
VOMITING: 0
DOUBLE VISION: 0
SORE THROAT: 0
INSOMNIA: 0
PALPITATIONS: 0
NAUSEA: 0
WEAKNESS: 0
BRUISES/BLEEDS EASILY: 0
FEVER: 0
COUGH: 0
DEPRESSION: 0
MYALGIAS: 0
BLURRED VISION: 0
SHORTNESS OF BREATH: 0

## 2022-10-11 ASSESSMENT — COGNITIVE AND FUNCTIONAL STATUS - GENERAL
SUGGESTED CMS G CODE MODIFIER MOBILITY: CH
MOBILITY SCORE: 24
SUGGESTED CMS G CODE MODIFIER DAILY ACTIVITY: CH
DAILY ACTIVITIY SCORE: 24

## 2022-10-11 ASSESSMENT — PATIENT HEALTH QUESTIONNAIRE - PHQ9
SUM OF ALL RESPONSES TO PHQ9 QUESTIONS 1 AND 2: 0
2. FEELING DOWN, DEPRESSED, IRRITABLE, OR HOPELESS: NOT AT ALL
1. LITTLE INTEREST OR PLEASURE IN DOING THINGS: NOT AT ALL

## 2022-10-11 ASSESSMENT — FIBROSIS 4 INDEX: FIB4 SCORE: 3.68

## 2022-10-11 ASSESSMENT — PAIN DESCRIPTION - PAIN TYPE: TYPE: ACUTE PAIN

## 2022-10-11 NOTE — PROGRESS NOTES
Discharge instructions given and discussed, signed copy in chart. Pt verbalized understanding and all questions answered. No new prescriptions. Pt discharged home in stable condition on room air escorted by self. Personal belongings with patient. IV removed and tolerated well. Tele box removed, monitor tech notified.

## 2022-10-11 NOTE — ASSESSMENT & PLAN NOTE
-Platelets 74,000.  Monitor CBC in the morning.  This is around her baseline as compared to previous CBC.

## 2022-10-11 NOTE — ASSESSMENT & PLAN NOTE
-1.31, unclear etiology.  Not on blood thinners.  No elevated LFTs.  Underlying lupus.  May need outpatient follow-up.

## 2022-10-11 NOTE — DISCHARGE SUMMARY
"Discharge Summary    CHIEF COMPLAINT ON ADMISSION  Chief Complaint   Patient presents with    Chest Pain    Shortness of Breath     Shortness of breath began this am, chest pain began a few hours ago, denies any numbness or tingling, nausea or vomiting.  Reports chills.         Reason for Admission  Chest Pain; Shortness of Breath     Admission Date  10/10/2022    CODE STATUS  Full Code    HPI & HOSPITAL COURSE  Per notes, \"52 y.o. female, smoker, with history of lupus on prednisone, positive family history of heart disease, who presented to the ER on 10/10/2022 with complaints of chest pain.  This started a few hours ago.  Pain is worse on the right side.  Describes this as \"sharp/stabbing \"in nature and associated with shortness of breath, rated as 6/10 in intensity.  Pain is started shortly after heated conversation with her granddaughter that is 12 years old.  She denies having similar symptoms in the past.  Denies radiation.  No alleviating or exacerbating factors.  No nausea, vomiting, no diaphoresis.  No abdominal pain.  No fever.  No coughing.\"    Patient was admitted and monitored overnight for chest pain.  EKG and troponins were negative.  She underwent a stress test on the morning of 10/11.  Prior to stress test she did report some chest pain, however stat EKG and troponins done at that time showed no abnormal findings.  She proceeded with stress test and had resolution of symptoms thereafter.  Stress test was negative for  ischemia but did show  a fixed defect in the inferior and inferolateral walls with normal wall motion that most likely represents artifact from bowel interference.  Recommended patient follow close with PCP in the outpatient setting.    Therefore, she is discharged in good and stable condition to home with close outpatient follow-up.    The patient recovered much more quickly than anticipated on admission.    Discharge Date  10/11/2022    FOLLOW UP ITEMS POST DISCHARGE  FU with " PCP    DISCHARGE DIAGNOSES  Principal Problem:    Atypical chest pain POA: Unknown  Active Problems:    Hypokalemia POA: Yes    Tobacco abuse POA: Yes    Thrombocytopenia (HCC) (Chronic) POA: Yes    Lupus (systemic lupus erythematosus) (HCC) POA: Yes      Overview: Formatting of this note might be different from the original.      Last Assessment & Plan:       Hx of       Patient reports liver (cirrhosis) and joint involvement      Takes Tylenol as needed pain    Gastroesophageal reflux disease POA: Yes    Normocytic anemia POA: Unknown    Elevated glucose POA: Unknown    Supratherapeutic INR POA: Unknown  Resolved Problems:    * No resolved hospital problems. *      FOLLOW UP  No future appointments.  No follow-up provider specified.    MEDICATIONS ON DISCHARGE     Medication List        CHANGE how you take these medications        Instructions   pantoprazole 40 MG Tbec  What changed: Another medication with the same name was removed. Continue taking this medication, and follow the directions you see here.  Commonly known as: PROTONIX   Take 40 mg by mouth every day.  Dose: 40 mg     predniSONE 20 MG Tabs  What changed:   how much to take  when to take this  Commonly known as: DELTASONE   Take 3 tablets once a day for 3 days, then 2 tablets once a day for 3 days, then 1 tablet once a day for 3 days.            CONTINUE taking these medications        Instructions   cyclobenzaprine 5 mg tablet  Commonly known as: Flexeril   Take 1-2 Tablets by mouth at bedtime as needed for Moderate Pain.  Dose: 5-10 mg     gabapentin 100 MG Caps  Commonly known as: NEURONTIN   gabapentin 100 mg capsule   Take 1 capsule 3 times a day by oral route as directed for 30 days.   for carpal tunnel pain, difficulty sleeping              Allergies  Allergies   Allergen Reactions    Asa [Aspirin] Unspecified     GI upset, bleeding, Gastric Bypass      Nsaids Nausea     GI upset, bleeding, Gastric Bypass      Tape Unspecified     Blisters all  over, Paper tape is ok       DIET  Orders Placed This Encounter   Procedures    Diet Order Diet: Cardiac; Miscellaneous modifications: (optional): No Decaf, No Caffeine(for test)     Standing Status:   Standing     Number of Occurrences:   1     Order Specific Question:   Diet:     Answer:   Cardiac [6]     Order Specific Question:   Miscellaneous modifications: (optional)     Answer:   No Decaf, No Caffeine(for test) [11]       ACTIVITY  As tolerated.  Weight bearing as tolerated    CONSULTATIONS  None    PROCEDURES  None    LABORATORY  Lab Results   Component Value Date    SODIUM 138 10/10/2022    POTASSIUM 3.2 (L) 10/10/2022    CHLORIDE 105 10/10/2022    CO2 24 10/10/2022    GLUCOSE 180 (H) 10/10/2022    BUN 18 10/10/2022    CREATININE 0.63 10/10/2022        Lab Results   Component Value Date    WBC 4.8 10/10/2022    HEMOGLOBIN 11.9 (L) 10/10/2022    HEMATOCRIT 38.0 10/10/2022    PLATELETCT 74 (L) 10/10/2022        Total time of the discharge process exceeds 45 minutes.

## 2022-10-11 NOTE — PROGRESS NOTES
Pt arrived to [facility] approximately 0815 via wheelchair for TM stress test. After injection with resting dose pt with c/o 7/10 central chest pressure, mild radiation to back. Denies SOB, nausea, diaphoresis. Connected pt to 12 lead ECG with unconfirmed reading showing sinus darnell at 58, /77. Pt appearing very calm resting on gurney. Noted pt also c/o chest pain/pressure prior to leaving floor for stress test.    Voalte message sent to Dr Martin with photo of unconfirmed ECG. Spoke with Dr Martin at 0844. Lab in [facility] to draw troponin. Per Dr Martin will hold stress test until troponin results. If normal ok to proceed with stress test. Pt verbalized understanding of plan at this time.

## 2022-10-11 NOTE — ASSESSMENT & PLAN NOTE
-Patient at this time has no desire or plan to quit smoking.  I encouraged her to add a healthy habit like drinking water or going for a brisk walk every single time she smokes.  I also encouraged her to read on habit formation.  Total amount of time allocated to smoking cessation was 4 minutes.

## 2022-10-11 NOTE — H&P
"Hospital Medicine History & Physical Note    Date of Service  10/11/2022    Primary Care Physician  Cipriano Gomez M.D.    Consultants  None      Code Status  Full Code    Chief Complaint  Chief Complaint   Patient presents with    Chest Pain    Shortness of Breath     Shortness of breath began this am, chest pain began a few hours ago, denies any numbness or tingling, nausea or vomiting.  Reports chills.         History of Presenting Illness  Elvia Roberto is a 52 y.o. female, smoker, with history of lupus on prednisone, positive family history of heart disease, who presented to the ER on 10/10/2022 with complaints of chest pain.  This started a few hours ago.  Pain is worse on the right side.  Describes this as \"sharp/stabbing \"in nature and associated with shortness of breath, rated as 6/10 in intensity.  Pain is started shortly after heated conversation with her granddaughter that is 12 years old.  She denies having similar symptoms in the past.  Denies radiation.  No alleviating or exacerbating factors.  No nausea, vomiting, no diaphoresis.  No abdominal pain.  No fever.  No coughing.    I discussed the plan of care with patient.    Review of Systems  Review of Systems   Constitutional:  Negative for fever.   HENT:  Negative for congestion and sore throat.    Eyes:  Negative for blurred vision and double vision.   Respiratory:  Negative for cough and shortness of breath.    Cardiovascular:  Positive for chest pain. Negative for palpitations.   Gastrointestinal:  Negative for nausea and vomiting.   Genitourinary:  Negative for dysuria and urgency.   Musculoskeletal:  Negative for myalgias and neck pain.   Skin:  Negative for itching and rash.   Neurological:  Negative for dizziness, weakness and headaches.   Endo/Heme/Allergies:  Does not bruise/bleed easily.   Psychiatric/Behavioral:  Negative for depression. The patient does not have insomnia.      Past Medical History   has a past medical history of Heart " burn, Indigestion, Liver disease, Lupus (HCC), Other specified disorder of intestines, and Other specified symptom associated with female genital organs.    Surgical History   has a past surgical history that includes bowel resection laparoscopic (2012); lysis adhesions general (2012); gyn surgery (); gyn surgery (); other orthopedic surgery (); other orthopedic surgery (); other abdominal surgery (); laparoscopy (2012); gastroscopy (2012); primary c section; pr remove tonsils/adenoids,<13 y/o; and hysteroscopy with video operative (2013).     Family History  Her father  of a heart attack but she is not sure how old he was.  Family history reviewed with patient. There is no family history that is pertinent to the chief complaint.     Social History   reports that she has been smoking cigarettes. She has a 15.00 pack-year smoking history. She has never used smokeless tobacco. She reports that she does not currently use alcohol. She reports that she does not use drugs.    Allergies  Allergies   Allergen Reactions    Asa [Aspirin] Unspecified     GI upset, bleeding, Gastric Bypass      Nsaids Nausea     GI upset, bleeding, Gastric Bypass      Tape Unspecified     Blisters all over, Paper tape is ok       Medications  Prior to Admission Medications   Prescriptions Last Dose Informant Patient Reported? Taking?   cyclobenzaprine (FLEXERIL) 5 mg tablet   No No   Sig: Take 1-2 Tablets by mouth at bedtime as needed for Moderate Pain.   gabapentin (NEURONTIN) 100 MG Cap   Yes No   Sig: gabapentin 100 mg capsule   Take 1 capsule 3 times a day by oral route as directed for 30 days.   for carpal tunnel pain, difficulty sleeping   pantoprazole (PROTONIX) 40 MG Recon Soln   Yes No   Sig: Infuse 40 mg into a venous catheter every day.   pantoprazole (PROTONIX) 40 MG Tablet Delayed Response   Yes No   Sig: Take 40 mg by mouth every day.   predniSONE (DELTASONE) 20 MG Tab   No No    Sig: Take 3 tablets once a day for 3 days, then 2 tablets once a day for 3 days, then 1 tablet once a day for 3 days.      Facility-Administered Medications: None       Physical Exam  Temp:  [36.2 °C (97.2 °F)-36.7 °C (98 °F)] 36.2 °C (97.2 °F)  Pulse:  [59-78] 59  Resp:  [14-20] 18  BP: ()/(56-84) 109/62  SpO2:  [97 %-98 %] 97 %  Blood Pressure: 109/62   Temperature: 36.2 °C (97.2 °F)   Pulse: (!) 59   Respiration: 18   Pulse Oximetry: 97 %       Physical Exam  Constitutional:       Appearance: Normal appearance.   HENT:      Head: Normocephalic and atraumatic.      Mouth/Throat:      Mouth: Mucous membranes are moist.      Pharynx: Oropharynx is clear.   Eyes:      Extraocular Movements: Extraocular movements intact.      Pupils: Pupils are equal, round, and reactive to light.   Cardiovascular:      Rate and Rhythm: Normal rate and regular rhythm.      Heart sounds: Normal heart sounds.   Pulmonary:      Effort: Pulmonary effort is normal.      Breath sounds: Normal breath sounds.   Abdominal:      General: Abdomen is flat. Bowel sounds are normal.      Palpations: Abdomen is soft.   Musculoskeletal:      Cervical back: Normal range of motion and neck supple.   Skin:     General: Skin is warm and dry.   Neurological:      General: No focal deficit present.      Mental Status: She is alert and oriented to person, place, and time.   Psychiatric:         Mood and Affect: Mood normal.         Behavior: Behavior normal.       Laboratory:  Recent Labs     10/10/22  2108   WBC 4.8   RBC 4.06*   HEMOGLOBIN 11.9*   HEMATOCRIT 38.0   MCV 93.6   MCH 29.3   MCHC 31.3*   RDW 57.3*   PLATELETCT 74*   MPV 10.3     Recent Labs     10/10/22  2108   SODIUM 138   POTASSIUM 3.2*   CHLORIDE 105   CO2 24   GLUCOSE 180*   BUN 18   CREATININE 0.63   CALCIUM 8.5     Recent Labs     10/10/22  2108   ALTSGPT 20   ASTSGOT 23   ALKPHOSPHAT 120*   TBILIRUBIN 0.2   LIPASE 37   GLUCOSE 180*     Recent Labs     10/10/22  2108   APTT 29.5    INR 1.31*     No results for input(s): NTPROBNP in the last 72 hours.      Recent Labs     10/10/22  2108 10/10/22  2318   TROPONINT <6 <6       Imaging:  DX-CHEST-PORTABLE (1 VIEW)   Final Result      Minimal central bronchial thickening could indicate bronchitis, atypical infection, reactive airway disease      NM-CARDIAC STRESS TEST    (Results Pending)       EKG:  My impression is: Normal sinus rhythm at 80 bpm.  No acute ST elevation.    Assessment/Plan:  Justification for Admission Status  I anticipate this patient is appropriate for observation status at this time because atypical chest pain under the context of lupus and chronic use of steroid.      * Atypical chest pain  Assessment & Plan  -Observation status on telemetry  -Troponin on admission: Negative  -EKG findings: No acute ischemia  -Serial cardiac enzymes every 4 hours ×3  -MONAB: Patient was told not to have aspirin or NSAIDs due to prior history of gastric bypass.  I discussed risks and benefits of having full aspirin today for which he verbalizes understanding.  -Other pertinent cardiac information: Father  of a heart attack.  -Ordered stress test in the morning.      Thrombocytopenia (HCC)- (present on admission)  Assessment & Plan  -Platelets 74,000.  Monitor CBC in the morning.  This is around her baseline as compared to previous CBC.    Hypokalemia- (present on admission)  Assessment & Plan  -Potassium is 3.2.  Replace electrolytes as needed.    Normocytic anemia  Assessment & Plan  -On admission hemoglobin is 11.9.  Monitor CBC in the morning.  No bleeding.    Supratherapeutic INR  Assessment & Plan  -1.31, unclear etiology.  Not on blood thinners.  No elevated LFTs.  Underlying lupus.  May need outpatient follow-up.    Elevated glucose  Assessment & Plan  -On chronic steroids.  On admission, glucose is 180.  Adding hemoglobin A1c.    Gastroesophageal reflux disease- (present on admission)  Assessment & Plan  -Continue  Protonix.    Lupus (systemic lupus erythematosus) (HCC)- (present on admission)  Assessment & Plan  -On prednisone that I will continue.    Tobacco abuse- (present on admission)  Assessment & Plan  -Patient at this time has no desire or plan to quit smoking.  I encouraged her to add a healthy habit like drinking water or going for a brisk walk every single time she smokes.  I also encouraged her to read on habit formation.  Total amount of time allocated to smoking cessation was 4 minutes.      VTE prophylaxis: SCDs/TEDs

## 2022-10-11 NOTE — PROGRESS NOTES
4 Eyes Skin Assessment Completed by ANAT Ojeda and ANAT Zee.    Head WDL  Ears WDL  Nose WDL  Mouth WDL  Neck WDL  Breast/Chest WDL  Shoulder Blades dry patches  Spine dry patches  (R) Arm/Elbow/Hand Scab and Scar and dry patches  (L) Arm/Elbow/Hand Scab and Scar and dry patches  Abdomen WDL  Groin WDL  Scrotum/Coccyx/Buttocks WDL  (R) Leg WDL  (L) Leg WDL  (R) Heel/Foot/Toe WDL  (L) Heel/Foot/Toe WDL          Devices In Places Tele Box      Interventions In Place Pressure Redistribution Mattress    Possible Skin Injury No    Pictures Uploaded Into Epic N/A  Wound Consult Placed N/A  RN Wound Prevention Protocol Ordered No

## 2022-10-11 NOTE — ASSESSMENT & PLAN NOTE
-Observation status on telemetry  -Troponin on admission: Negative  -EKG findings: No acute ischemia  -Serial cardiac enzymes every 4 hours ×3  -MONAB: Patient was told not to have aspirin or NSAIDs due to prior history of gastric bypass.  I discussed risks and benefits of having full aspirin today for which he verbalizes understanding.  -Other pertinent cardiac information: Father  of a heart attack.  -Ordered stress test in the morning.

## 2022-10-11 NOTE — ED TRIAGE NOTES
"Chief Complaint   Patient presents with    Chest Pain    Shortness of Breath     Shortness of breath began this am, chest pain began a few hours ago, denies any numbness or tingling, nausea or vomiting.  Reports chills.       /84   Pulse 78   Temp 36.7 °C (98 °F) (Temporal)   Resp 20   Ht 1.575 m (5' 2\")   Wt 77.5 kg (170 lb 13.7 oz)   LMP 05/04/2013   SpO2 98%   BMI 31.25 kg/m²       "

## 2022-10-11 NOTE — CARE PLAN
The patient is Stable - Low risk of patient condition declining or worsening    Shift Goals  Clinical Goals: Stress test  Patient Goals: Comfort and pain management    Progress made toward(s) clinical / shift goals:    Problem: Pain - Standard  Goal: Alleviation of pain or a reduction in pain to the patient’s comfort goal  Outcome: Progressing     Problem: Knowledge Deficit - Standard  Goal: Patient and family/care givers will demonstrate understanding of plan of care, disease process/condition, diagnostic tests and medications  Outcome: Progressing       Patient is not progressing towards the following goals:

## 2022-10-11 NOTE — ED PROVIDER NOTES
ED Provider Note    CHIEF COMPLAINT  Chest pain, shortness of breath    HPI  Elvia Roberto is a 52 y.o. female who presents to the emergency department for evaluation of chest pain and shortness of breath.  The patient states that she had the sudden onset of right-sided chest pain approximately 5 hours ago.  She describes it as initially tightness and then sharp and stabbing pain since arriving to the emergency room.  She admits to associated shortness of breath but denies diaphoresis, nausea, or vomiting.  She denies any abdominal pain.  She denies any hemoptysis or coughing.    She has approximately 30-pack-year history of smoking.  She denies any personal history of hypertension, hyperlipidemia, diabetes, or coronary artery disease.  She states that her father did die of a heart attack but is unsure of how old he was.  She has no personal history of DVT or PE.  She denies any recent travel, hospitalizations, or surgeries.  She denies calf swelling or tenderness.    The patient does have a history of lupus and is currently on steroids.    REVIEW OF SYSTEMS  See HPI for further details. All other systems are negative.     PAST MEDICAL HISTORY   has a past medical history of Heart burn, Indigestion, Liver disease, Lupus (HCC), Other specified disorder of intestines, and Other specified symptom associated with female genital organs.    SOCIAL HISTORY  Social History     Tobacco Use    Smoking status: Every Day     Packs/day: 0.50     Years: 30.00     Pack years: 15.00     Types: Cigarettes    Smokeless tobacco: Never   Vaping Use    Vaping Use: Never used   Substance and Sexual Activity    Alcohol use: Not Currently    Drug use: No    Sexual activity: Not on file       SURGICAL HISTORY   has a past surgical history that includes bowel resection laparoscopic (4/4/2012); lysis adhesions general (4/4/2012); gyn surgery (1993); gyn surgery (1993); other orthopedic surgery (2004); other orthopedic surgery (2000);  "other abdominal surgery (2010); laparoscopy (12/16/2012); gastroscopy (12/16/2012); primary c section; remove tonsils/adenoids,<11 y/o; and hysteroscopy with video operative (8/20/2013).    CURRENT MEDICATIONS  Home Medications       Reviewed by Rhonda Wu R.N. (Registered Nurse) on 10/10/22 at 2013  Med List Status: Not Addressed     Medication Last Dose Status   cyclobenzaprine (FLEXERIL) 5 mg tablet  Active   gabapentin (NEURONTIN) 100 MG Cap  Active   pantoprazole (PROTONIX) 40 MG Recon Soln  Active   pantoprazole (PROTONIX) 40 MG Tablet Delayed Response  Active   predniSONE (DELTASONE) 20 MG Tab  Active                    ALLERGIES  Allergies   Allergen Reactions    Asa [Aspirin] Unspecified     GI upset, bleeding, Gastric Bypass      Nsaids Nausea     GI upset, bleeding, Gastric Bypass      Tape Unspecified     Blisters all over, Paper tape is ok       PHYSICAL EXAM  VITAL SIGNS: BP (!) 99/57   Pulse (!) 59   Temp 36.2 °C (97.2 °F) (Temporal)   Resp 18   Ht 1.575 m (5' 2\")   Wt 77.5 kg (170 lb 13.7 oz)   LMP 05/04/2013   SpO2 97%   BMI 31.25 kg/m²    Constitutional: Alert and in no apparent distress.  HENT: Normocephalic atraumatic. Bilateral external ears normal. Nose normal. Mucous membranes are moist.  Eyes: Pupils are equal and reactive. Conjunctiva normal. Non-icteric sclera.   Neck: Normal range of motion without tenderness. Supple. No meningeal signs.  Cardiovascular: Regular rate and rhythm. No murmurs, gallops or rubs.  Thorax & Lungs: Breath sounds are clear to auscultation bilaterally. No wheezing, rhonchi or rales.  Abdomen: Soft, nontender and nondistended. No peritoneal signs noted.  Skin: Warm and dry. No rashes are noted.  Back: No bony tenderness, No CVA tenderness.   Extremities: 2+ peripheral pulses. Cap refill is less than 2 seconds. No edema, cyanosis, or clubbing.  Musculoskeletal: Good range of motion in all major joints. No tenderness to palpation or major deformities " noted.   Neurologic: Alert and oriented ×3. The patient moves all 4 extremities and follows commands.  Psychiatric: Affect is normal. Judgment appears to be intact.    DIAGNOSTIC STUDIES / PROCEDURES    LABS  Results for orders placed or performed during the hospital encounter of 10/10/22   CBC WITH DIFFERENTIAL   Result Value Ref Range    WBC 4.8 4.8 - 10.8 K/uL    RBC 4.06 (L) 4.20 - 5.40 M/uL    Hemoglobin 11.9 (L) 12.0 - 16.0 g/dL    Hematocrit 38.0 37.0 - 47.0 %    MCV 93.6 81.4 - 97.8 fL    MCH 29.3 27.0 - 33.0 pg    MCHC 31.3 (L) 33.6 - 35.0 g/dL    RDW 57.3 (H) 35.9 - 50.0 fL    Platelet Count 74 (L) 164 - 446 K/uL    MPV 10.3 9.0 - 12.9 fL    Neutrophils-Polys 54.90 44.00 - 72.00 %    Lymphocytes 35.70 22.00 - 41.00 %    Monocytes 7.30 0.00 - 13.40 %    Eosinophils 1.50 0.00 - 6.90 %    Basophils 0.40 0.00 - 1.80 %    Immature Granulocytes 0.20 0.00 - 0.90 %    Nucleated RBC 0.00 /100 WBC    Neutrophils (Absolute) 2.65 2.00 - 7.15 K/uL    Lymphs (Absolute) 1.72 1.00 - 4.80 K/uL    Monos (Absolute) 0.35 0.00 - 0.85 K/uL    Eos (Absolute) 0.07 0.00 - 0.51 K/uL    Baso (Absolute) 0.02 0.00 - 0.12 K/uL    Immature Granulocytes (abs) 0.01 0.00 - 0.11 K/uL    NRBC (Absolute) 0.00 K/uL   COMP METABOLIC PANEL   Result Value Ref Range    Sodium 138 135 - 145 mmol/L    Potassium 3.2 (L) 3.6 - 5.5 mmol/L    Chloride 105 96 - 112 mmol/L    Co2 24 20 - 33 mmol/L    Anion Gap 9.0 7.0 - 16.0    Glucose 180 (H) 65 - 99 mg/dL    Bun 18 8 - 22 mg/dL    Creatinine 0.63 0.50 - 1.40 mg/dL    Calcium 8.5 8.4 - 10.2 mg/dL    AST(SGOT) 23 12 - 45 U/L    ALT(SGPT) 20 2 - 50 U/L    Alkaline Phosphatase 120 (H) 30 - 99 U/L    Total Bilirubin 0.2 0.1 - 1.5 mg/dL    Albumin 3.7 3.2 - 4.9 g/dL    Total Protein 7.9 6.0 - 8.2 g/dL    Globulin 4.2 (H) 1.9 - 3.5 g/dL    A-G Ratio 0.9 g/dL   LIPASE   Result Value Ref Range    Lipase 37 7 - 58 U/L   TROPONIN   Result Value Ref Range    Troponin T <6 6 - 19 ng/L   PROTHROMBIN TIME (INR)    Result Value Ref Range    PT 15.7 (H) 12.0 - 14.6 sec    INR 1.31 (H) 0.87 - 1.13   APTT   Result Value Ref Range    APTT 29.5 24.7 - 36.0 sec   D-DIMER   Result Value Ref Range    D-Dimer Screen 0.41 0.00 - 0.50 ug/mL (FEU)   ESTIMATED GFR   Result Value Ref Range    GFR (CKD-EPI) 106 >60 mL/min/1.73 m 2   TROPONIN   Result Value Ref Range    Troponin T <6 6 - 19 ng/L   CoV-2 and Flu A/B by PCR ([employer])    Specimen: Nasopharyngeal; Respirate   Result Value Ref Range    SARS-CoV-2 Source NP Swab    EKG   Result Value Ref Range    Report       [facility]    Test Date:  2022-10-10  Pt Name:    JA CAMPOS                Department: [facility]  MRN:        [id number]                      [facility]  Gender:     Female                       Technician: [id number]  :        [id number]                   Requested By:ER TRIAGE PROTOCOL  Order #:    [id number]                    Reading MD: Sarah Pollard    Measurements  Intervals                                Axis  Rate:       80                           P:          23  ME:         164                          QRS:        29  QRSD:       95                           T:          16  QT:         378  QTc:        436    Interpretive Statements  Sinus rhythm  Borderline T abnormalities, anterior leads  No ST elevation or depression noted. Axis is normal. Intervals are within  normal  limits.  Compared to ECG 2021 20:19:57  T-wave abnormality now present  Electronically Signed On 10- 21:49:59 PDT by Sarah Pollard       RADIOLOGY  DX-CHEST-PORTABLE (1 VIEW)   Final Result      Minimal central bronchial thickening could indicate bronchitis, atypical infection, reactive airway disease        COURSE & MEDICAL DECISION MAKING  Pertinent Labs & Imaging studies reviewed. (See chart for details)    This is a 52-year-old female presenting to the emergency department for evaluation of chest pain and shortness of breath.  On initial  evaluation, the patient did not appear to be in any acute distress.  Her vital signs were normal and reassuring.  Physical exam was also reassuring.    EKG did not show any evidence of acute ischemia.  Her first troponin was normal and reassuring.  Her heart score is 3.    D-dimer was negative and I am less concerned for pulmonary embolism.    Chest x-ray revealed minimal central bronchial thickening potentially bronchitis versus atypical infection versus reactive airway disease.  No evidence of pneumothorax or focal opacity concerning for pneumonia was noted.    Electrolytes were notable for a potassium of 3.2.  This was orally repleted in the emergency department.  Her sugar was also elevated at 180 with no evidence of DKA.  This could be from her steroid use.  Her platelets were low at 74 but upon review of her medical record this appeared to be at her baseline.    The patient was observed in the ED and treated with morphine.  Upon reassessment, she continued to have chest pain.  Given her history of lupus and being on steroids with active chest pain, plan was made to admit for serial troponins.    12:16 AM - I discussed the case with Dr Argueta, hospitalist. She agreed with the plan and accepted the patient.  Patient was updated on the work-up thus far and agreeable with the plan of care.    FINAL IMPRESSION  1. Chest pain, unspecified type    2. Hypokalemia    3. Thrombocytopenia (HCC)      -ADMIT-    Electronically signed by: Sarah Pollard D.O., 10/10/2022 9:51 PM

## 2022-10-12 NOTE — PROGRESS NOTES
Telemetry Shift Summary    Rhythm SR  HR Range 62-69  Ectopy R-PVC  Measurements 0.16/0.10/0.40        Normal Values  Rhythm SR  HR Range    Measurements 0.12-0.20 / 0.06-0.10  / 0.30-0.52

## 2022-10-21 ENCOUNTER — APPOINTMENT (OUTPATIENT)
Dept: RADIOLOGY | Facility: MEDICAL CENTER | Age: 52
End: 2022-10-21
Attending: EMERGENCY MEDICINE
Payer: COMMERCIAL

## 2022-10-21 ENCOUNTER — HOSPITAL ENCOUNTER (EMERGENCY)
Facility: MEDICAL CENTER | Age: 52
End: 2022-10-21
Attending: EMERGENCY MEDICINE
Payer: COMMERCIAL

## 2022-10-21 VITALS
TEMPERATURE: 97.2 F | HEIGHT: 62 IN | OXYGEN SATURATION: 93 % | HEART RATE: 57 BPM | WEIGHT: 167.55 LBS | DIASTOLIC BLOOD PRESSURE: 57 MMHG | BODY MASS INDEX: 30.83 KG/M2 | RESPIRATION RATE: 15 BRPM | SYSTOLIC BLOOD PRESSURE: 115 MMHG

## 2022-10-21 DIAGNOSIS — R07.9 CHEST PAIN, UNSPECIFIED TYPE: ICD-10-CM

## 2022-10-21 LAB
ALBUMIN SERPL BCP-MCNC: 3.8 G/DL (ref 3.2–4.9)
ALBUMIN/GLOB SERPL: 1 G/DL
ALP SERPL-CCNC: 116 U/L (ref 30–99)
ALT SERPL-CCNC: 37 U/L (ref 2–50)
ANION GAP SERPL CALC-SCNC: 10 MMOL/L (ref 7–16)
AST SERPL-CCNC: 36 U/L (ref 12–45)
BASOPHILS # BLD AUTO: 0.7 % (ref 0–1.8)
BASOPHILS # BLD: 0.03 K/UL (ref 0–0.12)
BILIRUB SERPL-MCNC: 0.3 MG/DL (ref 0.1–1.5)
BUN SERPL-MCNC: 18 MG/DL (ref 8–22)
CALCIUM SERPL-MCNC: 9.1 MG/DL (ref 8.5–10.5)
CHLORIDE SERPL-SCNC: 103 MMOL/L (ref 96–112)
CO2 SERPL-SCNC: 24 MMOL/L (ref 20–33)
CREAT SERPL-MCNC: 0.81 MG/DL (ref 0.5–1.4)
EKG IMPRESSION: NORMAL
EKG IMPRESSION: NORMAL
EOSINOPHIL # BLD AUTO: 0.07 K/UL (ref 0–0.51)
EOSINOPHIL NFR BLD: 1.6 % (ref 0–6.9)
ERYTHROCYTE [DISTWIDTH] IN BLOOD BY AUTOMATED COUNT: 56.5 FL (ref 35.9–50)
GFR SERPLBLD CREATININE-BSD FMLA CKD-EPI: 87 ML/MIN/1.73 M 2
GLOBULIN SER CALC-MCNC: 3.9 G/DL (ref 1.9–3.5)
GLUCOSE SERPL-MCNC: 91 MG/DL (ref 65–99)
HCG SERPL QL: NEGATIVE
HCT VFR BLD AUTO: 39.9 % (ref 37–47)
HGB BLD-MCNC: 12.9 G/DL (ref 12–16)
IMM GRANULOCYTES # BLD AUTO: 0.02 K/UL (ref 0–0.11)
IMM GRANULOCYTES NFR BLD AUTO: 0.4 % (ref 0–0.9)
LYMPHOCYTES # BLD AUTO: 1.56 K/UL (ref 1–4.8)
LYMPHOCYTES NFR BLD: 34.7 % (ref 22–41)
MCH RBC QN AUTO: 29.8 PG (ref 27–33)
MCHC RBC AUTO-ENTMCNC: 32.3 G/DL (ref 33.6–35)
MCV RBC AUTO: 92.1 FL (ref 81.4–97.8)
MONOCYTES # BLD AUTO: 0.47 K/UL (ref 0–0.85)
MONOCYTES NFR BLD AUTO: 10.4 % (ref 0–13.4)
NEUTROPHILS # BLD AUTO: 2.35 K/UL (ref 2–7.15)
NEUTROPHILS NFR BLD: 52.2 % (ref 44–72)
NRBC # BLD AUTO: 0 K/UL
NRBC BLD-RTO: 0 /100 WBC
PLATELET # BLD AUTO: 83 K/UL (ref 164–446)
PMV BLD AUTO: 10.1 FL (ref 9–12.9)
POTASSIUM SERPL-SCNC: 4.5 MMOL/L (ref 3.6–5.5)
PROT SERPL-MCNC: 7.7 G/DL (ref 6–8.2)
RBC # BLD AUTO: 4.33 M/UL (ref 4.2–5.4)
SODIUM SERPL-SCNC: 137 MMOL/L (ref 135–145)
TROPONIN T SERPL-MCNC: <6 NG/L (ref 6–19)
WBC # BLD AUTO: 4.5 K/UL (ref 4.8–10.8)

## 2022-10-21 PROCEDURE — 84484 ASSAY OF TROPONIN QUANT: CPT

## 2022-10-21 PROCEDURE — 36415 COLL VENOUS BLD VENIPUNCTURE: CPT

## 2022-10-21 PROCEDURE — 84703 CHORIONIC GONADOTROPIN ASSAY: CPT

## 2022-10-21 PROCEDURE — 96374 THER/PROPH/DIAG INJ IV PUSH: CPT | Mod: XU

## 2022-10-21 PROCEDURE — 85025 COMPLETE CBC W/AUTO DIFF WBC: CPT

## 2022-10-21 PROCEDURE — 700117 HCHG RX CONTRAST REV CODE 255: Performed by: EMERGENCY MEDICINE

## 2022-10-21 PROCEDURE — 71045 X-RAY EXAM CHEST 1 VIEW: CPT

## 2022-10-21 PROCEDURE — 93005 ELECTROCARDIOGRAM TRACING: CPT | Performed by: EMERGENCY MEDICINE

## 2022-10-21 PROCEDURE — 93005 ELECTROCARDIOGRAM TRACING: CPT

## 2022-10-21 PROCEDURE — 71275 CT ANGIOGRAPHY CHEST: CPT

## 2022-10-21 PROCEDURE — 99285 EMERGENCY DEPT VISIT HI MDM: CPT

## 2022-10-21 PROCEDURE — 700111 HCHG RX REV CODE 636 W/ 250 OVERRIDE (IP): Performed by: EMERGENCY MEDICINE

## 2022-10-21 PROCEDURE — 80053 COMPREHEN METABOLIC PANEL: CPT

## 2022-10-21 RX ORDER — MORPHINE SULFATE 4 MG/ML
4 INJECTION INTRAVENOUS ONCE
Status: COMPLETED | OUTPATIENT
Start: 2022-10-21 | End: 2022-10-21

## 2022-10-21 RX ADMIN — MORPHINE SULFATE 4 MG: 4 INJECTION INTRAVENOUS at 19:02

## 2022-10-21 RX ADMIN — IOHEXOL 50 ML: 350 INJECTION, SOLUTION INTRAVENOUS at 21:17

## 2022-10-21 ASSESSMENT — FIBROSIS 4 INDEX: FIB4 SCORE: 3.68

## 2022-10-22 NOTE — ED NOTES
Patient ambulated back to B22. Patient complains of chest pain that radiates to her right side, worsens with deep breath, worse with exertion, tachypnea noted with exertion. Patient mentions that she was stuck sitting in traffic for over 5 hours yesterday.

## 2022-10-22 NOTE — ED TRIAGE NOTES
"Chief Complaint   Patient presents with    Chest Pain     Onset 10 am, and 1 occurrence 2 weeks ago were she was hospitalized at [facility]. Pt describing pain centralized to R chest down to left arm that is constant and worse with inspiration        Ambulatory to triage for above complaint.   Educated on triage process, encourage to inform staff of any changes.     /79   Pulse 68   Temp 36.1 °C (97 °F) (Temporal)   Resp 14   Ht 1.575 m (5' 2\")   Wt 76 kg (167 lb 8.8 oz)   LMP 05/04/2013   SpO2 97%   BMI 30.65 kg/m²     "

## 2022-10-22 NOTE — ED NOTES
Patient discharged per order. Oral and written discharge instructions reviewed. IV removed. . All belongings accounted for and taken with patient. Questions answered, and patient agrees with discharge plan. Patient escorted to lobby. Patient encouraged to follow up with PCP

## 2022-10-22 NOTE — ED PROVIDER NOTES
ED Provider Note    Scribed for Jeane Brush M.D. by Jose Funez. 10/21/2022  6:37 PM    Means of arrival: Walk-in  History obtained from: Patient  History limited by: None      CHIEF COMPLAINT  Chief Complaint   Patient presents with    Chest Pain     Onset 10 am, and 1 occurrence 2 weeks ago were she was hospitalized at [facility]. Pt describing pain centralized to R chest down to left arm that is constant and worse with inspiration        HPI  Elvia Roberto is a 52 y.o. female who presents to the Emergency Department for acute, mild right sided chest pain onset 10 AM today. The patient was recently admitted at [facility] for similar symptoms two weeks ago. Today she has right sided chest pain that radiates down to her left arm. She says she was in a car for five hours yesterday but has not travelled for long distance recently. She has associated symptoms of dyspnea on exertion and cough, but denies leg swelling or fever. Breathing in exacerbates her pain. She has a history of lupus. Patient admits to smoking cigarettes.    REVIEW OF SYSTEMS  Pertinent positive include chest pain, dyspnea on exertion, and cough. Pertinent negative include leg swelling or fever. All other systems reviewed and are negative.      PAST MEDICAL HISTORY   has a past medical history of Heart burn, Indigestion, Liver disease, Lupus (HCC), Other specified disorder of intestines, and Other specified symptom associated with female genital organs.    SOCIAL HISTORY  Social History     Tobacco Use    Smoking status: Every Day     Packs/day: 0.50     Years: 30.00     Pack years: 15.00     Types: Cigarettes    Smokeless tobacco: Never   Vaping Use    Vaping Use: Never used   Substance and Sexual Activity    Alcohol use: Not Currently    Drug use: No    Sexual activity: None noted       SURGICAL HISTORY   has a past surgical history that includes bowel resection laparoscopic (4/4/2012); lysis adhesions general (4/4/2012); gyn surgery (1993);  "gyn surgery (1993); other orthopedic surgery (2004); other orthopedic surgery (2000); other abdominal surgery (2010); laparoscopy (12/16/2012); gastroscopy (12/16/2012); primary c section; remove tonsils/adenoids,<13 y/o; and hysteroscopy with video operative (8/20/2013).    CURRENT MEDICATIONS  Home Medications       Reviewed by Charla Gonzalez R.N. (Registered Nurse) on 10/21/22 at 1756  Med List Status: Not Addressed     Medication Last Dose Status   cyclobenzaprine (FLEXERIL) 5 mg tablet  Active   gabapentin (NEURONTIN) 100 MG Cap  Active   pantoprazole (PROTONIX) 40 MG Tablet Delayed Response  Active   predniSONE (DELTASONE) 20 MG Tab  Active                    ALLERGIES  Allergies   Allergen Reactions    Asa [Aspirin] Unspecified     GI upset, bleeding, Gastric Bypass      Nsaids Nausea     GI upset, bleeding, Gastric Bypass      Tape Unspecified     Blisters all over, Paper tape is ok       PHYSICAL EXAM   VITAL SIGNS: /79   Pulse 68   Temp 36.1 °C (97 °F) (Temporal)   Resp 14   Ht 1.575 m (5' 2\")   Wt 76 kg (167 lb 8.8 oz)   LMP 05/04/2013   SpO2 97%   BMI 30.65 kg/m²     Constitutional: Nontoxic appearing, alert in no apparent distress.  HENT: Normocephalic, Atraumatic. Bilateral external ears normal. Nose normal.  Moist mucous membranes.  Oropharynx clear.  Eyes: Pupils are equal and reactive. Conjunctiva normal.   Neck: Supple, full range of motion  Heart: Regular rate and rhythm.  No murmurs.    Lungs: No respiratory distress, normal work of breathing. Lungs clear to auscultation bilaterally.  Abdomen Soft, no distention.  No tenderness to palpation.  Musculoskeletal: Atraumatic. No obvious deformities noted.  No lower extremity edema.  Skin: Warm, Dry.  No erythema, No rash.   Neurologic: Alert and oriented x3. Moving all extremities spontaneously without focal deficits.  Psychiatric: Affect normal, Mood normal, Appears appropriate and not intoxicated.    DIAGNOSTIC " STUDIES    EKG  Results for orders placed or performed during the hospital encounter of 10/21/22   EKG   Result Value Ref Range    Report       [facility] Emergency Dept.    Test Date:  2022-10-21  Pt Name:    JA CAMPOS                Department: ER  MRN:        [id number]                      Room:  Gender:     Female                       Technician: [id number]  :        1970                   Requested By:ER TRIAGE PROTOCOL  Order #:    [id number]                    Reading MD: Jeane Brush MD    Measurements  Intervals                                Axis  Rate:       66                           P:          15  SC:         156                          QRS:        23  QRSD:       88                           T:          22  QT:         386  QTc:        405    Interpretive Statements  Sinus rhythm  Normal intervals, no ectopy  No ST or T wave change  Compared to ECG 10/10/2022 20:05:41  T-wave abnormality no longer present  No other significant change  Electronically Signed On 10- 22:08:10 PDT by Jeane Brush MD     EKG   Result Value Ref Range    Report       [facility] Emergency Dept.    Test Date:  2022-10-21  Pt Name:    JA CAMPOS                Department: ER  MRN:        [id number]                      Room:        22  Gender:     Female                       Technician: [id number]  :        1970                   Requested By:ER TRIAGE PROTOCOL  Order #:    [id number]                    Reading MD: Jeane Brush MD    Measurements  Intervals                                Axis  Rate:       60                           P:          12  SC:         150                          QRS:        25  QRSD:       88                           T:          22  QT:         396  QTc:        396    Interpretive Statements  Sinus rhythm  Normal intervals, no ectopy  No ST or T wave change  Compared to ECG 10/21/2022 17:47:56  No significant changes  Electronically  Signed On 10- 22:08:31 PDT by Jeane Brush MD         LABS  Personally reviewed by me  Labs Reviewed   CBC WITH DIFFERENTIAL - Abnormal; Notable for the following components:       Result Value    WBC 4.5 (*)     MCHC 32.3 (*)     RDW 56.5 (*)     Platelet Count 83 (*)     All other components within normal limits    Narrative:     Biotin intake of greater than 5 mg per day may interfere with  troponin levels, causing false low values.   COMP METABOLIC PANEL - Abnormal; Notable for the following components:    Alkaline Phosphatase 116 (*)     Globulin 3.9 (*)     All other components within normal limits    Narrative:     Biotin intake of greater than 5 mg per day may interfere with  troponin levels, causing false low values.   TROPONIN    Narrative:     Biotin intake of greater than 5 mg per day may interfere with  troponin levels, causing false low values.   HCG QUAL SERUM    Narrative:     Biotin intake of greater than 5 mg per day may interfere with  troponin levels, causing false low values.   ESTIMATED GFR    Narrative:     Biotin intake of greater than 5 mg per day may interfere with  troponin levels, causing false low values.         RADIOLOGY  Personally reviewed by me  CT-CTA CHEST PULMONARY ARTERY W/ RECONS   Final Result      1.  No evidence of pulmonary embolism.   2.  Cirrhosis.   3.  Splenomegaly.            DX-CHEST-PORTABLE (1 VIEW)   Final Result         1. No acute cardiopulmonary abnormalities are identified.          ED COURSE  Vitals:    10/21/22 1900 10/21/22 2000 10/21/22 2100 10/21/22 2200   BP: 122/69 106/57 122/55 115/57   Pulse: 63 (!) 57 (!) 56 (!) 57   Resp: 14 16 (!) 22 15   Temp:    36.2 °C (97.2 °F)   TempSrc:    Temporal   SpO2: 96% 94% 96% 93%   Weight:       Height:             Medications administered:  Medications   morphine 4 MG/ML injection 4 mg (4 mg Intravenous Given 10/21/22 1902)   iohexol (OMNIPAQUE) 350 mg/mL (IV) (50 mL Intravenous Given 10/21/22 2117)          Old records personally reviewed:  The patient was admitted and discharged two weeks ago with chest pain and shortness of breath. She had a stress test which was negative. She has been seen here in the past for issues with joint pain secondary to lupus. She had a D-dimer done that was normal.    6:37 PM - Patient seen and examined at bedside. The patient presents with acute, mild chest pain onset 10 AM today. Discussed plan of care, including labs, radiology, and pain medication. Patient agrees to plan of care. Ordered for CT-CTA Chest Pulmonary Artery w/, DX-Chest, CBC w/ Diff, CMP, Troponin, HCG Qual Serum, and EKG to evaluate. Patient will be treated with morphine 4 mg injection for her symptoms.       MEDICAL DECISION MAKING  Patient with history of lupus on chronic steroids who had a recent admission for chest pain with a negative stress test last week, now presenting with persistent pleuritic chest pain.  She is afebrile with normal vital signs on arrival.  No hypoxia or respiratory distress.  EKG is unchanged from prior and troponin today is normal again making ACS unlikely.  CT of the chest was performed without evidence of pulmonary embolism, pulmonary edema or pneumonia.  Labs otherwise show a chronic neutropenia and chronically low platelets.  I suspect possible pleurisy or musculoskeletal pain as the etiology of her symptoms.  She does not have EKG findings for pericarditis and bedside ultrasound was performed without significant pericardial effusion.  The patient cannot be on NSAIDs due to gastric bypass surgery therefore plan to discharge home with Tylenol with close follow-up with her primary care physician for possible echocardiogram.    Upon reassessment, patient is resting comfortably with normal vital signs.  No new complaints at this time.  Discussed results with patient and/or family as well as importance of primary care follow up.  Patient understands plan of care and strict return  precautions for new or changing symptoms.           The patient will return for new or worsening symptoms and is stable at the time of discharge.    DISPOSITION:  Patient will be discharged home in stable condition.    FOLLOW UP:  Cipriano Gomez M.D.  [address]  [place] [address]  [phone]    Schedule an appointment as soon as possible for a visit       [facility], Emergency Dept  [address] [address] [phone]    If symptoms worsen    OUTPATIENT MEDICATIONS:  Discharge Medication List as of 10/21/2022 10:17 PM          IMPRESSION  (R07.9) Chest pain, unspecified type      The patient is referred to a primary physician for blood pressure management, diabetic screening, and for all other preventative health concerns.       Jose MENDOZA (Scribe), am scribing for, and in the presence of, Jeane Brush M.D..    Electronically signed by: Jose Funez (Scribe), 10/21/2022    Jeane MENDOZA M.D. personally performed the services described in this documentation, as scribed by Jose Funez in my presence, and it is both accurate and complete.    The note accurately reflects work and decisions made by me.  Jeane Brush M.D.  10/22/2022  9:40 PM

## 2022-10-22 NOTE — DISCHARGE INSTRUCTIONS
You were seen in the Emergency Department for chest pain.    EKG, labs, CT scan of chest were completed without significant acute abnormalities.    Please use 1,000mg of tylenol or 600mg of ibuprofen every 6 hours as needed for pain.    Please follow up with your primary care physician.    Return to the Emergency Department with worsening chest pain, trouble breathing, lightheadedness or fainting, or other concerns.

## 2022-11-01 ENCOUNTER — APPOINTMENT (OUTPATIENT)
Dept: RADIOLOGY | Facility: MEDICAL CENTER | Age: 52
End: 2022-11-01
Attending: EMERGENCY MEDICINE
Payer: COMMERCIAL

## 2022-11-01 ENCOUNTER — HOSPITAL ENCOUNTER (EMERGENCY)
Facility: MEDICAL CENTER | Age: 52
End: 2022-11-01
Attending: EMERGENCY MEDICINE
Payer: COMMERCIAL

## 2022-11-01 VITALS
WEIGHT: 170 LBS | DIASTOLIC BLOOD PRESSURE: 68 MMHG | SYSTOLIC BLOOD PRESSURE: 103 MMHG | HEIGHT: 62 IN | TEMPERATURE: 97.1 F | BODY MASS INDEX: 31.28 KG/M2 | RESPIRATION RATE: 20 BRPM | OXYGEN SATURATION: 96 % | HEART RATE: 54 BPM

## 2022-11-01 DIAGNOSIS — K59.00 CONSTIPATION, UNSPECIFIED CONSTIPATION TYPE: ICD-10-CM

## 2022-11-01 DIAGNOSIS — R10.11 RIGHT UPPER QUADRANT ABDOMINAL PAIN: ICD-10-CM

## 2022-11-01 DIAGNOSIS — R14.1 ABDOMINAL GAS PAIN: ICD-10-CM

## 2022-11-01 LAB
ALBUMIN SERPL BCP-MCNC: 4 G/DL (ref 3.2–4.9)
ALBUMIN/GLOB SERPL: 1 G/DL
ALP SERPL-CCNC: 103 U/L (ref 30–99)
ALT SERPL-CCNC: 16 U/L (ref 2–50)
ANION GAP SERPL CALC-SCNC: 13 MMOL/L (ref 7–16)
APPEARANCE UR: CLEAR
AST SERPL-CCNC: 22 U/L (ref 12–45)
BASOPHILS # BLD AUTO: 0.5 % (ref 0–1.8)
BASOPHILS # BLD: 0.03 K/UL (ref 0–0.12)
BILIRUB SERPL-MCNC: 0.3 MG/DL (ref 0.1–1.5)
BILIRUB UR QL STRIP.AUTO: NEGATIVE
BUN SERPL-MCNC: 12 MG/DL (ref 8–22)
CALCIUM SERPL-MCNC: 8.7 MG/DL (ref 8.4–10.2)
CHLORIDE SERPL-SCNC: 105 MMOL/L (ref 96–112)
CO2 SERPL-SCNC: 19 MMOL/L (ref 20–33)
COLOR UR: YELLOW
CREAT SERPL-MCNC: 0.7 MG/DL (ref 0.5–1.4)
EOSINOPHIL # BLD AUTO: 0.05 K/UL (ref 0–0.51)
EOSINOPHIL NFR BLD: 0.9 % (ref 0–6.9)
ERYTHROCYTE [DISTWIDTH] IN BLOOD BY AUTOMATED COUNT: 53 FL (ref 35.9–50)
GFR SERPLBLD CREATININE-BSD FMLA CKD-EPI: 103 ML/MIN/1.73 M 2
GLOBULIN SER CALC-MCNC: 4.1 G/DL (ref 1.9–3.5)
GLUCOSE SERPL-MCNC: 85 MG/DL (ref 65–99)
GLUCOSE UR STRIP.AUTO-MCNC: NEGATIVE MG/DL
HCT VFR BLD AUTO: 39.8 % (ref 37–47)
HGB BLD-MCNC: 13 G/DL (ref 12–16)
IMM GRANULOCYTES # BLD AUTO: 0.01 K/UL (ref 0–0.11)
IMM GRANULOCYTES NFR BLD AUTO: 0.2 % (ref 0–0.9)
KETONES UR STRIP.AUTO-MCNC: NEGATIVE MG/DL
LEUKOCYTE ESTERASE UR QL STRIP.AUTO: NEGATIVE
LIPASE SERPL-CCNC: 42 U/L (ref 7–58)
LYMPHOCYTES # BLD AUTO: 1.45 K/UL (ref 1–4.8)
LYMPHOCYTES NFR BLD: 25.8 % (ref 22–41)
MCH RBC QN AUTO: 29.6 PG (ref 27–33)
MCHC RBC AUTO-ENTMCNC: 32.7 G/DL (ref 33.6–35)
MCV RBC AUTO: 90.7 FL (ref 81.4–97.8)
MICRO URNS: NORMAL
MONOCYTES # BLD AUTO: 0.56 K/UL (ref 0–0.85)
MONOCYTES NFR BLD AUTO: 10 % (ref 0–13.4)
NEUTROPHILS # BLD AUTO: 3.52 K/UL (ref 2–7.15)
NEUTROPHILS NFR BLD: 62.6 % (ref 44–72)
NITRITE UR QL STRIP.AUTO: NEGATIVE
NRBC # BLD AUTO: 0 K/UL
NRBC BLD-RTO: 0 /100 WBC
PH UR STRIP.AUTO: 6 [PH] (ref 5–8)
PLATELET # BLD AUTO: 79 K/UL (ref 164–446)
PMV BLD AUTO: 9.8 FL (ref 9–12.9)
POTASSIUM SERPL-SCNC: 4 MMOL/L (ref 3.6–5.5)
PROT SERPL-MCNC: 8.1 G/DL (ref 6–8.2)
PROT UR QL STRIP: NEGATIVE MG/DL
RBC # BLD AUTO: 4.39 M/UL (ref 4.2–5.4)
RBC UR QL AUTO: NEGATIVE
SODIUM SERPL-SCNC: 137 MMOL/L (ref 135–145)
SP GR UR STRIP.AUTO: 1.01
WBC # BLD AUTO: 5.6 K/UL (ref 4.8–10.8)

## 2022-11-01 PROCEDURE — 74018 RADEX ABDOMEN 1 VIEW: CPT

## 2022-11-01 PROCEDURE — 700111 HCHG RX REV CODE 636 W/ 250 OVERRIDE (IP): Performed by: EMERGENCY MEDICINE

## 2022-11-01 PROCEDURE — 85025 COMPLETE CBC W/AUTO DIFF WBC: CPT

## 2022-11-01 PROCEDURE — 96375 TX/PRO/DX INJ NEW DRUG ADDON: CPT

## 2022-11-01 PROCEDURE — 81003 URINALYSIS AUTO W/O SCOPE: CPT

## 2022-11-01 PROCEDURE — 99284 EMERGENCY DEPT VISIT MOD MDM: CPT

## 2022-11-01 PROCEDURE — 700102 HCHG RX REV CODE 250 W/ 637 OVERRIDE(OP): Performed by: EMERGENCY MEDICINE

## 2022-11-01 PROCEDURE — 80053 COMPREHEN METABOLIC PANEL: CPT

## 2022-11-01 PROCEDURE — 96374 THER/PROPH/DIAG INJ IV PUSH: CPT

## 2022-11-01 PROCEDURE — A9270 NON-COVERED ITEM OR SERVICE: HCPCS | Performed by: EMERGENCY MEDICINE

## 2022-11-01 PROCEDURE — 83690 ASSAY OF LIPASE: CPT

## 2022-11-01 PROCEDURE — 36415 COLL VENOUS BLD VENIPUNCTURE: CPT

## 2022-11-01 RX ORDER — MORPHINE SULFATE 4 MG/ML
4 INJECTION INTRAVENOUS ONCE
Status: COMPLETED | OUTPATIENT
Start: 2022-11-01 | End: 2022-11-01

## 2022-11-01 RX ORDER — ONDANSETRON 2 MG/ML
4 INJECTION INTRAMUSCULAR; INTRAVENOUS ONCE
Status: COMPLETED | OUTPATIENT
Start: 2022-11-01 | End: 2022-11-01

## 2022-11-01 RX ORDER — BISACODYL 5 MG
10 TABLET, DELAYED RELEASE (ENTERIC COATED) ORAL ONCE
Status: COMPLETED | OUTPATIENT
Start: 2022-11-01 | End: 2022-11-01

## 2022-11-01 RX ORDER — ALUMINA, MAGNESIA, AND SIMETHICONE 2400; 2400; 240 MG/30ML; MG/30ML; MG/30ML
10 SUSPENSION ORAL ONCE
Status: COMPLETED | OUTPATIENT
Start: 2022-11-01 | End: 2022-11-01

## 2022-11-01 RX ADMIN — ALUMINUM HYDROXIDE, MAGNESIUM HYDROXIDE, AND DIMETHICONE 10 ML: 400; 400; 40 SUSPENSION ORAL at 22:00

## 2022-11-01 RX ADMIN — BISACODYL 10 MG: 5 TABLET, COATED ORAL at 22:00

## 2022-11-01 RX ADMIN — ONDANSETRON 4 MG: 2 INJECTION INTRAMUSCULAR; INTRAVENOUS at 20:54

## 2022-11-01 RX ADMIN — MORPHINE SULFATE 4 MG: 4 INJECTION INTRAVENOUS at 20:54

## 2022-11-01 ASSESSMENT — FIBROSIS 4 INDEX: FIB4 SCORE: 3.78

## 2022-11-02 NOTE — ED NOTES
Patient is stable for discharge at this time, anticipatory guidance provided, close follow-up is encouraged, and ED return instructions have been detailed. Patient is both agreeable to the disposition and plan and discharged home in ambulatory state and in good condition.

## 2022-11-02 NOTE — DISCHARGE INSTRUCTIONS
Increase water and fiber in your diet i.e. bran cereal, bran muffins, raw vegetables or look up foods high in fiber on the Internet and adjust your diet accordingly  Over-the-counter Gas-X with warm water works well  Laxatives for the next 1 to 2 days until you have a good bowel movement.  Follow-up with your primary care doctor within 2 to 3 days for recheck and return if problems.

## 2022-11-02 NOTE — ED PROVIDER NOTES
CHIEF COMPLAINT  Chief Complaint   Patient presents with    Abdominal Pain     RUQ pain started 2 hours ago, does not radiate, vomiting x1, nausea, no diarrhea, no constipation, no fevers.  No dysuria.  Last BM this am, soft.         HPI  Elvia Roberto is a 52 y.o. female who presents tonight with a chief complaint of sudden onset of severe right upper quadrant abdominal pain that started 2 hours prior to arrival.  The pain does not radiate anywhere.  She had nausea with vomiting x1.  She describes it as sharp and stabbing in nature.  She has had a previous cholecystectomy.  She denies any dysuria or hematuria.  She had a normal bowel movement earlier today.  She has no fever or chills.    REVIEW OF SYSTEMS  See HPI for further details. All other system reviews are negative.    PAST MEDICAL HISTORY  Past Medical History:   Diagnosis Date    Heart burn     Indigestion     Liver disease     Auto immune hepatitis    Lupus (HCC)     Other specified disorder of intestines     Other specified symptom associated with female genital organs     tubiligation       FAMILY HISTORY  History reviewed. No pertinent family history.    SOCIAL HISTORY  Social History     Socioeconomic History    Marital status: Single    Highest education level: Associate degree: occupational, technical, or vocational program   Tobacco Use    Smoking status: Every Day     Packs/day: 0.50     Years: 30.00     Pack years: 15.00     Types: Cigarettes    Smokeless tobacco: Never   Vaping Use    Vaping Use: Never used   Substance and Sexual Activity    Alcohol use: Not Currently    Drug use: No     Social Determinants of Health     Financial Resource Strain: Medium Risk    Difficulty of Paying Living Expenses: Somewhat hard   Food Insecurity: Food Insecurity Present    Worried About Running Out of Food in the Last Year: Sometimes true    Ran Out of Food in the Last Year: Sometimes true   Transportation Needs: No Transportation Needs    Lack of  Transportation (Medical): No    Lack of Transportation (Non-Medical): No   Physical Activity: Insufficiently Active    Days of Exercise per Week: 7 days    Minutes of Exercise per Session: 10 min   Stress: Stress Concern Present    Feeling of Stress : To some extent   Social Connections: Moderately Isolated    Frequency of Communication with Friends and Family: More than three times a week    Frequency of Social Gatherings with Friends and Family: Patient refused    Attends Anabaptism Services: Never    Active Member of Clubs or Organizations: No    Attends Club or Organization Meetings: Patient refused    Marital Status: Living with partner   Housing Stability: High Risk    Unable to Pay for Housing in the Last Year: Yes    Number of Places Lived in the Last Year: 1    Unstable Housing in the Last Year: No       SURGICAL HISTORY  Past Surgical History:   Procedure Laterality Date    HYSTEROSCOPY WITH VIDEO OPERATIVE  2013    Performed by Robin Frederick M.D. at SURGERY [facility] ORS    LAPAROSCOPY  2012    Performed by Hayes Lugo M.D. at SURGERY [facility] ORS    GASTROSCOPY  2012    Performed by Hayes Lugo M.D. at SURGERY [facility] ORS    BOWEL RESECTION LAPAROSCOPIC  2012    Performed by HAYES LUGO at SURGERY [facility] ORS    LYSIS ADHESIONS GENERAL  2012    Performed by HAYES LUGO at SURGERY [facility] ORS    OTHER ABDOMINAL SURGERY      Kristi En Y gastric bypass    OTHER ORTHOPEDIC SURGERY      Left ankle surgery    OTHER ORTHOPEDIC SURGERY      Left knee surgery    GYN SURGERY      tubal ligation    GYN SURGERY          OH REMOVE TONSILS/ADENOIDS,<13 Y/O      PRIMARY C SECTION             CURRENT MEDICATIONS  See nurses notes    ALLERGIES  Allergies   Allergen Reactions    Asa [Aspirin] Unspecified     GI upset, bleeding, Gastric Bypass      Nsaids Nausea     GI upset, bleeding, Gastric Bypass      Tape Unspecified     Blisters  "all over, Paper tape is ok       PHYSICAL EXAM  VITAL SIGNS: /68   Pulse (!) 54   Temp 36.2 °C (97.1 °F) (Temporal)   Resp 20   Ht 1.575 m (5' 2\")   Wt 77.1 kg (170 lb)   LMP 05/04/2013   SpO2 96%   BMI 31.09 kg/m²     Constitutional: Patient is well developed, well nourished in and in severe distress, holding her abdomen crying and doubled over.  HENT: Normocephalic,  Oropharynx moist without erythema or exudates, nose normal with no mucosal edema or drainage.   Eyes: PERRL, EOMI  Neck: Supple   Lymphatic: No lymphadenopathy noted.   Cardiovascular: Normal heart rate and rhythm. No murmur  Thorax & Lungs: Clear and equal breath sounds with good excursion. No respiratory distress  Abdomen: Bowel sounds normal in all four quadrants. Soft, significant right upper quadrant tenderness upon palpation she has intentional guarding no rebound no flank tenderness.  She does have hyperactive bowel sounds.  Skin: Warm, Dry, No erythema, No rashes.   Back: No cervical, thoracic, or lumbosacral tenderness.    Extremities: Peripheral pulses 4/4 No edema  Musculoskeletal: Normal range of motion in all major joints. No tenderness to palpation or major deformities noted.   Neurologic: Alert & oriented x 3, Normal motor function, Normal sensory function  Psychiatric: Affect very anxious.    EKG  Results for orders placed or performed during the hospital encounter of 11/01/22   CBC with Differential   Result Value Ref Range    WBC 5.6 4.8 - 10.8 K/uL    RBC 4.39 4.20 - 5.40 M/uL    Hemoglobin 13.0 12.0 - 16.0 g/dL    Hematocrit 39.8 37.0 - 47.0 %    MCV 90.7 81.4 - 97.8 fL    MCH 29.6 27.0 - 33.0 pg    MCHC 32.7 (L) 33.6 - 35.0 g/dL    RDW 53.0 (H) 35.9 - 50.0 fL    Platelet Count 79 (L) 164 - 446 K/uL    MPV 9.8 9.0 - 12.9 fL    Neutrophils-Polys 62.60 44.00 - 72.00 %    Lymphocytes 25.80 22.00 - 41.00 %    Monocytes 10.00 0.00 - 13.40 %    Eosinophils 0.90 0.00 - 6.90 %    Basophils 0.50 0.00 - 1.80 %    Immature " Granulocytes 0.20 0.00 - 0.90 %    Nucleated RBC 0.00 /100 WBC    Neutrophils (Absolute) 3.52 2.00 - 7.15 K/uL    Lymphs (Absolute) 1.45 1.00 - 4.80 K/uL    Monos (Absolute) 0.56 0.00 - 0.85 K/uL    Eos (Absolute) 0.05 0.00 - 0.51 K/uL    Baso (Absolute) 0.03 0.00 - 0.12 K/uL    Immature Granulocytes (abs) 0.01 0.00 - 0.11 K/uL    NRBC (Absolute) 0.00 K/uL   Complete Metabolic Panel   Result Value Ref Range    Sodium 137 135 - 145 mmol/L    Potassium 4.0 3.6 - 5.5 mmol/L    Chloride 105 96 - 112 mmol/L    Co2 19 (L) 20 - 33 mmol/L    Anion Gap 13.0 7.0 - 16.0    Glucose 85 65 - 99 mg/dL    Bun 12 8 - 22 mg/dL    Creatinine 0.70 0.50 - 1.40 mg/dL    Calcium 8.7 8.4 - 10.2 mg/dL    AST(SGOT) 22 12 - 45 U/L    ALT(SGPT) 16 2 - 50 U/L    Alkaline Phosphatase 103 (H) 30 - 99 U/L    Total Bilirubin 0.3 0.1 - 1.5 mg/dL    Albumin 4.0 3.2 - 4.9 g/dL    Total Protein 8.1 6.0 - 8.2 g/dL    Globulin 4.1 (H) 1.9 - 3.5 g/dL    A-G Ratio 1.0 g/dL   Lipase   Result Value Ref Range    Lipase 42 7 - 58 U/L   Urinalysis    Specimen: Urine, Clean Catch   Result Value Ref Range    Color Yellow     Character Clear     Specific Gravity 1.010 <1.035    Ph 6.0 5.0 - 8.0    Glucose Negative Negative mg/dL    Ketones Negative Negative mg/dL    Protein Negative Negative mg/dL    Bilirubin Negative Negative    Nitrite Negative Negative    Leukocyte Esterase Negative Negative    Occult Blood Negative Negative    Micro Urine Req see below    ESTIMATED GFR   Result Value Ref Range    GFR (CKD-EPI) 103 >60 mL/min/1.73 m 2         RADIOLOGY/PROCEDURES  [id number] VIEW   Final Result         1.  Moderate stool in the colon suggests changes of constipation, otherwise nonspecific bowel gas pattern            COURSE & MEDICAL DECISION MAKING  Pertinent Labs & Imaging studies reviewed. (See chart for details)  Patient received an IV of normal saline with morphine and Zofran for pain.  X-ray performed shows moderate stool and gas consistent with  constipation.  Remaining labs and urinalysis were unremarkable.  Patient was treated with MiraLAX and Dulcolax.  She is instructed to increase water and fiber in her diet, follow-up with her primary care doctor within the next 2 days for recheck and return if problems or worsening.  She is stable upon discharge.    FINAL IMPRESSION  1.  Right upper quadrant abdominal pain  2.  Abdominal gas pain   3.  Constipation         Electronically signed by: Renee Ferreira D.O., 11/2/2022 12:01 AM ED Provider Note

## 2022-11-02 NOTE — ED TRIAGE NOTES
"Chief Complaint   Patient presents with    Abdominal Pain     RUQ pain started 2 hours ago, does not radiate, vomiting x1, nausea, no diarrhea, no constipation, no fevers.  No dysuria.  Last BM this am, soft.       /80   Pulse 66   Temp 36.4 °C (97.6 °F) (Temporal)   Resp 20   Ht 1.575 m (5' 2\")   Wt 77.1 kg (170 lb)   LMP 05/04/2013   SpO2 98%   BMI 31.09 kg/m²     "

## 2022-11-29 ENCOUNTER — HOSPITAL ENCOUNTER (EMERGENCY)
Facility: MEDICAL CENTER | Age: 52
End: 2022-11-29
Attending: EMERGENCY MEDICINE
Payer: COMMERCIAL

## 2022-11-29 VITALS
OXYGEN SATURATION: 96 % | WEIGHT: 172.84 LBS | BODY MASS INDEX: 31.81 KG/M2 | DIASTOLIC BLOOD PRESSURE: 83 MMHG | HEIGHT: 62 IN | RESPIRATION RATE: 18 BRPM | HEART RATE: 68 BPM | SYSTOLIC BLOOD PRESSURE: 119 MMHG | TEMPERATURE: 98.2 F

## 2022-11-29 DIAGNOSIS — J06.9 VIRAL UPPER RESPIRATORY TRACT INFECTION: ICD-10-CM

## 2022-11-29 LAB
FLUAV RNA SPEC QL NAA+PROBE: NEGATIVE
FLUBV RNA SPEC QL NAA+PROBE: NEGATIVE
RSV RNA SPEC QL NAA+PROBE: NEGATIVE
SARS-COV-2 RNA RESP QL NAA+PROBE: DETECTED
SPECIMEN SOURCE: ABNORMAL

## 2022-11-29 PROCEDURE — 0241U HCHG SARS-COV-2 COVID-19 NFCT DS RESP RNA 4 TRGT MIC: CPT

## 2022-11-29 PROCEDURE — C9803 HOPD COVID-19 SPEC COLLECT: HCPCS | Performed by: EMERGENCY MEDICINE

## 2022-11-29 PROCEDURE — 99284 EMERGENCY DEPT VISIT MOD MDM: CPT

## 2022-11-29 ASSESSMENT — FIBROSIS 4 INDEX: FIB4 SCORE: 3.69

## 2022-11-29 NOTE — ED TRIAGE NOTES
Pt states this morning was getting ready ready for work and states just didn't feel well and was dizzy. Pt states has had a cold for 4-5 days with cough and congestion and just feels worse this morning.

## 2022-11-29 NOTE — ED PROVIDER NOTES
ED Provider Note    CHIEF COMPLAINT  Chief Complaint   Patient presents with    Congestion    Headache    Cough    Dizziness       HPI  Elvia Roberto is a 52 y.o. female who presents with cough and congestion.  The patient states she is been sick for a little over a week.  She states she has a cough as well as a lot of congestion.  She states she is developed a headache as well as some dizziness.  She is unaware of any recent fevers.  She has not had any vomiting or diarrhea.  She is unaware of any sick contacts.  She does take 10 mg of steroids a day for her lupus.  She does abuse tobacco products.    REVIEW OF SYSTEMS  See HPI for further details. All other systems are negative.     PAST MEDICAL HISTORY  Past Medical History:   Diagnosis Date    Heart burn     Indigestion     Liver disease     Auto immune hepatitis    Lupus (HCC)     Other specified disorder of intestines     Other specified symptom associated with female genital organs     tubiligation       FAMILY HISTORY  [unfilled]    SOCIAL HISTORY  Social History     Socioeconomic History    Marital status: Single    Highest education level: Associate degree: occupational, technical, or vocational program   Tobacco Use    Smoking status: Every Day     Packs/day: 0.50     Years: 30.00     Pack years: 15.00     Types: Cigarettes    Smokeless tobacco: Never   Vaping Use    Vaping Use: Never used   Substance and Sexual Activity    Alcohol use: Not Currently    Drug use: No     Social Determinants of Health     Financial Resource Strain: Medium Risk    Difficulty of Paying Living Expenses: Somewhat hard   Food Insecurity: Food Insecurity Present    Worried About Running Out of Food in the Last Year: Sometimes true    Ran Out of Food in the Last Year: Sometimes true   Transportation Needs: No Transportation Needs    Lack of Transportation (Medical): No    Lack of Transportation (Non-Medical): No   Physical Activity: Insufficiently Active    Days of Exercise per  Week: 7 days    Minutes of Exercise per Session: 10 min   Stress: Stress Concern Present    Feeling of Stress : To some extent   Social Connections: Moderately Isolated    Frequency of Communication with Friends and Family: More than three times a week    Frequency of Social Gatherings with Friends and Family: Patient refused    Attends [employer] Services: Never    Active Member of Clubs or Organizations: No    Attends Club or Organization Meetings: Patient refused    Marital Status: Living with partner   Housing Stability: High Risk    Unable to Pay for Housing in the Last Year: Yes    Number of Places Lived in the Last Year: 1    Unstable Housing in the Last Year: No       SURGICAL HISTORY  Past Surgical History:   Procedure Laterality Date    HYSTEROSCOPY WITH VIDEO OPERATIVE  2013    Performed by Robin Frederick M.D. at SURGERY [facility] ORS    LAPAROSCOPY  2012    Performed by Hayes Lugo M.D. at SURGERY [facility] ORS    GASTROSCOPY  2012    Performed by Hayes Lugo M.D. at SURGERY [facility] ORS    BOWEL RESECTION LAPAROSCOPIC  2012    Performed by HAYES LUGO at SURGERY [facility]    LYSIS ADHESIONS GENERAL  2012    Performed by HAYES LUGO at SURGERY [facility]    OTHER ABDOMINAL SURGERY      Kristi En Y gastric bypass    OTHER ORTHOPEDIC SURGERY      Left ankle surgery    OTHER ORTHOPEDIC SURGERY      Left knee surgery    GYN SURGERY      tubal ligation    GYN SURGERY          MO REMOVE TONSILS/ADENOIDS,<11 Y/O      PRIMARY C SECTION             CURRENT MEDICATIONS  Home Medications    **Home medications have not yet been reviewed for this encounter**         ALLERGIES  Allergies   Allergen Reactions    Asa [Aspirin] Unspecified     GI upset, bleeding, Gastric Bypass      Nsaids Nausea     GI upset, bleeding, Gastric Bypass      Tape Unspecified     Blisters all over, Paper tape is ok       PHYSICAL EXAM  VITAL SIGNS: BP  "135/86   Pulse 78   Temp 36.8 °C (98.2 °F) (Temporal)   Resp 19   Ht 1.575 m (5' 2\")   Wt 78.4 kg (172 lb 13.5 oz)   LMP 05/04/2013   SpO2 96%   BMI 31.61 kg/m²       Constitutional: Well developed, Well nourished, No acute distress, Non-toxic appearance.   HENT: Normocephalic, Atraumatic, Bilateral tympanic membranes retracted but nonerythematous, oropharynx moist, No oral exudates, Nose: Turbinates  Eyes: PERRLA, EOMI, Conjunctiva normal, No discharge.   Neck: Normal range of motion, No tenderness, Supple, No stridor.   Lymphatic: No lymphadenopathy noted.   Cardiovascular: Normal heart rate, Normal rhythm, No murmurs, No rubs, No gallops.   Thorax & Lungs: Magically diminished throughout breath sounds, No respiratory distress, No wheezing, No chest tenderness.   Abdomen: Bowel sounds normal, Soft, No tenderness, No masses, No pulsatile masses.   Skin: Warm, Dry, No erythema, No rash.   Back: No tenderness, No CVA tenderness.   Extremities: Intact distal pulses, No edema, No tenderness, No cyanosis, No clubbing.   Neurologic: Alert & oriented x 3, Normal motor function, Normal sensory function, No focal deficits noted.   Psychiatric: Affect normal, Judgment normal, Mood normal.     COURSE & MEDICAL DECISION MAKING  Pertinent Labs & Imaging studies reviewed. (See chart for details)  This a 52-year-old female who presents with signs and symptoms consistent with a viral process.  She has not any respiratory distress nor she hypoxic.  The patient is outside of the window for Tamiflu or Paxil of it.  Therefore we will continue supportive management with nasal hydration, oral hydration, and Tylenol.  Viral testing was ordered in triage via protocol.  She will follow-up on the results.  She is aware if she is positive for COVID she needs to isolate for 5 days otherwise she will isolate until she has improvement in symptoms and continues to have no fever for greater than 24 hours.  The patient will return if she " is acutely worse.  Of note I suspect the dizziness is secondary to eustachian tube dysfunction.  She will utilize nasal saline spray.  She has not had any vomiting or diarrhea to support dehydration.    FINAL IMPRESSION  1.  Viral illness  2.  Dizziness  Disposition  The patient will be discharged in stable condition         Electronically signed by: Parish Choudhury M.D., 11/29/2022 11:53 AM

## 2022-12-01 ENCOUNTER — APPOINTMENT (OUTPATIENT)
Dept: RADIOLOGY | Facility: MEDICAL CENTER | Age: 52
End: 2022-12-01
Attending: EMERGENCY MEDICINE
Payer: COMMERCIAL

## 2022-12-01 ENCOUNTER — HOSPITAL ENCOUNTER (EMERGENCY)
Facility: MEDICAL CENTER | Age: 52
End: 2022-12-01
Attending: EMERGENCY MEDICINE
Payer: COMMERCIAL

## 2022-12-01 VITALS
HEART RATE: 63 BPM | SYSTOLIC BLOOD PRESSURE: 141 MMHG | WEIGHT: 170.42 LBS | OXYGEN SATURATION: 97 % | TEMPERATURE: 99.6 F | DIASTOLIC BLOOD PRESSURE: 79 MMHG | HEIGHT: 62 IN | RESPIRATION RATE: 27 BRPM | BODY MASS INDEX: 31.36 KG/M2

## 2022-12-01 DIAGNOSIS — J06.9 UPPER RESPIRATORY TRACT INFECTION, UNSPECIFIED TYPE: ICD-10-CM

## 2022-12-01 DIAGNOSIS — R07.9 CHEST PAIN, UNSPECIFIED TYPE: ICD-10-CM

## 2022-12-01 DIAGNOSIS — U07.1 COVID: ICD-10-CM

## 2022-12-01 DIAGNOSIS — R42 VERTIGO: ICD-10-CM

## 2022-12-01 LAB
ALBUMIN SERPL BCP-MCNC: 3.9 G/DL (ref 3.2–4.9)
ALBUMIN/GLOB SERPL: 1.1 G/DL
ALP SERPL-CCNC: 105 U/L (ref 30–99)
ALT SERPL-CCNC: 25 U/L (ref 2–50)
ANION GAP SERPL CALC-SCNC: 10 MMOL/L (ref 7–16)
AST SERPL-CCNC: 25 U/L (ref 12–45)
BASOPHILS # BLD AUTO: 0.4 % (ref 0–1.8)
BASOPHILS # BLD: 0.02 K/UL (ref 0–0.12)
BILIRUB SERPL-MCNC: 0.2 MG/DL (ref 0.1–1.5)
BUN SERPL-MCNC: 16 MG/DL (ref 8–22)
CALCIUM SERPL-MCNC: 9 MG/DL (ref 8.4–10.2)
CHLORIDE SERPL-SCNC: 104 MMOL/L (ref 96–112)
CO2 SERPL-SCNC: 21 MMOL/L (ref 20–33)
CREAT SERPL-MCNC: 0.76 MG/DL (ref 0.5–1.4)
EKG IMPRESSION: NORMAL
EOSINOPHIL # BLD AUTO: 0.07 K/UL (ref 0–0.51)
EOSINOPHIL NFR BLD: 1.4 % (ref 0–6.9)
ERYTHROCYTE [DISTWIDTH] IN BLOOD BY AUTOMATED COUNT: 53.9 FL (ref 35.9–50)
GFR SERPLBLD CREATININE-BSD FMLA CKD-EPI: 93 ML/MIN/1.73 M 2
GLOBULIN SER CALC-MCNC: 3.7 G/DL (ref 1.9–3.5)
GLUCOSE SERPL-MCNC: 97 MG/DL (ref 65–99)
HCT VFR BLD AUTO: 39.8 % (ref 37–47)
HGB BLD-MCNC: 12.8 G/DL (ref 12–16)
IMM GRANULOCYTES # BLD AUTO: 0.02 K/UL (ref 0–0.11)
IMM GRANULOCYTES NFR BLD AUTO: 0.4 % (ref 0–0.9)
LYMPHOCYTES # BLD AUTO: 1.61 K/UL (ref 1–4.8)
LYMPHOCYTES NFR BLD: 32.3 % (ref 22–41)
MCH RBC QN AUTO: 29.1 PG (ref 27–33)
MCHC RBC AUTO-ENTMCNC: 32.2 G/DL (ref 33.6–35)
MCV RBC AUTO: 90.5 FL (ref 81.4–97.8)
MONOCYTES # BLD AUTO: 0.45 K/UL (ref 0–0.85)
MONOCYTES NFR BLD AUTO: 9 % (ref 0–13.4)
NEUTROPHILS # BLD AUTO: 2.81 K/UL (ref 2–7.15)
NEUTROPHILS NFR BLD: 56.5 % (ref 44–72)
NRBC # BLD AUTO: 0 K/UL
NRBC BLD-RTO: 0 /100 WBC
PLATELET # BLD AUTO: 82 K/UL (ref 164–446)
PMV BLD AUTO: 9.8 FL (ref 9–12.9)
POTASSIUM SERPL-SCNC: 4.1 MMOL/L (ref 3.6–5.5)
PROT SERPL-MCNC: 7.6 G/DL (ref 6–8.2)
RBC # BLD AUTO: 4.4 M/UL (ref 4.2–5.4)
SODIUM SERPL-SCNC: 135 MMOL/L (ref 135–145)
TROPONIN T SERPL-MCNC: <6 NG/L (ref 6–19)
WBC # BLD AUTO: 5 K/UL (ref 4.8–10.8)

## 2022-12-01 PROCEDURE — 700102 HCHG RX REV CODE 250 W/ 637 OVERRIDE(OP): Performed by: EMERGENCY MEDICINE

## 2022-12-01 PROCEDURE — 93005 ELECTROCARDIOGRAM TRACING: CPT | Performed by: EMERGENCY MEDICINE

## 2022-12-01 PROCEDURE — 84484 ASSAY OF TROPONIN QUANT: CPT

## 2022-12-01 PROCEDURE — 93005 ELECTROCARDIOGRAM TRACING: CPT

## 2022-12-01 PROCEDURE — 71045 X-RAY EXAM CHEST 1 VIEW: CPT

## 2022-12-01 PROCEDURE — 99284 EMERGENCY DEPT VISIT MOD MDM: CPT

## 2022-12-01 PROCEDURE — 700111 HCHG RX REV CODE 636 W/ 250 OVERRIDE (IP): Performed by: EMERGENCY MEDICINE

## 2022-12-01 PROCEDURE — A9270 NON-COVERED ITEM OR SERVICE: HCPCS | Performed by: EMERGENCY MEDICINE

## 2022-12-01 PROCEDURE — 36415 COLL VENOUS BLD VENIPUNCTURE: CPT

## 2022-12-01 PROCEDURE — 80053 COMPREHEN METABOLIC PANEL: CPT

## 2022-12-01 PROCEDURE — 85025 COMPLETE CBC W/AUTO DIFF WBC: CPT

## 2022-12-01 PROCEDURE — 96374 THER/PROPH/DIAG INJ IV PUSH: CPT

## 2022-12-01 RX ORDER — KETOROLAC TROMETHAMINE 30 MG/ML
30 INJECTION, SOLUTION INTRAMUSCULAR; INTRAVENOUS ONCE
Status: COMPLETED | OUTPATIENT
Start: 2022-12-01 | End: 2022-12-01

## 2022-12-01 RX ORDER — MECLIZINE HYDROCHLORIDE 25 MG/1
25 TABLET ORAL ONCE
Status: COMPLETED | OUTPATIENT
Start: 2022-12-01 | End: 2022-12-01

## 2022-12-01 RX ORDER — ACETAMINOPHEN 500 MG
1000 TABLET ORAL ONCE
Status: COMPLETED | OUTPATIENT
Start: 2022-12-01 | End: 2022-12-01

## 2022-12-01 RX ORDER — MECLIZINE HYDROCHLORIDE 25 MG/1
25 TABLET ORAL 3 TIMES DAILY PRN
Qty: 30 TABLET | Refills: 0 | Status: SHIPPED | OUTPATIENT
Start: 2022-12-01 | End: 2023-05-29

## 2022-12-01 RX ADMIN — ACETAMINOPHEN 1000 MG: 500 TABLET ORAL at 21:27

## 2022-12-01 RX ADMIN — MECLIZINE HYDROCHLORIDE 25 MG: 25 TABLET ORAL at 21:27

## 2022-12-01 RX ADMIN — KETOROLAC TROMETHAMINE 30 MG: 30 INJECTION, SOLUTION INTRAMUSCULAR; INTRAVENOUS at 21:26

## 2022-12-01 ASSESSMENT — FIBROSIS 4 INDEX: FIB4 SCORE: 3.69

## 2022-12-02 NOTE — DISCHARGE INSTRUCTIONS
Use over-the-counter Tylenol and ibuprofen, use Afrin and steroid nasal spray.  Use Mucinex and other decongestants.

## 2022-12-02 NOTE — ED PROVIDER NOTES
ED Provider Note    Scribed for Aidan Mina M.D. by Alpesh Nunez. 12/1/2022  8:05 PM    Primary care provider: Cipriano Gomez M.D.  Means of arrival: walk in  History obtained from: patient  History limited by: none    CHIEF COMPLAINT  Chief Complaint   Patient presents with    Coronavirus Screening     COVID +. Reports h/a, sore throat, cough, body aches, vomiting, chills. Unknown fevers. Denies diarrhea.    Chest Pain     Midsternal.       HPI  Elvia Roberto is a 52 y.o. female who presents to the Emergency Department for COVID symptoms onset one week ago. She states that she began to experience cough, congestion, sore throat, aches, vomiting and chills. Two days ago she began to become dizzy, she described her dizziness as spinning. She was seen here at the ED two days ago for this dizziness and was discharged. She now returns for worsening dizziness, in addition to her symptoms listed above. She also notes mild shortness of breath. She denies any history of blood clots. She notes that she tested positive for COVID recently.    REVIEW OF SYSTEMS  Pertinent positives include cough, mild shortness of breath, dizziness, congestion, sore throat, aches, vomiting and chills. Pertinent negatives include no diarrhea.  All other systems reviewed and negative.    PAST MEDICAL HISTORY   has a past medical history of Heart burn, Indigestion, Liver disease, Lupus (HCC), Other specified disorder of intestines, and Other specified symptom associated with female genital organs.    SURGICAL HISTORY   has a past surgical history that includes bowel resection laparoscopic (4/4/2012); lysis adhesions general (4/4/2012); gyn surgery (1993); gyn surgery (1993); other orthopedic surgery (2004); other orthopedic surgery (2000); other abdominal surgery (2010); laparoscopy (12/16/2012); gastroscopy (12/16/2012); primary c section; remove tonsils/adenoids,<13 y/o; and hysteroscopy with video operative (8/20/2013).    SOCIAL  "HISTORY  Social History     Tobacco Use    Smoking status: Every Day     Packs/day: 0.50     Years: 30.00     Pack years: 15.00     Types: Cigarettes    Smokeless tobacco: Never   Vaping Use    Vaping Use: Never used   Substance Use Topics    Alcohol use: Not Currently    Drug use: No      Social History     Substance and Sexual Activity   Drug Use No       FAMILY HISTORY  None noted    CURRENT MEDICATIONS  Home Medications       Reviewed by Aydee Garcia R.N. (Registered Nurse) on 12/01/22 at 1835  Med List Status: Not Addressed     Medication Last Dose Status   cyclobenzaprine (FLEXERIL) 5 mg tablet  Active   gabapentin (NEURONTIN) 100 MG Cap  Active   pantoprazole (PROTONIX) 40 MG Tablet Delayed Response  Active   predniSONE (DELTASONE) 20 MG Tab  Active                    ALLERGIES  Allergies   Allergen Reactions    Asa [Aspirin] Unspecified     GI upset, bleeding, Gastric Bypass      Nsaids Nausea     GI upset, bleeding, Gastric Bypass      Tape Unspecified     Blisters all over, Paper tape is ok       PHYSICAL EXAM  VITAL SIGNS: BP (!) 140/86   Pulse 85   Temp 37.6 °C (99.6 °F) (Temporal)   Resp 18   Ht 1.575 m (5' 2\")   Wt 77.3 kg (170 lb 6.7 oz)   LMP 05/04/2013   SpO2 96%   BMI 31.17 kg/m²     Constitutional: Well developed, Well nourished, No distress, Non-toxic appearance.   HENT: Fluid behind bilateral TM's. Oropharynx with posterior nasal drainage. Normocephalic, Atraumatic, Bilateral external ears normal, No oral exudates.   Eyes: PERRLA, EOMI, Conjunctiva normal, No discharge.   Neck: No tenderness, Supple, No stridor.   Lymphatic: No lymphadenopathy noted.   Cardiovascular: Normal heart rate, Normal rhythm.   Thorax & Lungs: Chest wall with some anterior tenderness to palpation. Clear to auscultation bilaterally, No respiratory distress, No wheezing, No crackles.   Abdomen: Soft, No tenderness, No masses, No pulsatile masses.   Skin: Warm, Dry, No erythema, No rash.   Extremities:, No " edema No cyanosis.   Musculoskeletal: No tenderness to palpation or major deformities noted.  Intact distal pulses  Neurologic: Awake, alert. Moves all extremities spontaneously.  Psychiatric: Affect normal, Judgment normal, Mood normal.     LABS  Results for orders placed or performed during the hospital encounter of 12/01/22   CBC with Differential   Result Value Ref Range    WBC 5.0 4.8 - 10.8 K/uL    RBC 4.40 4.20 - 5.40 M/uL    Hemoglobin 12.8 12.0 - 16.0 g/dL    Hematocrit 39.8 37.0 - 47.0 %    MCV 90.5 81.4 - 97.8 fL    MCH 29.1 27.0 - 33.0 pg    MCHC 32.2 (L) 33.6 - 35.0 g/dL    RDW 53.9 (H) 35.9 - 50.0 fL    Platelet Count 82 (L) 164 - 446 K/uL    MPV 9.8 9.0 - 12.9 fL    Neutrophils-Polys 56.50 44.00 - 72.00 %    Lymphocytes 32.30 22.00 - 41.00 %    Monocytes 9.00 0.00 - 13.40 %    Eosinophils 1.40 0.00 - 6.90 %    Basophils 0.40 0.00 - 1.80 %    Immature Granulocytes 0.40 0.00 - 0.90 %    Nucleated RBC 0.00 /100 WBC    Neutrophils (Absolute) 2.81 2.00 - 7.15 K/uL    Lymphs (Absolute) 1.61 1.00 - 4.80 K/uL    Monos (Absolute) 0.45 0.00 - 0.85 K/uL    Eos (Absolute) 0.07 0.00 - 0.51 K/uL    Baso (Absolute) 0.02 0.00 - 0.12 K/uL    Immature Granulocytes (abs) 0.02 0.00 - 0.11 K/uL    NRBC (Absolute) 0.00 K/uL   Complete Metabolic Panel (CMP)   Result Value Ref Range    Sodium 135 135 - 145 mmol/L    Potassium 4.1 3.6 - 5.5 mmol/L    Chloride 104 96 - 112 mmol/L    Co2 21 20 - 33 mmol/L    Anion Gap 10.0 7.0 - 16.0    Glucose 97 65 - 99 mg/dL    Bun 16 8 - 22 mg/dL    Creatinine 0.76 0.50 - 1.40 mg/dL    Calcium 9.0 8.4 - 10.2 mg/dL    AST(SGOT) 25 12 - 45 U/L    ALT(SGPT) 25 2 - 50 U/L    Alkaline Phosphatase 105 (H) 30 - 99 U/L    Total Bilirubin 0.2 0.1 - 1.5 mg/dL    Albumin 3.9 3.2 - 4.9 g/dL    Total Protein 7.6 6.0 - 8.2 g/dL    Globulin 3.7 (H) 1.9 - 3.5 g/dL    A-G Ratio 1.1 g/dL   Troponin STAT   Result Value Ref Range    Troponin T <6 6 - 19 ng/L   ESTIMATED GFR   Result Value Ref Range    GFR  (CKD-EPI) 93 >60 mL/min/1.73 m 2   EKG   Result Value Ref Range    Report       [facility] Emergency Dept.    Test Date:  2022  Pt Name:    JA CAMPOS                Department: [facility]  MRN:        [id number]                      Room:  Gender:     Female                       Technician: JUD  :        [id number]                   Requested By:ER TRIAGE PROTOCOL  Order #:    [id number]                    Reading MD: JAS THOMAS MD    Measurements  Intervals                                Axis  Rate:       71                           P:          21  WI:         147                          QRS:        32  QRSD:       87                           T:          25  QT:         372  QTc:        405    Interpretive Statements  Sinus rhythm  Compared to ECG 10/21/2022 18:08:33  No significant changes  Electronically Signed On 2022 20:17:19 PST by JAS THOMAS MD          RADIOLOGY  DX-CHEST-PORTABLE (1 VIEW)   Final Result      No acute cardiac or pulmonary abnormalities are identified.        The radiologist's interpretation of all radiological studies have been reviewed by me.    COURSE & MEDICAL DECISION MAKING  Pertinent Labs & Imaging studies reviewed. (See chart for details)    8:05 PM - Patient seen and examined at bedside. Patient will be treated with Tylenol 1000 mg. Ordered DX-chest, CBC w/ diff, CMP, troponin and EKG to evaluate her symptoms. The differential diagnoses include but are not limited to: COVID vs ACS vs peripheral vertigo.    8:28 PM - Patient will be medicated with Toradol 30 mg and meclizine 25 mg. Ordered estimated GFR to evaluate her symptoms.    9:21 PM - Patient was reevaluated at bedside. Discussed lab and radiology results with the patient and informed them that they will be discharged at this time. Discussed return precautions and plan for at home care. Patient verbalizes understanding and agreement to this plan of care.        Decision  Making:  Patient with URI type symptoms, the patient has COVID, the patient is having some chest pain, heart score is 2.  Troponin is negative, no evidence of PE.  The patient is feeling improved.  We will discharge patient home, have the patient return with worsening symptoms.    The patient will return for new or worsening symptoms and is stable at the time of discharge.    The patient is referred to a primary physician for blood pressure management, diabetic screening, and for all other preventative health concerns.    DISPOSITION:  Patient will be discharged home in stable condition.    FOLLOW UP:  [facility], Emergency Dept  [address]  [place] [id number] [phone]    If symptoms worsen      FINAL IMPRESSION  1. COVID    2. Upper respiratory tract infection, unspecified type    3. Chest pain, unspecified type    4. Vertigo       Alpesh MENDOZA (Scribe), am scribing for, and in the presence of, Aidan Mina M.D..    Electronically signed by: Alpesh Nunez (Desiraeibjustine), 12/1/2022    Aidan MENDOZA M.D. personally performed the services described in this documentation, as scribed by Alpesh Nunez in my presence, and it is both accurate and complete.    The note accurately reflects work and decisions made by me.  Aidan Mina M.D.  12/1/2022  11:55 PM

## 2022-12-04 ENCOUNTER — OFFICE VISIT (OUTPATIENT)
Dept: URGENT CARE | Facility: CLINIC | Age: 52
End: 2022-12-04
Payer: COMMERCIAL

## 2022-12-04 VITALS
HEART RATE: 90 BPM | OXYGEN SATURATION: 98 % | BODY MASS INDEX: 31.28 KG/M2 | SYSTOLIC BLOOD PRESSURE: 138 MMHG | DIASTOLIC BLOOD PRESSURE: 86 MMHG | TEMPERATURE: 98.8 F | RESPIRATION RATE: 16 BRPM | WEIGHT: 170 LBS | HEIGHT: 62 IN

## 2022-12-04 DIAGNOSIS — S16.1XXA STRAIN OF NECK MUSCLE, INITIAL ENCOUNTER: ICD-10-CM

## 2022-12-04 DIAGNOSIS — U07.1 COVID-19: ICD-10-CM

## 2022-12-04 PROCEDURE — 99213 OFFICE O/P EST LOW 20 MIN: CPT | Performed by: STUDENT IN AN ORGANIZED HEALTH CARE EDUCATION/TRAINING PROGRAM

## 2022-12-04 RX ORDER — CYCLOBENZAPRINE HCL 5 MG
5 TABLET ORAL 3 TIMES DAILY PRN
Qty: 30 TABLET | Refills: 0 | Status: SHIPPED | OUTPATIENT
Start: 2022-12-04 | End: 2022-12-14

## 2022-12-04 ASSESSMENT — FIBROSIS 4 INDEX: FIB4 SCORE: 3.23

## 2022-12-04 NOTE — PROGRESS NOTES
Subjective:   Elvia Roberto is a 52 y.o. female who presents for Neck Pain (Radiates into shoulders and head, x this morning)      HPI:  Pleasant 52-year-old female who is COVID-19 positive presents to urgent care for right-sided neck pain with movement that started this morning.  She was seen in the ER in 12/1/2022 and diagnosed with COVID-19.  She had extensive work-up including cardiac evaluation which was normal.  She reports some mild coughing with her COVID.  She states that her neck pain is only there if she turns her head to the right side.  No injury or trauma.  Denies any falls.  No pain if she looks to the left.  Denies numbness, weakness, burning, radiation of pain down into the arms, thunderclap headache, dizziness, chest pain, palpitations, lower leg swelling, shortness of breath.  She reports that her COVID-19 symptoms have greatly improved.  She has been using Voltaren since this morning.  She has not used anything else at this time.      Medications:    cyclobenzaprine  gabapentin Caps  meclizine Tabs  pantoprazole Tbec  predniSONE Tabs    Allergies: Asa [aspirin], Nsaids, and Tape    Problem List: Elvia Roberto does not have any pertinent problems on file.    Surgical History:  Past Surgical History:   Procedure Laterality Date    HYSTEROSCOPY WITH VIDEO OPERATIVE  8/20/2013    Performed by Robin Frederick M.D. at SURGERY [facility] ORS    LAPAROSCOPY  12/16/2012    Performed by Hayes Lugo M.D. at SURGERY [facility]S    GASTROSCOPY  12/16/2012    Performed by Hayes Lugo M.D. at SURGERY [facility]S    BOWEL RESECTION LAPAROSCOPIC  4/4/2012    Performed by HAYES LUGO at [facility]S    LYSIS ADHESIONS GENERAL  4/4/2012    Performed by HAYES LUGO at SURGERY [facility]    OTHER ABDOMINAL SURGERY  2010    Kristi En Y gastric bypass    OTHER ORTHOPEDIC SURGERY  2004    Left ankle surgery    OTHER ORTHOPEDIC SURGERY  2000    Left knee surgery    GYN SURGERY   "    tubal ligation    GYN SURGERY          AR REMOVE TONSILS/ADENOIDS,<13 Y/O      PRIMARY C SECTION             Past Social Hx: Elvia Roberto  reports that she has been smoking cigarettes. She has a 15.00 pack-year smoking history. She has never used smokeless tobacco. She reports that she does not currently use alcohol. She reports that she does not use drugs.     Past Family Hx:  Elvia Roberto family history is not on file.     Problem list, medications, and allergies reviewed by myself today in Epic.     Objective:     /86   Pulse 90   Temp 37.1 °C (98.8 °F)   Resp 16   Ht 1.575 m (5' 2\")   Wt 77.1 kg (170 lb)   LMP 2013   SpO2 98%   BMI 31.09 kg/m²     Physical Exam  Vitals reviewed.   Constitutional:       General: She is not in acute distress.     Appearance: Normal appearance.   HENT:      Head: Normocephalic.      Right Ear: Tympanic membrane, ear canal and external ear normal.      Left Ear: Tympanic membrane, ear canal and external ear normal.      Nose: Nose normal.      Mouth/Throat:      Mouth: Mucous membranes are moist.   Eyes:      Conjunctiva/sclera: Conjunctivae normal.      Pupils: Pupils are equal, round, and reactive to light.   Cardiovascular:      Rate and Rhythm: Normal rate and regular rhythm.      Pulses: Normal pulses.      Heart sounds: Normal heart sounds. No murmur heard.  Pulmonary:      Effort: Pulmonary effort is normal. No respiratory distress.      Breath sounds: Normal breath sounds. No stridor. No wheezing, rhonchi or rales.   Musculoskeletal:      Cervical back: Normal range of motion. No swelling or deformity.      Comments: Cervical spine: No midline spinal tenderness, crepitus, or step-offs.  Increased muscle tone to the levator scapula on the right side when compared to the left.  Patient does have reduced passive/active range of motion due to pain when looking to the left.  No meningeal signs.  Mild TTP over the levator " scapula.  Sensation intact and no rash.  No vesicles.  No erythema, ecchymosis, or signs of trauma.   Lymphadenopathy:      Cervical: No cervical adenopathy.   Skin:     General: Skin is warm and dry.      Capillary Refill: Capillary refill takes less than 2 seconds.      Findings: No erythema, lesion or rash.   Neurological:      General: No focal deficit present.      Mental Status: She is alert and oriented to person, place, and time.       Assessment/Plan:     Diagnosis and associated orders:     1. COVID-19        2. Strain of neck muscle, initial encounter  cyclobenzaprine (FLEXERIL) 5 mg tablet         Comments/MDM:     Patient's presentation physical exam findings are consistent with right-sided cervical muscle strain.  I suspect that this could be due to her recent COVID infection and coughing.  May also be exacerbated by muscle aches.  Her pain is consistent with muscular etiology given she is only having pain when she looks to the left and describes as a tight pulling sensation that starts in her upper back and extends to the base of her skull.  Advised to continue with Voltaren 4 times daily.  She will also be given a muscle relaxant and was advised on use this medication and the possible side effects.  She has had this in the past and tolerated well.  She was advised on the risks including increased chance of falls.  Advised to start with this medication at night to see how her body reacts to the medication.  Advised using heat 3-5 times per day for 20 minutes at a time gentle range of motion as tolerated.  I suspect that this is self-limited and should resolve over the next several days.  ED/return precautions were given.         Differential diagnosis, natural history, supportive care, and indications for immediate follow-up discussed.    Advised the patient to follow-up with the primary care physician for recheck, reevaluation, and consideration of further management.    Please note that this  dictation was created using voice recognition software. I have made a reasonable attempt to correct obvious errors, but I expect that there are errors of grammar and possibly content that I did not discover before finalizing the note.    Electronically signed by Arash Vee PA-C.

## 2023-01-04 ENCOUNTER — OFFICE VISIT (OUTPATIENT)
Dept: URGENT CARE | Facility: CLINIC | Age: 53
End: 2023-01-04
Payer: COMMERCIAL

## 2023-01-04 VITALS
DIASTOLIC BLOOD PRESSURE: 64 MMHG | HEIGHT: 63 IN | SYSTOLIC BLOOD PRESSURE: 90 MMHG | BODY MASS INDEX: 29.39 KG/M2 | RESPIRATION RATE: 18 BRPM | HEART RATE: 71 BPM | OXYGEN SATURATION: 96 % | WEIGHT: 165.9 LBS | TEMPERATURE: 98.2 F

## 2023-01-04 DIAGNOSIS — M77.50 FOOT TENDINITIS: ICD-10-CM

## 2023-01-04 PROCEDURE — 99213 OFFICE O/P EST LOW 20 MIN: CPT | Performed by: NURSE PRACTITIONER

## 2023-01-04 RX ORDER — PANTOPRAZOLE SODIUM 40 MG/1
1 TABLET, DELAYED RELEASE ORAL DAILY
COMMUNITY
Start: 2022-10-06

## 2023-01-04 ASSESSMENT — FIBROSIS 4 INDEX: FIB4 SCORE: 3.23

## 2023-01-04 NOTE — PROGRESS NOTES
Chief Complaint   Patient presents with    Foot Problem     X over 2 weeks right foot pain. Started in heel, now radiating into posterior ankle/achilles       HISTORY OF PRESENT ILLNESS: Patient is a pleasant 52 y.o. female who presents to urgent care today with complaints of right foot pain.  The patient notes that her pain started to lateral aspect of her heel and now has moved up into her Achilles region pain exacerbated with ambulation and movement.  Pain improved with massage.  She denies any injury or trauma. She does take prednisone daily, has been for several months, due to autoimmune disorder.    Patient Active Problem List    Diagnosis Date Noted    Atypical chest pain 10/11/2022    Normocytic anemia 10/11/2022    Elevated glucose 10/11/2022    Supratherapeutic INR 10/11/2022    Iron deficiency 04/29/2021    Lupus (systemic lupus erythematosus) (HCC) 04/15/2021    Other cirrhosis of liver (HCC) 04/15/2021    Pancytopenia (HCC) 04/15/2021    Reactive depression (situational) 01/28/2021    Gastroesophageal reflux disease 01/28/2021    Malnutrition following gastrointestinal surgery 01/28/2021    Chronic migraine 10/16/2018    Thrombocytopenia (HCC) 11/15/2017    Retroperitoneal lymphadenopathy 11/15/2017    Diarrhea 11/13/2017    Dehydration 11/11/2017    Hypokalemia 11/11/2017    Hypomagnesemia 11/11/2017    Tobacco abuse 11/11/2017    Chronic pain 11/11/2017    Epigastric abdominal pain 12/14/2012    Abdominal pain, other specified site 04/22/2012       Allergies:Asa [aspirin], Nsaids, and Tape    Current Outpatient Medications Ordered in Epic   Medication Sig Dispense Refill    Acetaminophen (TYLENOL 8 HOUR ARTHRITIS PAIN PO) Take  by mouth.      pantoprazole (PROTONIX) 40 MG Tablet Delayed Response Take 1 Tablet by mouth every day.      gabapentin (NEURONTIN) 100 MG Cap gabapentin 100 mg capsule   Take 1 capsule 3 times a day by oral route as directed for 30 days.   for carpal tunnel pain, difficulty  sleeping      pantoprazole (PROTONIX) 40 MG Tablet Delayed Response Take 40 mg by mouth every day.      predniSONE (DELTASONE) 20 MG Tab Take 3 tablets once a day for 3 days, then 2 tablets once a day for 3 days, then 1 tablet once a day for 3 days. 18 Tablet 0    meclizine (ANTIVERT) 25 MG Tab Take 1 Tablet by mouth 3 times a day as needed for Vertigo. (Patient not taking: Reported on 1/4/2023) 30 Tablet 0     No current Epic-ordered facility-administered medications on file.       Past Medical History:   Diagnosis Date    Heart burn     Indigestion     Liver disease     Auto immune hepatitis    Lupus (HCC)     Other specified disorder of intestines     Other specified symptom associated with female genital organs     tubiligation       Social History     Tobacco Use    Smoking status: Every Day     Packs/day: 0.50     Years: 30.00     Pack years: 15.00     Types: Cigarettes    Smokeless tobacco: Never   Vaping Use    Vaping Use: Never used   Substance Use Topics    Alcohol use: Not Currently     Comment: haven't for around 15 years (1-4-23)    Drug use: No       No family status information on file.   History reviewed. No pertinent family history.    ROS:  Review of Systems   Constitutional: Negative for fever, chills, weight loss, malaise, and fatigue.   HENT: Negative for ear pain, nosebleeds, congestion, sore throat and neck pain.    Eyes: Negative for vision changes.   Neuro: Negative for headache, sensory changes, weakness, seizure, LOC.   Cardiovascular: Negative for chest pain, palpitations, orthopnea and leg swelling.   Respiratory: Negative for cough, sputum production, shortness of breath and wheezing.   Gastrointestinal: Negative for abdominal pain, nausea, vomiting or diarrhea.   Genitourinary: Negative for dysuria, urgency and frequency.  Musculoskeletal: Positive for right heel pain.  Negative for falls, neck pain, back pain, joint pain, myalgias.   Skin: Negative for rash, diaphoresis.     Exam:  BP  "90/64 (BP Location: Left arm, Patient Position: Sitting, BP Cuff Size: Adult long)   Pulse 71   Temp 36.8 °C (98.2 °F) (Temporal)   Resp 18   Ht 1.605 m (5' 3.19\")   Wt 75.3 kg (165 lb 14.4 oz)   SpO2 96%   General: well-nourished, well-developed female in NAD  Head: normocephalic, atraumatic  Eyes: PERRLA, no conjunctival injection, acuity grossly intact, lids normal.  Ears: normal shape and symmetry, no tenderness, no discharge. External canals are without any significant edema or erythema. Tympanic membranes are without any inflammation, no effusion. Gross auditory acuity is intact.  Nose: symmetrical without tenderness, no discharge.  Mouth/Throat: reasonable hygiene, no erythema, exudates or tonsillar enlargement.  Neck: no masses, range of motion within normal limits, no tracheal deviation. No obvious thyroid enlargement.   Lymph: no cervical adenopathy. No supraclavicular adenopathy.   Neuro: alert and oriented. Cranial nerves 1-12 grossly intact. No sensory deficit.   Cardiovascular: regular rate and rhythm. No edema.  Pulmonary: no distress. Chest is symmetrical with respiration, no wheezes, crackles, or rhonchi.   Abdomen: soft, non-tender, no guarding, no hepatosplenomegaly.  Musculoskeletal: no clubbing, appropriate muscle tone, gait is stable.  Right heel: Normal appearance, soft tissue tenderness to lateral aspect of heel and Achilles.  Achilles is without deformity.  Ankle has full range of motion.  Distal neurovascular intact.  Skin: warm, dry, intact, no clubbing, no cyanosis, no rashes.   Psych: appropriate mood, affect, judgement.         Assessment/Plan:  1. Foot tendinitis  Referral to Orthopedics          Presentation consistent with tendinitis, most likely secondary to steroid use.  Patient placed in a short walking boot, massage encouraged, OTC Tylenol.  Patient encouraged to refrain from repetitive motions of foot.  Referral is placed orthopedics for follow-up.  Supportive care, " differential diagnoses, and indications for immediate follow-up discussed with patient.   Pathogenesis of diagnosis discussed including typical length and natural progression.   Instructed to return to clinic or nearest emergency department for any change in condition, further concerns, or worsening of symptoms.  Patient states understanding of the plan of care and discharge instructions.  Instructed to make an appointment, for follow up, with her primary care provider.        Please note that this dictation was created using voice recognition software. I have made every reasonable attempt to correct obvious errors, but I expect that there are errors of grammar and possibly content that I did not discover before finalizing the note.      SHADIA Zamora.

## 2023-01-04 NOTE — LETTER
January 4, 2023         Patient: Elvia Roberto   YOB: 1970   Date of Visit: 1/4/2023           To Whom it May Concern:    Elvia Roberto was seen in my clinic on 1/4/2023. She has been instructed to wear an orthopedic walking boot, she may work in the boot, she will be following with orthopedics for further care.    If you have any questions or concerns, please don't hesitate to call.        Sincerely,           SHADIA Zamora.  Electronically Signed

## 2023-01-17 NOTE — PROGRESS NOTES
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 Cipriano Gomez because of pancytopenia. She does have a diagnosis of lupus as well as probable cirrhosis with splenomegaly on prior abdominal scans. She was actually seen by Dr. Gilliam in April 2021 for leukopenia. 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 She is on prednisone for her lupus. (? 10 or 20 mg). No spontaneous bleeding. Has had a uterine ablation. Is also s/p gastric bypass in 2010. Lost over 100 lbs and kept it off. Has not had her iron or B12 checked in a couple of years. When last checked iron was low.     ROS:    Constitutional: No weight loss  Skin: No rash or jaundice  HENT: No change in eyesight or hearing  Cardiovascular:No chest pain or arrythmia  Respiratory:No cough or SOB  GI:No nausea, vomiting, diarrhea, constipation  :No dysuria or frequency  Musculoskeletal:No bone or joint pain  Neuro:No sx's of neuropathy  Psych: No complaints    PMH:      Allergies   Allergen Reactions    Asa [Aspirin] Unspecified     GI upset, bleeding, Gastric Bypass      Nsaids Nausea     GI upset, bleeding, Gastric Bypass      Tape Unspecified     Blisters all over, Paper tape is ok       Past Medical History:   Diagnosis Date    Heart burn     Indigestion     Liver disease     Auto immune hepatitis    Lupus (HCC)     Other specified disorder of intestines     Other specified symptom associated with female genital organs     tubiligation        Past Surgical History:   Procedure Laterality Date    HYSTEROSCOPY WITH VIDEO OPERATIVE  8/20/2013    Performed by Robin Frederick M.D. at SUR[facility]ERY [facility] ORS    LAPAROSCOPY  12/16/2012    Performed by Toyin Lugo M.D. at SURGERY [facility] ORS    GASTROSCOPY  12/16/2012    Performed by Toyin [facility]o M.D. at [facility]    BOWEL RESECTION LAPAROSCOPIC  4/4/2012    [facility]    LYSIS ADHESIONS GENERAL  2012    Performed by TOYIN LUGO at [facility]    OTHER ABDOMINAL SURGERY      Kristi En Y gastric bypass    OTHER ORTHOPEDIC SURGERY      Left ankle surgery    OTHER ORTHOPEDIC SURGERY      Left knee surgery    GYN SURGERY      tubal ligation    GYN SURGERY          MS REMOVE TONSILS/ADENOIDS,<11 Y/O      PRIMARY C SECTION              Medications:    Current Outpatient Medications on File Prior to Encounter   Medication Sig Dispense Refill    Acetaminophen (TYLENOL 8 HOUR ARTHRITIS PAIN PO) Take  by mouth.      pantoprazole (PROTONIX) 40 MG Tablet Delayed Response Take 1 Tablet by mouth every day.      gabapentin (NEURONTIN) 100 MG Cap gabapentin 100 mg capsule   Take 1 capsule 3 times a day by oral route as directed for 30 days.   for carpal tunnel pain, difficulty sleeping      predniSONE (DELTASONE) 20 MG Tab Take 3 tablets once a day for 3 days, then 2 tablets once a day for 3 days, then 1 tablet once a day for 3 days. 18 Tablet 0    meclizine (ANTIVERT) 25 MG Tab Take 1 Tablet by mouth 3 times a day as needed for Vertigo. (Patient not taking: Reported on 2023) 30 Tablet 0    pantoprazole (PROTONIX) 40 MG Tablet Delayed Response Take 40 mg by mouth every day. (Patient not taking: Reported on 2023)       No current facility-administered medications on file prior to encounter.       Social History     Tobacco Use    Smoking status: Every Day     Packs/day: 0.50     Years: 30.00     Pack years: 15.00     Types: Cigarettes    Smokeless tobacco: Never   Substance Use Topics    Alcohol use: Not Currently     Comment: haven't for around 15 years (23)        History reviewed. No pertinent family history.     Objective    Vitals:    /70 (BP Location: Left arm, Patient Position: Sitting, BP Cuff Size: Adult)   Pulse 75   Temp 36.2 °C (97.1 °F) (Temporal)   Resp 18   Ht 1.605 m (5' 3.19\")   Wt 77.7 kg (171 " lb 6.5 oz)   LMP 05/04/2013   SpO2 96%   BMI 30.18 kg/m²     Physical Exam:    Appears well-developed and well-nourished. No distress.    Head -  Normocephalic .   Eyes - Pupils are equal. Conjunctivae normal. No scleral icterus.   Ears - normal hearing  Mouth - Throat: Oropharynx is clear and moist. No oropharyngeal exudate  Neck - Neck supple. No thyromegaly  Cardiovascular - Normal rate, regular rhythm, normal heart sounds and intact distal pulses. No  gallop, murmur or rub  Pulmonary - Normal breath sounds.  No wheeze, rales or rhonchi  Breast - symmetrical. No mass on indentation.  Abdominal -Soft. No distension. Liver somewhat enlarged and tender. I cannot feel spleen.   Extremities-  No edema or tenderness.    Nodes - No submental, submandibular, preauricular, cervical, axillary or inguinal adenopathy.    Neurological -   Alert and oriented.  Skin - Skin is warm and dry. No rash noted. Not diaphoretic. No erythema. No pallor. No jaundice   Psychiatric -  Normal mood and affect.    Labs:     Latest Reference Range & Units 03/24/22 04:40 08/31/22 10:53 10/10/22 21:08 10/21/22 18:01 11/01/22 20:41 12/01/22 20:15   WBC 4.8 - 10.8 K/uL 2.8 (L) 2.9 (L) 4.8 4.5 (L) 5.6 5.0   RBC 4.20 - 5.40 M/uL 4.52 3.89 (L) 4.06 (L) 4.33 4.39 4.40   Hemoglobin 12.0 - 16.0 g/dL 14.2 11.7 (L) 11.9 (L) 12.9 13.0 12.8   Hematocrit 37.0 - 47.0 % 44.0 35.7 (L) 38.0 39.9 39.8 39.8   MCV 81.4 - 97.8 fL 97.3 91.8 93.6 92.1 90.7 90.5   MCH 27.0 - 33.0 pg 31.4 30.1 29.3 29.8 29.6 29.1   MCHC 33.6 - 35.0 g/dL 32.3 (L) 32.8 (L) 31.3 (L) 32.3 (L) 32.7 (L) 32.2 (L)   RDW 35.9 - 50.0 fL 53.4 (H) 58.4 (H) 57.3 (H) 56.5 (H) 53.0 (H) 53.9 (H)   Platelet Count 164 - 446 K/uL 88 (L) 76 (L) 74 (L) 83 (L) 79 (L) 82 (L)     Assessment  Thrombocytopenia predates splenic enlargement so probably is due to the lupus  Imp:    Visit Diagnosis:    1. Pancytopenia (HCC)        2. Other cirrhosis of liver (HCC)        3. Other systemic lupus erythematosus with  other organ involvement (HCC)  Referral to Rheumatology      4. Portal hypertension (HCC)        5. History of gastric bypass  FERRITIN    IRON/TOTAL IRON BIND    VITAMIN B12          Plan:  Needs to be seen by rheumatology  Check iron and B12  Avoid ASA and NSAIDS  MyChart each other    Jbo Leavitt M.D.

## 2023-01-23 ENCOUNTER — HOSPITAL ENCOUNTER (OUTPATIENT)
Dept: HEMATOLOGY ONCOLOGY | Facility: MEDICAL CENTER | Age: 53
End: 2023-01-23
Attending: INTERNAL MEDICINE
Payer: COMMERCIAL

## 2023-01-23 ENCOUNTER — HOSPITAL ENCOUNTER (OUTPATIENT)
Dept: LAB | Facility: MEDICAL CENTER | Age: 53
End: 2023-01-23
Attending: INTERNAL MEDICINE
Payer: COMMERCIAL

## 2023-01-23 VITALS
HEART RATE: 75 BPM | DIASTOLIC BLOOD PRESSURE: 70 MMHG | BODY MASS INDEX: 30.37 KG/M2 | OXYGEN SATURATION: 96 % | RESPIRATION RATE: 18 BRPM | SYSTOLIC BLOOD PRESSURE: 110 MMHG | TEMPERATURE: 97.1 F | HEIGHT: 63 IN | WEIGHT: 171.41 LBS

## 2023-01-23 DIAGNOSIS — K76.6 PORTAL HYPERTENSION (HCC): ICD-10-CM

## 2023-01-23 DIAGNOSIS — D61.818 PANCYTOPENIA (HCC): ICD-10-CM

## 2023-01-23 DIAGNOSIS — M32.19 OTHER SYSTEMIC LUPUS ERYTHEMATOSUS WITH OTHER ORGAN INVOLVEMENT (HCC): ICD-10-CM

## 2023-01-23 DIAGNOSIS — Z98.84 HISTORY OF GASTRIC BYPASS: ICD-10-CM

## 2023-01-23 DIAGNOSIS — K74.69 OTHER CIRRHOSIS OF LIVER (HCC): ICD-10-CM

## 2023-01-23 LAB
BASOPHILS # BLD AUTO: 0.6 % (ref 0–1.8)
BASOPHILS # BLD: 0.04 K/UL (ref 0–0.12)
EOSINOPHIL # BLD AUTO: 0.13 K/UL (ref 0–0.51)
EOSINOPHIL NFR BLD: 1.9 % (ref 0–6.9)
ERYTHROCYTE [DISTWIDTH] IN BLOOD BY AUTOMATED COUNT: 57.3 FL (ref 35.9–50)
FERRITIN SERPL-MCNC: 12.9 NG/ML (ref 10–291)
HCT VFR BLD AUTO: 41 % (ref 37–47)
HGB BLD-MCNC: 12.9 G/DL (ref 12–16)
IMM GRANULOCYTES # BLD AUTO: 0.02 K/UL (ref 0–0.11)
IMM GRANULOCYTES NFR BLD AUTO: 0.3 % (ref 0–0.9)
IRON SATN MFR SERPL: 12 % (ref 15–55)
IRON SERPL-MCNC: 50 UG/DL (ref 40–170)
LYMPHOCYTES # BLD AUTO: 1.74 K/UL (ref 1–4.8)
LYMPHOCYTES NFR BLD: 25.1 % (ref 22–41)
MCH RBC QN AUTO: 29 PG (ref 27–33)
MCHC RBC AUTO-ENTMCNC: 31.5 G/DL (ref 33.6–35)
MCV RBC AUTO: 92.1 FL (ref 81.4–97.8)
MONOCYTES # BLD AUTO: 0.56 K/UL (ref 0–0.85)
MONOCYTES NFR BLD AUTO: 8.1 % (ref 0–13.4)
NEUTROPHILS # BLD AUTO: 4.43 K/UL (ref 2–7.15)
NEUTROPHILS NFR BLD: 64 % (ref 44–72)
NRBC # BLD AUTO: 0 K/UL
NRBC BLD-RTO: 0 /100 WBC
PLATELET # BLD AUTO: 90 K/UL (ref 164–446)
PMV BLD AUTO: 11.1 FL (ref 9–12.9)
RBC # BLD AUTO: 4.45 M/UL (ref 4.2–5.4)
TIBC SERPL-MCNC: 407 UG/DL (ref 250–450)
UIBC SERPL-MCNC: 357 UG/DL (ref 110–370)
VIT B12 SERPL-MCNC: 318 PG/ML (ref 211–911)
WBC # BLD AUTO: 6.9 K/UL (ref 4.8–10.8)

## 2023-01-23 PROCEDURE — 36415 COLL VENOUS BLD VENIPUNCTURE: CPT

## 2023-01-23 PROCEDURE — 83550 IRON BINDING TEST: CPT

## 2023-01-23 PROCEDURE — 85025 COMPLETE CBC W/AUTO DIFF WBC: CPT

## 2023-01-23 PROCEDURE — 99212 OFFICE O/P EST SF 10 MIN: CPT | Performed by: INTERNAL MEDICINE

## 2023-01-23 PROCEDURE — 83540 ASSAY OF IRON: CPT

## 2023-01-23 PROCEDURE — 82607 VITAMIN B-12: CPT

## 2023-01-23 PROCEDURE — 99213 OFFICE O/P EST LOW 20 MIN: CPT | Performed by: INTERNAL MEDICINE

## 2023-01-23 PROCEDURE — 82728 ASSAY OF FERRITIN: CPT

## 2023-01-23 ASSESSMENT — PAIN SCALES - GENERAL: PAINLEVEL: NO PAIN

## 2023-01-23 ASSESSMENT — FIBROSIS 4 INDEX: FIB4 SCORE: 3.23

## 2023-01-24 DIAGNOSIS — E61.1 IRON DEFICIENCY: ICD-10-CM

## 2023-01-27 ENCOUNTER — TELEPHONE (OUTPATIENT)
Dept: HEMATOLOGY ONCOLOGY | Facility: MEDICAL CENTER | Age: 53
End: 2023-01-27
Payer: COMMERCIAL

## 2023-01-27 DIAGNOSIS — M32.19 OTHER SYSTEMIC LUPUS ERYTHEMATOSUS WITH OTHER ORGAN INVOLVEMENT (HCC): ICD-10-CM

## 2023-04-08 ENCOUNTER — APPOINTMENT (OUTPATIENT)
Dept: RADIOLOGY | Facility: MEDICAL CENTER | Age: 53
End: 2023-04-08
Attending: EMERGENCY MEDICINE
Payer: COMMERCIAL

## 2023-04-08 ENCOUNTER — HOSPITAL ENCOUNTER (EMERGENCY)
Facility: MEDICAL CENTER | Age: 53
End: 2023-04-08
Attending: EMERGENCY MEDICINE
Payer: COMMERCIAL

## 2023-04-08 VITALS
WEIGHT: 172.84 LBS | DIASTOLIC BLOOD PRESSURE: 69 MMHG | HEART RATE: 67 BPM | OXYGEN SATURATION: 99 % | BODY MASS INDEX: 30.62 KG/M2 | HEIGHT: 63 IN | SYSTOLIC BLOOD PRESSURE: 114 MMHG | TEMPERATURE: 98.3 F | RESPIRATION RATE: 18 BRPM

## 2023-04-08 DIAGNOSIS — R07.81 RIB PAIN: ICD-10-CM

## 2023-04-08 PROCEDURE — 700102 HCHG RX REV CODE 250 W/ 637 OVERRIDE(OP): Performed by: EMERGENCY MEDICINE

## 2023-04-08 PROCEDURE — A9270 NON-COVERED ITEM OR SERVICE: HCPCS | Performed by: EMERGENCY MEDICINE

## 2023-04-08 PROCEDURE — 71045 X-RAY EXAM CHEST 1 VIEW: CPT

## 2023-04-08 PROCEDURE — 700111 HCHG RX REV CODE 636 W/ 250 OVERRIDE (IP): Performed by: EMERGENCY MEDICINE

## 2023-04-08 PROCEDURE — 99283 EMERGENCY DEPT VISIT LOW MDM: CPT

## 2023-04-08 RX ORDER — OXYCODONE HYDROCHLORIDE AND ACETAMINOPHEN 5; 325 MG/1; MG/1
1 TABLET ORAL EVERY 4 HOURS PRN
Qty: 20 TABLET | Refills: 0 | Status: SHIPPED | OUTPATIENT
Start: 2023-04-08 | End: 2023-04-13

## 2023-04-08 RX ORDER — OXYCODONE HYDROCHLORIDE AND ACETAMINOPHEN 5; 325 MG/1; MG/1
1 TABLET ORAL ONCE
Status: COMPLETED | OUTPATIENT
Start: 2023-04-08 | End: 2023-04-08

## 2023-04-08 RX ORDER — ONDANSETRON 4 MG/1
4 TABLET, ORALLY DISINTEGRATING ORAL ONCE
Status: COMPLETED | OUTPATIENT
Start: 2023-04-08 | End: 2023-04-08

## 2023-04-08 RX ADMIN — ONDANSETRON 4 MG: 4 TABLET, ORALLY DISINTEGRATING ORAL at 16:44

## 2023-04-08 RX ADMIN — OXYCODONE AND ACETAMINOPHEN 1 TABLET: 325; 5 TABLET ORAL at 16:46

## 2023-04-08 ASSESSMENT — FIBROSIS 4 INDEX: FIB4 SCORE: 2.94

## 2023-04-08 ASSESSMENT — PAIN DESCRIPTION - DESCRIPTORS: DESCRIPTORS: ACHING

## 2023-04-08 NOTE — ED TRIAGE NOTES
"Chief Complaint   Patient presents with    Rib Pain      Complains of right rib pain,  \" I think of sprained them from dry heaving all day\". . Pt denies n/v at this time. Pt also complains of non productive cough.   "

## 2023-04-09 NOTE — ED PROVIDER NOTES
ED Provider Note    CHIEF COMPLAINT  Chief Complaint   Patient presents with    Rib Pain       EXTERNAL RECORDS REVIEWED  PDMP the patient has had 1 narcotic prescription within the last year    HPI/ROS  LIMITATION TO HISTORY   Select: : None  OUTSIDE HISTORIAN(S):  None    Elvia Roberto is a 52 y.o. female who presents with past medical history seen for heartburn, indigestion, lupus, she has had a gastric bypass surgery and a cholecystectomy, she reports that on Thursday she was dry heaving and having diarrhea, those symptoms resolved and now she is got right-sided pain of her ribs.  It is worse when she palpates and breathes.  She denies any shortness of breath.  She denies any hemoptysis.  She has been taking Tylenol which has only minimally helped the pain, she reports that she cannot take ibuprofen because of her gastric bypass surgery.    PAST MEDICAL HISTORY   has a past medical history of Heart burn, Indigestion, Liver disease, Lupus (HCC), Other specified disorder of intestines, and Other specified symptom associated with female genital organs.    SURGICAL HISTORY   has a past surgical history that includes bowel resection laparoscopic (4/4/2012); lysis adhesions general (4/4/2012); gyn surgery (1993); gyn surgery (1993); other orthopedic surgery (2004); other orthopedic surgery (2000); other abdominal surgery (2010); laparoscopy (12/16/2012); gastroscopy (12/16/2012); primary c section; remove tonsils/adenoids,<11 y/o; and hysteroscopy with video operative (8/20/2013).    FAMILY HISTORY  History reviewed. No pertinent family history.    SOCIAL HISTORY  Social History     Tobacco Use    Smoking status: Every Day     Packs/day: 0.50     Years: 30.00     Pack years: 15.00     Types: Cigarettes    Smokeless tobacco: Never   Vaping Use    Vaping Use: Never used   Substance and Sexual Activity    Alcohol use: Not Currently     Comment: haven't for around 15 years (1-4-23)    Drug use: No    Sexual activity:  "Not on file       CURRENT MEDICATIONS  Tylenol, Protonix, gabapentin      ALLERGIES  Allergies   Allergen Reactions    Asa [Aspirin] Unspecified     GI upset, bleeding, Gastric Bypass      Nsaids Nausea     GI upset, bleeding, Gastric Bypass      Tape Unspecified     Blisters all over, Paper tape is ok       PHYSICAL EXAM  VITAL SIGNS: BP (!) 131/92   Pulse 64   Temp 37 °C (98.6 °F) (Temporal)   Resp 20   Ht 1.6 m (5' 3\")   Wt 78.4 kg (172 lb 13.5 oz)   LMP 05/04/2013   SpO2 95%   BMI 30.62 kg/m²    Constitutional: Alert.  HENT: No signs of trauma, Bilateral external ears normal, Nose normal.   Eyes: Pupils are equal and reactive, Conjunctiva normal, Non-icteric.   Neck: Normal range of motion, No tenderness, Supple, No stridor.   Lymphatic: No lymphadenopathy noted.   Cardiovascular: Regular rate and rhythm, no murmurs.   Thorax & Lungs: Normal breath sounds, No respiratory distress, No wheezing, No chest tenderness.   Abdomen: Bowel sounds normal, Soft, No tenderness, No peritoneal signs, No masses, No pulsatile masses.   Skin: Warm, Dry, No erythema, No rash.   Extremities: Intact distal pulses, No edema, No tenderness, No cyanosis.  Musculoskeletal: Good range of motion in all major joints. No tenderness to palpation or major deformities noted.   Neurologic: Alert , Normal motor function, Normal sensory function, No focal deficits noted.   Psychiatric: Affect normal, Judgment normal, Mood normal.       DIAGNOSTIC STUDIES / PROCEDURES      RADIOLOGY  I have independently interpreted the diagnostic imaging associated with this visit and am waiting the final reading from the radiologist.   My preliminary interpretation is as follows: Normal chest x-ray  Radiologist interpretation:     DX-CHEST-PORTABLE (1 VIEW)   Final Result      Hypoinflation without other evidence for acute cardiopulmonary disease.            COURSE & MEDICAL DECISION MAKING    ED Observation Status? No; Patient does not meet criteria " for ED Observation.     INITIAL ASSESSMENT, COURSE AND PLAN  Care Narrative: The patient presents after having vomiting and diarrhea with right-sided pleuritic pain.  Differential diagnose includes pneumothorax, muscle strain.  Patient given 5 mg p.o. Percocet.  He is given Zofran 4 mg p.o.        ADDITIONAL PROBLEM LIST  Lupus, gastric bypass surgery, patient cannot take NSAIDs  DISPOSITION AND DISCUSSIONS    5:26 PM the patient's chest x-ray is normal.  She will be discharged after receiving Percocet here her pain is resolved.  I will prescribe her Percocet.        I have discussed management of the patient with the following physicians and DEMETRICE's: None    Discussion of management with other QHP or appropriate source(s): None     Escalation of care considered, and ultimately not performed:blood analysis and acute inpatient care management, however at this time, the patient is most appropriate for outpatient management    Barriers to care at this time, including but not limited to:  None .     Decision tools and prescription drugs considered including, but not limited to: Pain Medications Percocet .      I reviewed prescription monitoring program for patient's narcotic use before prescribing a scheduled drug.The patient will not drink alcohol nor drive with prescribed medications. The patient will return for new or worsening symptoms and is stable at the time of discharge.    The patient is referred to a primary physician for blood pressure management, diabetic screening, and for all other preventative health concerns.    In prescribing controlled substances to this patient, I certify that I have obtained and reviewed the medical history of Elvia Roberto. I have also made a good aranza effort to obtain applicable records from other providers who have treated the patient and records did not demonstrate any increased risk of substance abuse that would prevent me from prescribing controlled substances.     I have  conducted a physical exam and documented it. I have reviewed Ms. Roberto’s prescription history as maintained by the [place] Prescription Monitoring Program.     I have assessed the patient’s risk for abuse, dependency, and addiction using the validated Opioid Risk Tool available at https://www.mdcalc.com/ewkngo-sfly-bbew-ort-narcotic-abuse.     Given the above, I believe the benefits of controlled substance therapy outweigh the risks. The reasons for prescribing controlled substances include non-narcotic, oral analgesic alternatives have been inadequate for pain control. Accordingly, I have discussed the risk and benefits, treatment plan, and alternative therapies with the patient.       DISPOSITION:  Patient will be discharged home in stable condition.    FOLLOW UP:  [facility], Emergency Dept  [address]  [place] [id number] [phone]  Follow up  If symptoms worsen    Cipriano Gomez M.D.  [address]  [facility] [address]  [phone]    Follow up  As needed      OUTPATIENT MEDICATIONS:  New Prescriptions    OXYCODONE-ACETAMINOPHEN (PERCOCET) 5-325 MG TAB    Take 1 Tablet by mouth every four hours as needed (pain) for up to 5 days. No driving, no drinking alcohol           FINAL DIAGNOSIS  1. Rib pain           Electronically signed by: Tyrone Kamara M.D., 4/8/2023 5:22 PM

## 2023-04-09 NOTE — ED NOTES
Discharge home with instructions, rxn and follow up care reviewed and given to patient with verbal understanding.    Patient report  to drive her home.    Ambulated out of the ED instable condition

## 2023-04-17 NOTE — PROGRESS NOTES
Date of service 8/7/23 rescheduled from 5/1/23 - rescheduled from 04/24/23    Subjective    Chief Complaint:  Follow up from consultation for pancytopenia    HPI:  52 female with complicated history of SLE and cirrhosis with portal hypertension and splenomegaly. Also h/o gastric bypass bariatric surgery. She was seen in January because of thrombocytopenia and previously pancytopenia felt most like due to hypersplenism. Because of the h/o gastric bypass surgery B12 and ferrtin were checked, both of which were low normal values. Referral was placed to rheumatology but she has not been seen.     ROS:    Constitutional: No weight loss  Skin: No rash or jaundice  HENT: No change in eyesight or hearing  Cardiovascular:No chest pain or arrythmia  Respiratory:No cough or SOB  GI:No nausea, vomiting, diarrhea, constipation  :No dysuria or frequency  Musculoskeletal:No bone or joint pain  Neuro:No sx's of neuropathy  Psych: No complaints    PMH:      Allergies   Allergen Reactions    Asa [Aspirin] Unspecified     GI upset, bleeding, Gastric Bypass      Nsaids Nausea     GI upset, bleeding, Gastric Bypass      Tape Unspecified     Blisters all over, Paper tape is ok       Past Medical History:   Diagnosis Date    Heart burn     Indigestion     Liver disease     Auto immune hepatitis    Lupus (HCC)     Other specified disorder of intestines     Other specified symptom associated with female genital organs     tubiligation        Past Surgical History:   Procedure Laterality Date    HYSTEROSCOPY WITH VIDEO OPERATIVE  8/20/2013    Performed by Robin Frederick M.D. at SURGERY [facility]S    LAPAROSCOPY  12/16/2012    Performed by Hayes Lugo M.D. at SURGERY [facility] ORS    GASTROSCOPY  12/16/2012    Performed by Hayes Lugo M.D. at SURGERY [facility] ORS    BOWEL RESECTION LAPAROSCOPIC  4/4/2012    Performed by HAYES LUGO at SURGERY [facility] ORS    LYSIS ADHESIONS GENERAL  4/4/2012    Performed by HAYES LUGO  C at SURGERY [facility] ORS    OTHER ABDOMINAL SURGERY      Kristi En Y gastric bypass    OTHER ORTHOPEDIC SURGERY      Left ankle surgery    OTHER ORTHOPEDIC SURGERY      Left knee surgery    GYN SURGERY      tubal ligation    GYN SURGERY          GA REMOVE TONSILS/ADENOIDS,<11 Y/O      PRIMARY C SECTION              Medications:    Current Outpatient Medications on File Prior to Visit   Medication Sig Dispense Refill    Acetaminophen (TYLENOL 8 HOUR ARTHRITIS PAIN PO) Take  by mouth.      pantoprazole (PROTONIX) 40 MG Tablet Delayed Response Take 1 Tablet by mouth every day.      meclizine (ANTIVERT) 25 MG Tab Take 1 Tablet by mouth 3 times a day as needed for Vertigo. (Patient not taking: Reported on 2023) 30 Tablet 0    gabapentin (NEURONTIN) 100 MG Cap gabapentin 100 mg capsule   Take 1 capsule 3 times a day by oral route as directed for 30 days.   for carpal tunnel pain, difficulty sleeping      pantoprazole (PROTONIX) 40 MG Tablet Delayed Response Take 40 mg by mouth every day. (Patient not taking: Reported on 2023)      predniSONE (DELTASONE) 20 MG Tab Take 3 tablets once a day for 3 days, then 2 tablets once a day for 3 days, then 1 tablet once a day for 3 days. 18 Tablet 0     No current facility-administered medications on file prior to visit.       Social History     Tobacco Use    Smoking status: Every Day     Packs/day: 0.50     Years: 30.00     Pack years: 15.00     Types: Cigarettes    Smokeless tobacco: Never   Substance Use Topics    Alcohol use: Not Currently     Comment: haven't for around 15 years (23)        No family history on file.     Objective    Vitals:    [facility] 2013     Physical Exam:    Appears well-developed and well-nourished. No distress.    Head -  Normocephalic .   Eyes - Pupils are equal. Conjunctivae normal. No scleral icterus.   Ears - normal hearing  Neurological -   Alert and oriented.  Skin - Skin is warm and dry. No rash  noted. Not diaphoretic. No erythema. No pallor. No jaundice   Psychiatric -  Normal mood and affect.    Labs:       Latest Reference Range & Units 01/23/23 11:23 04/28/23 11:39   Iron 40 - 170 ug/dL 50 37 (L)   Total Iron Binding 250 - 450 ug/dL 407 405   % Saturation 15 - 55 % 12 (L) 9 (L)      Latest Reference Range & Units 01/23/23 11:23 04/28/23 11:39   Ferritin 10.0 - 291.0 ng/mL 12.9 10.1      Latest Reference Range & Units 12/01/22 20:15 01/23/23 11:23 04/28/23 11:39 05/24/23 13:51   WBC 4.8 - 10.8 K/uL 5.0 6.9 4.1 (L) 4.5 (L)   RBC 4.20 - 5.40 M/uL 4.40 4.45 4.36 4.29   Hemoglobin 12.0 - 16.0 g/dL 12.8 12.9 11.8 (L) 11.3 (L)   Hematocrit 37.0 - 47.0 % 39.8 41.0 37.8 36.5 (L)   MCV 81.4 - 97.8 fL 90.5 92.1 86.7 85.1   MCH 27.0 - 33.0 pg 29.1 29.0 27.1 26.3 (L)   MCHC 32.2 - 35.5 g/dL 32.2 (L) 31.5 (L) 31.2 (L) 31.0 (L)   RDW 35.9 - 50.0 fL 53.9 (H) 57.3 (H) 55.5 (H) 55.4 (H)   Platelet Count 164 - 446 K/uL 82 (L) 90 (L) 77 (L) 88 (L)   MPV 9.0 - 12.9 fL 9.8 11.1 11.2 9.7   Neutrophils-Polys 44.00 - 72.00 % 56.50 64.00 77.10 (H) 70.40   Neutrophils (Absolute) 1.82 - 7.42 K/uL 2.81 4.43 3.15 3.17   Lymphocytes 22.00 - 41.00 % 32.30 25.10 14.90 (L) 20.60 (L)   Lymphs (Absolute) 1.00 - 4.80 K/uL 1.61 1.74 0.61 (L) 0.93 (L)       Assessment    Imp:    Visit Diagnosis:    1. Other systemic lupus erythematosus with other organ involvement (HCC)        2. Pancytopenia (HCC)        3. Other cirrhosis of liver (HCC)        4. Portal hypertension (HCC)        5. History of gastric bypass        6. Iron deficiency          Plan:      Job Leavitt M.D.

## 2023-04-28 ENCOUNTER — HOSPITAL ENCOUNTER (OUTPATIENT)
Dept: LAB | Facility: MEDICAL CENTER | Age: 53
End: 2023-04-28
Attending: INTERNAL MEDICINE
Payer: COMMERCIAL

## 2023-04-28 DIAGNOSIS — E61.1 IRON DEFICIENCY: ICD-10-CM

## 2023-04-28 DIAGNOSIS — M32.19 OTHER SYSTEMIC LUPUS ERYTHEMATOSUS WITH OTHER ORGAN INVOLVEMENT (HCC): ICD-10-CM

## 2023-04-28 DIAGNOSIS — D61.818 PANCYTOPENIA (HCC): ICD-10-CM

## 2023-04-28 LAB
BASOPHILS # BLD AUTO: 0.5 % (ref 0–1.8)
BASOPHILS # BLD: 0.02 K/UL (ref 0–0.12)
CRP SERPL HS-MCNC: <0.2 MG/L (ref 0–3)
EOSINOPHIL # BLD AUTO: 0.01 K/UL (ref 0–0.51)
EOSINOPHIL NFR BLD: 0.2 % (ref 0–6.9)
ERYTHROCYTE [DISTWIDTH] IN BLOOD BY AUTOMATED COUNT: 55.5 FL (ref 35.9–50)
ERYTHROCYTE [SEDIMENTATION RATE] IN BLOOD BY WESTERGREN METHOD: 16 MM/HOUR (ref 0–25)
FERRITIN SERPL-MCNC: 10.1 NG/ML (ref 10–291)
HCT VFR BLD AUTO: 37.8 % (ref 37–47)
HGB BLD-MCNC: 11.8 G/DL (ref 12–16)
IMM GRANULOCYTES # BLD AUTO: 0.02 K/UL (ref 0–0.11)
IMM GRANULOCYTES NFR BLD AUTO: 0.5 % (ref 0–0.9)
IRON SATN MFR SERPL: 9 % (ref 15–55)
IRON SERPL-MCNC: 37 UG/DL (ref 40–170)
LYMPHOCYTES # BLD AUTO: 0.61 K/UL (ref 1–4.8)
LYMPHOCYTES NFR BLD: 14.9 % (ref 22–41)
MCH RBC QN AUTO: 27.1 PG (ref 27–33)
MCHC RBC AUTO-ENTMCNC: 31.2 G/DL (ref 33.6–35)
MCV RBC AUTO: 86.7 FL (ref 81.4–97.8)
MONOCYTES # BLD AUTO: 0.28 K/UL (ref 0–0.85)
MONOCYTES NFR BLD AUTO: 6.8 % (ref 0–13.4)
NEUTROPHILS # BLD AUTO: 3.15 K/UL (ref 2–7.15)
NEUTROPHILS NFR BLD: 77.1 % (ref 44–72)
NRBC # BLD AUTO: 0 K/UL
NRBC BLD-RTO: 0 /100 WBC
PLATELET # BLD AUTO: 77 K/UL (ref 164–446)
PMV BLD AUTO: 11.2 FL (ref 9–12.9)
RBC # BLD AUTO: 4.36 M/UL (ref 4.2–5.4)
TIBC SERPL-MCNC: 405 UG/DL (ref 250–450)
UIBC SERPL-MCNC: 368 UG/DL (ref 110–370)
WBC # BLD AUTO: 4.1 K/UL (ref 4.8–10.8)

## 2023-04-28 PROCEDURE — 86141 C-REACTIVE PROTEIN HS: CPT

## 2023-04-28 PROCEDURE — 36415 COLL VENOUS BLD VENIPUNCTURE: CPT

## 2023-04-28 PROCEDURE — 86256 FLUORESCENT ANTIBODY TITER: CPT

## 2023-04-28 PROCEDURE — 86038 ANTINUCLEAR ANTIBODIES: CPT

## 2023-04-28 PROCEDURE — 86225 DNA ANTIBODY NATIVE: CPT

## 2023-04-28 PROCEDURE — 82728 ASSAY OF FERRITIN: CPT

## 2023-04-28 PROCEDURE — 83540 ASSAY OF IRON: CPT

## 2023-04-28 PROCEDURE — 85652 RBC SED RATE AUTOMATED: CPT

## 2023-04-28 PROCEDURE — 86039 ANTINUCLEAR ANTIBODIES (ANA): CPT

## 2023-04-28 PROCEDURE — 85025 COMPLETE CBC W/AUTO DIFF WBC: CPT

## 2023-04-28 PROCEDURE — 83550 IRON BINDING TEST: CPT

## 2023-04-28 PROCEDURE — 86235 NUCLEAR ANTIGEN ANTIBODY: CPT | Mod: 91

## 2023-04-30 LAB — NUCLEAR IGG SER QL IA: DETECTED

## 2023-05-01 DIAGNOSIS — E61.1 IRON DEFICIENCY: ICD-10-CM

## 2023-05-01 LAB
ANA INTERPRETIVE COMMENT Q5143: ABNORMAL
ANA PATTERN Q5144: ABNORMAL
ANA TITER Q5145: ABNORMAL
ANTINUCLEAR ANTIBODY (ANA), HEP-2, IGG Q5142: DETECTED
CYTOPLASMIC PATTERN TITER Q5148: ABNORMAL

## 2023-05-01 RX ORDER — METHYLPREDNISOLONE SODIUM SUCCINATE 125 MG/2ML
125 INJECTION, POWDER, LYOPHILIZED, FOR SOLUTION INTRAMUSCULAR; INTRAVENOUS PRN
Status: CANCELLED | OUTPATIENT
Start: 2023-05-03

## 2023-05-01 RX ORDER — SODIUM CHLORIDE 9 MG/ML
INJECTION, SOLUTION INTRAVENOUS CONTINUOUS
Status: CANCELLED | OUTPATIENT
Start: 2023-05-03

## 2023-05-01 RX ORDER — DIPHENHYDRAMINE HYDROCHLORIDE 50 MG/ML
50 INJECTION INTRAMUSCULAR; INTRAVENOUS PRN
Status: CANCELLED | OUTPATIENT
Start: 2023-05-03

## 2023-05-01 RX ORDER — EPINEPHRINE 1 MG/ML(1)
0.5 AMPUL (ML) INJECTION PRN
Status: CANCELLED | OUTPATIENT
Start: 2023-05-03

## 2023-05-02 LAB
DSDNA AB TITR SER CLIF: NORMAL {TITER}
ENA JO1 AB TITR SER: 0 AU/ML (ref 0–40)
ENA SCL70 IGG SER QL: 0 AU/ML (ref 0–40)
ENA SM IGG SER-ACNC: 1 AU/ML (ref 0–40)
ENA SS-B IGG SER IA-ACNC: 1 AU/ML (ref 0–40)
SSA52 R0ENA AB IGG Q0420: 16 AU/ML (ref 0–40)
SSA60 R0ENA AB IGG Q0419: 78 AU/ML (ref 0–40)
U1 SNRNP IGG SER QL: 86 UNITS (ref 0–19)

## 2023-05-03 LAB — DSDNA IGG TITR SER CLIF: ABNORMAL {TITER}

## 2023-05-04 ENCOUNTER — HOSPITAL ENCOUNTER (EMERGENCY)
Facility: MEDICAL CENTER | Age: 53
End: 2023-05-04
Attending: EMERGENCY MEDICINE
Payer: COMMERCIAL

## 2023-05-04 ENCOUNTER — APPOINTMENT (OUTPATIENT)
Dept: RADIOLOGY | Facility: MEDICAL CENTER | Age: 53
End: 2023-05-04
Attending: EMERGENCY MEDICINE
Payer: COMMERCIAL

## 2023-05-04 VITALS
WEIGHT: 171.3 LBS | SYSTOLIC BLOOD PRESSURE: 101 MMHG | HEART RATE: 65 BPM | RESPIRATION RATE: 16 BRPM | TEMPERATURE: 98.5 F | HEIGHT: 63 IN | BODY MASS INDEX: 30.35 KG/M2 | DIASTOLIC BLOOD PRESSURE: 71 MMHG | OXYGEN SATURATION: 96 %
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
## 2023-05-04 DIAGNOSIS — S39.012A STRAIN OF LUMBAR REGION, INITIAL ENCOUNTER: ICD-10-CM

## 2023-05-04 DIAGNOSIS — S09.90XA CLOSED HEAD INJURY, INITIAL ENCOUNTER: ICD-10-CM

## 2023-05-04 DIAGNOSIS — S20.212A CONTUSION OF LEFT CHEST WALL, INITIAL ENCOUNTER: ICD-10-CM

## 2023-05-04 DIAGNOSIS — S16.1XXA STRAIN OF NECK MUSCLE, INITIAL ENCOUNTER: ICD-10-CM

## 2023-05-04 PROCEDURE — 70450 CT HEAD/BRAIN W/O DYE: CPT

## 2023-05-04 PROCEDURE — 72128 CT CHEST SPINE W/O DYE: CPT

## 2023-05-04 PROCEDURE — 72125 CT NECK SPINE W/O DYE: CPT

## 2023-05-04 PROCEDURE — 71045 X-RAY EXAM CHEST 1 VIEW: CPT

## 2023-05-04 PROCEDURE — 700102 HCHG RX REV CODE 250 W/ 637 OVERRIDE(OP): Performed by: EMERGENCY MEDICINE

## 2023-05-04 PROCEDURE — A9270 NON-COVERED ITEM OR SERVICE: HCPCS | Performed by: EMERGENCY MEDICINE

## 2023-05-04 PROCEDURE — 72220 X-RAY EXAM SACRUM TAILBONE: CPT

## 2023-05-04 PROCEDURE — 99284 EMERGENCY DEPT VISIT MOD MDM: CPT

## 2023-05-04 PROCEDURE — 72131 CT LUMBAR SPINE W/O DYE: CPT

## 2023-05-04 RX ORDER — METHOCARBAMOL 500 MG/1
500 TABLET, FILM COATED ORAL ONCE
Status: COMPLETED | OUTPATIENT
Start: 2023-05-04 | End: 2023-05-04

## 2023-05-04 RX ORDER — METHOCARBAMOL 750 MG/1
750 TABLET, FILM COATED ORAL 4 TIMES DAILY
Qty: 12 TABLET | Refills: 0 | Status: SHIPPED | OUTPATIENT
Start: 2023-05-04 | End: 2023-05-07

## 2023-05-04 RX ORDER — ACETAMINOPHEN 500 MG
1000 TABLET ORAL ONCE
Status: COMPLETED | OUTPATIENT
Start: 2023-05-04 | End: 2023-05-04

## 2023-05-04 RX ORDER — OXYCODONE HYDROCHLORIDE AND ACETAMINOPHEN 5; 325 MG/1; MG/1
1 TABLET ORAL ONCE
Status: COMPLETED | OUTPATIENT
Start: 2023-05-04 | End: 2023-05-04

## 2023-05-04 RX ADMIN — OXYCODONE AND ACETAMINOPHEN 1 TABLET: 325; 5 TABLET ORAL at 13:15

## 2023-05-04 RX ADMIN — METHOCARBAMOL 500 MG: 500 TABLET ORAL at 13:15

## 2023-05-04 RX ADMIN — ACETAMINOPHEN 1000 MG: 500 TABLET ORAL at 13:15

## 2023-05-04 ASSESSMENT — FIBROSIS 4 INDEX: FIB4 SCORE: 3.44

## 2023-05-04 ASSESSMENT — PAIN DESCRIPTION - PAIN TYPE: TYPE: ACUTE PAIN

## 2023-05-04 NOTE — ED PROVIDER NOTES
"ED Provider Note    CHIEF COMPLAINT  Chief Complaint   Patient presents with    GLF     Pt was in the restroom this AM approximately 1 hour PTA when she slipped and fell on the title.   She hit her head on the door frame, no LOC but now has a 7/10 HA.   No blurry vision, mild nausea with no vomiting.  No anticoagulants.     Back Pain     Mid back pain rated 9/10 since falling backwards in restroom this AM.   Hx of back surgeries.        EXTERNAL RECORDS REVIEWED  Other patient was seen at [facility] April 8, 2023 with complaint of dry heaving and diarrhea as well as right-sided rib pain.  She had a chest x-ray which was unremarkable.  Was diagnosed with \"rib pain.\"    Patient was admitted at [facility] October 2022 for chest pain and shortness of breath.  She had a stress test which was negative for ischemia but did show fixed defect in the inferior and inferolateral walls with normal wall motion which most likely represented artifact.      HPI/ROS  LIMITATION TO HISTORY   Select: : None  OUTSIDE HISTORIAN(S):   Family members at bedside but is sleeping throughout the encounter and does not offer any additional information.      Elvia Roberto is a 52 y.o. female who presents to the ER with complaint of mild headache and nausea as well as some left-sided rib pain and back pain after she slipped today in the bathroom and fell.  She says she struck her left chest wall against the door jam as she fell.  She landed predominantly on her low back/buttock.  She believes she hit her head but no LOC.  She has a mild headache and some nausea.  No dizziness.  No visual changes.  She complains of some neck pain but says she always has neck pain.  She had a fusion of the lumbar spine back in 2013.  She always has some degree of back pain but says this is worse.  She mostly complains of pain to her low back and buttock area.  She says she has a little bit of burning pain down the back of the right leg into " "the hamstring region.  It does not radiate below the knee.  No weakness.  She was able to get herself up but it \"took a while.\"  She is able to walk.  No weakness of her legs.  No saddle anesthesia or loss of bowel or bladder control.  No abdominal pain.  She is not on any blood thinners.    PAST MEDICAL HISTORY   has a past medical history of Heart burn, Indigestion, Liver disease, Lupus (HCC), Other specified disorder of intestines, and Other specified symptom associated with female genital organs.    SURGICAL HISTORY   has a past surgical history that includes bowel resection laparoscopic (4/4/2012); lysis adhesions general (4/4/2012); gyn surgery (1993); gyn surgery (1993); other orthopedic surgery (2004); other orthopedic surgery (2000); other abdominal surgery (2010); laparoscopy (12/16/2012); gastroscopy (12/16/2012); primary c section; remove tonsils/adenoids,<13 y/o; and hysteroscopy with video operative (8/20/2013).    FAMILY HISTORY  No family history on file.    SOCIAL HISTORY  Social History     Tobacco Use    Smoking status: Every Day     Packs/day: 0.50     Years: 30.00     Pack years: 15.00     Types: Cigarettes    Smokeless tobacco: Never   Vaping Use    Vaping Use: Never used   Substance and Sexual Activity    Alcohol use: Not Currently     Comment: haven't for around 15 years (1-4-23)    Drug use: No    Sexual activity: Not on file       CURRENT MEDICATIONS  Home Medications    **Home medications have not yet been reviewed for this encounter**         ALLERGIES  Allergies   Allergen Reactions    Asa [Aspirin] Unspecified     GI upset, bleeding, Gastric Bypass      Nsaids Nausea     GI upset, bleeding, Gastric Bypass      Tape Unspecified     Blisters all over, Paper tape is ok       PHYSICAL EXAM  VITAL SIGNS: /86   Pulse 72   Temp 36.8 °C (98.2 °F) (Temporal)   Resp 16   Ht 1.6 m (5' 3\")   Wt 77.7 kg (171 lb 4.8 oz)   LMP 05/04/2013   SpO2 98%   BMI 30.34 kg/m²    Constitutional:  " Well developed, well nourished; No acute distress   HENT: Normocephalic, Atraumatic, Bilateral external ears normal, no raccoons or battles.  Eyes: PERRL, EOMI, Conjunctiva normal, No discharge.   Neck: Normal range of motion, supple, mild tenderness along the C-spine diffusely midline without step-off or deformity  Lymphatic: No lymphadenopathy noted.   Cardiovascular: Normal heart rate, Normal rhythm, No murmurs, rubs or gallops   Thorax & Lungs: CTA=bilaterally;  No respiratory distress,  No wheezing rales, or rhonchi;  mild tenderness to the left lower chest wall.  No bruising.. No crepitus or subQ air  Abdomen: Nontender,  no guarding no rebound, no masses, no pulsatile mass, no tenderness, no distention  Skin: Warm, Dry, No erythema, No rash.   Back:  tender diffusely over the lumbar spine and upper sacrum midline without step-off or deformity.  No ecchymosis.  No CVA tenderness.  Tenderness along the upper T-spine.  Extremities: 2+ dp and pt pulses bilateral LEs;  Nontender; no pretibial edema  Neurologic: Alert & oriented x 4, negative straight leg raise bilaterally.  Lower extremity strengths are 5 out of 5 and equal bilaterally with testing of dorsiflexors, plantar flexors, quadriceps and hamstrings.  Sensation is intact to light touch.  DTRs are 2 out of 4 and equal at knees and ankles.  Psychiatric: appropriate, normal affect     DIAGNOSTIC STUDIES / PROCEDURES      RADIOLOGY  I have independently interpreted the diagnostic imaging associated with this visit and am waiting the final reading from the radiologist.     My preliminary interpretation is as follows: ER MD is reviewed the patient's chest x-ray.  No obvious fractures or pneumothorax.    Radiologist interpretation:   CT-LSPINE W/O PLUS RECONS   Final Result      1.  There is no acute fracture or malalignment of the lumbar spine.      CT-TSPINE W/O PLUS RECONS   Final Result      1.  There is no acute fracture of the thoracic spine.      CT-CSPINE  WITHOUT PLUS RECONS   Final Result      1.  There is no acute fracture of the cervical spine.         CT-HEAD W/O   Final Result      1.  No acute intracranial hemorrhage or depressed calvarial fracture.         DX-CHEST-PORTABLE (1 VIEW)   Final Result      No evidence of acute cardiopulmonary process.      DX-SACRUM AND COCCYX 2+   Final Result      No evidence of acute fracture.           COURSE & MEDICAL DECISION MAKING    ED Observation Status? No; Patient does not meet criteria for ED Observation.     INITIAL ASSESSMENT, COURSE AND PLAN  Care Narrative: Patient presents to the ER complaining of low back pain as well as buttock pain after she slipped today in the bathroom and fell.  She struck her left thorax against the door jam and then fell onto her buttock.  She believes she hit her head.  No LOC.  She has a mild headache with some nausea.  No vomiting.  No visual changes or vertigo.  She had some mild tenderness with palpation of the C-spine.  She also had tenderness along the upper T-spine and the lower lumbar spine and sacrum.  She has history of lumbar fusion back in in 2013.  Patient describes a little bit of pain radiating down the back of her right leg to the hamstring area.  Pain does not radiate below the knee.  No weakness of the legs.  No loss of bowel or bladder control no saddle anesthesia.  Patient is neurologically intact.  No weakness on examination.  At this time no concern for cord compression or cauda equina syndrome.  CT scan of the C-spine, T-spine and L-spine was performed.  There is no obvious fracture.  X-ray of the sacrum and coccyx was obtained as well.  No fractures identified on x-ray either.  He has some very mild tenderness along the left posterior ribs.  No crepitus.  X-rays negative.  No obvious rib fracture or pneumothorax.  At this time I suspect the patient has contusions as well as strains to the cervical, thoracic and lumbar region.  She was given Percocet, Tylenol and  Robaxin here in the ER.  She cannot take NSAIDs that she had a gastric bypass.  Patient will go home with Robaxin.  She can take Tylenol at home.  She is been asked to ice the areas of concern.  She is to avoid heat.  She is to follow-up with her primary care physician within the next 1 to 2 days.  At this time she is safe and stable for outpatient management discharge home.  Has been given strict return precautions and discharge instructions and she understands treatment plan and follow-up.        ADDITIONAL PROBLEM LIST  Problem #1: Closed head injury  Problem #2: Low back pain  Problem #3: Buttock pain    DISPOSITION AND DISCUSSIONS  I have discussed management of the patient with the following physicians and DEMETRICE's: None    Discussion of management with other [facility] or appropriate source(s): None     Escalation of care considered, and ultimately not performed:blood analysis.  No blood work needed as patient's injuries are predominantly to the back.  No abdominal pain or vomiting.    Barriers to care at this time, including but not limited to:  None known .     Decision tools and prescription drugs considered including, but not limited to: Medication modification patient cannot take NSAIDs as she has history of gastric bypass, therefore no NSAIDs administered here today. .    I verified that the patient was wearing a mask and I was wearing appropriate PPE every time I entered the room. The patient's mask was on the patient at all times during my encounter except for a brief view of the oropharynx.     FINAL DIAGNOSIS  1. Fall, initial encounter Acute   2. Closed head injury, initial encounter    3. Strain of lumbar region, initial encounter Acute   4. Strain of neck muscle, initial encounter Acute   5. Contusion of left chest wall, initial encounter Acute        This dictation has been created using voice recognition software. The accuracy of the dictation is limited by the abilities of the software. I expect there  may be some errors of grammar and possibly content. I made every attempt to manually correct the errors within my dictation. However, errors related to voice recognition software may still exist and should be interpreted within the appropriate context.     Electronically signed by: Crystal Mora M.D., 5/4/2023 10:47 AM

## 2023-05-04 NOTE — DISCHARGE INSTRUCTIONS
Use ice to help with the pain.  Avoid heat as that often makes muscle pain worse.    Follow-up with your primary care physician within the next 1 to 2 days.  Please call for appointment.    Return to the ER for any worsening neck pain, worsening back pain, pain radiating down your legs, numbness/tingling/weakness of your legs, loss of bowel or bladder control, numbness around your genital area, worsening headache, dizziness, feeling as though you are off balance, visual changes, vomiting, or for any concerns.    You may take over-the-counter Tylenol in addition to the muscle spasm medicine which were prescribed today.

## 2023-05-24 ENCOUNTER — APPOINTMENT (OUTPATIENT)
Dept: RADIOLOGY | Facility: MEDICAL CENTER | Age: 53
End: 2023-05-24
Attending: EMERGENCY MEDICINE
Payer: COMMERCIAL

## 2023-05-24 ENCOUNTER — APPOINTMENT (OUTPATIENT)
Dept: RADIOLOGY | Facility: MEDICAL CENTER | Age: 53
End: 2023-05-24
Payer: COMMERCIAL

## 2023-05-24 ENCOUNTER — HOSPITAL ENCOUNTER (EMERGENCY)
Facility: MEDICAL CENTER | Age: 53
End: 2023-05-24
Attending: EMERGENCY MEDICINE
Payer: COMMERCIAL

## 2023-05-24 VITALS
HEART RATE: 61 BPM | HEIGHT: 64 IN | SYSTOLIC BLOOD PRESSURE: 126 MMHG | TEMPERATURE: 98.1 F | DIASTOLIC BLOOD PRESSURE: 74 MMHG | WEIGHT: 169.97 LBS | RESPIRATION RATE: 19 BRPM | BODY MASS INDEX: 29.02 KG/M2 | OXYGEN SATURATION: 99 %

## 2023-05-24 DIAGNOSIS — R07.9 ACUTE CHEST PAIN: ICD-10-CM

## 2023-05-24 DIAGNOSIS — R07.9 RIGHT-SIDED CHEST PAIN: ICD-10-CM

## 2023-05-24 LAB
ALBUMIN SERPL BCP-MCNC: 4 G/DL (ref 3.2–4.9)
ALBUMIN/GLOB SERPL: 1.3 G/DL
ALP SERPL-CCNC: 71 U/L (ref 30–99)
ALT SERPL-CCNC: 10 U/L (ref 2–50)
ANION GAP SERPL CALC-SCNC: 10 MMOL/L (ref 7–16)
AST SERPL-CCNC: 22 U/L (ref 12–45)
BASOPHILS # BLD AUTO: 0.2 % (ref 0–1.8)
BASOPHILS # BLD: 0.01 K/UL (ref 0–0.12)
BILIRUB SERPL-MCNC: 0.3 MG/DL (ref 0.1–1.5)
BUN SERPL-MCNC: 13 MG/DL (ref 8–22)
CALCIUM ALBUM COR SERPL-MCNC: 8.7 MG/DL (ref 8.5–10.5)
CALCIUM SERPL-MCNC: 8.7 MG/DL (ref 8.5–10.5)
CHLORIDE SERPL-SCNC: 108 MMOL/L (ref 96–112)
CO2 SERPL-SCNC: 21 MMOL/L (ref 20–33)
CREAT SERPL-MCNC: 0.74 MG/DL (ref 0.5–1.4)
D DIMER PPP IA.FEU-MCNC: 0.33 UG/ML (FEU) (ref 0–0.5)
EKG IMPRESSION: NORMAL
EOSINOPHIL # BLD AUTO: 0.01 K/UL (ref 0–0.51)
EOSINOPHIL NFR BLD: 0.2 % (ref 0–6.9)
ERYTHROCYTE [DISTWIDTH] IN BLOOD BY AUTOMATED COUNT: 55.4 FL (ref 35.9–50)
GFR SERPLBLD CREATININE-BSD FMLA CKD-EPI: 96 ML/MIN/1.73 M 2
GLOBULIN SER CALC-MCNC: 3 G/DL (ref 1.9–3.5)
GLUCOSE SERPL-MCNC: 86 MG/DL (ref 65–99)
HCG SERPL QL: NEGATIVE
HCT VFR BLD AUTO: 36.5 % (ref 37–47)
HGB BLD-MCNC: 11.3 G/DL (ref 12–16)
IMM GRANULOCYTES # BLD AUTO: 0.01 K/UL (ref 0–0.11)
IMM GRANULOCYTES NFR BLD AUTO: 0.2 % (ref 0–0.9)
LYMPHOCYTES # BLD AUTO: 0.93 K/UL (ref 1–4.8)
LYMPHOCYTES NFR BLD: 20.6 % (ref 22–41)
MCH RBC QN AUTO: 26.3 PG (ref 27–33)
MCHC RBC AUTO-ENTMCNC: 31 G/DL (ref 32.2–35.5)
MCV RBC AUTO: 85.1 FL (ref 81.4–97.8)
MONOCYTES # BLD AUTO: 0.38 K/UL (ref 0–0.85)
MONOCYTES NFR BLD AUTO: 8.4 % (ref 0–13.4)
NEUTROPHILS # BLD AUTO: 3.17 K/UL (ref 1.82–7.42)
NEUTROPHILS NFR BLD: 70.4 % (ref 44–72)
NRBC # BLD AUTO: 0 K/UL
NRBC BLD-RTO: 0 /100 WBC (ref 0–0.2)
PLATELET # BLD AUTO: 88 K/UL (ref 164–446)
PLATELET BLD QL SMEAR: NORMAL
PLATELETS.RETICULATED NFR BLD AUTO: 3.5 % (ref 0.6–13.1)
PMV BLD AUTO: 9.7 FL (ref 9–12.9)
POTASSIUM SERPL-SCNC: 4.4 MMOL/L (ref 3.6–5.5)
PROT SERPL-MCNC: 7 G/DL (ref 6–8.2)
RBC # BLD AUTO: 4.29 M/UL (ref 4.2–5.4)
SODIUM SERPL-SCNC: 139 MMOL/L (ref 135–145)
TROPONIN T SERPL-MCNC: <6 NG/L (ref 6–19)
WBC # BLD AUTO: 4.5 K/UL (ref 4.8–10.8)

## 2023-05-24 PROCEDURE — 85379 FIBRIN DEGRADATION QUANT: CPT

## 2023-05-24 PROCEDURE — 93005 ELECTROCARDIOGRAM TRACING: CPT

## 2023-05-24 PROCEDURE — 700111 HCHG RX REV CODE 636 W/ 250 OVERRIDE (IP): Mod: UD | Performed by: EMERGENCY MEDICINE

## 2023-05-24 PROCEDURE — 93005 ELECTROCARDIOGRAM TRACING: CPT | Performed by: EMERGENCY MEDICINE

## 2023-05-24 PROCEDURE — 99284 EMERGENCY DEPT VISIT MOD MDM: CPT

## 2023-05-24 PROCEDURE — 96372 THER/PROPH/DIAG INJ SC/IM: CPT

## 2023-05-24 PROCEDURE — 85025 COMPLETE CBC W/AUTO DIFF WBC: CPT

## 2023-05-24 PROCEDURE — 80053 COMPREHEN METABOLIC PANEL: CPT

## 2023-05-24 PROCEDURE — 85055 RETICULATED PLATELET ASSAY: CPT

## 2023-05-24 PROCEDURE — 71045 X-RAY EXAM CHEST 1 VIEW: CPT

## 2023-05-24 PROCEDURE — 84703 CHORIONIC GONADOTROPIN ASSAY: CPT

## 2023-05-24 PROCEDURE — 36415 COLL VENOUS BLD VENIPUNCTURE: CPT

## 2023-05-24 PROCEDURE — 84484 ASSAY OF TROPONIN QUANT: CPT

## 2023-05-24 RX ORDER — KETOROLAC TROMETHAMINE 30 MG/ML
30 INJECTION, SOLUTION INTRAMUSCULAR; INTRAVENOUS ONCE
Status: COMPLETED | OUTPATIENT
Start: 2023-05-24 | End: 2023-05-24

## 2023-05-24 RX ORDER — TRAMADOL HYDROCHLORIDE 50 MG/1
50 TABLET ORAL EVERY 6 HOURS PRN
Qty: 12 TABLET | Refills: 0 | Status: SHIPPED | OUTPATIENT
Start: 2023-05-24 | End: 2023-05-27

## 2023-05-24 RX ORDER — KETOROLAC TROMETHAMINE 30 MG/ML
15 INJECTION, SOLUTION INTRAMUSCULAR; INTRAVENOUS ONCE
Status: CANCELLED | OUTPATIENT
Start: 2023-05-24 | End: 2023-05-24

## 2023-05-24 RX ADMIN — KETOROLAC TROMETHAMINE 30 MG: 30 INJECTION, SOLUTION INTRAMUSCULAR; INTRAVENOUS at 16:25

## 2023-05-24 ASSESSMENT — FIBROSIS 4 INDEX: FIB4 SCORE: 3.44

## 2023-05-24 NOTE — ED PROVIDER NOTES
ED Provider Note    CHIEF COMPLAINT  Chief Complaint   Patient presents with    Chest Pain     Right sided chest pain, into back under shoulder pain, pain worse with deep breath.         EXTERNAL RECORDS REVIEWED  Discharge summary 10/11/2022, admitted to the hospital with chest pain, negative work-up with stress test negative for ischemia    HPI/ROS    Elvia Roberto is a 52 y.o. female who presents for evaluation of right-sided chest pain for the past several days, constantly present, occasionally has increased sharp pain, improved with palpation.  No shortness of breath.  No coughing.  She is a smoker.  No leg pain or swelling.  No fever.  She reports the pain does increase with deep inspiration as well but no history of DVT or PE.  No abdominal pain or vomiting, no other complaints    PAST MEDICAL HISTORY   has a past medical history of Heart burn, Indigestion, Liver disease, Lupus (HCC), Other specified disorder of intestines, and Other specified symptom associated with female genital organs.    SURGICAL HISTORY   has a past surgical history that includes bowel resection laparoscopic (4/4/2012); lysis adhesions general (4/4/2012); gyn surgery (1993); gyn surgery (1993); other orthopedic surgery (2004); other orthopedic surgery (2000); other abdominal surgery (2010); laparoscopy (12/16/2012); gastroscopy (12/16/2012); primary c section; remove tonsils/adenoids,<13 y/o; and hysteroscopy with video operative (8/20/2013).    FAMILY HISTORY  History reviewed. No pertinent family history.    SOCIAL HISTORY  Social History     Tobacco Use    Smoking status: Every Day     Packs/day: 0.50     Years: 30.00     Pack years: 15.00     Types: Cigarettes    Smokeless tobacco: Never   Vaping Use    Vaping Use: Never used   Substance and Sexual Activity    Alcohol use: Not Currently     Comment: haven't for around 15 years (1-4-23)    Drug use: No    Sexual activity: Not on file       CURRENT MEDICATIONS  Home Medications   "     Reviewed by Raquel Maynard R.N. (Registered Nurse) on 05/24/23 at 1347  Med List Status: Partial     Medication Last Dose Status   Acetaminophen (TYLENOL 8 HOUR ARTHRITIS PAIN PO)  Active   gabapentin (NEURONTIN) 100 MG Cap  Active   meclizine (ANTIVERT) 25 MG Tab  Active   pantoprazole (PROTONIX) 40 MG Tablet Delayed Response  Active   pantoprazole (PROTONIX) 40 MG Tablet Delayed Response  Active   predniSONE (DELTASONE) 20 MG Tab  Active                    ALLERGIES  Allergies   Allergen Reactions    Asa [Aspirin] Unspecified     GI upset, bleeding, Gastric Bypass      Nsaids Nausea     GI upset, bleeding, Gastric Bypass      Tape Unspecified     Blisters all over, Paper tape is ok       PHYSICAL EXAM  VITAL SIGNS: /81   Pulse 65   Temp 37.1 °C (98.8 °F) (Temporal)   Resp 16   Ht 1.613 m (5' 3.5\")   Wt 77.1 kg (169 lb 15.6 oz)   LMP 05/04/2013   SpO2 97%   BMI 29.64 kg/m²    Constitutional: Well appearing patient in no acute distress.  Awake and alert, not toxic nor ill in appearance.  HENT: Normocephalic, no obvious evidence of acute trauma.  Eyes: No scleral icterus. Normal conjunctiva   Thorax & Lungs: Normal nonlabored respirations. I appreciate no wheezing, rhonchi or rales. There is normal air movement.  Upon cardiac ascultation I appreciate a regular heart rhythm and a normal rate.   Abdomen: The abdomen is not visibly distended. Upon palpation, I find it to be without tenderness.  No mass appreciated.  Skin: The exposed portions of skin reveal no obvious rash or other abnormalities.  Extremities/Musculoskeletal: No obvious sign of acute trauma. No asymmetric calf tenderness or edema.   Neurologic: Alert & oriented. No focal deficits observed.      DIAGNOSTIC STUDIES / PROCEDURES    LABS  Results for orders placed or performed during the hospital encounter of 05/24/23   CBC with Differential   Result Value Ref Range    WBC 4.5 (L) 4.8 - 10.8 K/uL    RBC 4.29 4.20 - 5.40 M/uL    " Hemoglobin 11.3 (L) 12.0 - 16.0 g/dL    Hematocrit 36.5 (L) 37.0 - 47.0 %    MCV 85.1 81.4 - 97.8 fL    MCH 26.3 (L) 27.0 - 33.0 pg    MCHC 31.0 (L) 32.2 - 35.5 g/dL    RDW 55.4 (H) 35.9 - 50.0 fL    Platelet Count 88 (L) 164 - 446 K/uL    MPV 9.7 9.0 - 12.9 fL    Neutrophils-Polys 70.40 44.00 - 72.00 %    Lymphocytes 20.60 (L) 22.00 - 41.00 %    Monocytes 8.40 0.00 - 13.40 %    Eosinophils 0.20 0.00 - 6.90 %    Basophils 0.20 0.00 - 1.80 %    Immature Granulocytes 0.20 0.00 - 0.90 %    Nucleated RBC 0.00 0.00 - 0.20 /100 WBC    Neutrophils (Absolute) 3.17 1.82 - 7.42 K/uL    Lymphs (Absolute) 0.93 (L) 1.00 - 4.80 K/uL    Monos (Absolute) 0.38 0.00 - 0.85 K/uL    Eos (Absolute) 0.01 0.00 - 0.51 K/uL    Baso (Absolute) 0.01 0.00 - 0.12 K/uL    Immature Granulocytes (abs) 0.01 0.00 - 0.11 K/uL    NRBC (Absolute) 0.00 K/uL   Complete Metabolic Panel (CMP)   Result Value Ref Range    Sodium 139 135 - 145 mmol/L    Potassium 4.4 3.6 - 5.5 mmol/L    Chloride 108 96 - 112 mmol/L    Co2 21 20 - 33 mmol/L    Anion Gap 10.0 7.0 - 16.0    Glucose 86 65 - 99 mg/dL    Bun 13 8 - 22 mg/dL    Creatinine 0.74 0.50 - 1.40 mg/dL    Calcium 8.7 8.5 - 10.5 mg/dL    AST(SGOT) 22 12 - 45 U/L    ALT(SGPT) 10 2 - 50 U/L    Alkaline Phosphatase 71 30 - 99 U/L    Total Bilirubin 0.3 0.1 - 1.5 mg/dL    Albumin 4.0 3.2 - 4.9 g/dL    Total Protein 7.0 6.0 - 8.2 g/dL    Globulin 3.0 1.9 - 3.5 g/dL    A-G Ratio 1.3 g/dL   Troponins NOW   Result Value Ref Range    Troponin T <6 6 - 19 ng/L   HCG Qual Serum   Result Value Ref Range    Beta-Hcg Qualitative Serum Negative Negative   CORRECTED CALCIUM   Result Value Ref Range    Correct Calcium 8.7 8.5 - 10.5 mg/dL   ESTIMATED GFR   Result Value Ref Range    GFR (CKD-EPI) 96 >60 mL/min/1.73 m 2   D-DIMER   Result Value Ref Range    D-Dimer 0.33 0.00 - 0.50 ug/mL (FEU)   PLATELET ESTIMATE   Result Value Ref Range    Plt Estimation Decreased    IMMATURE PLT FRACTION   Result Value Ref Range    Imm. Plt  Fraction 3.5 0.6 - 13.1 %   EKG   Result Value Ref Range    Report       [facility] Emergency Dept[facility]    Test Date:  2023  Pt Name:    JA CAMPOS                Department: ER  MRN:        [id number]                      Room:  Gender:     Female                       Technician: [id number]  :        [id number]                   Requested By:ER TRIAGE PROTOCOL  Order #:    [id number]                    Reading MD: RICHIE GROVER MD    Measurements  Intervals                                Axis  Rate:       67                           P:          23  WY:         148                          QRS:        35  QRSD:       88                           T:          28  QT:         379  QTc:        400    Interpretive Statements  12 Lead EKG interpreted by me to show: -- Rate 67 -- Rhythm: Normal sinus  rhythm -- Axis: Normal -- WY and QRS Intervals: Normal -- T waves: No acute  changes -- ST segments: No acute changes -- Ectopy: None. My impression of  this  EKG: Does not indicate acute ischemia at this time.  Electronically Signed On 2023 14:25:55 PDT by RICHIE GROVER MD  Electronically Signed On 2023 14:26:20 PDT by RICHIE GROVER MD          RADIOLOGY  I have independently interpreted the diagnostic imaging associated with this visit and am waiting the final reading from the radiologist.   My preliminary interpretation is as follows: No infiltrate, no pneumothorax  Radiologist interpretation:   DX-CHEST-PORTABLE (1 VIEW)   Final Result      No acute cardiopulmonary abnormality.            COURSE & MEDICAL DECISION MAKING    ED Observation Status? No; Patient does not meet criteria for ED Observation.     INITIAL ASSESSMENT, COURSE AND PLAN  Care Narrative: 52-year-old female with right-sided chest pain constant present for the past 3 days.  Worse with deep inspiration.  No fever.  No coughing.  Unremarkable exam.  Vital signs are normal, no tachycardia, no hypoxia.  Given her  age greater than 50 a D-dimer was obtained to exclude pulmonary embolism and this in fact was negative.  Her EKG is nonischemic and her troponin is negative.  Her blood work is otherwise unremarkable and her chest x-ray is normal.  She is not anemic, no hepatic or renal abnormalities, no electrolyte abnormalities.  At this point, she will be treated with an IM dose of Toradol.  Will prescribe Ultram.  She is discharged home in stable condition with strict return instructions provided      DISPOSITION AND DISCUSSIONS    Escalation of care considered, and ultimately not performed:acute inpatient care management, however at this time, the patient is most appropriate for outpatient management  Decision tools and prescription drugs considered including, but not limited to: Heart score low.    I spent greater than 3 minutes with the patient explaining the importance of smoking cessation.       FINAL DIAGNOSIS  1. Right-sided chest pain    2. Acute chest pain           Electronically signed by: Jose Luis Maldonado M.D., 5/24/2023 4:09 PM

## 2023-05-24 NOTE — ED TRIAGE NOTES
Pt ambulated to triage with   Chief Complaint   Patient presents with    Chest Pain     Right sided chest pain, into back under shoulder pain, pain worse with deep breath.       EKG completed.  Reports pain started on Monday and gradually getting worse.   Protocol ordered.  Pt Informed regarding triage process and verbalized understanding to inform triage tech or RN for any changes in condition. Placed in lobby.

## 2023-05-24 NOTE — ED NOTES
Pt ambulates to [facility] with steady gait.    ABCs intact. A+Ox4. Skin PWD.    Pt updated on POC and marked up for ERP.

## 2023-05-24 NOTE — ED NOTES
Discharge instructions reviewed with pt. Pt verbalized understanding. Pt ambulated out of er with steady gait and all belongings. With rx and follow up instructions.  Patient signed Opioid consent form and understood instructions.

## 2023-05-29 ENCOUNTER — OUTPATIENT INFUSION SERVICES (OUTPATIENT)
Dept: ONCOLOGY | Facility: MEDICAL CENTER | Age: 53
End: 2023-05-29
Attending: INTERNAL MEDICINE
Payer: COMMERCIAL

## 2023-05-29 VITALS
TEMPERATURE: 97.3 F | BODY MASS INDEX: 30.2 KG/M2 | HEART RATE: 57 BPM | HEIGHT: 63 IN | SYSTOLIC BLOOD PRESSURE: 112 MMHG | WEIGHT: 170.42 LBS | DIASTOLIC BLOOD PRESSURE: 63 MMHG | RESPIRATION RATE: 18 BRPM | OXYGEN SATURATION: 98 %

## 2023-05-29 DIAGNOSIS — E61.1 IRON DEFICIENCY: ICD-10-CM

## 2023-05-29 DIAGNOSIS — D69.6 THROMBOCYTOPENIA (HCC): Chronic | ICD-10-CM

## 2023-05-29 LAB
FERRITIN SERPL-MCNC: 8.5 NG/ML (ref 10–291)
IRON SATN MFR SERPL: 9 % (ref 15–55)
IRON SERPL-MCNC: 34 UG/DL (ref 40–170)
TIBC SERPL-MCNC: 358 UG/DL (ref 250–450)
UIBC SERPL-MCNC: 324 UG/DL (ref 110–370)

## 2023-05-29 PROCEDURE — 83540 ASSAY OF IRON: CPT

## 2023-05-29 PROCEDURE — 82728 ASSAY OF FERRITIN: CPT

## 2023-05-29 PROCEDURE — 700111 HCHG RX REV CODE 636 W/ 250 OVERRIDE (IP): Mod: UD | Performed by: INTERNAL MEDICINE

## 2023-05-29 PROCEDURE — 700105 HCHG RX REV CODE 258: Mod: UD | Performed by: INTERNAL MEDICINE

## 2023-05-29 PROCEDURE — 83550 IRON BINDING TEST: CPT

## 2023-05-29 PROCEDURE — 96365 THER/PROPH/DIAG IV INF INIT: CPT

## 2023-05-29 PROCEDURE — 96375 TX/PRO/DX INJ NEW DRUG ADDON: CPT

## 2023-05-29 RX ORDER — EPINEPHRINE 1 MG/ML(1)
0.5 AMPUL (ML) INJECTION PRN
OUTPATIENT
Start: 2023-05-29

## 2023-05-29 RX ORDER — SODIUM CHLORIDE 9 MG/ML
INJECTION, SOLUTION INTRAVENOUS CONTINUOUS
OUTPATIENT
Start: 2023-05-29

## 2023-05-29 RX ORDER — METHYLPREDNISOLONE SODIUM SUCCINATE 125 MG/2ML
125 INJECTION, POWDER, LYOPHILIZED, FOR SOLUTION INTRAMUSCULAR; INTRAVENOUS PRN
OUTPATIENT
Start: 2023-05-29

## 2023-05-29 RX ORDER — METHYLPREDNISOLONE SODIUM SUCCINATE 125 MG/2ML
125 INJECTION, POWDER, LYOPHILIZED, FOR SOLUTION INTRAMUSCULAR; INTRAVENOUS PRN
Status: CANCELLED | OUTPATIENT
Start: 2023-05-29

## 2023-05-29 RX ORDER — DIPHENHYDRAMINE HYDROCHLORIDE 50 MG/ML
50 INJECTION INTRAMUSCULAR; INTRAVENOUS PRN
OUTPATIENT
Start: 2023-05-29

## 2023-05-29 RX ORDER — METHYLPREDNISOLONE SODIUM SUCCINATE 125 MG/2ML
125 INJECTION, POWDER, LYOPHILIZED, FOR SOLUTION INTRAMUSCULAR; INTRAVENOUS PRN
Status: DISCONTINUED | OUTPATIENT
Start: 2023-05-29 | End: 2023-05-29 | Stop reason: HOSPADM

## 2023-05-29 RX ADMIN — METHYLPREDNISOLONE SODIUM SUCCINATE 125 MG: 125 INJECTION, POWDER, FOR SOLUTION INTRAMUSCULAR; INTRAVENOUS at 08:44

## 2023-05-29 RX ADMIN — SODIUM CHLORIDE 1000 MG: 9 INJECTION, SOLUTION INTRAVENOUS at 08:54

## 2023-05-29 ASSESSMENT — FIBROSIS 4 INDEX: FIB4 SCORE: 4.27

## 2023-05-29 NOTE — PROGRESS NOTES
Ms. [facility] presented [facility] for iron dextran. POC discussed, including time of treatment, pt agreeable. PIV started to right FA, brisk blood return observed. Iron labs drawn as ordered. Pre-med given. Iron Dextran started at 75 ml/hr per orders for 5 mins, paused for 10 mins with no s/s of adverse reaction. Remainder of infusion run over remainder of approx 1 hour with no s/s of adverse reaction. PIV flushed and removed, gauze dressing placed. No further appts needed at this time. Left on foot to self care with family.

## 2023-06-12 ENCOUNTER — OFFICE VISIT (OUTPATIENT)
Dept: URGENT CARE | Facility: CLINIC | Age: 53
End: 2023-06-12
Payer: COMMERCIAL

## 2023-06-12 VITALS
WEIGHT: 169 LBS | BODY MASS INDEX: 29.95 KG/M2 | OXYGEN SATURATION: 100 % | SYSTOLIC BLOOD PRESSURE: 102 MMHG | DIASTOLIC BLOOD PRESSURE: 62 MMHG | HEIGHT: 63 IN | TEMPERATURE: 98.2 F | HEART RATE: 54 BPM | RESPIRATION RATE: 16 BRPM

## 2023-06-12 DIAGNOSIS — M54.6 CHRONIC BILATERAL THORACIC BACK PAIN: ICD-10-CM

## 2023-06-12 DIAGNOSIS — M54.41 ACUTE BILATERAL LOW BACK PAIN WITH RIGHT-SIDED SCIATICA: ICD-10-CM

## 2023-06-12 DIAGNOSIS — G89.29 CHRONIC BILATERAL THORACIC BACK PAIN: ICD-10-CM

## 2023-06-12 LAB
APPEARANCE UR: CLEAR
BILIRUB UR STRIP-MCNC: NEGATIVE MG/DL
COLOR UR AUTO: YELLOW
GLUCOSE UR STRIP.AUTO-MCNC: NEGATIVE MG/DL
KETONES UR STRIP.AUTO-MCNC: NEGATIVE MG/DL
LEUKOCYTE ESTERASE UR QL STRIP.AUTO: NEGATIVE
NITRITE UR QL STRIP.AUTO: NEGATIVE
PH UR STRIP.AUTO: 6.5 [PH] (ref 5–8)
PROT UR QL STRIP: NEGATIVE MG/DL
RBC UR QL AUTO: NEGATIVE
SP GR UR STRIP.AUTO: 1.01
UROBILINOGEN UR STRIP-MCNC: 0.2 MG/DL

## 2023-06-12 PROCEDURE — 99213 OFFICE O/P EST LOW 20 MIN: CPT

## 2023-06-12 PROCEDURE — 3078F DIAST BP <80 MM HG: CPT

## 2023-06-12 PROCEDURE — 81002 URINALYSIS NONAUTO W/O SCOPE: CPT

## 2023-06-12 PROCEDURE — 3074F SYST BP LT 130 MM HG: CPT

## 2023-06-12 RX ORDER — METHOCARBAMOL 500 MG/1
TABLET, FILM COATED ORAL
Qty: 30 TABLET | Refills: 0 | Status: SHIPPED | OUTPATIENT
Start: 2023-06-12 | End: 2023-08-15

## 2023-06-12 RX ORDER — KETOROLAC TROMETHAMINE 30 MG/ML
15 INJECTION, SOLUTION INTRAMUSCULAR; INTRAVENOUS ONCE
Status: COMPLETED | OUTPATIENT
Start: 2023-06-12 | End: 2023-06-12

## 2023-06-12 RX ADMIN — KETOROLAC TROMETHAMINE 15 MG: 30 INJECTION, SOLUTION INTRAMUSCULAR; INTRAVENOUS at 12:25

## 2023-06-12 ASSESSMENT — FIBROSIS 4 INDEX: FIB4 SCORE: 4.27

## 2023-06-12 NOTE — PROGRESS NOTES
Chief Complaint   Patient presents with    Back Pain     MID/UPPER BACK PAIN STARTED THIS MORNING        HISTORY OF PRESENT ILLNESS: Patient is a pleasant 53 y.o. female who presents to urgent care today mid thoracic and low back pain that started yesterday when getting out of bed.  Patient states she has taken Tylenol with little to no relief.  She has a previous history of back surgery with ongoing acute on chronic back pain issues.  She denies any loss of bowel or bladder, no numbness or tingling, she is currently able to ambulate.  She denies any nausea or vomiting, no noted chest pain.  Pain is reproducible especially with sitting.  No noted pain with urination.  No fevers.    Patient Active Problem List    Diagnosis Date Noted    Atypical chest pain 10/11/2022    Normocytic anemia 10/11/2022    Elevated glucose 10/11/2022    Supratherapeutic INR 10/11/2022    Iron deficiency 04/29/2021    Lupus (systemic lupus erythematosus) (HCC) 04/15/2021    Other cirrhosis of liver (HCC) 04/15/2021    Pancytopenia (HCC) 04/15/2021    Reactive depression (situational) 01/28/2021    Gastroesophageal reflux disease 01/28/2021    Malnutrition following gastrointestinal surgery 01/28/2021    Chronic migraine 10/16/2018    Thrombocytopenia (HCC) 11/15/2017    Retroperitoneal lymphadenopathy 11/15/2017    Diarrhea 11/13/2017    Dehydration 11/11/2017    Hypokalemia 11/11/2017    Hypomagnesemia 11/11/2017    Tobacco abuse 11/11/2017    Chronic pain 11/11/2017    Epigastric abdominal pain 12/14/2012    Abdominal pain, other specified site 04/22/2012       Allergies:Asa [aspirin], Nsaids, and Tape    Current Outpatient Medications Ordered in Epic   Medication Sig Dispense Refill    methocarbamol (ROBAXIN) 500 MG Tab Take 1 to 2 tablets every 6 hours as needed for pain/muscle spasms. 30 Tablet 0    Acetaminophen (TYLENOL 8 HOUR ARTHRITIS PAIN PO) Take  by mouth.      pantoprazole (PROTONIX) 40 MG Tablet Delayed Response Take 1 Tablet  by mouth every day.      gabapentin (NEURONTIN) 100 MG Cap gabapentin 100 mg capsule   Take 1 capsule 3 times a day by oral route as directed for 30 days.   for carpal tunnel pain, difficulty sleeping      predniSONE (DELTASONE) 20 MG Tab Take 3 tablets once a day for 3 days, then 2 tablets once a day for 3 days, then 1 tablet once a day for 3 days. (Patient taking differently: 10 mg every day.) 18 Tablet 0     Current Facility-Administered Medications Ordered in Epic   Medication Dose Route Frequency Provider Last Rate Last Admin    ketorolac (TORADOL) injection 15 mg  15 mg Intramuscular Once DEJON Abreu           Past Medical History:   Diagnosis Date    Heart burn     Indigestion     Liver disease     Auto immune hepatitis    Lupus (HCC)     Other specified disorder of intestines     Other specified symptom associated with female genital organs     tubiligation       Social History     Tobacco Use    Smoking status: Every Day     Packs/day: 0.50     Years: 30.00     Pack years: 15.00     Types: Cigarettes    Smokeless tobacco: Never   Vaping Use    Vaping Use: Never used   Substance Use Topics    Alcohol use: Not Currently     Comment: haven't for around 15 years (1-4-23)    Drug use: No       No family status information on file.   History reviewed. No pertinent family history.    ROS:  Review of Systems   Constitutional: Negative for fever, chills, weight loss, malaise, and fatigue.   HENT: Negative for ear pain, nosebleeds, congestion, sore throat and neck pain.    Eyes: Negative for vision changes.   Neuro: Negative for headache, sensory changes, weakness, seizure, LOC.   Cardiovascular: Negative for chest pain, palpitations, orthopnea and leg swelling.   Respiratory: Negative for cough, sputum production, shortness of breath and wheezing.   Gastrointestinal: Negative for abdominal pain, nausea, vomiting or diarrhea.   Genitourinary: Negative for dysuria, urgency and frequency.  Musculoskeletal:  "Negative for falls, neck pain, positive back pain, negative joint pain, myalgias.   Skin: Negative for rash, diaphoresis.     Exam:  /62   Pulse (!) 54   Temp 36.8 °C (98.2 °F)   Resp 16   Ht 1.6 m (5' 3\")   Wt 76.7 kg (169 lb)   SpO2 100%   General: well-nourished, well-developed female in NAD  Head: normocephalic, atraumatic  Eyes: PERRLA, no conjunctival injection, acuity grossly intact, lids normal.  Ears: normal shape and symmetry, no tenderness, no discharge. External canals are without any significant edema or erythema. Tympanic membranes are without any inflammation, no effusion. Gross auditory acuity is intact.  Nose: symmetrical without tenderness, no discharge.  Mouth/Throat: reasonable hygiene, no erythema, exudates or tonsillar enlargement.  Neck: no masses, range of motion within normal limits, no tracheal deviation. No obvious thyroid enlargement.   Lymph: no cervical adenopathy. No supraclavicular adenopathy.   Neuro: alert and oriented. Cranial nerves 1-12 grossly intact. No sensory deficit.   Cardiovascular: regular rate and rhythm. No edema.  Pulmonary: no distress. Chest is symmetrical with respiration, no wheezes, crackles, or rhonchi.   Abdomen: soft, non-tender, no guarding, no hepatosplenomegaly.  Musculoskeletal: no clubbing, appropriate muscle tone, gait is stable.  Noted thoracic and low back pain with light palpation, she states this radiates down the right leg  Skin: warm, dry, intact, no clubbing, no cyanosis, no rashes.   Psych: appropriate mood, affect, judgement.         Assessment/Plan:  1. Chronic bilateral thoracic back pain  ketorolac (TORADOL) injection 15 mg    methocarbamol (ROBAXIN) 500 MG Tab    POCT Urinalysis      2. Acute bilateral low back pain with right-sided sciatica  ketorolac (TORADOL) injection 15 mg    methocarbamol (ROBAXIN) 500 MG Tab    POCT Urinalysis        This is a 53-year-old female who comes in today with acute on chronic back pain.  She has a " previous history back surgery.  She states it is mid thoracic and lower back.  Denies any numbness or tingling, no loss of bowel or bladder, she is able to ambulate.  She denies any nausea or vomiting, no pain with urination, no fevers.  She states it started yesterday while she was getting out of bed, she has taken Tylenol with little to no relief.  Patient does have a history of gastric bypass, she is unable to take aspirin or Motrin for this reason.  She is also currently on prednisone 10 mg daily.  On physical exam she has noted pain with light palpation to her thoracic and lower back area.  Physical exam is otherwise within normal limits, no numbness or tingling is noted, no loss of sensation that I can see, she does have full mobility.  POCT urinalysis was complete to rule out potential causes, this was negative at this time.  Patient given Toradol here in the office today for comfort measures.  Advised to continue taking Tylenol.  Robaxin prescribed and sent to the pharmacy.  Reviewed plan of care with the patient as she is currently already on prednisone at this time we will continue that dose.  Advised to follow-up if she continues to get worse or does not improve.    Supportive care, differential diagnoses, and indications for immediate follow-up discussed with patient.   Pathogenesis of diagnosis discussed including typical length and natural progression.   Instructed to return to clinic or nearest emergency department for any change in condition, further concerns, or worsening of symptoms.  Patient states understanding of the plan of care and discharge instructions.  Instructed to make an appointment, for follow up, with primary care provider.    Please note that this dictation was created using voice recognition software. I have made every reasonable attempt to correct obvious errors, but I expect that there are errors of grammar and possibly content that I did not discover before finalizing the  note.      Terrie LEE

## 2023-06-14 NOTE — ASSESSMENT & PLAN NOTE
-On admission hemoglobin is 11.9.  Monitor CBC in the morning.  No bleeding.   Vermilion Border Text: The closure involved the vermilion border.

## 2023-08-07 ENCOUNTER — APPOINTMENT (OUTPATIENT)
Dept: HEMATOLOGY ONCOLOGY | Facility: MEDICAL CENTER | Age: 53
End: 2023-08-07
Payer: COMMERCIAL

## 2023-08-13 ENCOUNTER — APPOINTMENT (OUTPATIENT)
Dept: RADIOLOGY | Facility: MEDICAL CENTER | Age: 53
End: 2023-08-13
Attending: EMERGENCY MEDICINE
Payer: COMMERCIAL

## 2023-08-13 ENCOUNTER — HOSPITAL ENCOUNTER (EMERGENCY)
Facility: MEDICAL CENTER | Age: 53
End: 2023-08-13
Attending: EMERGENCY MEDICINE
Payer: COMMERCIAL

## 2023-08-13 VITALS
RESPIRATION RATE: 16 BRPM | TEMPERATURE: 98 F | WEIGHT: 169.31 LBS | SYSTOLIC BLOOD PRESSURE: 110 MMHG | BODY MASS INDEX: 30 KG/M2 | HEART RATE: 60 BPM | OXYGEN SATURATION: 99 % | HEIGHT: 63 IN | DIASTOLIC BLOOD PRESSURE: 55 MMHG

## 2023-08-13 DIAGNOSIS — S76.312A STRAIN OF LEFT HAMSTRING, INITIAL ENCOUNTER: ICD-10-CM

## 2023-08-13 PROCEDURE — 93971 EXTREMITY STUDY: CPT | Mod: 26,LT | Performed by: INTERNAL MEDICINE

## 2023-08-13 PROCEDURE — 99283 EMERGENCY DEPT VISIT LOW MDM: CPT | Mod: EDC

## 2023-08-13 PROCEDURE — 93971 EXTREMITY STUDY: CPT | Mod: LT

## 2023-08-13 ASSESSMENT — LIFESTYLE VARIABLES: DO YOU DRINK ALCOHOL: NO

## 2023-08-13 ASSESSMENT — FIBROSIS 4 INDEX: FIB4 SCORE: 4.27

## 2023-08-13 NOTE — ED PROVIDER NOTES
ED Provider Note    CHIEF COMPLAINT  Chief Complaint   Patient presents with    Leg Pain     Pt is having posterior left leg pain behind her knee. Pt states the pain began a week ago and is beginning to move up her leg. Pt is concerned about possible DVT. -blood thinners         HPI/ROS    Elvia Roberto is a 53 y.o. female who presents with pain to the left posterior thigh.  The patient states has had this over the last week.  She went to urgent care and she was sent here for further work-up and for possible ultrasound.  The patient does not have any chest pain or difficulty with breathing.  Does not have any history of thrombosis.  She has not had any associated trauma to the left lower extremity that she can remember.    PAST MEDICAL HISTORY   has a past medical history of Heart burn, Indigestion, Liver disease, Lupus (HCC), Other specified disorder of intestines, and Other specified symptom associated with female genital organs.    SURGICAL HISTORY   has a past surgical history that includes bowel resection laparoscopic (4/4/2012); lysis adhesions general (4/4/2012); gyn surgery (1993); gyn surgery (1993); other orthopedic surgery (2004); other orthopedic surgery (2000); other abdominal surgery (2010); laparoscopy (12/16/2012); gastroscopy (12/16/2012); primary c section; remove tonsils/adenoids,<11 y/o; and hysteroscopy with video operative (8/20/2013).    FAMILY HISTORY  History reviewed. No pertinent family history.    SOCIAL HISTORY  Social History     Tobacco Use    Smoking status: Every Day     Packs/day: 0.50     Years: 30.00     Pack years: 15.00     Types: Cigarettes    Smokeless tobacco: Never   Vaping Use    Vaping Use: Never used   Substance and Sexual Activity    Alcohol use: Not Currently     Comment: haven't for around 15 years (1-4-23)    Drug use: No    Sexual activity: Not on file       CURRENT MEDICATIONS  Home Medications    **Home medications have not yet been reviewed for this  "encounter**         ALLERGIES  Allergies   Allergen Reactions    Asa [Aspirin] Unspecified     GI upset, bleeding, Gastric Bypass      Nsaids Nausea     GI upset, bleeding, Gastric Bypass      Tape Unspecified     Blisters all over, Paper tape is ok       PHYSICAL EXAM  VITAL SIGNS: /71   Pulse 63   Temp 36.4 °C (97.6 °F) (Temporal)   Resp 16   Ht 1.6 m (5' 3\")   Wt 76.8 kg (169 lb 5 oz)   LMP 05/04/2013   SpO2 98%   BMI 29.99 kg/m²    General the patient does not appear toxic    Pulmonary the patient's lungs are clear to auscultation bilaterally    Cardiovascular S1-S2 with a regular rate and rhythm    GI abdomen soft    Skin no erythema nor induration noted to the right lower extremity    Extremities no distal edema to the right lower extremity the patient does have some reproducible discomfort in the right hamstring region the patient does have good distal pulses      RADIOLOGY  I have independently interpreted the diagnostic imaging associated with this visit and am waiting the final reading from the radiologist.   My preliminary interpretation is as follows: Reviewed the ultrasound and there is no evidence of a DVT  Radiologist interpretation: Please see formal radiology report    COURSE & MEDICAL DECISION MAKING    This a 53-year-old female who presents to the emergency department with posterior thigh discomfort.  Clinically do not appreciate any evidence of an infectious process.  She does not have any scale deformities to support a traumatic etiology.  She does not have any distal edema but due to the discomfort I did perform an ultrasound to rule out a DVT and this was negative.  I suspect this is from a muscular source.  I will prescribe naproxen as an anti-inflammatory and have the patient follow-up with orthopedics in 5 to 7 days.  FINAL DIAGNOSIS  Left hamstring strain    Disposition  Patient will be discharged in stable condition       Electronically signed by: Parish Choudhury M.D., " 8/13/2023 9:54 AM    The patient states that she had gastric bypass and she would like to hold off on anti-inflammatories and stick to Tylenol.

## 2023-08-13 NOTE — DISCHARGE INSTRUCTIONS
Take Tylenol as discussed for discomfort and follow-up with orthopedics if not better in 5 to 7 days

## 2023-08-13 NOTE — ED TRIAGE NOTES
Elvia Roberto  53 y.o. female  Chief Complaint   Patient presents with    Leg Pain     Pt is having posterior left leg pain behind her knee. Pt states the pain began a week ago and is beginning to move up her leg. Pt is concerned about possible DVT. -blood thinners       Vitals:    08/13/23 0933   BP: 125/71   Pulse: 63   Resp: 16   Temp: 36.4 °C (97.6 °F)   SpO2: 98%       Patient educated on triage process and encouraged to alert staff of any changes in condition.    Pt ambulatory to triage with the above complaint

## 2023-08-13 NOTE — ED NOTES
First interaction with patient.  Assumed care at this time.  Pt reports pain behind L knee x1 week that his now radiating up to middle of L thigh. Pt denies any trauma or recent illness. Pt reports concern over possible blood clot, denies hx of same. No swelling, redness or heat noted to L leg. Distal CMS+. Pt awake and alert, respirations even/unlabored. Skin PWD.     Pt in gown.  Patient's NPO status explained.  Call light provided.  Chart up for ERP.

## 2023-08-15 ENCOUNTER — OFFICE VISIT (OUTPATIENT)
Dept: URGENT CARE | Facility: PHYSICIAN GROUP | Age: 53
End: 2023-08-15
Payer: COMMERCIAL

## 2023-08-15 VITALS
DIASTOLIC BLOOD PRESSURE: 72 MMHG | TEMPERATURE: 97.9 F | BODY MASS INDEX: 30.12 KG/M2 | RESPIRATION RATE: 12 BRPM | SYSTOLIC BLOOD PRESSURE: 112 MMHG | HEIGHT: 63 IN | HEART RATE: 77 BPM | WEIGHT: 170 LBS | OXYGEN SATURATION: 96 %

## 2023-08-15 DIAGNOSIS — R68.89 FLU-LIKE SYMPTOMS: ICD-10-CM

## 2023-08-15 DIAGNOSIS — R05.2 SUBACUTE COUGH: ICD-10-CM

## 2023-08-15 LAB
FLUAV RNA SPEC QL NAA+PROBE: NEGATIVE
FLUBV RNA SPEC QL NAA+PROBE: NEGATIVE
RSV RNA SPEC QL NAA+PROBE: NEGATIVE
SARS-COV-2 RNA RESP QL NAA+PROBE: NEGATIVE

## 2023-08-15 PROCEDURE — 99213 OFFICE O/P EST LOW 20 MIN: CPT | Performed by: NURSE PRACTITIONER

## 2023-08-15 PROCEDURE — 0241U POCT CEPHEID COV-2, FLU A/B, RSV - PCR: CPT | Performed by: NURSE PRACTITIONER

## 2023-08-15 PROCEDURE — 3074F SYST BP LT 130 MM HG: CPT | Performed by: NURSE PRACTITIONER

## 2023-08-15 PROCEDURE — 3078F DIAST BP <80 MM HG: CPT | Performed by: NURSE PRACTITIONER

## 2023-08-15 RX ORDER — BENZONATATE 100 MG/1
100 CAPSULE ORAL 3 TIMES DAILY PRN
Qty: 60 CAPSULE | Refills: 0 | Status: SHIPPED | OUTPATIENT
Start: 2023-08-15

## 2023-08-15 RX ORDER — ALBUTEROL SULFATE 90 UG/1
2 AEROSOL, METERED RESPIRATORY (INHALATION) EVERY 6 HOURS PRN
Qty: 8.5 G | Refills: 0 | Status: SHIPPED | OUTPATIENT
Start: 2023-08-15

## 2023-08-15 ASSESSMENT — FIBROSIS 4 INDEX: FIB4 SCORE: 4.27

## 2023-08-15 ASSESSMENT — ENCOUNTER SYMPTOMS
MUSCULOSKELETAL NEGATIVE: 1
EYES NEGATIVE: 1
COUGH: 1
FEVER: 0
CARDIOVASCULAR NEGATIVE: 1
SINUS PRESSURE: 1
GASTROINTESTINAL NEGATIVE: 1
HEADACHES: 1
CHILLS: 0
SINUS PAIN: 1

## 2023-08-15 NOTE — LETTER
August 15, 2023       Patient: Elvia Roberto   YOB: 1970   Date of Visit: 8/15/2023         To Whom It May Concern:    In my medical opinion, I recommend that Elvia Roberto be excused from work 8/15/2023 and 8/16/2023 due to illness.     If you have any questions or concerns, please don't hesitate to call [phone]          Sincerely,          [email or web address].  Electronically Signed

## 2023-08-16 NOTE — PROGRESS NOTES
"Subjective:   Elvia Roberto is a 53 y.o. female who presents for Sinus Problem (Pressure in face due to congestion, congestion in chest as well. Has been going on a few days but Is worse today. )      Sinus Problem  This is a new problem. Episode onset: 3 days. The problem is unchanged. There has been no fever. Her pain is at a severity of 4/10. The pain is moderate. Associated symptoms include congestion, coughing, headaches, sinus pressure and sneezing. Pertinent negatives include no chills. Past treatments include oral decongestants and acetaminophen. The treatment provided mild relief.       Review of Systems   Constitutional:  Positive for malaise/fatigue. Negative for chills and fever.   HENT:  Positive for congestion, sinus pressure, sinus pain and sneezing.    Eyes: Negative.    Respiratory:  Positive for cough.    Cardiovascular: Negative.    Gastrointestinal: Negative.    Genitourinary: Negative.    Musculoskeletal: Negative.    Skin: Negative.    Neurological:  Positive for headaches.       Medications, Allergies, and current problem list reviewed today in Epic.     Objective:     /72   Pulse 77   Temp 36.6 °C (97.9 °F)   Resp 12   Ht 1.6 m (5' 3\")   Wt 77.1 kg (170 lb)   SpO2 96%     Physical Exam  Vitals reviewed.   Constitutional:       General: She is not in acute distress.     Appearance: Normal appearance. She is ill-appearing.   HENT:      Head: Normocephalic and atraumatic.      Right Ear: Tympanic membrane, ear canal and external ear normal.      Left Ear: Tympanic membrane, ear canal and external ear normal.      Nose: Congestion present.      Mouth/Throat:      Mouth: Mucous membranes are moist.      Pharynx: Oropharynx is clear.   Eyes:      Extraocular Movements: Extraocular movements intact.      Conjunctiva/sclera: Conjunctivae normal.      Pupils: Pupils are equal, round, and reactive to light.   Cardiovascular:      Rate and Rhythm: Normal rate and regular rhythm.      " Pulses: Normal pulses.      Heart sounds: Normal heart sounds.   Pulmonary:      Effort: Pulmonary effort is normal.      Breath sounds: Normal breath sounds.   Abdominal:      General: Abdomen is flat.      Palpations: Abdomen is soft.   Musculoskeletal:         General: Normal range of motion.      Cervical back: Normal range of motion and neck supple.   Skin:     General: Skin is warm and dry.      Capillary Refill: Capillary refill takes less than 2 seconds.   Neurological:      General: No focal deficit present.      Mental Status: She is alert.   Psychiatric:         Mood and Affect: Mood normal.         Behavior: Behavior normal.         Lab Results/POC Test Results   Results for orders placed or performed in visit on 08/15/23   POCT CoV-2, Flu A/B, RSV by PCR   Result Value Ref Range    SARS-CoV-2 by PCR Negative Negative, Invalid    Influenza virus A RNA Negative Negative, Invalid    Influenza virus B, PCR Negative Negative, Invalid    RSV, PCR Negative Negative, Invalid           Assessment/Plan:     Diagnosis and associated orders:     1. Flu-like symptoms  POCT CoV-2, Flu A/B, RSV by PCR    albuterol 108 (90 Base) MCG/ACT Aero Soln inhalation aerosol    benzonatate (TESSALON) 100 MG Cap      2. Subacute cough  albuterol 108 (90 Base) MCG/ACT Aero Soln inhalation aerosol    benzonatate (TESSALON) 100 MG Cap         Comments/MDM:     Advised patient symptoms are viral in etiology, recommend supportive care. Increased fluids and rest.  Recommend over-the-counter cold and flu medications and Tylenol and/or ibuprofen for symptomatic relief and fevers.  Discussed use of nedi-pot, humidifier, and Flonase nasal spray as well.  Discussed good hand hygiene and ways to decrease spread of disease.  Follow-up with PCP return for reevaluation if symptoms persist/worsen.  Patient offered discharge instructions regarding viral illness.  The patient demonstrated a good understanding and agreed with the treatment plan.             Differential diagnosis, natural history, supportive care, and indications for immediate follow-up discussed.    Advised the patient to follow-up with the primary care physician for recheck, reevaluation, and consideration of further management.    Please note that this dictation was created using voice recognition software. I have made a reasonable attempt to correct obvious errors, but I expect that there are errors of grammar and possibly content that I did not discover before finalizing the note.    This note was electronically signed by ROSA Stein